# Patient Record
Sex: FEMALE | Race: WHITE | Employment: UNEMPLOYED
[De-identification: names, ages, dates, MRNs, and addresses within clinical notes are randomized per-mention and may not be internally consistent; named-entity substitution may affect disease eponyms.]

---

## 2017-11-11 ENCOUNTER — HEALTH MAINTENANCE LETTER (OUTPATIENT)
Age: 37
End: 2017-11-11

## 2018-02-27 DIAGNOSIS — F41.9 ANXIETY: ICD-10-CM

## 2018-02-27 RX ORDER — BUSPIRONE HYDROCHLORIDE 15 MG/1
15 TABLET ORAL 3 TIMES DAILY
Qty: 90 TABLET | Refills: 0 | Status: SHIPPED | OUTPATIENT
Start: 2018-02-27 | End: 2018-03-19

## 2018-02-27 RX ORDER — BUSPIRONE HYDROCHLORIDE 15 MG/1
15 TABLET ORAL 3 TIMES DAILY
Qty: 90 TABLET | Refills: 0 | Status: SHIPPED | OUTPATIENT
Start: 2018-02-27 | End: 2018-02-27

## 2018-02-27 NOTE — TELEPHONE ENCOUNTER
Reason for Call:  Medication or medication refill:    Do you use a [facility]?  Name of the pharmacy and phone number for the current request:  [facility] - [place], [place] [phone]    Name of the medication requested: Patient calling requesting a short term fill of her buspar. Patient is trying to make an appointment with Dr. Ambrocio but she does not know what her upcoming work schedule will be after March 8th. The day that would work for her to come in during the next couple weeks is a day Dr. Ambrocio is on vacation. Please call her to let her know if this can be filled or if she needs to wait to be seen.    Other request:     Can we leave a detailed message on this number? YES    Phone number patient can be reached at: Home number on file [phone] (home)    Best Time: any    Call taken on 2/27/2018 at 12:46 PM by Monique Silverio

## 2018-02-27 NOTE — TELEPHONE ENCOUNTER
Patient has not taken this medication in months.  Patient has not been seen since 7/2016. Patient states her anxiety is getting worse.  Patient was offered other appointments for evaluation and patient wants a guarantee the medication was prescribed.  Patient was advised the writer can not guarantee a prescription would be granted at the office visit.  Patient was very demanding she gets this medication.  Patient requested this be sent to provider.  Please review and advise.  Writer accidentally approved prescription. Writer called the pharmacy to cancel the prescription.      Thank you  Ekaterina RICE RN

## 2018-03-19 ENCOUNTER — OFFICE VISIT (OUTPATIENT)
Dept: FAMILY MEDICINE | Facility: CLINIC | Age: 38
End: 2018-03-19
Payer: COMMERCIAL

## 2018-03-19 VITALS
HEART RATE: 89 BPM | DIASTOLIC BLOOD PRESSURE: 74 MMHG | RESPIRATION RATE: 16 BRPM | SYSTOLIC BLOOD PRESSURE: 114 MMHG | WEIGHT: 162.2 LBS | HEIGHT: 68 IN | BODY MASS INDEX: 24.58 KG/M2

## 2018-03-19 DIAGNOSIS — F41.9 ANXIETY: ICD-10-CM

## 2018-03-19 DIAGNOSIS — F34.1 DYSTHYMIC DISORDER: ICD-10-CM

## 2018-03-19 DIAGNOSIS — G44.209 MUSCLE CONTRACTION HEADACHE: Primary | ICD-10-CM

## 2018-03-19 PROCEDURE — 99214 OFFICE O/P EST MOD 30 MIN: CPT | Performed by: FAMILY MEDICINE

## 2018-03-19 RX ORDER — NORTRIPTYLINE HYDROCHLORIDE 50 MG/1
50 CAPSULE ORAL AT BEDTIME
Qty: 30 CAPSULE | Refills: 5 | Status: SHIPPED | OUTPATIENT
Start: 2018-03-19 | End: 2018-05-15

## 2018-03-19 RX ORDER — BUSPIRONE HYDROCHLORIDE 15 MG/1
30 TABLET ORAL 2 TIMES DAILY
Qty: 120 TABLET | Refills: 5 | Status: SHIPPED | OUTPATIENT
Start: 2018-03-19 | End: 2018-05-15

## 2018-03-19 ASSESSMENT — ANXIETY QUESTIONNAIRES
GAD7 TOTAL SCORE: 13
IF YOU CHECKED OFF ANY PROBLEMS ON THIS QUESTIONNAIRE, HOW DIFFICULT HAVE THESE PROBLEMS MADE IT FOR YOU TO DO YOUR WORK, TAKE CARE OF THINGS AT HOME, OR GET ALONG WITH OTHER PEOPLE: SOMEWHAT DIFFICULT
3. WORRYING TOO MUCH ABOUT DIFFERENT THINGS: NEARLY EVERY DAY
6. BECOMING EASILY ANNOYED OR IRRITABLE: SEVERAL DAYS
7. FEELING AFRAID AS IF SOMETHING AWFUL MIGHT HAPPEN: NOT AT ALL
1. FEELING NERVOUS, ANXIOUS, OR ON EDGE: NEARLY EVERY DAY
2. NOT BEING ABLE TO STOP OR CONTROL WORRYING: NEARLY EVERY DAY
5. BEING SO RESTLESS THAT IT IS HARD TO SIT STILL: SEVERAL DAYS

## 2018-03-19 ASSESSMENT — PATIENT HEALTH QUESTIONNAIRE - PHQ9: 5. POOR APPETITE OR OVEREATING: MORE THAN HALF THE DAYS

## 2018-03-19 NOTE — MR AVS SNAPSHOT
"              After Visit Summary   3/19/2018    Sommer Gomez    MRN: [id number]           Patient Information     Date Of Birth          1980        Visit Information        Provider Department      3/19/2018 3:00 PM MIGUELINA Ambrocio MD [facility]        Today's Diagnoses     Muscle contraction headache    -  1    Anxiety        Dysthymic disorder           Follow-ups after your visit        Who to contact     If you have questions or need follow up information about today's clinic visit or your schedule please contact [facility] directly at [phone].  Normal or non-critical lab and imaging results will be communicated to you by [employer], letter or phone within 4 business days after the clinic has received the results. If you do not hear from us within 7 days, please contact the clinic through [employer] or phone. If you have a critical or abnormal lab result, we will notify you by phone as soon as possible.  Submit refill requests through [employer] or call your pharmacy and they will forward the refill request to us. Please allow 3 business days for your refill to be completed.          Additional Information About Your Visit        MyChart Information     [employer] gives you secure access to your electronic health record. If you see a primary care provider, you can also send messages to your care team and make appointments. If you have questions, please call your primary care clinic.  If you do not have a primary care provider, please call [phone] and they will assist you.        Care EveryWhere ID     This is your Care EveryWhere ID. This could be used by other organizations to access your [facility] medical records  [id number]        Your Vitals Were     Pulse Respirations Height BMI (Body Mass Index)          89 16 5' 8\" (1.727 m) 24.66 kg/m2         Blood Pressure from Last 3 Encounters:   03/19/18 114/74   07/21/16 100/70   12/04/15 92/60    Weight from Last 3 " Encounters:   03/19/18 162 lb 3.2 oz (73.6 kg)   07/21/16 149 lb (67.6 kg)   12/04/15 144 lb 6.4 oz (65.5 kg)              Today, you had the following     No orders found for display         Today's Medication Changes          These changes are accurate as of 3/19/18  4:30 PM.  If you have any questions, ask your nurse or doctor.               Start taking these medicines.        Dose/Directions    nortriptyline 50 MG capsule   Commonly known as:  PAMELOR   Used for:  Muscle contraction headache   Started by:  MIGUELINA Ambrocio MD        Dose:  50 mg   Take 1 capsule (50 mg) by mouth At Bedtime   Quantity:  30 capsule   Refills:  5         These medicines have changed or have updated prescriptions.        Dose/Directions    busPIRone 15 MG tablet   Commonly known as:  BUSPAR   This may have changed:    - how much to take  - when to take this   Used for:  Anxiety   Changed by:  MIGUELINA Ambrocio MD        Dose:  30 mg   Take 2 tablets (30 mg) by mouth 2 times daily   Quantity:  120 tablet   Refills:  5         Stop taking these medicines if you haven't already. Please contact your care team if you have questions.     traZODone 50 MG tablet   Commonly known as:  DESYREL   Stopped by:  MIGUELINA Ambrocio MD                Where to get your medicines      These medications were sent to [id number] - [place], [place] - [address]  [address] [address]     Phone:  [phone]     busPIRone 15 MG tablet    nortriptyline 50 MG capsule                Primary Care Provider Office Phone # Fax #    MIGUELINA Ambrocio [phone] [address] [address]        Equal Access to Services     [facility]: Hadii aad ku hadasho Soomaali, waaxda luqadaha, qaybta kaalmada adeegyalolis, thania andujar. So [facility] [phone].    ATENCIÓN: Si habla español, tiene a lira disposición servicios gratuitos de asistencia lingüística. Llame al  [phone].    We comply with applicable federal civil rights laws and [place] laws. We do not discriminate on the basis of race, color, national origin, age, disability, sex, sexual orientation, or gender identity.            Thank you!     Thank you for choosing [facility]  for your care. Our goal is always to provide you with excellent care. Hearing back from our patients is one way we can continue to improve our services. Please take a few minutes to complete the written survey that you may receive in the mail after your visit with us. Thank you!             Your Updated Medication List - Protect others around you: Learn how to safely use, store and throw away your medicines at www.disposemymeds.org.          This list is accurate as of 3/19/18  4:30 PM.  Always use your most recent med list.                   Brand Name Dispense Instructions for use Diagnosis    busPIRone 15 MG tablet    BUSPAR    120 tablet    Take 2 tablets (30 mg) by mouth 2 times daily    Anxiety       nortriptyline 50 MG capsule    PAMELOR    30 capsule    Take 1 capsule (50 mg) by mouth At Bedtime    Muscle contraction headache       ranitidine 300 MG tablet    ZANTAC    90 tablet    Take 1 tablet (300 mg) by mouth At Bedtime    Colitis       TYLENOL PO      Take 500-1,500 mg by mouth 2 times daily as needed.

## 2018-03-19 NOTE — NURSING NOTE
"Chief Complaint   Patient presents with     Anxiety     recheck/refill pt. would like to talk about a different medication ? headaches     Health Maintenance     pt. was reminded she is due for pap/pe.       Initial /74  Pulse 89  Resp 16  Ht 5' 8\" (1.727 m)  Wt 162 lb 3.2 oz (73.6 kg)  BMI 24.66 kg/m2 Estimated body mass index is 24.66 kg/(m^2) as calculated from the following:    Height as of this encounter: 5' 8\" (1.727 m).    Weight as of this encounter: 162 lb 3.2 oz (73.6 kg).  Medication Reconciliation: complete     Stefani Martinez, CMA      "

## 2018-03-19 NOTE — PROGRESS NOTES
Subjective:  Sommer Gomez is a 37 year old female   Chief Complaint   Patient presents with     Anxiety     recheck/refill pt. would like to talk about a different medication ? headaches     Health Maintenance     pt. was reminded she is due for pap/pe.     She has noticed that her headaches are often associated with feeling stressed or anxious.  She admits that she will frequently miss the mid day dose of her buspirone and those are often the days that she will get a headache.  The trazodone had been working well to help her sleep at night, but apparently she had quite a few cavities in her dentist looked it up and said this medication was associated with increased dental decay.  I do not find that as a listed side effect in the [employer] medication asaf.  Current symptoms include:  ROSA-7   [employer], 2002; Used with Permission) 3/19/2018   Over the last 2 weeks, how often have you been bothered by feeling nervous, anxious or on edge?    Over the last 2 weeks, how often have you been bothered by not being able to stop or control worrying?    Over the last 2 weeks, how often have you been bothered by worrying too much about different things?    Over the last 2 weeks, how often have you been bothered by trouble relaxing?    Over the last 2 weeks, how often have you been bothered by being so restless that it is hard to sit still?    Over the last 2 weeks, how often have you been bothered by becoming easily annoyed or irritable?    Over the last 2 weeks, how often have you been bothered by feeling afraid as if something awful might happen?    ROSA-7 Total Score =     1. Feeling nervous, anxious, or on edge 3   2. Not being able to stop or control worrying 3   3. Worrying too much about different things 3   4. Trouble relaxing 2   5. Being so restless that it is hard to sit still 1   6. Becoming easily annoyed or irritable 1   7. Feeling afraid, as if something awful might happen 0   ROSA-7 Total Score 13   If you  checked any problems, how difficult have they made it for you to do your work, take care of things at home, or get along with other people? Somewhat difficult     PHQ-9 ([employer]) 3/19/2018   No Interest In Doing Things    Feeling Depressed    Trouble Sleeping    Tired / No Energy    No appetite or Over-Eating    Feeling Bad about Self    Trouble Concentrating    Moving Slow or Restless    Suicidal Thoughts    Total Score    1.  Little interest or pleasure in doing things 1   2.  Feeling down, depressed, or hopeless 0   3.  Trouble falling or staying asleep, or sleeping too much 3   4.  Feeling tired or having little energy 2   5.  Poor appetite or overeating 1   6.  Feeling bad about yourself 0   7.  Trouble concentrating 1   8.  Moving slowly or restless 1   9.  Suicidal or self-harm thoughts 0   PHQ-9 Total Score 9   Difficulty at work, home, or with people Somewhat difficult   1.  Little interest or pleasure in doing things    2.  Feeling down, depressed, or hopeless    3.  Trouble falling or staying asleep, or sleeping too much    4.  Feeling tired or having little energy    5.  Poor appetite or overeating    6.  Feeling bad about yourself    7.  Trouble concentrating    8.  Moving slowly or restless    9.  Suicidal or self-harm thoughts    PHQ-9 via [employer] TOTAL SCORE----->      Encounter Diagnoses   Name Primary?     Muscle contraction headache Yes     Anxiety      Dysthymic disorder        ROS:other than noted above, general, HEENT, respiratory, cardiac, gastrointestinal systems are negative    Medical, surgical, social, and family histories, medications and allergies reviewed and updated.    Objective:  Exam:    GENERAL APPEARANCE ADULT: Alert, no acute distress  EYES: PERRL, EOM normal, conjunctiva and lids normal  RESP: lungs clear to auscultation   CV: normal rate, regular rhythm, no murmur or gallop  MS: Tenderness and tightness of the posterior neck muscles just below the occiput  NEURO: Alert,  oriented, speech and mentation normal, Cranial nerves 2-12 are normal., Strength normal and symmetrical in upper and lower extremities., Reflexes 2+ and symmetrical at biceps, triceps, brachioradialis, knees and ankles, Finger to nose and heel to shin testing is normal, Gait  normal  PSYCH: mentation appears normal., affect somewhat flat which is her baseline      ASSESSMENT:  1. Muscle contraction headache    2. Anxiety    3. Dysthymic disorder        PLAN:  Orders Placed This Encounter     busPIRone (BUSPAR) 15 MG tablet     nortriptyline (PAMELOR) 50 MG capsule       We will switch her buspirone prescription to 30 mg twice a day instead of 50 mg 3 times a day.  She should be able to achieve better compliance with it twice a day dosing.  Hopefully this will decrease the frequency of her muscle contraction headaches.  Although I had never heard of the problem with dental caries using trazodone,  will try switching to nortriptyline and see if this will work as well to help her with sleep

## 2018-03-20 ASSESSMENT — PATIENT HEALTH QUESTIONNAIRE - PHQ9: SUM OF ALL RESPONSES TO PHQ QUESTIONS 1-9: 9

## 2018-03-20 ASSESSMENT — ANXIETY QUESTIONNAIRES: GAD7 TOTAL SCORE: 13

## 2018-04-20 ENCOUNTER — TELEPHONE (OUTPATIENT)
Dept: FAMILY MEDICINE | Facility: CLINIC | Age: 38
End: 2018-04-20

## 2018-04-20 NOTE — TELEPHONE ENCOUNTER
Patient was notified and left a detailed message on the voicemail. Patient stated it was ok to leave a message.    Ekaterina RICE RN

## 2018-04-20 NOTE — TELEPHONE ENCOUNTER
Patient would like something for some future dental work she is having done.  Patient has had to be under sedation in the past.  Patient's insurance is not covering the sedation and the PA for this was refused. Patient has appointment with PCP on 5/15/18.  Patient has dentist appointment on 6/13/18.  Ok to send rx or would you like to discuss this with patient at the clinic visit in 5/15/18?    Thank you  Ekaterina RICE RN

## 2018-04-20 NOTE — TELEPHONE ENCOUNTER
Reason for Call:  Anxiety medications    Detailed comments: patient is calling and stating that she is on an anxiety medication and it increases with dental visits. She is having a tooth pulled soon, and would like something stronger to keep her calm for this.  Please call her work number of [phone] and ask for Caroline in [place], and you can leave a message on her cell phone.    Phone Number Patient can be reached at: Work number on file:  [phone] (work)    Best Time: any    Can we leave a detailed message on this number? YES   Odalys Royal  [facility] [place] [facility]      Call taken on 4/20/2018 at 12:58 PM by Odalys Royal

## 2018-04-20 NOTE — TELEPHONE ENCOUNTER
You could call the patient back and reassure her that I will give her something to help her get through the dental appointments.  We might as well wait until the office visit to do the actual prescription so I can explain this in more detail, and then she will have it for the dental visits in [place]

## 2018-05-15 ENCOUNTER — OFFICE VISIT (OUTPATIENT)
Dept: FAMILY MEDICINE | Facility: CLINIC | Age: 38
End: 2018-05-15
Payer: COMMERCIAL

## 2018-05-15 VITALS
DIASTOLIC BLOOD PRESSURE: 77 MMHG | TEMPERATURE: 97.4 F | BODY MASS INDEX: 24.25 KG/M2 | WEIGHT: 160 LBS | HEART RATE: 112 BPM | HEIGHT: 68 IN | RESPIRATION RATE: 16 BRPM | SYSTOLIC BLOOD PRESSURE: 112 MMHG

## 2018-05-15 DIAGNOSIS — F34.1 DYSTHYMIC DISORDER: Primary | ICD-10-CM

## 2018-05-15 DIAGNOSIS — F41.9 ANXIETY: ICD-10-CM

## 2018-05-15 DIAGNOSIS — F33.0 MILD RECURRENT MAJOR DEPRESSION (H): ICD-10-CM

## 2018-05-15 DIAGNOSIS — G44.209 MUSCLE CONTRACTION HEADACHE: ICD-10-CM

## 2018-05-15 PROCEDURE — 99213 OFFICE O/P EST LOW 20 MIN: CPT | Performed by: FAMILY MEDICINE

## 2018-05-15 RX ORDER — BUSPIRONE HYDROCHLORIDE 15 MG/1
30 TABLET ORAL 2 TIMES DAILY
Qty: 120 TABLET | Refills: 11 | Status: SHIPPED | OUTPATIENT
Start: 2018-05-15 | End: 2020-12-01

## 2018-05-15 RX ORDER — NORTRIPTYLINE HYDROCHLORIDE 75 MG/1
75 CAPSULE ORAL AT BEDTIME
Qty: 90 CAPSULE | Refills: 3 | Status: SHIPPED | OUTPATIENT
Start: 2018-05-15 | End: 2020-09-15

## 2018-05-15 RX ORDER — LORAZEPAM 1 MG/1
1 TABLET ORAL EVERY 8 HOURS PRN
Qty: 12 TABLET | Refills: 1 | Status: SHIPPED | OUTPATIENT
Start: 2018-05-15 | End: 2023-09-21

## 2018-05-15 NOTE — PROGRESS NOTES
Subjective:  Sommer Gomez is a 37 year old female   Chief Complaint   Patient presents with     Anxiety     Depression     Health Maintenance     pt. is due for pap/pe     She is here for follow-up of her depression and needs a refill on her medications.  She is also requesting a short acting potent antianxiety meds to use for some upcoming dental appointments.  The nortriptyline has helped her to fall asleep but she will frequently wake up after 3 or 4 hours and have difficulty going back to sleep she gets extremely anxious about these appointments and her dentist does provide sedation but her insurance will not cover.  Therefore they recommended she get a prescription for an oral medication from her physician.    Current symptoms include:  PHQ-9 ([employer]) 5/15/2018   No Interest In Doing Things    Feeling Depressed    Trouble Sleeping    Tired / No Energy    No appetite or Over-Eating    Feeling Bad about Self    Trouble Concentrating    Moving Slow or Restless    Suicidal Thoughts    Total Score    1.  Little interest or pleasure in doing things 1   2.  Feeling down, depressed, or hopeless 0   3.  Trouble falling or staying asleep, or sleeping too much 2   4.  Feeling tired or having little energy 1   5.  Poor appetite or overeating 1   6.  Feeling bad about yourself 0   7.  Trouble concentrating 1   8.  Moving slowly or restless 0   9.  Suicidal or self-harm thoughts 0   PHQ-9 Total Score 6   Difficulty at work, home, or with people Somewhat difficult   1.  Little interest or pleasure in doing things    2.  Feeling down, depressed, or hopeless    3.  Trouble falling or staying asleep, or sleeping too much    4.  Feeling tired or having little energy    5.  Poor appetite or overeating    6.  Feeling bad about yourself    7.  Trouble concentrating    8.  Moving slowly or restless    9.  Suicidal or self-harm thoughts    PHQ-9 via [employer] TOTAL SCORE----->      ROSA-7   [employer], 2002; Used with Permission)  3/19/2018   Over the last 2 weeks, how often have you been bothered by feeling nervous, anxious or on edge?    Over the last 2 weeks, how often have you been bothered by not being able to stop or control worrying?    Over the last 2 weeks, how often have you been bothered by worrying too much about different things?    Over the last 2 weeks, how often have you been bothered by trouble relaxing?    Over the last 2 weeks, how often have you been bothered by being so restless that it is hard to sit still?    Over the last 2 weeks, how often have you been bothered by becoming easily annoyed or irritable?    Over the last 2 weeks, how often have you been bothered by feeling afraid as if something awful might happen?    ROSA-7 Total Score =     1. Feeling nervous, anxious, or on edge 3   2. Not being able to stop or control worrying 3   3. Worrying too much about different things 3   4. Trouble relaxing 2   5. Being so restless that it is hard to sit still 1   6. Becoming easily annoyed or irritable 1   7. Feeling afraid, as if something awful might happen 0   ROSA-7 Total Score 13   If you checked any problems, how difficult have they made it for you to do your work, take care of things at home, or get along with other people? Somewhat difficult           Encounter Diagnoses   Name Primary?     Dysthymic disorder Yes     Mild recurrent major depression (H)      Muscle contraction headache      Anxiety        ROS:other than noted above, general, HEENT, respiratory, cardiac, gastrointestinal systems are negative    Medical, surgical, social, and family histories, medications and allergies reviewed and updated.    Objective:  Exam:    GENERAL APPEARANCE ADULT: Alert, no acute distress  EYES: PERRL, EOM normal, conjunctiva and lids normal  PSYCH: mentation appears normal., affect and mood normal      ASSESSMENT:  1. Dysthymic disorder    2. Mild recurrent major depression (H)    3. Muscle contraction headache    4. Anxiety         PLAN:  Orders Placed This Encounter     busPIRone (BUSPAR) 15 MG tablet     nortriptyline (PAMELOR) 75 MG capsule     LORazepam (ATIVAN) 1 MG tablet for dental appointments     We will increase the nortriptyline to 75 mg and see if that will not help her sleep longer through the night

## 2018-05-15 NOTE — MR AVS SNAPSHOT
After Visit Summary   5/15/2018    Sommer Gomez    MRN: [id number]           Patient Information     Date Of Birth          1980        Visit Information        Provider Department      5/15/2018 4:40 PM MIGUELINA Ambrocio MD [facility]        Today's Diagnoses     Anxiety        Muscle contraction headache           Follow-ups after your visit        Your next 10 appointments already scheduled     May 15, 2018  4:40 PM CDT   SHORT with MIGUELINA Ambrocio MD   [facility] ([facility])    [address]  [facility] [id number] [phone]              Who to contact     If you have questions or need follow up information about today's clinic visit or your schedule please contact [facility] directly at [phone].  Normal or non-critical lab and imaging results will be communicated to you by MyChart, letter or phone within 4 business days after the clinic has received the results. If you do not hear from us within 7 days, please contact the clinic through MyChart or phone. If you have a critical or abnormal lab result, we will notify you by phone as soon as possible.  Submit refill requests through "[employer] or call your pharmacy and they will forward the refill request to us. Please allow 3 business days for your refill to be completed.          Additional Information About Your Visit        MyChart Information     "[employer] gives you secure access to your electronic health record. If you see a primary care provider, you can also send messages to your care team and make appointments. If you have questions, please call your primary care clinic.  If you do not have a primary care provider, please call [phone] and they will assist you.        Care EveryWhere ID     This is your Care EveryWhere ID. This could be used by other organizations to access your [facility] medical records  [id number]        Your Vitals Were      "Pulse Temperature Respirations Height BMI (Body Mass Index)       112 97.4  F (36.3  C) (Tympanic) 16 5' 8\" (1.727 m) 24.33 kg/m2        Blood Pressure from Last 3 Encounters:   05/15/18 112/77   03/19/18 114/74   07/21/16 100/70    Weight from Last 3 Encounters:   05/15/18 160 lb (72.6 kg)   03/19/18 162 lb 3.2 oz (73.6 kg)   07/21/16 149 lb (67.6 kg)              Today, you had the following     No orders found for display         Today's Medication Changes          These changes are accurate as of 5/15/18  4:29 PM.  If you have any questions, ask your nurse or doctor.               Start taking these medicines.        Dose/Directions    LORazepam 1 MG tablet   Commonly known as:  ATIVAN   Used for:  Anxiety        Dose:  1 mg   Take 1 tablet (1 mg) by mouth every 8 hours as needed for anxiety Take 30 minutes prior to dental appointment.  Do not operate a vehicle after taking this medication   Quantity:  12 tablet   Refills:  1         These medicines have changed or have updated prescriptions.        Dose/Directions    nortriptyline 75 MG capsule   Commonly known as:  PAMELOR   This may have changed:    - medication strength  - how much to take   Used for:  Muscle contraction headache        Dose:  75 mg   Take 1 capsule (75 mg) by mouth At Bedtime   Quantity:  90 capsule   Refills:  3            Where to get your medicines      These medications were sent to [id number] - [place], [place] - [address]  [address] [address]     Phone:  [phone]     busPIRone 15 MG tablet    nortriptyline 75 MG capsule         Some of these will need a paper prescription and others can be bought over the counter.  Ask your nurse if you have questions.     Bring a paper prescription for each of these medications     LORazepam 1 MG tablet                Primary Care Provider Office Phone # Fax #    MIGUELINA Ambrocio [phone] [address] " [place] [address]        Equal Access to Services     [facility] : Hadii giselle jeff ken Calvert, wasmoothda luqadaha, qaybta kaalmada radhacarlynlolis, waxarnav sylvain escamillapreetantoine andujar. So [facility] [phone].    ATENCIÓN: Si habla español, tiene a lira disposición servicios gratuitos de asistencia lingüística. Rakeshame al [phone].    We comply with applicable federal civil rights laws and [place] laws. We do not discriminate on the basis of race, color, national origin, age, disability, sex, sexual orientation, or gender identity.            Thank you!     Thank you for choosing [facility]  for your care. Our goal is always to provide you with excellent care. Hearing back from our patients is one way we can continue to improve our services. Please take a few minutes to complete the written survey that you may receive in the mail after your visit with us. Thank you!             Your Updated Medication List - Protect others around you: Learn how to safely use, store and throw away your medicines at www.disposemymeds.org.          This list is accurate as of 5/15/18  4:29 PM.  Always use your most recent med list.                   Brand Name Dispense Instructions for use Diagnosis    busPIRone 15 MG tablet    BUSPAR    120 tablet    Take 2 tablets (30 mg) by mouth 2 times daily    Anxiety       LORazepam 1 MG tablet    ATIVAN    12 tablet    Take 1 tablet (1 mg) by mouth every 8 hours as needed for anxiety Take 30 minutes prior to dental appointment.  Do not operate a vehicle after taking this medication    Anxiety       nortriptyline 75 MG capsule    PAMELOR    90 capsule    Take 1 capsule (75 mg) by mouth At Bedtime    Muscle contraction headache       ranitidine 300 MG tablet    ZANTAC    90 tablet    Take 1 tablet (300 mg) by mouth At Bedtime    Colitis       TYLENOL PO      Take 500-1,500 mg by mouth 2 times daily as needed.

## 2018-05-16 ASSESSMENT — PATIENT HEALTH QUESTIONNAIRE - PHQ9: SUM OF ALL RESPONSES TO PHQ QUESTIONS 1-9: 6

## 2018-09-02 ENCOUNTER — NURSE TRIAGE (OUTPATIENT)
Dept: NURSING | Facility: CLINIC | Age: 38
End: 2018-09-02

## 2018-09-02 ENCOUNTER — HOSPITAL ENCOUNTER (EMERGENCY)
Facility: CLINIC | Age: 38
Discharge: HOME | End: 2018-09-02
Attending: PHYSICIAN ASSISTANT | Admitting: PHYSICIAN ASSISTANT
Payer: COMMERCIAL

## 2018-09-02 VITALS
DIASTOLIC BLOOD PRESSURE: 90 MMHG | RESPIRATION RATE: 16 BRPM | SYSTOLIC BLOOD PRESSURE: 139 MMHG | BODY MASS INDEX: 24.33 KG/M2 | TEMPERATURE: 98.7 F | OXYGEN SATURATION: 99 % | WEIGHT: 160 LBS | HEART RATE: 108 BPM

## 2018-09-02 PROCEDURE — G0463 HOSPITAL OUTPT CLINIC VISIT: HCPCS | Performed by: PHYSICIAN ASSISTANT

## 2018-09-02 PROCEDURE — 99213 OFFICE O/P EST LOW 20 MIN: CPT | Mod: Z6 | Performed by: PHYSICIAN ASSISTANT

## 2018-09-02 RX ORDER — ALBUTEROL SULFATE 90 UG/1
2 AEROSOL, METERED RESPIRATORY (INHALATION) EVERY 6 HOURS PRN
Qty: 1 INHALER | Refills: 0 | Status: SHIPPED | OUTPATIENT
Start: 2018-09-02 | End: 2019-09-09

## 2018-09-02 NOTE — ED AVS SNAPSHOT
[facility]    [address] [address]    Phone:  [phone]    Fax:  [phone]                                       Sommer Gomez   MRN: [id number]    Department:  [facility]   Date of Visit:  9/2/2018           After Visit Summary Signature Page     I have received my discharge instructions, and my questions have been answered. I have discussed any challenges I see with this plan with the nurse or doctor.    ..........................................................................................................................................  Patient/Patient Representative Signature      ..........................................................................................................................................  Patient Representative Print Name and Relationship to Patient    ..................................................               ................................................  Date                                            Time    ..........................................................................................................................................  Reviewed by Signature/Title    ...................................................              ..............................................  Date                                                            Time          22EPIC Rev 08/18

## 2018-09-02 NOTE — ED AVS SNAPSHOT
[facility]    [address] [address]    Phone:  [phone]    Fax:  [phone]                                       Sommer Gomez   MRN: [id number]    Department:  [facility]   Date of Visit:  9/2/2018           Patient Information     Date Of Birth          1980        Your diagnoses for this visit were:     Exposure to respiratory irritant, initial encounter        You were seen by Amara Grullon PA-C.      Follow-up Information     Call MIGUELINA Ambrocio MD.    Specialty:  Family Practice    Why:  As needed, For persistent symptoms    Contact information:    [address] [address]  [phone]          Follow up with [facility].    Specialty:  EMERGENCY MEDICINE    Why:  As needed, If symptoms worsen    Contact information:    [address] [place] [id number] [phone]    Additional information:    The medical center is located at   [address] ([address] in [place], four miles north   of [place]).      24 Hour Appointment Hotline       To make an appointment at any [facility] clinic, call [phone] ([phone]). If you don't have a family doctor or clinic, we will help you find one. [facility] clinics are conveniently located to serve the needs of you and your family.             Review of your medicines      START taking        Dose / Directions Last dose taken    albuterol 108 (90 Base) MCG/ACT inhaler   Commonly known as:  PROAIR HFA/PROVENTIL HFA/VENTOLIN HFA   Dose:  2 puff   Quantity:  1 Inhaler        Inhale 2 puffs into the lungs every 6 hours as needed for shortness of breath / dyspnea or wheezing   Refills:  0          Our records show that you are taking the medicines listed below. If these are incorrect, please call your family doctor or clinic.        Dose / Directions Last dose taken    busPIRone 15 MG tablet    Commonly known as:  BUSPAR   Dose:  30 mg   Quantity:  120 tablet        Take 2 tablets (30 mg) by mouth 2 times daily   Refills:  11        LORazepam 1 MG tablet   Commonly known as:  ATIVAN   Dose:  1 mg   Quantity:  12 tablet        Take 1 tablet (1 mg) by mouth every 8 hours as needed for anxiety Take 30 minutes prior to dental appointment.  Do not operate a vehicle after taking this medication   Refills:  1        nortriptyline 75 MG capsule   Commonly known as:  PAMELOR   Dose:  75 mg   Quantity:  90 capsule        Take 1 capsule (75 mg) by mouth At Bedtime   Refills:  3        ranitidine 300 MG tablet   Commonly known as:  ZANTAC   Dose:  300 mg   Quantity:  90 tablet        Take 1 tablet (300 mg) by mouth At Bedtime   Refills:  3        TYLENOL PO   Dose:  500-1500 mg        Take 500-1,500 mg by mouth 2 times daily as needed.   Refills:  0                Prescriptions were sent or printed at these locations (1 Prescription)                   [facility] [address]   [address] [address]    Telephone:  [phone]   Fax:  [phone]   Hours:                  E-Prescribed (1 of 1)         albuterol (PROAIR HFA/PROVENTIL HFA/VENTOLIN HFA) 108 (90 Base) MCG/ACT inhaler                Orders Needing Specimen Collection     None      Pending Results     No orders found from 8/31/2018 to 9/3/2018.            Pending Culture Results     No orders found from 8/31/2018 to 9/3/2018.            Pending Results Instructions     If you had any lab results that were not finalized at the time of your Discharge, you can call the ED Lab Result RN at [phone]. You will be contacted by this team for any positive Lab results or changes in treatment. The nurses are available 7 days a week from 10A to 6:30P.  You can leave a message 24 hours per day and they will return your call.        Test Results From Your Hospital Stay               Thank you for choosing [facility]        Thank you for choosing [facility] for your care. Our goal is always to provide you with excellent care. Hearing back from our patients is one way we can continue to improve our services. Please take a few minutes to complete the written survey that you may receive in the mail after you visit with us. Thank you!        [employer] Information     [employer] gives you secure access to your electronic health record. If you see a primary care provider, you can also send messages to your care team and make appointments. If you have questions, please call your primary care clinic.  If you do not have a primary care provider, please call [phone] and they will assist you.        Care EveryWhere ID     This is your Care EveryWhere ID. This could be used by other organizations to access your [facility] medical records  [id number]        Equal Access to Services     KODY RÍOS : Cathy Calvert, mika fernández, steve hernandez, thania andujar. So [facility] [phone].    ATENCIÓN: Si habla español, tiene a lira disposición servicios gratuitos de asistencia lingüística. Llame al [phone].    We comply with applicable federal civil rights laws and [place] laws. We do not discriminate on the basis of race, color, national origin, age, disability, sex, sexual orientation, or gender identity.            After Visit Summary       This is your record. Keep this with you and show to your community pharmacist(s) and doctor(s) at your next visit.

## 2018-09-02 NOTE — ED TRIAGE NOTES
Patient presents today with possible allergic reaction. Patient states that she has a history with being sensitive to certain laundry detergents etc.

## 2018-09-02 NOTE — TELEPHONE ENCOUNTER
Should 911 but will go to ER.  Rocío Cates RN  [facility]      Reason for Disposition    Wheezing, stridor, hoarseness, or difficulty breathing    Protocols used: ANAPHYLAXIS-ADULT-AH

## 2018-09-02 NOTE — ED PROVIDER NOTES
History     Chief Complaint   Patient presents with     Allergic Reaction     reports chemical exposure at work,  felt  hard to breath and c/o chest pain. reports it is now improved after benadryl. triage nurse told pt to come in     HPI  Sommer Gomez is a 37 year old female who presents with complaints of irritant exposure around 2 PM today.  Pt states she was in the [place]le of the laundry detergents at a store when she developed chest tightness from inhaling all of the laundry detergent scents which she states was overwhelming.  Pt denies any difficulties breathing, shortness of breath, or wheezing.  Pt also developed diffuse itching at that time as well.  Pt reports similar symptoms in the past whenever she is exposed to strong perfumes or strong scents.  Pt states she is very sensitive to scents.  Pt took 2 tabs of Benadryl prior to coming in with improvement in her symptoms.  No rash.  Pt denies difficulties swallowing or the sensation of her throat closing/swelling.  Denies fevers, chills, cough, sore throat, sinus pressure, nasal congestion, nausea, vomiting, or abdominal pain.  She states the nurse advice line recommended she be evaluated.      Problem List:    Patient Active Problem List    Diagnosis Date Noted     Impingement syndrome of shoulder region 12/11/2014     Priority: Medium     Fatigue 11/14/2012     Priority: Medium     Anxiety 11/02/2012     Priority: Medium     Health Care Home 10/16/2011     Priority: Medium     EMERGENCY CARE PLAN  Presenting Problem Signs and Symptoms Treatment Plan    Questions or conerns during clinic hours    I will call the clinic directly     Questions or conerns outside clinic hours    I will call the 24 hour nurse line at [phone]    Patient needs to schedule an appointment    I will call the 24 hour scheduling team at [phone] or clinic directly    Same day treatment     I will call the clinic first, nurse line if after hours, urgent care and express  care if needed                            DX V65.8 REPLACED WITH [id number] HEALTH CARE HOME (04/08/2013)       Mild recurrent major depression (H) 08/05/2011     Priority: Medium     CARDIOVASCULAR SCREENING; LDL GOAL LESS THAN 160 10/31/2010     Priority: Medium     Colitis 12/10/2008     Priority: Medium     Dysthymic disorder 08/31/2006     Priority: Medium     8/31/2006  Pt has hx of depression, family hx of depression (all her brothers are on lexapro), and hx of psot-partum depression.  8/31/2006 PHQ9 score is 14.            Past Medical History:    Past Medical History:   Diagnosis Date     Chondromalacia of patella        Past Surgical History:    Past Surgical History:   Procedure Laterality Date     COLONOSCOPY  3/1/2011    flex sig     COLONOSCOPY      colonoscopy     TUBAL LIGATION  12/08    essure occlusion       Family History:    Family History   Problem Relation Age of Onset     Allergies Father      Alzheimer Disease Maternal Grandmother      Blood Disease Maternal Grandmother      Hypertension Maternal Grandfather      Arthritis Paternal Grandmother      Depression Brother      Depression Brother      Depression Mother      Allergies Son      Allergies Son        Social History:  Marital Status:  Legally  [3]  Social History   Substance Use Topics     Smoking status: Never Smoker     Smokeless tobacco: Never Used     Alcohol use No        Medications:      albuterol (PROAIR HFA/PROVENTIL HFA/VENTOLIN HFA) 108 (90 Base) MCG/ACT inhaler   Acetaminophen (TYLENOL PO)   busPIRone (BUSPAR) 15 MG tablet   LORazepam (ATIVAN) 1 MG tablet   nortriptyline (PAMELOR) 75 MG capsule   ranitidine (ZANTAC) 300 MG tablet         Review of Systems   Constitutional: Negative.    HENT: Negative.    Respiratory: Positive for chest tightness. Negative for cough, choking, shortness of breath and wheezing.    Cardiovascular: Negative.  Negative for chest pain.   Gastrointestinal: Negative.    Skin: Negative.    All  other systems reviewed and are negative.      Physical Exam   BP: 139/90  Pulse: 108  Temp: 98.7  F (37.1  C)  Resp: 16  Weight: 72.6 kg (160 lb)  SpO2: 99 %      Physical Exam   Constitutional: She appears well-developed and well-nourished. No distress.   HENT:   Head: Normocephalic and atraumatic.   Nose: Nose normal.   Mouth/Throat: Uvula is midline, oropharynx is clear and moist and mucous membranes are normal.   Eyes: Conjunctivae and EOM are normal. Pupils are equal, round, and reactive to light.   Neck: Normal range of motion. Neck supple.   Cardiovascular: Normal rate, regular rhythm and normal heart sounds.    Pulmonary/Chest: Effort normal and breath sounds normal. No respiratory distress. She has no wheezes. She has no rales.   Musculoskeletal: Normal range of motion.   Neurological: She is alert.   Skin: Skin is warm and dry. No rash noted.       ED Course     ED Course     Procedures    No results found for this or any previous visit (from the past 24 hour(s)).    Medications - No data to display    Assessments & Plan (with Medical Decision Making)     Pt is a 37 year old female who presents with complaints of irritant exposure around 2 PM today.  Pt states she was [place] the [place] the laundry detergents at a store when she developed chest tightness from inhaling all of the laundry detergent scents which she states was overwhelming.  Pt denies any difficulties breathing, shortness of breath, or wheezing.  Pt also developed diffuse itching at that time as well.  Pt reports similar symptoms in the past whenever she is exposed to strong perfumes or strong scents.  Pt states she is very sensitive to scents.  Pt took 2 tabs of Benadryl prior to coming in with improvement in her symptoms.  No rash.  Pt denies difficulties swallowing or the sensation of her throat closing/swelling.  Pt is afebrile on arrival.  Exam as above.  Pt's O2 sats are 99% on room air.  Lungs are clear to ausculation bilaterally.  We  discussed obtaining a CXR but since patient is improving with reassuring exam, will hold-off at this time.  Patient is in agreement with this.  Return precautions were reviewed.  Hand-outs were provided.    Patient was sent with an Albuterol inhaler and was instructed to follow-up with PCP in 3-5 days for continued care and management or sooner if new or worsening symptoms.  She is to return to the ED for persistent and/or worsening symptoms.  Patient expressed understanding of the diagnosis and plan and was discharged home in good condition.    I have reviewed the nursing notes.    I have reviewed the findings, diagnosis, plan and need for follow up with the patient.    Discharge Medication List as of 9/2/2018  5:08 PM      START taking these medications    Details   albuterol (PROAIR HFA/PROVENTIL HFA/VENTOLIN HFA) 108 (90 Base) MCG/ACT inhaler Inhale 2 puffs into the lungs every 6 hours as needed for shortness of breath / dyspnea or wheezing, Disp-1 Inhaler, R-0, E-Prescribe             Final diagnoses:   Exposure to respiratory irritant, initial encounter       9/2/2018   [facility] EMERGENCY DEPARTMENT     Amara Grullon PA-C  09/03/18 1646       Amara Grullon PA-C  09/12/18 1000       Amara Grullon PA-C  09/12/18 1000

## 2018-09-12 ASSESSMENT — ENCOUNTER SYMPTOMS
CONSTITUTIONAL NEGATIVE: 1
CHEST TIGHTNESS: 1
WHEEZING: 0
SHORTNESS OF BREATH: 0
CHOKING: 0
CARDIOVASCULAR NEGATIVE: 1
COUGH: 0
GASTROINTESTINAL NEGATIVE: 1

## 2019-02-06 ENCOUNTER — TELEPHONE (OUTPATIENT)
Dept: FAMILY MEDICINE | Facility: CLINIC | Age: 39
End: 2019-02-06

## 2019-02-06 NOTE — TELEPHONE ENCOUNTER
Panel Management Review      Patient has the following on her problem list:     Depression / Dysthymia review    Measure:  Needs PHQ-9 score of 4 or less during index window.  Administer PHQ-9 and if score is 5 or more, send encounter to provider for next steps.    5 - 7 month window range: Due    PHQ-9 SCORE 7/21/2016 3/19/2018 5/15/2018   PHQ-9 Total Score - - -   PHQ-9 Total Score MyChart - - -   PHQ-9 Total Score 4 9 6       If PHQ-9 recheck is 5 or more, route to provider for next steps.    Patient is due for:  PHQ9      Composite cancer screening  Chart review shows that this patient is due/due soon for the following Pap Smear  Summary:    Patient is due/failing the following:   PAP and PHQ9    Action needed:   Patient needs office visit for pap. and Patient needs to do PHQ9.    Type of outreach:    Sent MyChart message.    Questions for provider review:    None                                                                                                                                    Penny Batista MA       Chart routed to Care Team .

## 2019-08-12 ENCOUNTER — OFFICE VISIT (OUTPATIENT)
Dept: FAMILY MEDICINE | Facility: CLINIC | Age: 39
End: 2019-08-12
Payer: COMMERCIAL

## 2019-08-12 VITALS
OXYGEN SATURATION: 99 % | DIASTOLIC BLOOD PRESSURE: 72 MMHG | HEIGHT: 68 IN | HEART RATE: 72 BPM | WEIGHT: 159.8 LBS | BODY MASS INDEX: 24.22 KG/M2 | SYSTOLIC BLOOD PRESSURE: 108 MMHG | TEMPERATURE: 98 F

## 2019-08-12 DIAGNOSIS — Z23 NEED FOR TDAP VACCINATION: ICD-10-CM

## 2019-08-12 DIAGNOSIS — K21.9 GASTROESOPHAGEAL REFLUX DISEASE WITHOUT ESOPHAGITIS: ICD-10-CM

## 2019-08-12 DIAGNOSIS — F41.9 ANXIETY: Primary | ICD-10-CM

## 2019-08-12 PROCEDURE — 90471 IMMUNIZATION ADMIN: CPT | Performed by: FAMILY MEDICINE

## 2019-08-12 PROCEDURE — 99214 OFFICE O/P EST MOD 30 MIN: CPT | Mod: 25 | Performed by: FAMILY MEDICINE

## 2019-08-12 PROCEDURE — 90715 TDAP VACCINE 7 YRS/> IM: CPT | Performed by: FAMILY MEDICINE

## 2019-08-12 RX ORDER — BUSPIRONE HYDROCHLORIDE 10 MG/1
10 TABLET ORAL 2 TIMES DAILY
Qty: 60 TABLET | Refills: 0 | Status: SHIPPED | OUTPATIENT
Start: 2019-08-12 | End: 2019-09-09

## 2019-08-12 ASSESSMENT — ENCOUNTER SYMPTOMS
FEVER: 0
DIARRHEA: 0
WEAKNESS: 0
BREAST MASS: 0
SHORTNESS OF BREATH: 0
HEARTBURN: 0
PALPITATIONS: 0
ABDOMINAL PAIN: 0
COUGH: 0
DYSURIA: 0
FREQUENCY: 0
PARESTHESIAS: 0
SORE THROAT: 0
MYALGIAS: 0
HEADACHES: 0
DIZZINESS: 0
NAUSEA: 0
NERVOUS/ANXIOUS: 1
HEMATOCHEZIA: 0
CHILLS: 0
ARTHRALGIAS: 0
JOINT SWELLING: 0
EYE PAIN: 0
CONSTIPATION: 0
HEMATURIA: 0

## 2019-08-12 ASSESSMENT — ANXIETY QUESTIONNAIRES
3. WORRYING TOO MUCH ABOUT DIFFERENT THINGS: SEVERAL DAYS
7. FEELING AFRAID AS IF SOMETHING AWFUL MIGHT HAPPEN: NOT AT ALL
GAD7 TOTAL SCORE: 14
6. BECOMING EASILY ANNOYED OR IRRITABLE: SEVERAL DAYS
5. BEING SO RESTLESS THAT IT IS HARD TO SIT STILL: NEARLY EVERY DAY
2. NOT BEING ABLE TO STOP OR CONTROL WORRYING: NEARLY EVERY DAY
1. FEELING NERVOUS, ANXIOUS, OR ON EDGE: NEARLY EVERY DAY

## 2019-08-12 ASSESSMENT — PATIENT HEALTH QUESTIONNAIRE - PHQ9
5. POOR APPETITE OR OVEREATING: NEARLY EVERY DAY
SUM OF ALL RESPONSES TO PHQ QUESTIONS 1-9: 12

## 2019-08-12 ASSESSMENT — MIFFLIN-ST. JEOR: SCORE: 1455.41

## 2019-08-12 NOTE — PATIENT INSTRUCTIONS
Dolly Miguel,     Thank you for allowing [facility] to manage your care.    I sent your prescriptions to your pharmacy.    I made a therapy referral, they will be in 1-2 weeks to set up your appointment.  If you do not hear from them, please call the specialty number on your after visit.     If you have any questions or concerns, please feel free to call us at [phone].    Sincerely,    Dr. Shultz
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
## 2019-08-13 ASSESSMENT — ANXIETY QUESTIONNAIRES: GAD7 TOTAL SCORE: 14

## 2019-09-09 ENCOUNTER — OFFICE VISIT (OUTPATIENT)
Dept: FAMILY MEDICINE | Facility: CLINIC | Age: 39
End: 2019-09-09
Payer: COMMERCIAL

## 2019-09-09 VITALS
OXYGEN SATURATION: 100 % | HEART RATE: 67 BPM | BODY MASS INDEX: 24.48 KG/M2 | DIASTOLIC BLOOD PRESSURE: 68 MMHG | WEIGHT: 156 LBS | SYSTOLIC BLOOD PRESSURE: 112 MMHG | TEMPERATURE: 98 F | HEIGHT: 67 IN

## 2019-09-09 DIAGNOSIS — F41.9 ANXIETY: Primary | ICD-10-CM

## 2019-09-09 DIAGNOSIS — R06.02 SHORTNESS OF BREATH: ICD-10-CM

## 2019-09-09 PROCEDURE — 99213 OFFICE O/P EST LOW 20 MIN: CPT | Performed by: FAMILY MEDICINE

## 2019-09-09 RX ORDER — ALBUTEROL SULFATE 90 UG/1
2 AEROSOL, METERED RESPIRATORY (INHALATION) EVERY 6 HOURS PRN
Qty: 1 INHALER | Refills: 3 | Status: SHIPPED | OUTPATIENT
Start: 2019-09-09 | End: 2020-03-24

## 2019-09-09 RX ORDER — BUSPIRONE HYDROCHLORIDE 10 MG/1
10 TABLET ORAL 2 TIMES DAILY
Qty: 180 TABLET | Refills: 3 | Status: SHIPPED | OUTPATIENT
Start: 2019-09-09 | End: 2020-10-21

## 2019-09-09 ASSESSMENT — ENCOUNTER SYMPTOMS
HEMATURIA: 0
WEAKNESS: 0
MYALGIAS: 0
COUGH: 0
CONSTIPATION: 0
HEADACHES: 0
NAUSEA: 0
NERVOUS/ANXIOUS: 0
CHILLS: 0
ARTHRALGIAS: 0
PALPITATIONS: 0
DYSURIA: 0
DIZZINESS: 0
JOINT SWELLING: 0
SHORTNESS OF BREATH: 1
BREAST MASS: 0
ABDOMINAL PAIN: 0
FREQUENCY: 0
DIARRHEA: 0
HEMATOCHEZIA: 0
HEARTBURN: 0
SORE THROAT: 0
FEVER: 0
EYE PAIN: 0
PARESTHESIAS: 0

## 2019-09-09 ASSESSMENT — MIFFLIN-ST. JEOR: SCORE: 1434.2
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
## 2019-09-09 NOTE — PATIENT INSTRUCTIONS
Dolly Gomez,    Thank you for allowing [facility] to manage your care.    I sent your prescriptions to your pharmacy.    If you have any questions or concerns, please feel free to call us at [phone].    Sincerely,    Dr. Shultz

## 2019-10-31 ENCOUNTER — OFFICE VISIT (OUTPATIENT)
Dept: PSYCHOLOGY | Facility: CLINIC | Age: 39
End: 2019-10-31
Payer: MEDICAID

## 2019-10-31 DIAGNOSIS — F43.23 ADJUSTMENT DISORDER WITH MIXED ANXIETY AND DEPRESSED MOOD: Primary | ICD-10-CM

## 2019-10-31 PROCEDURE — 90834 PSYTX W PT 45 MINUTES: CPT | Performed by: COUNSELOR

## 2019-10-31 ASSESSMENT — ANXIETY QUESTIONNAIRES
6. BECOMING EASILY ANNOYED OR IRRITABLE: MORE THAN HALF THE DAYS
3. WORRYING TOO MUCH ABOUT DIFFERENT THINGS: MORE THAN HALF THE DAYS
2. NOT BEING ABLE TO STOP OR CONTROL WORRYING: MORE THAN HALF THE DAYS
IF YOU CHECKED OFF ANY PROBLEMS ON THIS QUESTIONNAIRE, HOW DIFFICULT HAVE THESE PROBLEMS MADE IT FOR YOU TO DO YOUR WORK, TAKE CARE OF THINGS AT HOME, OR GET ALONG WITH OTHER PEOPLE: VERY DIFFICULT
7. FEELING AFRAID AS IF SOMETHING AWFUL MIGHT HAPPEN: NOT AT ALL
GAD7 TOTAL SCORE: 14
5. BEING SO RESTLESS THAT IT IS HARD TO SIT STILL: MORE THAN HALF THE DAYS
1. FEELING NERVOUS, ANXIOUS, OR ON EDGE: NEARLY EVERY DAY

## 2019-10-31 ASSESSMENT — PATIENT HEALTH QUESTIONNAIRE - PHQ9
5. POOR APPETITE OR OVEREATING: NEARLY EVERY DAY
SUM OF ALL RESPONSES TO PHQ QUESTIONS 1-9: 10
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
## 2019-10-31 NOTE — PATIENT INSTRUCTIONS
"Step 2: Coping strategies - Things I can do to take my mind off of my problems without contacting another person (relaxation technique, physical activity):    Distress Tolerance Strategies:  read a book:   , pick a color and count how many things in the room you can find, grounding (5 things you see, 4 things you feel, 3 things you hear, 2 things you smell, 1 thing you taste OR 1 thing you have control of), square breathing    Physical Activities: go for a walk, yoga and deep breathing, get outside    Focus on helpful thoughts:  \"What can I control right now\"  Step 3: People and social settings that provide distraction:    Spend time with kids and boyfriend  Step 4: Remind myself of people and things that are important to me and worth living for:    Kids, boyfriend        Suicide Prevention Lifeline: 0-737-097-LLUG (6769)    Crisis Text Line Service (available 24 hours a day, 7 days a week): Text [place] to 813421          "

## 2019-11-02 ASSESSMENT — ANXIETY QUESTIONNAIRES: GAD7 TOTAL SCORE: 14

## 2019-11-06 ENCOUNTER — HEALTH MAINTENANCE LETTER (OUTPATIENT)
Age: 39
End: 2019-11-06

## 2019-11-07 ENCOUNTER — FCC EXTENDED DOCUMENTATION (OUTPATIENT)
Dept: PSYCHOLOGY | Facility: CLINIC | Age: 39
End: 2019-11-07

## 2019-11-14 ENCOUNTER — TELEPHONE (OUTPATIENT)
Dept: FAMILY MEDICINE | Facility: CLINIC | Age: 39
End: 2019-11-14

## 2019-11-14 ENCOUNTER — OFFICE VISIT (OUTPATIENT)
Dept: PSYCHOLOGY | Facility: CLINIC | Age: 39
End: 2019-11-14
Payer: MEDICAID

## 2019-11-14 DIAGNOSIS — F41.1 GENERALIZED ANXIETY DISORDER: Primary | ICD-10-CM

## 2019-11-14 DIAGNOSIS — K21.9 GASTROESOPHAGEAL REFLUX DISEASE WITHOUT ESOPHAGITIS: Primary | ICD-10-CM

## 2019-11-14 PROCEDURE — 90791 PSYCH DIAGNOSTIC EVALUATION: CPT | Performed by: COUNSELOR

## 2019-11-14 RX ORDER — FAMOTIDINE 20 MG/1
20 TABLET, FILM COATED ORAL 2 TIMES DAILY
Qty: 180 TABLET | Refills: 1 | Status: SHIPPED | OUTPATIENT
Start: 2019-11-14 | End: 2020-10-21

## 2019-11-14 ASSESSMENT — COLUMBIA-SUICIDE SEVERITY RATING SCALE - C-SSRS
2. HAVE YOU ACTUALLY HAD ANY THOUGHTS OF KILLING YOURSELF LIFETIME?: YES
TOTAL  NUMBER OF INTERRUPTED ATTEMPTS LIFETIME: YES
MOST RECENT DATE: 62823
REASONS FOR IDEATION LIFETIME: COMPLETELY TO END OR STOP THE PAIN (YOU COULDN'T GO ON LIVING WITH THE PAIN OR HOW YOU WERE FEELING)
ATTEMPT LIFETIME: YES
TOTAL  NUMBER OF ACTUAL ATTEMPTS LIFETIME: 2
6. HAVE YOU EVER DONE ANYTHING, STARTED TO DO ANYTHING, OR PREPARED TO DO ANYTHING TO END YOUR LIFE?: YES
4. HAVE YOU HAD THESE THOUGHTS AND HAD SOME INTENTION OF ACTING ON THEM?: YES
1. IN THE PAST MONTH, HAVE YOU WISHED YOU WERE DEAD OR WISHED YOU COULD GO TO SLEEP AND NOT WAKE UP?: YES
5. HAVE YOU STARTED TO WORK OUT OR WORKED OUT THE DETAILS OF HOW TO KILL YOURSELF? DO YOU INTEND TO CARRY OUT THIS PLAN?: YES
TOTAL  NUMBER OF INTERRUPTED ATTEMPTS PAST 3 MONTHS: 1
3. HAVE YOU BEEN THINKING ABOUT HOW YOU MIGHT KILL YOURSELF?: YES
1. IN THE PAST MONTH, HAVE YOU WISHED YOU WERE DEAD OR WISHED YOU COULD GO TO SLEEP AND NOT WAKE UP?: NO
TOTAL  NUMBER OF ABORTED OR SELF INTERRUPTED ATTEMPTS PAST LIFETIME: NO
2. HAVE YOU ACTUALLY HAD ANY THOUGHTS OF KILLING YOURSELF?: NO

## 2019-11-14 ASSESSMENT — PATIENT HEALTH QUESTIONNAIRE - PHQ9
5. POOR APPETITE OR OVEREATING: NEARLY EVERY DAY
SUM OF ALL RESPONSES TO PHQ QUESTIONS 1-9: 10

## 2019-11-14 ASSESSMENT — ANXIETY QUESTIONNAIRES
GAD7 TOTAL SCORE: 14
IF YOU CHECKED OFF ANY PROBLEMS ON THIS QUESTIONNAIRE, HOW DIFFICULT HAVE THESE PROBLEMS MADE IT FOR YOU TO DO YOUR WORK, TAKE CARE OF THINGS AT HOME, OR GET ALONG WITH OTHER PEOPLE: SOMEWHAT DIFFICULT
5. BEING SO RESTLESS THAT IT IS HARD TO SIT STILL: MORE THAN HALF THE DAYS
3. WORRYING TOO MUCH ABOUT DIFFERENT THINGS: NEARLY EVERY DAY
7. FEELING AFRAID AS IF SOMETHING AWFUL MIGHT HAPPEN: NOT AT ALL
2. NOT BEING ABLE TO STOP OR CONTROL WORRYING: NEARLY EVERY DAY
1. FEELING NERVOUS, ANXIOUS, OR ON EDGE: MORE THAN HALF THE DAYS
6. BECOMING EASILY ANNOYED OR IRRITABLE: SEVERAL DAYS
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
## 2019-11-14 NOTE — TELEPHONE ENCOUNTER
Please notify patient of ranitidine recall.  Please discontinue ranitidine.  I am switching her to famotidine (please go over medication instruction with patient).     1. Gastroesophageal reflux disease without esophagitis  - famotidine (PEPCID) 20 MG tablet; Take 1 tablet (20 mg) by mouth 2 times daily  Dispense: 180 tablet; Refill: 1

## 2019-11-14 NOTE — Clinical Note
Gera Shultz,See attached for completed DA. Myriam and I will begin working together to address anxiety and clarification of diagnosis (some limited insight, will explore possible depression and PTSD). When I am on leave, she will likely transition to another [facility] provider for support while I am out. She did report difficulty remembering to take meds consistently, so we will be addressing that in her treatment plan.Please let me know if you have any questions or concerns. Thank you for the referral!Leah

## 2019-11-14 NOTE — TELEPHONE ENCOUNTER
Due to multiple ranitidine recalls, we are recommending switching from Ranitidine to famotidine.  We will be asking pt to return current ranitidine and would like to have an alternative ready for them.  Please send new Rx if appropriate.    Thank you,    Pelon Forrest, Pharm D  [facility]  [phone]

## 2019-11-15 ASSESSMENT — ANXIETY QUESTIONNAIRES: GAD7 TOTAL SCORE: 14

## 2020-01-25 ENCOUNTER — HOSPITAL ENCOUNTER (EMERGENCY)
Facility: CLINIC | Age: 40
Discharge: HOME | End: 2020-01-25
Attending: NURSE PRACTITIONER | Admitting: NURSE PRACTITIONER
Payer: COMMERCIAL

## 2020-01-25 VITALS
HEART RATE: 105 BPM | OXYGEN SATURATION: 97 % | SYSTOLIC BLOOD PRESSURE: 109 MMHG | DIASTOLIC BLOOD PRESSURE: 77 MMHG | RESPIRATION RATE: 20 BRPM | TEMPERATURE: 98.6 F

## 2020-01-25 DIAGNOSIS — J32.9 SINUSITIS: ICD-10-CM

## 2020-01-25 DIAGNOSIS — B34.9 VIRAL SYNDROME: ICD-10-CM

## 2020-01-25 LAB
INTERNAL QC OK POCT: YES
S PYO AG THROAT QL IA.RAPID: NEGATIVE

## 2020-01-25 PROCEDURE — G0463 HOSPITAL OUTPT CLINIC VISIT: HCPCS

## 2020-01-25 PROCEDURE — 87081 CULTURE SCREEN ONLY: CPT | Performed by: PHYSICIAN ASSISTANT

## 2020-01-25 PROCEDURE — 87880 STREP A ASSAY W/OPTIC: CPT | Performed by: NURSE PRACTITIONER

## 2020-01-25 PROCEDURE — 99213 OFFICE O/P EST LOW 20 MIN: CPT | Mod: Z6 | Performed by: NURSE PRACTITIONER

## 2020-01-25 RX ORDER — PREDNISONE 20 MG/1
TABLET ORAL
Qty: 10 TABLET | Refills: 0 | Status: SHIPPED | OUTPATIENT
Start: 2020-01-25 | End: 2020-09-15

## 2020-01-25 ASSESSMENT — ENCOUNTER SYMPTOMS
CHILLS: 0
LIGHT-HEADEDNESS: 0
COUGH: 1
EYE DISCHARGE: 0
FATIGUE: 0
HEADACHES: 0
JOINT SWELLING: 0
DIZZINESS: 0
WHEEZING: 0
STRIDOR: 0
VOMITING: 0
WEAKNESS: 0
SINUS PAIN: 1
RHINORRHEA: 1
FEVER: 1
VOICE CHANGE: 1
ABDOMINAL PAIN: 0
MYALGIAS: 0
NAUSEA: 0
SORE THROAT: 1
EYE REDNESS: 0
ARTHRALGIAS: 0
TROUBLE SWALLOWING: 0
CHEST TIGHTNESS: 0
SHORTNESS OF BREATH: 0
SINUS PRESSURE: 0
NUMBNESS: 0

## 2020-01-25 NOTE — ED PROVIDER NOTES
History     Chief Complaint   Patient presents with     Sinusitis     HPI  Sommer Gomez is a 39 year old female who presents urgent care for evaluation of multiple URI symptoms.  Symptoms include sinus pain, laryngitis, sore throat, cough, congestion, rhinorrhea for 5 days.  Patient states she previously had a tactile fever which has since resolved.  Today patient feels slightly off balance due to her sinuses.  She is a non-smoker with multiple sick contacts.  Has not receive her seasonal influenza.    Allergies:  No Known Allergies    Problem List:    Patient Active Problem List    Diagnosis Date Noted     Generalized anxiety disorder 11/14/2019     Priority: Medium     Impingement syndrome of shoulder region 12/11/2014     Priority: Medium     Fatigue 11/14/2012     Priority: Medium     Anxiety 11/02/2012     Priority: Medium     Health Care Home 10/16/2011     Priority: Medium     EMERGENCY CARE PLAN  Presenting Problem Signs and Symptoms Treatment Plan    Questions or conerns during clinic hours    I will call the clinic directly     Questions or conerns outside clinic hours    I will call the 24 hour nurse line at [phone]    Patient needs to schedule an appointment    I will call the 24 hour scheduling team at [phone] or clinic directly    Same day treatment     I will call the clinic first, nurse line if after hours, urgent care and express care if needed                            DX V65.8 REPLACED WITH 19697 HEALTH CARE HOME (04/08/2013)       Mild recurrent major depression (H) 08/05/2011     Priority: Medium     CARDIOVASCULAR SCREENING; LDL GOAL LESS THAN 160 10/31/2010     Priority: Medium     Colitis 12/10/2008     Priority: Medium     Dysthymic disorder 08/31/2006     Priority: Medium     8/31/2006  Pt has hx of depression, family hx of depression (all her brothers are on lexapro), and hx of psot-partum depression.  8/31/2006 PHQ9 score is 14.            Past Medical History:    Past  Medical History:   Diagnosis Date     Anxiety disorder      Chondromalacia of patella      Depression        Past Surgical History:    Past Surgical History:   Procedure Laterality Date     COLONOSCOPY  3/1/2011    flex sig     COLONOSCOPY      colonoscopy     TUBAL LIGATION  12/08    essure occlusion       Family History:    Family History   Problem Relation Age of Onset     Allergies Father      Alzheimer Disease Maternal Grandmother      Blood Disease Maternal Grandmother      Hypertension Maternal Grandfather      Arthritis Paternal Grandmother      Depression Brother      Depression Brother      Depression Mother      Allergies Son      Allergies Son        Social History:  Marital Status:   [4]  Social History     Tobacco Use     Smoking status: Never Smoker     Smokeless tobacco: Never Used   Substance Use Topics     Alcohol use: No     Drug use: No        Medications:    predniSONE (DELTASONE) 20 MG tablet  Acetaminophen (TYLENOL PO)  albuterol (PROAIR HFA/PROVENTIL HFA/VENTOLIN HFA) 108 (90 Base) MCG/ACT inhaler  busPIRone (BUSPAR) 10 MG tablet  busPIRone (BUSPAR) 15 MG tablet  famotidine (PEPCID) 20 MG tablet  LORazepam (ATIVAN) 1 MG tablet  nortriptyline (PAMELOR) 75 MG capsule  ranitidine (ZANTAC) 300 MG tablet          Review of Systems   Constitutional: Positive for fever. Negative for chills and fatigue.   HENT: Positive for congestion, rhinorrhea, sinus pain, sore throat and voice change. Negative for ear discharge, ear pain, sinus pressure and trouble swallowing.    Eyes: Negative for discharge and redness.   Respiratory: Positive for cough. Negative for chest tightness, shortness of breath, wheezing and stridor.    Cardiovascular: Negative for chest pain.   Gastrointestinal: Negative for abdominal pain, nausea and vomiting.   Musculoskeletal: Negative for arthralgias, joint swelling and myalgias.   Skin: Negative for rash.   Neurological: Negative for dizziness, weakness, light-headedness,  numbness and headaches.       Physical Exam   BP: 109/77  Pulse: 105  Temp: 98.6  F (37  C)  Resp: 20  SpO2: 97 %      Physical Exam  Constitutional:       General: She is not in acute distress.     Appearance: She is well-developed. She is ill-appearing. She is not diaphoretic.   HENT:      Right Ear: Tympanic membrane normal.      Left Ear: Tympanic membrane normal.      Nose: Nose normal.      Right Sinus: No maxillary sinus tenderness or frontal sinus tenderness.      Left Sinus: No maxillary sinus tenderness or frontal sinus tenderness.      Mouth/Throat:      Mouth: Mucous membranes are moist.      Pharynx: Oropharynx is clear. No oropharyngeal exudate or posterior oropharyngeal erythema.   Eyes:      Conjunctiva/sclera: Conjunctivae normal.      Pupils: Pupils are equal, round, and reactive to light.   Neck:      Musculoskeletal: Normal range of motion and neck supple.   Cardiovascular:      Rate and Rhythm: Regular rhythm. Tachycardia present.   Pulmonary:      Effort: Pulmonary effort is normal. No respiratory distress.      Breath sounds: Normal breath sounds and air entry. No decreased air movement. No decreased breath sounds, wheezing or rhonchi.   Abdominal:      General: There is no distension.      Palpations: Abdomen is soft.      Tenderness: There is no abdominal tenderness.   Musculoskeletal: Normal range of motion.   Skin:     General: Skin is warm.      Capillary Refill: Capillary refill takes less than 2 seconds.   Neurological:      Mental Status: She is alert and oriented to person, place, and time.         ED Course        Procedures    Results for orders placed or performed during the hospital encounter of 01/25/20 (from the past 24 hour(s))   Rapid strep group A screen POCT   Result Value Ref Range    Rapid Strep A Screen negative neg    Internal QC OK Yes        Medications - No data to display    Assessments & Plan (with Medical Decision Making)   Patient is a 39-year-old female who  presents urgent care for evaluation of URI-like illness.  Provided patient prescription for prednisone due to reported intense pain throughout the sinuses.  Unable to re-create this pain with percussion or palpitations on exam.  Rapid strep completed which was negative, culture pending.  Discussed likely viral etiology of symptoms and average course of viral illness. May use over the counter medications as needed and appropriate. Increase rest and hydration. Return precautions reviewed, all questions answered. Patient agreeable to plan of care and discharged in stable condition.    I have reviewed the nursing notes.    I have reviewed the findings, diagnosis, plan and need for follow up with the patient.    Discharge Medication List as of 1/25/2020  4:27 PM      START taking these medications    Details   predniSONE (DELTASONE) 20 MG tablet Take two tablets (= 40mg) each day for 5 (five) days, Disp-10 tablet, R-0, E-Prescribe             Final diagnoses:   Sinusitis   Viral syndrome       1/25/2020   [facility] EMERGENCY DEPARTMENT     Ginger Mcghee, APRN CNP  01/25/20 [id number]

## 2020-01-25 NOTE — ED AVS SNAPSHOT
[facility]  [address] [address]  Phone:  [phone]  Fax:  [phone]                                    Sommer Gomez   MRN: [id number]    Department:  [facility]   Date of Visit:  1/25/2020           After Visit Summary Signature Page    I have received my discharge instructions, and my questions have been answered. I have discussed any challenges I see with this plan with the nurse or doctor.    ..........................................................................................................................................  Patient/Patient Representative Signature      ..........................................................................................................................................  Patient Representative Print Name and Relationship to Patient    ..................................................               ................................................  Date                                   Time    ..........................................................................................................................................  Reviewed by Signature/Title    ...................................................              ..............................................  Date                                               Time          22EPIC Rev 08/18

## 2020-01-27 LAB
BACTERIA SPEC CULT: NORMAL
Lab: NORMAL
SPECIMEN SOURCE: NORMAL

## 2020-01-27 NOTE — RESULT ENCOUNTER NOTE
Final Beta strep group A r/o culture is NEGATIVE for Group A streptococcus.    No treatment or change in treatment per [place] Strep protocol.

## 2020-02-16 ENCOUNTER — HEALTH MAINTENANCE LETTER (OUTPATIENT)
Age: 40
End: 2020-02-16

## 2020-03-23 DIAGNOSIS — R06.02 SHORTNESS OF BREATH: ICD-10-CM

## 2020-03-23 NOTE — TELEPHONE ENCOUNTER
"Requested Prescriptions   Pending Prescriptions Disp Refills     albuterol (PROAIR HFA/PROVENTIL HFA/VENTOLIN HFA) 108 (90 Base) MCG/ACT inhaler 1 Inhaler 3     Sig: Inhale 2 puffs into the lungs every 6 hours as needed for shortness of breath / dyspnea or wheezing  Last Written Prescription Date:  09/09/2019 #1 x 3  Last filled - not provided  Last office visit: 9/9/2019 KAMRAN Shultz   Future Office Visit:  None         Asthma Maintenance Inhalers - Anticholinergics Passed - 3/23/2020  2:44 PM        Passed - Patient is age 12 years or older        Passed - Recent (12 mo) or future (30 days) visit within the authorizing provider's specialty     Patient has had an office visit with the authorizing provider or a provider within the authorizing providers department within the previous 12 mos or has a future within next 30 days. See \"Patient Info\" tab in inbasket, or \"Choose Columns\" in Meds & Orders section of the refill encounter.              Passed - Medication is active on med list       Short-Acting Beta Agonist Inhalers Protocol  Passed - 3/23/2020  2:44 PM        Passed - Patient is age 12 or older        Passed - Recent (12 mo) or future (30 days) visit within the authorizing provider's specialty     Patient has had an office visit with the authorizing provider or a provider within the authorizing providers department within the previous 12 mos or has a future within next 30 days. See \"Patient Info\" tab in inbasket, or \"Choose Columns\" in Meds & Orders section of the refill encounter.              Passed - Medication is active on med list             "

## 2020-03-24 RX ORDER — ALBUTEROL SULFATE 90 UG/1
2 AEROSOL, METERED RESPIRATORY (INHALATION) EVERY 6 HOURS PRN
Qty: 1 INHALER | Refills: 0 | Status: SHIPPED | OUTPATIENT
Start: 2020-03-24 | End: 2022-10-28

## 2020-08-11 ENCOUNTER — E-VISIT (OUTPATIENT)
Dept: FAMILY MEDICINE | Facility: CLINIC | Age: 40
End: 2020-08-11
Payer: COMMERCIAL

## 2020-08-11 DIAGNOSIS — Z53.9 ERRONEOUS ENCOUNTER--DISREGARD: Primary | ICD-10-CM

## 2020-08-11 ASSESSMENT — ANXIETY QUESTIONNAIRES
6. BECOMING EASILY ANNOYED OR IRRITABLE: NEARLY EVERY DAY
GAD7 TOTAL SCORE: 17
2. NOT BEING ABLE TO STOP OR CONTROL WORRYING: NEARLY EVERY DAY
4. TROUBLE RELAXING: NEARLY EVERY DAY
1. FEELING NERVOUS, ANXIOUS, OR ON EDGE: NEARLY EVERY DAY
5. BEING SO RESTLESS THAT IT IS HARD TO SIT STILL: MORE THAN HALF THE DAYS
GAD7 TOTAL SCORE: 17
GAD7 TOTAL SCORE: 17
7. FEELING AFRAID AS IF SOMETHING AWFUL MIGHT HAPPEN: SEVERAL DAYS
3. WORRYING TOO MUCH ABOUT DIFFERENT THINGS: MORE THAN HALF THE DAYS
7. FEELING AFRAID AS IF SOMETHING AWFUL MIGHT HAPPEN: SEVERAL DAYS

## 2020-08-11 ASSESSMENT — PATIENT HEALTH QUESTIONNAIRE - PHQ9
SUM OF ALL RESPONSES TO PHQ QUESTIONS 1-9: 17
10. IF YOU CHECKED OFF ANY PROBLEMS, HOW DIFFICULT HAVE THESE PROBLEMS MADE IT FOR YOU TO DO YOUR WORK, TAKE CARE OF THINGS AT HOME, OR GET ALONG WITH OTHER PEOPLE: EXTREMELY DIFFICULT
SUM OF ALL RESPONSES TO PHQ QUESTIONS 1-9: 17

## 2020-08-12 ASSESSMENT — PATIENT HEALTH QUESTIONNAIRE - PHQ9: SUM OF ALL RESPONSES TO PHQ QUESTIONS 1-9: 17

## 2020-08-12 ASSESSMENT — ANXIETY QUESTIONNAIRES: GAD7 TOTAL SCORE: 17

## 2020-09-15 ENCOUNTER — VIRTUAL VISIT (OUTPATIENT)
Dept: FAMILY MEDICINE | Facility: CLINIC | Age: 40
End: 2020-09-15
Payer: COMMERCIAL

## 2020-09-15 DIAGNOSIS — F32.A DEPRESSION, UNSPECIFIED DEPRESSION TYPE: Primary | ICD-10-CM

## 2020-09-15 PROCEDURE — 99213 OFFICE O/P EST LOW 20 MIN: CPT | Mod: 95 | Performed by: FAMILY MEDICINE

## 2020-09-15 ASSESSMENT — ENCOUNTER SYMPTOMS
ABDOMINAL PAIN: 0
HEMATOCHEZIA: 0
DIARRHEA: 0
WEAKNESS: 0
NAUSEA: 0
FEVER: 0
CONSTIPATION: 0
BREAST MASS: 0
ARTHRALGIAS: 0
DYSURIA: 0
FREQUENCY: 0
PARESTHESIAS: 0
PALPITATIONS: 0
DIZZINESS: 0
JOINT SWELLING: 0
EYE PAIN: 0
CHILLS: 0
SHORTNESS OF BREATH: 0
SORE THROAT: 0
COUGH: 0
HEMATURIA: 0
NERVOUS/ANXIOUS: 1
HEARTBURN: 0
MYALGIAS: 0
HEADACHES: 0

## 2020-09-15 ASSESSMENT — ANXIETY QUESTIONNAIRES
2. NOT BEING ABLE TO STOP OR CONTROL WORRYING: NEARLY EVERY DAY
GAD7 TOTAL SCORE: 15
IF YOU CHECKED OFF ANY PROBLEMS ON THIS QUESTIONNAIRE, HOW DIFFICULT HAVE THESE PROBLEMS MADE IT FOR YOU TO DO YOUR WORK, TAKE CARE OF THINGS AT HOME, OR GET ALONG WITH OTHER PEOPLE: EXTREMELY DIFFICULT
3. WORRYING TOO MUCH ABOUT DIFFERENT THINGS: NEARLY EVERY DAY
7. FEELING AFRAID AS IF SOMETHING AWFUL MIGHT HAPPEN: NOT AT ALL
6. BECOMING EASILY ANNOYED OR IRRITABLE: MORE THAN HALF THE DAYS
5. BEING SO RESTLESS THAT IT IS HARD TO SIT STILL: SEVERAL DAYS
1. FEELING NERVOUS, ANXIOUS, OR ON EDGE: NEARLY EVERY DAY

## 2020-09-15 ASSESSMENT — PATIENT HEALTH QUESTIONNAIRE - PHQ9
5. POOR APPETITE OR OVEREATING: NEARLY EVERY DAY
SUM OF ALL RESPONSES TO PHQ QUESTIONS 1-9: 17

## 2020-09-15 NOTE — PROGRESS NOTES
"Sommer Gomez is a 39 year old female who is being evaluated via a billable telephone visit.      The patient has been notified of following:     \"This telephone visit will be conducted via a call between you and your physician/provider. We have found that certain health care needs can be provided without the need for a physical exam.  This service lets us provide the care you need with a short phone conversation.  If a prescription is necessary we can send it directly to your pharmacy.  If lab work is needed we can place an order for that and you can then stop by our lab to have the test done at a later time.    Telephone visits are billed at different rates depending on your insurance coverage. During this emergency period, for some insurers they may be billed the same as an in-person visit.  Please reach out to your insurance provider with any questions.    If during the course of the call the physician/provider feels a telephone visit is not appropriate, you will not be charged for this service.\"    Patient has given verbal consent for Telephone visit?  Yes    What phone number would you like to be contacted at? [phone]    How would you like to obtain your AVS? Liban Eli     Sommer Gomez is a 39 year old female who presents via phone visit today for the following health issues:    HPI    Depression and Anxiety Follow-Up    How are you doing with your depression since your last visit? Worsened     How are you doing with your anxiety since your last visit?  Worsened     Are you having other symptoms that might be associated with depression or anxiety? Yes:  hard to explain feelings in words, not sleeping, feeling overwhelmed, feeling \"flighty\"    Have you had a significant life event? No     Do you have any concerns with your use of alcohol or other drugs? No    Social History     Tobacco Use     Smoking status: Never Smoker     Smokeless tobacco: Never Used   Substance Use Topics     " Alcohol use: No     Drug use: No     PHQ 11/14/2019 8/11/2020 9/15/2020   PHQ-9 Total Score 10 17 17   Q9: Thoughts of better off dead/self-harm past 2 weeks Not at all Not at all Not at all     ROSA-7 SCORE 11/14/2019 8/11/2020 9/15/2020   Total Score - - -   Total Score - 17 (severe anxiety) -   Total Score 14 17 15     Last PHQ-9 9/15/2020   1.  Little interest or pleasure in doing things 3   2.  Feeling down, depressed, or hopeless 1   3.  Trouble falling or staying asleep, or sleeping too much 3   4.  Feeling tired or having little energy 2   5.  Poor appetite or overeating 3   6.  Feeling bad about yourself 1   7.  Trouble concentrating 3   8.  Moving slowly or restless 1   Q9: Thoughts of better off dead/self-harm past 2 weeks 0   PHQ-9 Total Score 17   Difficulty at work, home, or with people Extremely dIfficult     ROSA-7  9/15/2020   1. Feeling nervous, anxious, or on edge 3   2. Not being able to stop or control worrying 3   3. Worrying too much about different things 3   4. Trouble relaxing 3   5. Being so restless that it is hard to sit still 1   6. Becoming easily annoyed or irritable 2   7. Feeling afraid, as if something awful might happen 0   ROSA-7 Total Score 15   If you checked any problems, how difficult have they made it for you to do your work, take care of things at home, or get along with other people? Extremely difficult       Suicide Assessment Five-step Evaluation and Treatment (SAFE-T)      How many servings of fruits and vegetables do you eat daily?  2-3    On average, how many sweetened beverages do you drink each day (Examples: soda, juice, sweet tea, etc.  Do NOT count diet or artificially sweetened beverages)?   1    How many days per week do you exercise enough to make your heart beat faster? 5    How many minutes a day do you exercise enough to make your heart beat faster? 60 or more  How many days per week do you miss taking your medication? 2 times per week will miss second  dose    What makes it hard for you to take your medications?  remembering to take       1. Depression: Patient states that symptoms are not as bad since 8/11/20.  Patient does feel stable.  Feels up and down.  Ongoing for the past 2 months.  Does not feel suicidal.  At times feel no energy and tearful.  Snapping at people at work.  She is a .  Patient had a manager walked out in regards to work stress.  Mask mandate is stressful in regards enforcing mask policy.  Now switched job [employer].  Financial stressors at home.  Was previous on buspar and nortriptyline. Unsure if celexa helped in the past.  Patient went to therapy in past with good relief.     Sleep: Positive  Interest: Positive  Guilt: Positive  Energy: Positive  Concentration: Positive  Appetite: Positive  Psychomotor (Anxious): Positive  Suicide: Negative      Review of Systems   Constitutional: Negative for chills and fever.   HENT: Negative for congestion, ear pain, hearing loss and sore throat.    Eyes: Negative for pain and visual disturbance.   Respiratory: Negative for cough and shortness of breath.    Cardiovascular: Negative for chest pain, palpitations and peripheral edema.   Gastrointestinal: Negative for abdominal pain, constipation, diarrhea, heartburn, hematochezia and nausea.   Breasts:  Negative for tenderness, breast mass and discharge.   Genitourinary: Negative for dysuria, frequency, genital sores, hematuria, pelvic pain, urgency, vaginal bleeding and vaginal discharge.   Musculoskeletal: Negative for arthralgias, joint swelling and myalgias.   Skin: Negative for rash.   Neurological: Negative for dizziness, weakness, headaches and paresthesias.   Psychiatric/Behavioral: Negative for mood changes. The patient is nervous/anxious.         Objective          Vitals:  No vitals were obtained today due to virtual visit.    healthy, alert and no distress  PSYCH: Alert and oriented times 3; coherent speech, normal   rate and volume,  able to articulate logical thoughts, able   to abstract reason, no tangential thoughts, no hallucinations   or delusions  Her affect is normal  RESP: No cough, no audible wheezing, able to talk in full sentences  Remainder of exam unable to be completed due to telephone visits    Assessment/Plan:    Assessment & Plan     Depression, unspecified depression type  Encourage therapy.  Close f/u 1 month.   - FLUoxetine (PROZAC) 20 MG capsule; Take 1 capsule (20 mg) by mouth daily     See Patient Instructions    No follow-ups on file.    Dharmesh Shultz DO  [facility]    Phone call duration:  20 minutes

## 2020-09-15 NOTE — PATIENT INSTRUCTIONS
Dolly Miguel,    Thank you for allowing [facility] to manage your care.    I sent your prescriptions to your pharmacy.    If you have any questions or concerns, please feel free to call us at [phone].    Sincerely,    Dr. Shultz    Did you know?  You can schedule an e-Visit for certain simple non-emergent issue for your convenience.  To learn more about or start an eVisit, simply login to [email or web address], click  Visits  on top banner, click  Start a Virtual Visit  drop down, and click  Symptom-Specific E-Visit

## 2020-09-16 ASSESSMENT — ANXIETY QUESTIONNAIRES: GAD7 TOTAL SCORE: 15

## 2020-09-17 ENCOUNTER — OFFICE VISIT (OUTPATIENT)
Dept: FAMILY MEDICINE | Facility: CLINIC | Age: 40
End: 2020-09-17
Payer: OTHER MISCELLANEOUS

## 2020-09-17 VITALS
DIASTOLIC BLOOD PRESSURE: 68 MMHG | OXYGEN SATURATION: 97 % | SYSTOLIC BLOOD PRESSURE: 114 MMHG | TEMPERATURE: 98.2 F | RESPIRATION RATE: 15 BRPM | WEIGHT: 154 LBS | BODY MASS INDEX: 23.94 KG/M2 | HEART RATE: 82 BPM

## 2020-09-17 DIAGNOSIS — M25.511 ACUTE PAIN OF RIGHT SHOULDER: Primary | ICD-10-CM

## 2020-09-17 PROCEDURE — 99213 OFFICE O/P EST LOW 20 MIN: CPT | Performed by: FAMILY MEDICINE

## 2020-09-17 ASSESSMENT — ENCOUNTER SYMPTOMS
CONSTIPATION: 0
FEVER: 0
MYALGIAS: 0
HEARTBURN: 0
HEMATOCHEZIA: 0
SORE THROAT: 0
HEADACHES: 0
DIZZINESS: 0
SHORTNESS OF BREATH: 0
ABDOMINAL PAIN: 0
DYSURIA: 0
DIARRHEA: 0
FREQUENCY: 0
ARTHRALGIAS: 1
PARESTHESIAS: 0
PALPITATIONS: 0
COUGH: 0
NAUSEA: 0
WEAKNESS: 0
HEMATURIA: 0
CHILLS: 0
EYE PAIN: 0
JOINT SWELLING: 0
BREAST MASS: 0
NERVOUS/ANXIOUS: 0

## 2020-09-17 NOTE — PATIENT INSTRUCTIONS
Dolly Miguel,    Thank you for allowing [facility] to manage your care.    I made a physical therapy referral, they will be calling in approximately 1 week to set up your appointment.  If you do not hear from them, please call the specialty number on your after visit.     May take over the counter tylenol as needed for pain.    If you have any questions or concerns, please feel free to call us at [phone].    Sincerely,    Dr. Shultz    Did you know?  You can schedule an e-Visit for certain simple non-emergent issue for your convenience.  To learn more about or start an eVisit, simply login to [employer], click  Visits  on top banner, click  Start a Virtual Visit  drop down, and click  Symptom-Specific E-Visit

## 2020-09-17 NOTE — PROGRESS NOTES
Subjective     Sommer Gomez is a 39 year old female who presents to clinic today for the following health issues:    HPI       Musculoskeletal problem/pain  Onset/Duration: 09/09; injured while lifting a box over head on 9/9/20  Description  Location: right shoulder  Joint Swelling: no  Redness: no  Pain: YES  Warmth: no  Intensity:  mild  Progression of Symptoms:  same  Accompanying signs and symptoms:   Fevers: no  Numbness/tingling/weakness: YES- right hand dominant  History  Trauma to the area: YES  Recent illness:  no  Previous similar problem: YES- 7/8 years ago  Previous evaluation:  no  Precipitating or alleviating factors:  Aggravating factors include: lifting  Therapies tried and outcome: rest/inactivity, heat and ice    Ally DeShong CMA    1. Right shoulder pain: Patient was lifting box last Wednesday.  Box was considered heavy.  Felt sore afterwards.  Dent popping noise.  Symptoms got worse with activities.  Rest makes it better.  The past two days some improvement because she is not working.  Right hand dominant.  History of trauma in the past in which was treated with cortisone shot.  Right now, 3/10 and 7-8/10 by the end of the day.  Some improvement with tylenol.    Review of Systems   Constitutional: Negative for chills and fever.   HENT: Negative for congestion, ear pain, hearing loss and sore throat.    Eyes: Negative for pain and visual disturbance.   Respiratory: Negative for cough and shortness of breath.    Cardiovascular: Negative for chest pain, palpitations and peripheral edema.   Gastrointestinal: Negative for abdominal pain, constipation, diarrhea, heartburn, hematochezia and nausea.   Breasts:  Negative for tenderness, breast mass and discharge.   Genitourinary: Negative for dysuria, frequency, genital sores, hematuria, pelvic pain, urgency, vaginal bleeding and vaginal discharge.   Musculoskeletal: Positive for arthralgias (right shoulder). Negative for joint swelling and  myalgias.   Skin: Negative for rash.   Neurological: Negative for dizziness, weakness, headaches and paresthesias.   Psychiatric/Behavioral: Negative for mood changes. The patient is not nervous/anxious.          Objective    /68   Pulse 82   Temp 98.2  F (36.8  C) (Tympanic)   Resp 15   Wt 69.9 kg (154 lb)   LMP  (LMP Unknown)   SpO2 97%   Breastfeeding No   BMI 23.94 kg/m    Body mass index is 23.94 kg/m .  Physical Exam  Constitutional:       General: She is not in acute distress.     Appearance: She is well-developed.   HENT:      Head: Normocephalic and atraumatic.      Nose: Nose normal.   Eyes:      Conjunctiva/sclera: Conjunctivae normal.   Neck:      Musculoskeletal: Normal range of motion.      Trachea: No tracheal deviation.   Cardiovascular:      Rate and Rhythm: Normal rate and regular rhythm.      Heart sounds: Normal heart sounds.   Pulmonary:      Effort: Pulmonary effort is normal. No respiratory distress.      Breath sounds: Normal breath sounds.   Musculoskeletal:      Comments: Right shoulder: Normal symmetry, good ROM in regards to flexion, external rotation (arms at side), external rotation (arms abducted at 90 deg), and internal rotation, negative Neer impingement sign, mild Rivers, negative Apprehension sign     Skin:     General: Skin is warm.   Neurological:      Mental Status: She is alert and oriented to person, place, and time.   Psychiatric:         Behavior: Behavior normal.        Assessment & Plan     Acute pain of right shoulder  Most likely a shoulder strain.  Low likelihood of rotator cuff tear.  May continue with tylenol as needed. Light duty for 2 weeks.   - [facility], AND CHIROPRACTIC REFERRAL; Future     See Patient Instructions    Return in about 2 weeks (around 10/1/2020), or if symptoms worsen or fail to improve.    Dharmesh Shultz DO  [facility]

## 2020-09-17 NOTE — LETTER
September 17, 2020      Sommer Gomez  [address]  [place] [facility] [address]        To Whom It May Concern:    Sommer Gomez was seen in our clinic. She may return to work with light duty for the next 2 weeks with following restrictions:    No heavy lifting greater 15 lbs  No pulling or pushing  No strenuous activities involving right shoulder      Sincerely,      Dr. Dharmesh Shultz

## 2020-10-05 ENCOUNTER — THERAPY VISIT (OUTPATIENT)
Dept: PHYSICAL THERAPY | Facility: CLINIC | Age: 40
End: 2020-10-05
Attending: FAMILY MEDICINE
Payer: OTHER MISCELLANEOUS

## 2020-10-05 DIAGNOSIS — M25.511 RIGHT SHOULDER PAIN, UNSPECIFIED CHRONICITY: Primary | ICD-10-CM

## 2020-10-05 DIAGNOSIS — M25.511 ACUTE PAIN OF RIGHT SHOULDER: ICD-10-CM

## 2020-10-05 PROCEDURE — 97112 NEUROMUSCULAR REEDUCATION: CPT | Mod: GP | Performed by: PHYSICAL THERAPIST

## 2020-10-05 PROCEDURE — 97161 PT EVAL LOW COMPLEX 20 MIN: CPT | Mod: GP | Performed by: PHYSICAL THERAPIST

## 2020-10-05 NOTE — PROGRESS NOTES
[facility] Initial Evaluation  Subjective:  The history is provided by the patient. No  was used.   Patient Health History  Sommer Gomez being seen for right shoulder.     Problem began: 9/9/2020.   Problem occurred: lifted a heavy box overhead.  Patient is right hand dominant     General health as reported by patient is good.  Pertinent medical history includes: asthma, depression and implanted device.   Red flags:  None as reported by patient.  Medical allergies: Nortiptyline.   Surgeries: Essure Birth Control device.    Current medications:  Anti-depressants and other (Tylenol).    Current occupation is  for [employer].   Primary job tasks include:  Lifting/carrying, computer work, prolonged standing and repetitive tasks.                  Therapist Generated HPI Evaluation         Type of problem:  Right shoulder.    This is a new condition.  Condition occurred with:  Lifting.    Patient reports pain:  Anterior, in the joint and upper trap.  Pain is described as aching and is constant (rated as 2/10).  Pain radiates to:  Cervical. Pain is worse in the P.M..  Since onset symptoms are gradually improving.  Associated symptoms:  Loss of strength and other (intermittent numbness/tingling in C8-T1 dermatome; occasional popping). 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      Restrictions due to condition include:  Working in normal job with restrictions.  Barriers include:  None as reported by patient.      Patient's Goals:   To strengthen right arm to do job duties without pain             Objective:  System  SHOULDER EVALUATION    POSTURE: poor sitting posture; increased thoracic kyphosis; forward head; rounded shoulders    CERVICAL SCREEN: negative    RANGE OF MOTION: (* denotes pain)   AROM L  AROM R PROM L PROM R   FLEXION WNl WNL&*   WNL   ABDUCTION WNL WNL  WNL   IR/EXT T8 T9     IR    WNL*   ER WNL WNL  WNL   HORIZ ADD WNL WNL  WNL     STRENGTH:  (* denotes pain)   LEFT RIGHT   FLEXION  5/5      5/5   ABDUCTION 5/5 5/5   IR 5/5 5/5   ER 5/5 5/5   SUPRASPINATUS 5/5 5-/5 *       SPECIAL TESTS:   IMPINGEMENT LEFT RIGHT   INSTABILITY LEFT RIGHT    Neer's  Neg   Sulcus  Neg    Hawkin's-Kade  Equivocal   Anterior Load & Shift      Coracoid Impingement  Neg   Posterior Load & Shift      Internal Impingement     Randy Relocation      BURSA     Apprehension      LABRAL          [place]'s  Neg    ROTATOR CUFF TEAR      Crank     Drop Arm      Compression/Rotation     ER Lag Sign      BICEPS     [place] Lift-Off      Speed's     Belly Press       Scapulohumeral Rhythm: WNL   Mobility Testing: NT          Palpation: not assessed   Other:  pec minor tightness bilat; decreased thoracic extension mobility      Physical Exam    General     ROS    Assessment/Plan:    Patient is a 39 year old female with right shoulder complaints.    Patient has the following significant findings with corresponding treatment plan.                Diagnosis 1:  Right Shoulder Pain    Pain -  self management, education and home program  Decreased strength - therapeutic exercise  Decreased function - home program  Impaired posture - neuro re-education    Cumulative Therapy Evaluation is: Low complexity.    Previous and current functional limitations:  (See Goal Flow Sheet for this information)    Short term and Long term goals: (See Goal Flow Sheet for this information)     Communication ability:  Patient appears to be able to clearly communicate and understand verbal and written communication and follow directions correctly.  Treatment Explanation - The following has been discussed with the patient:    RX ordered/plan of care  Anticipated outcomes  Possible risks and side effects  This patient would benefit from PT intervention to resume  normal activities.   Rehab potential is good.    Frequency:  1 X week, once daily  Duration:  for 4 weeks, tapering to 2x/month for 1 month  Discharge Plan:  Achieve all LTG.  Independent in home treatment program.  Reach maximal therapeutic benefit.  Return to work without restrictions    Please refer to the daily flowsheet for treatment today, total treatment time and time spent performing 1:1 timed codes.

## 2020-10-12 ENCOUNTER — THERAPY VISIT (OUTPATIENT)
Dept: PHYSICAL THERAPY | Facility: CLINIC | Age: 40
End: 2020-10-12
Payer: OTHER MISCELLANEOUS

## 2020-10-12 DIAGNOSIS — M25.511 RIGHT SHOULDER PAIN, UNSPECIFIED CHRONICITY: ICD-10-CM

## 2020-10-12 PROCEDURE — 97112 NEUROMUSCULAR REEDUCATION: CPT | Mod: GP | Performed by: PHYSICAL THERAPIST

## 2020-10-12 PROCEDURE — 97110 THERAPEUTIC EXERCISES: CPT | Mod: GP | Performed by: PHYSICAL THERAPIST

## 2020-10-12 NOTE — PROGRESS NOTES
Subjective:  HPI  Physical Exam                    Objective:  System    Physical Exam    General     ROS    Assessment/Plan:    {REHAB NOTES:249539}

## 2020-10-19 ENCOUNTER — THERAPY VISIT (OUTPATIENT)
Dept: PHYSICAL THERAPY | Facility: CLINIC | Age: 40
End: 2020-10-19
Payer: OTHER MISCELLANEOUS

## 2020-10-19 DIAGNOSIS — M25.511 RIGHT SHOULDER PAIN, UNSPECIFIED CHRONICITY: ICD-10-CM

## 2020-10-19 PROCEDURE — 97110 THERAPEUTIC EXERCISES: CPT | Mod: GP | Performed by: PHYSICAL THERAPIST

## 2020-10-19 PROCEDURE — 97112 NEUROMUSCULAR REEDUCATION: CPT | Mod: GP | Performed by: PHYSICAL THERAPIST

## 2020-10-20 DIAGNOSIS — K21.9 GASTROESOPHAGEAL REFLUX DISEASE WITHOUT ESOPHAGITIS: ICD-10-CM

## 2020-10-20 DIAGNOSIS — F41.9 ANXIETY: ICD-10-CM

## 2020-10-21 RX ORDER — FAMOTIDINE 20 MG/1
20 TABLET, FILM COATED ORAL 2 TIMES DAILY
Qty: 180 TABLET | Refills: 1 | Status: SHIPPED | OUTPATIENT
Start: 2020-10-21 | End: 2021-02-23

## 2020-10-21 RX ORDER — BUSPIRONE HYDROCHLORIDE 10 MG/1
10 TABLET ORAL 2 TIMES DAILY
Qty: 180 TABLET | Refills: 1 | Status: SHIPPED | OUTPATIENT
Start: 2020-10-21 | End: 2020-12-01

## 2020-10-22 DIAGNOSIS — F32.A DEPRESSION, UNSPECIFIED DEPRESSION TYPE: ICD-10-CM

## 2020-10-30 ENCOUNTER — THERAPY VISIT (OUTPATIENT)
Dept: PHYSICAL THERAPY | Facility: CLINIC | Age: 40
End: 2020-10-30
Payer: OTHER MISCELLANEOUS

## 2020-10-30 DIAGNOSIS — M25.511 RIGHT SHOULDER PAIN, UNSPECIFIED CHRONICITY: ICD-10-CM

## 2020-10-30 PROCEDURE — 97112 NEUROMUSCULAR REEDUCATION: CPT | Mod: GP | Performed by: PHYSICAL THERAPIST

## 2020-10-30 PROCEDURE — 97110 THERAPEUTIC EXERCISES: CPT | Mod: GP | Performed by: PHYSICAL THERAPIST

## 2020-11-13 ENCOUNTER — THERAPY VISIT (OUTPATIENT)
Dept: PHYSICAL THERAPY | Facility: CLINIC | Age: 40
End: 2020-11-13
Payer: OTHER MISCELLANEOUS

## 2020-11-13 DIAGNOSIS — M25.511 RIGHT SHOULDER PAIN, UNSPECIFIED CHRONICITY: ICD-10-CM

## 2020-11-13 PROCEDURE — 97112 NEUROMUSCULAR REEDUCATION: CPT | Mod: GP | Performed by: PHYSICAL THERAPIST

## 2020-11-13 PROCEDURE — 97110 THERAPEUTIC EXERCISES: CPT | Mod: GP | Performed by: PHYSICAL THERAPIST

## 2020-11-13 PROCEDURE — 97140 MANUAL THERAPY 1/> REGIONS: CPT | Mod: GP | Performed by: PHYSICAL THERAPIST

## 2020-11-27 ENCOUNTER — THERAPY VISIT (OUTPATIENT)
Dept: PHYSICAL THERAPY | Facility: CLINIC | Age: 40
End: 2020-11-27
Payer: OTHER MISCELLANEOUS

## 2020-11-27 DIAGNOSIS — M25.511 RIGHT SHOULDER PAIN, UNSPECIFIED CHRONICITY: ICD-10-CM

## 2020-11-27 PROCEDURE — 97110 THERAPEUTIC EXERCISES: CPT | Mod: GP | Performed by: PHYSICAL THERAPIST

## 2020-11-27 PROCEDURE — 97112 NEUROMUSCULAR REEDUCATION: CPT | Mod: GP | Performed by: PHYSICAL THERAPIST

## 2020-11-27 NOTE — PROGRESS NOTES
Subjective:  HPI  Physical Exam                    Objective:  System    Physical Exam    General     ROS    Assessment/Plan:    DISCHARGE REPORT    Progress reporting period is from 10/05/2020 to 11/27/2020.       SUBJECTIVE  Patient reports significant improvement in her left shoulder since the start of therapy. 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  Prior to this, however, patient noticed that she could tolerate lifting lighter boxes at work without increased pain.         Current pain level is 2/10  .     Previous pain level was  0/10  .   Changes in function:  Yes (See Goal flowsheet attached for changes in current functional level)  Adverse reaction to treatment or activity: None    OBJECTIVE  Left Shoulder AROM: WNL and painfree  Left Shoulder Strength: all motions 5/5 and painfree  Flexibility: increased in pec minor  Thoracic spine mobility improving.        ASSESSMENT/PLAN  STG/LTGs have been met or progress has been made towards goals:  Yes (See Goal flow sheet completed today.)  Assessment of Progress: The patient has met all of her short and long term goals.  Self Management Plans:  Patient has been instructed in a home exercise program and how to safely progress with this independently, emphasizing high reps with low weight.  PT intervention is no longer required to meet STG/LTG.    Recommendations:  This patient is ready to be discharged from therapy and continue their home treatment program. Encouraged patient to call if she has any questions or concerns.      Please refer to the daily flowsheet for treatment today, total treatment time and time spent performing 1:1 timed codes.

## 2020-11-29 ENCOUNTER — HEALTH MAINTENANCE LETTER (OUTPATIENT)
Age: 40
End: 2020-11-29

## 2020-12-01 ENCOUNTER — VIRTUAL VISIT (OUTPATIENT)
Dept: FAMILY MEDICINE | Facility: CLINIC | Age: 40
End: 2020-12-01
Payer: COMMERCIAL

## 2020-12-01 DIAGNOSIS — F32.A DEPRESSION, UNSPECIFIED DEPRESSION TYPE: ICD-10-CM

## 2020-12-01 PROCEDURE — 96127 BRIEF EMOTIONAL/BEHAV ASSMT: CPT | Performed by: FAMILY MEDICINE

## 2020-12-01 PROCEDURE — 99213 OFFICE O/P EST LOW 20 MIN: CPT | Mod: 95 | Performed by: FAMILY MEDICINE

## 2020-12-01 RX ORDER — FLUOXETINE 40 MG/1
40 CAPSULE ORAL DAILY
Qty: 30 CAPSULE | Refills: 0 | Status: SHIPPED | OUTPATIENT
Start: 2020-12-01 | End: 2021-01-18

## 2020-12-01 ASSESSMENT — ANXIETY QUESTIONNAIRES
7. FEELING AFRAID AS IF SOMETHING AWFUL MIGHT HAPPEN: SEVERAL DAYS
5. BEING SO RESTLESS THAT IT IS HARD TO SIT STILL: MORE THAN HALF THE DAYS
2. NOT BEING ABLE TO STOP OR CONTROL WORRYING: NEARLY EVERY DAY
6. BECOMING EASILY ANNOYED OR IRRITABLE: MORE THAN HALF THE DAYS
1. FEELING NERVOUS, ANXIOUS, OR ON EDGE: NEARLY EVERY DAY
3. WORRYING TOO MUCH ABOUT DIFFERENT THINGS: NEARLY EVERY DAY
GAD7 TOTAL SCORE: 16

## 2020-12-01 ASSESSMENT — PATIENT HEALTH QUESTIONNAIRE - PHQ9
5. POOR APPETITE OR OVEREATING: MORE THAN HALF THE DAYS
SUM OF ALL RESPONSES TO PHQ QUESTIONS 1-9: 13

## 2020-12-01 NOTE — PATIENT INSTRUCTIONS
Dolly Miguel,    Thank you for allowing [facility] to manage your care.    I sent your prescriptions to your pharmacy.    I made a therapy referral, they will be calling in approximately 1 week to set up your appointment.  If you do not hear from them, please call the specialty number on your after visit.     If you have any questions or concerns, please feel free to call us at [phone].    Sincerely,    Dr. Shultz    Did you know?      You can schedule a video visit for follow-up appointments as well as future appointments for certain conditions.  Please see the below link.     [email or web address]    If you have not already done so,  I encourage you to sign up for Process [facility]ation[facility] ([email or web address]).  This will allow you to review your results, securely communicate with a provider, and schedule virtual visits as well.

## 2020-12-01 NOTE — PROGRESS NOTES
"oSmmer Gomez is a 39 year old female who is being evaluated via a billable telephone visit.      The patient has been notified of following:     \"This telephone visit will be conducted via a call between you and your physician/provider. We have found that certain health care needs can be provided without the need for a physical exam.  This service lets us provide the care you need with a short phone conversation.  If a prescription is necessary we can send it directly to your pharmacy.  If lab work is needed we can place an order for that and you can then stop by our lab to have the test done at a later time.    Telephone visits are billed at different rates depending on your insurance coverage. During this emergency period, for some insurers they may be billed the same as an in-person visit.  Please reach out to your insurance provider with any questions.    If during the course of the call the physician/provider feels a telephone visit is not appropriate, you will not be charged for this service.\"    Patient has given verbal consent for Telephone visit?  Yes    What phone number would you like to be contacted at? [phone]    How would you like to obtain your AVS? Liban Eli     Sommer Gomez is a 39 year old female who presents via phone visit today for the following health issues:    HPI     Depression and Anxiety Follow-Up    How are you doing with your depression since your last visit? Improved but not great    How are you doing with your anxiety since your last visit?  No change    Are you having other symptoms that might be associated with depression or anxiety? Yes:  sleep problems    Have you had a significant life event? She lost her job     Do you have any concerns with your use of alcohol or other drugs? No    Social History     Tobacco Use     Smoking status: Never Smoker     Smokeless tobacco: Never Used   Substance Use Topics     Alcohol use: No     Drug use: No     PHQ " 8/11/2020 9/15/2020 12/1/2020   PHQ-9 Total Score 17 17 13   Q9: Thoughts of better off dead/self-harm past 2 weeks Not at all Not at all Not at all     ROSA-7 SCORE 8/11/2020 9/15/2020 12/1/2020   Total Score - - -   Total Score 17 (severe anxiety) - -   Total Score 17 15 16         Suicide Assessment Five-step Evaluation and Treatment (SAFE-T)      How many days per week do you miss taking your medication? 0    What makes it hard for you to take your medications?  fatigue and dizziness     1. Depression and anxiety f/u: Patient got fired from her last job at the end of October.  Patient applied for [employer].  Patient is doing a little better now.  It was tough initially when she was fired.  She was called inappropriate names which was not true.  Patient states that prozac was very helpful.  She states that she struggles with sleep and continues to have racing thoughts.     Review of Systems   Constitutional, HEENT, cardiovascular, pulmonary, gi and gu systems are negative, except as otherwise noted.       Objective          Vitals:  No vitals were obtained today due to virtual visit.    healthy, alert and no distress  PSYCH: Alert and oriented times 3; coherent speech, normal   rate and volume, able to articulate logical thoughts, able   to abstract reason, no tangential thoughts, no hallucinations   or delusions  Her affect is normal  RESP: No cough, no audible wheezing, able to talk in full sentences  Remainder of exam unable to be completed due to telephone visits      Assessment/Plan:    Assessment & Plan     Depression, unspecified depression type  With anxiety.  - FLUoxetine (PROZAC) 40 MG capsule; Take 1 capsule (40 mg) by mouth daily  Dispense: 30 capsule; Refill: 0  - MENTAL HEALTH REFERRAL  - Adult; Outpatient Treatment; Individual/Couples/Family/Group Therapy/Health Psychology; [facility] [place] [facility] [phone]; We will contact you to schedule the appointment or please call with any  questions    See Patient Instructions    No follow-ups on file.    Dharmesh Shultz [facility]    Phone call duration:  15 minutes

## 2020-12-02 ASSESSMENT — ANXIETY QUESTIONNAIRES: GAD7 TOTAL SCORE: 16

## 2021-01-20 ENCOUNTER — VIRTUAL VISIT (OUTPATIENT)
Dept: PSYCHOLOGY | Facility: CLINIC | Age: 41
End: 2021-01-20
Attending: FAMILY MEDICINE
Payer: COMMERCIAL

## 2021-01-20 DIAGNOSIS — F33.9 MAJOR DEPRESSIVE DISORDER, RECURRENT EPISODE WITH ANXIOUS DISTRESS (H): Primary | ICD-10-CM

## 2021-01-20 PROCEDURE — 90834 PSYTX W PT 45 MINUTES: CPT | Mod: 95 | Performed by: PSYCHOLOGIST

## 2021-01-20 ASSESSMENT — ANXIETY QUESTIONNAIRES
6. BECOMING EASILY ANNOYED OR IRRITABLE: SEVERAL DAYS
GAD7 TOTAL SCORE: 12
2. NOT BEING ABLE TO STOP OR CONTROL WORRYING: MORE THAN HALF THE DAYS
3. WORRYING TOO MUCH ABOUT DIFFERENT THINGS: NEARLY EVERY DAY
1. FEELING NERVOUS, ANXIOUS, OR ON EDGE: MORE THAN HALF THE DAYS
7. FEELING AFRAID AS IF SOMETHING AWFUL MIGHT HAPPEN: NOT AT ALL
5. BEING SO RESTLESS THAT IT IS HARD TO SIT STILL: SEVERAL DAYS
IF YOU CHECKED OFF ANY PROBLEMS ON THIS QUESTIONNAIRE, HOW DIFFICULT HAVE THESE PROBLEMS MADE IT FOR YOU TO DO YOUR WORK, TAKE CARE OF THINGS AT HOME, OR GET ALONG WITH OTHER PEOPLE: SOMEWHAT DIFFICULT

## 2021-01-20 ASSESSMENT — COLUMBIA-SUICIDE SEVERITY RATING SCALE - C-SSRS
TOTAL  NUMBER OF INTERRUPTED ATTEMPTS LIFETIME: NO
1. IN THE PAST MONTH, HAVE YOU WISHED YOU WERE DEAD OR WISHED YOU COULD GO TO SLEEP AND NOT WAKE UP?: YES
5. HAVE YOU STARTED TO WORK OUT OR WORKED OUT THE DETAILS OF HOW TO KILL YOURSELF? DO YOU INTEND TO CARRY OUT THIS PLAN?: NO
ATTEMPT LIFETIME: YES
ATTEMPT PAST THREE MONTHS: NO
6. HAVE YOU EVER DONE ANYTHING, STARTED TO DO ANYTHING, OR PREPARED TO DO ANYTHING TO END YOUR LIFE?: YES
4. HAVE YOU HAD THESE THOUGHTS AND HAD SOME INTENTION OF ACTING ON THEM?: NO
REASONS FOR IDEATION LIFETIME: MOSTLY TO END OR STOP THE PAIN (YOU COULDN'T GO ON LIVING WITH THE PAIN OR HOW YOU WERE FEELING)
6. HAVE YOU EVER DONE ANYTHING, STARTED TO DO ANYTHING, OR PREPARED TO DO ANYTHING TO END YOUR LIFE?: NO
1. IN THE PAST MONTH, HAVE YOU WISHED YOU WERE DEAD OR WISHED YOU COULD GO TO SLEEP AND NOT WAKE UP?: NO
TOTAL  NUMBER OF ABORTED OR SELF INTERRUPTED ATTEMPTS PAST LIFETIME: NO
TOTAL  NUMBER OF ABORTED OR SELF INTERRUPTED ATTEMPTS PAST 3 MONTHS: NO
TOTAL  NUMBER OF INTERRUPTED ATTEMPTS PAST 3 MONTHS: NO

## 2021-01-20 ASSESSMENT — PATIENT HEALTH QUESTIONNAIRE - PHQ9
SUM OF ALL RESPONSES TO PHQ QUESTIONS 1-9: 10
5. POOR APPETITE OR OVEREATING: NEARLY EVERY DAY

## 2021-01-21 ASSESSMENT — ANXIETY QUESTIONNAIRES: GAD7 TOTAL SCORE: 12

## 2021-01-25 NOTE — PROGRESS NOTES
Progress Note - Initial Visit    Client Name:  Sommer Gomez Date: 1/20/21         Service Type: Individual     Visit Start Time: 11:00 AM  Visit End Time: 11:52 AM    Visit #: 1    Attendees: Client    Service Modality:  Video Visit:      Provider verified identity through the following two step process.  Patient provided:  Patient photo    Telemedicine Visit: The patient's condition can be safely assessed and treated via synchronous audio and visual telemedicine encounter.      Reason for Telemedicine Visit: Services only offered telehealth    Originating Site (Patient Location): Patient's home    Distant Site (Provider Location): Provider Remote Setting    Consent:  The patient/guardian has verbally consented to: the potential risks and benefits of telemedicine (video visit) versus in person care; bill my insurance or make self-payment for services provided; and responsibility for payment of non-covered services.     Patient would like the video invitation sent by:  Text to cell phone: [phone]    Mode of Communication:  Video Conference via Amwell    As the provider I attest to compliance with applicable laws and regulations related to telemedicine.       DATA:   Interactive Complexity: No   Crisis: No     Presenting Concerns/  Current Stressors:   Individual/Relational/Societal  Patient was on time and present to the session. Patient discussed having concerns related to do depression, anxiety and at times trauma flashbacks.  Patient discussed recently losing their job and that is what triggered the realization of wanting to reach out for support.       ASSESSMENT:  Mental Status Assessment:  Appearance:   Appropriate   Eye Contact:   Good   Psychomotor Behavior: Normal   Attitude:   Cooperative   Orientation:   All  Speech   Rate / Production: Normal/ Responsive   Volume:  Normal   Mood:    Normal  Affect:    Appropriate   Thought Content:  Clear   Thought Form:  Logical    Insight:    Good       Safety Issues and Plan for Safety and Risk Management:     [place] Suicide Severity Rating Scale (Lifetime/Recent)  [place] Suicide Severity Rating (Lifetime/Recent) 11/14/2019 1/20/2021   1. Wish to be Dead (Lifetime) Yes Yes   1. Wish to be Dead (Recent) No No   2. Non-Specific Active Suicidal Thoughts (Lifetime) Yes -   2. Non-Specific Active Suicidal Thoughts (Recent) No -   3. Active Suicidal Ideation with any Methods (Not Plan) Without Intent to Act (Lifetime) Yes -   3. Active Sucidal Ideation with any Methods (Not Plan) Without Intent to Act (Recent) - Yes   4. Active Suicidal Ideation with Some Intent to Act, Without Specific Plan (Lifetime) Yes -   4. Active Suicidal Ideation with Some Intent to Act, Without Specific Plan (Recent) - No   5. Active Suicidal Ideation with Specific Plan and Intent (Lifetime) Yes -   5. Active Suicidal Ideation with Specific Plan and Intent (Recent) - No   Most Severe Ideation Rating (Lifetime) 5 5   Frequency (Lifetime) 5 4   Duration (Lifetime) 4 3   Controllability (Lifetime) 4 4   Protective Factors  (Lifetime) 1 2   Reasons for Ideation (Lifetime) 5 4   Most Severe Ideation Rating (Past Month) - NA   Frequency (Past Month) - NA   Duration (Past Month) - NA   Controllability (Past Month) - NA   Protective Factors (Past Month) - NA   Reasons for Ideation (Past Month) - NA   Actual Attempt (Lifetime) Yes Yes   Total Number of Actual Attempts (Lifetime) 2 -   Actual Attempt (Past 3 Months) - No   Has subject engaged in non-suicidal self-injurious behavior? (Lifetime) No Yes   Has subject engaged in non-suicidal self-injurious behavior? (Past 3 Months) - No   Interrupted Attempts (Lifetime) Yes No   Total Number of Interrupted Attempts (Lifetime) 1 -   Interrupted Attempts (Past 3 Months) - No   Aborted or Self-Interrupted Attempt (Lifetime) No No   Aborted or Self-Interrupted Attempt (Past 3 Months) - No   Preparatory Acts or Behavior (Lifetime) Yes  Yes   Preparatory Acts or Behavior (Past 3 Months) - No   Most Recent Attempt Date 25834 -   Most Recent Attempt Actual Lethality Code - NA   Most Lethal Attempt Actual Lethality Code - NA   Initial/First Attempt Actual Lethality Code - NA     Patient denies current fears or concerns for personal safety.  Patient denies current or recent suicidal ideation or behaviors.  Patient denies current or recent homicidal ideation or behaviors.  Patient denies current or recent self injurious behavior or ideation.  Patient denies other safety concerns.  Recommended that patient call 911 or go to the local ED should there be a change in any of these risk factors.  Patient reports there are no firearms in the house.     Diagnostic Criteria:  A) Recurrent episode(s) - symptoms have been present during the same 2-week period and represent a change from previous functioning 5 or more symptoms (required for diagnosis)   - Depressed mood. Note: In children and adolescents, can be irritable mood.     - Diminished interest or pleasure in all, or almost all, activities.    - Increased sleep.    - Psychomotor activity agitation.    - Fatigue or loss of energy.    - Feelings of worthlessness or inappropriate guilt.    - Diminished ability to think or concentrate, or indecisiveness.    - Recurrent thoughts of death (not just fear of dying), recurrent suicidal ideation without a specific plan, or a suicide attempt or a specific plan for committing suicide.   B) The symptoms cause clinically significant distress or impairment in social, occupational, or other important areas of functioning  C) The episode is not attributable to the physiological effects of a substance or to another medical condition  D) The occurence of major depressive episode is not better explained by other thought / psychotic disorders      DSM5 Diagnoses: (Sustained by DSM5 Criteria Listed Above)  Diagnoses: 296.31 (F33.0) Major Depressive Disorder, Recurrent Episode,  Mild _ and With anxious distress  Psychosocial & Contextual Factors: Individual/Family/Relational/Societal  WHODAS 2.0 (12 item):   WHODAS 2.0 Total Score 11/14/2019 1/20/2021   Total Score 31 32     Intervention:   Psychodynamic- Patient processed internal experiences   Collateral Reports Completed:  Routed note to PCP      PLAN: (Homework, other):  1. Provider will continue Diagnostic Assessment.  Patient was given the following to do until next session:  No homework assigned.    2. Provider recommended the following referrals: No referrals recommended at time of assessment.      3.  Safety plan created.  Provider recommended that patient contact support persons or any health care professional if any safety concerns develop and for any immediate safety risk or concerns, please contact 911.       CHRISTIAN Mata  January 20, 2021

## 2021-01-27 ENCOUNTER — VIRTUAL VISIT (OUTPATIENT)
Dept: PSYCHOLOGY | Facility: CLINIC | Age: 41
End: 2021-01-27
Attending: FAMILY MEDICINE
Payer: COMMERCIAL

## 2021-01-27 DIAGNOSIS — F41.1 GENERALIZED ANXIETY DISORDER: Primary | ICD-10-CM

## 2021-01-27 PROCEDURE — 90834 PSYTX W PT 45 MINUTES: CPT | Mod: 95 | Performed by: PSYCHOLOGIST

## 2021-01-27 NOTE — PROGRESS NOTES
Progress Note    Patient Name: Sommer Gomez  Date: 1/27/21         Service Type: Individual      Session Start Time: 5:07 PM  Session End Time: 5:59 PM     Session Length: 38-52 min    Session #: 2    Attendees: Client    Service Modality:  Video Visit:      Provider verified identity through the following two step process.  Patient provided:  Patient photo    Telemedicine Visit: The patient's condition can be safely assessed and treated via synchronous audio and visual telemedicine encounter.      Reason for Telemedicine Visit: Services only offered telehealth    Originating Site (Patient Location): Patient's home    Distant Site (Provider Location): Provider Remote Setting    Consent:  The patient/guardian has verbally consented to: the potential risks and benefits of telemedicine (video visit) versus in person care; bill my insurance or make self-payment for services provided; and responsibility for payment of non-covered services.     Patient would like the video invitation sent by:  Text to cell phone: [phone]    Mode of Communication:  Video Conference via Amwell    As the provider I attest to compliance with applicable laws and regulations related to telemedicine.     Treatment Plan Last Reviewed:   PHQ-9 / ROSA-7 : 10/12    DATA  Interactive Complexity: No  Crisis: No       Progress Since Last Session (Related to Symptoms / Goals / Homework):   Symptoms: No change Patient has expressed and exhibited no change in symptoms or behaviors.    Homework: Completed in session      Episode of Care Goals: Achieved / completed to satisfaction - ACTION (Actively working towards change); Intervened by reinforcing change plan / affirming steps taken     Current / Ongoing Stressors and Concerns:   Individual/Family/Relational/Societal  Patient was on time and present for the session.  Patient discussed having a fairly decent week but tired due to not sleeping.  Patient  and therapist discussed medication and they brought up past unsuccessful attempts.  Discussed reconsidering medication. Patient and therapist completed safety plan, please see below for safety plan.      Treatment Objective(s) Addressed in This Session:   identify specific strategies to more effectively address stressors       Intervention:   CBT: Completion of safety plans using CBT interventions        ASSESSMENT: Current Emotional / Mental Status (status of significant symptoms):   Risk status (Self / Other harm or suicidal ideation)   Patient denies current fears or concerns for personal safety.   Patient denies current or recent suicidal ideation or behaviors.   Patient denies current or recent homicidal ideation or behaviors.   Patient denies current or recent self injurious behavior or ideation.   Patient denies other safety concerns.   Patient reports there has been no change in risk factors since their last session.     Patient reports there has been no change in protective factors since their last session.     A safety and risk management plan has been developed including: Patient consented to co-developed safety plan.  Safety and risk management plan was completed.  Patient agreed to use safety plan should any safety concerns arise.  A copy was given to the patient.     Appearance:   Appropriate    Eye Contact:   Good    Psychomotor Behavior: Normal    Attitude:   Cooperative    Orientation:   All   Speech    Rate / Production: Normal     Volume:  Normal    Mood:    Normal   Affect:    Appropriate    Thought Content:  Clear    Thought Form:  Coherent  Logical    Insight:    Good      Medication Review:   No changes to current psychiatric medication(s)     Medication Compliance:   Yes     Changes in Health Issues:   None reported     Chemical Use Review:   Substance Use: Chemical use reviewed, no active concerns identified      Tobacco Use: No current tobacco use.      Diagnosis:  Generalized Anxiety  "Disorder     Collateral Reports Completed:   Not Applicable    PLAN: (Patient Tasks / Therapist Tasks / Other)  Continue to complete assessment and meet for individual therapy        Misbah Sandoval [facility]                                                                                               Sommer Gomez     SAFETY PLAN:  Step 1: Warning signs / cues (Thoughts, images, mood, situation, behavior) that a crisis may be developing:    Thoughts: \"I don't matter\", \"People would be better off without me\", \"I'm a burden\", \"I can't do this anymore\" and \"I just want this to end\"    Images: flashbacks    Thinking Processes: ruminations (can't stop thinking about my problems): stressors, racing thoughts, intrusive thoughts (bothersome, unwanted thoughts that come out of nowhere): past experiences and highly critical and negative thoughts: negative self esteem    Mood: worsening depression, hopelessness, helplessness and lack of motivation    Behaviors: isolating/withdrawing , not taking care of my responsibilities and not sleeping enough    Situations: financial stress and compounded and prolonged stressors   Step 2: Coping strategies - Things I can do to take my mind off of my problems without contacting another person (relaxation technique, physical activity):    Distress Tolerance Strategies:  play with my pet , change body temperature (ice pack/cold water)  and go outside, spend time with family and garden    Physical Activities: go for a walk, yoga and gardening    Focus on helpful thoughts:  \"This is temporary\", \"I will get through this\", \"It always passes\" and \"Ride the wave\"  Step 3: People and social settings that provide distraction:   Name: Mary    Name: [employer]   Step 4: Remind myself of people and things that are important to me and worth living for:  My family and kids and my pets.       Step 5: When I am in crisis, I can ask these people to help me use my safety plan:   Name: " Shalom  Step 6: Making the environment safe:     be around others  Step 7: Professionals or agencies I can contact during a crisis:    [facility] Daytime Number: [phone]    Suicide Prevention Lifeline: 7-532-775-OWWL (9686)    Crisis Text Line Service (available 24 hours a day, 7 days a week): Text [place] to 146730  Local Crisis Services: 911    Call 911 or go to my nearest emergency department.   I helped develop this safety plan and agree to use it when needed.  I have been given a copy of this plan.      Client signature-Myriam  Today s date:  1/27/2021  Adapted from Safety Plan Template 2008 Merari Colin and Chau Garcia is reprinted with the express permission of the authors.  No portion of the Safety Plan Template may be reproduced without the express, written permission.  You can contact the authors at [email or web address] or [email or web address].

## 2021-02-22 DIAGNOSIS — K21.9 GASTROESOPHAGEAL REFLUX DISEASE WITHOUT ESOPHAGITIS: ICD-10-CM

## 2021-02-22 DIAGNOSIS — F32.A DEPRESSION, UNSPECIFIED DEPRESSION TYPE: ICD-10-CM

## 2021-02-23 RX ORDER — FLUOXETINE 40 MG/1
40 CAPSULE ORAL DAILY
Qty: 30 CAPSULE | Refills: 0 | Status: SHIPPED | OUTPATIENT
Start: 2021-02-23 | End: 2021-02-26

## 2021-02-23 RX ORDER — FAMOTIDINE 20 MG/1
20 TABLET, FILM COATED ORAL 2 TIMES DAILY
Qty: 180 TABLET | Refills: 0 | Status: SHIPPED | OUTPATIENT
Start: 2021-02-23 | End: 2021-06-24

## 2021-02-23 NOTE — TELEPHONE ENCOUNTER
Famotidine:    Prescription approved per [facility] Refill Protocol.      Fluoxetine:    PHQ-9 score:    PHQ 1/20/2021   PHQ-9 Total Score 10   Q9: Thoughts of better off dead/self-harm past 2 weeks Not at all       Virtual visit 12/1/20 with Dr. Shultz.    See plan:    Instructions       Return in about 1 month (around 1/1/2021) for with me, using a phone visit, depression f/u.      I called and spoke to patient, scheduled phone visit Friday 2/26.  She is out of prozac now so sent a one month supply to get her by.        Cyndi Hardwick RN  [facility]

## 2021-02-25 NOTE — PROGRESS NOTES
Myriam is a 40 year old who is being evaluated via a billable telephone visit.      What phone number would you like to be contacted at? [phone]  How would you like to obtain your AVS? MyChart    Assessment & Plan     Depression, unspecified depression type  Stable and improved.   - FLUoxetine (PROZAC) 40 MG capsule; Take 1 capsule (40 mg) by mouth daily    See Patient Instructions    No follow-ups on file.    Dharmesh Shultz DO  [facility] EBONI Miguel is a 40 year old who presents for the following health issues     HPI       Depression and Anxiety Follow-Up    How are you doing with your depression since your last visit? Improved     How are you doing with your anxiety since your last visit?  Improved     Are you having other symptoms that might be associated with depression or anxiety? Yes:  trouble sleeping    Have you had a significant life event? No     Do you have any concerns with your use of alcohol or other drugs? No    Social History     Tobacco Use     Smoking status: Never Smoker     Smokeless tobacco: Never Used   Substance Use Topics     Alcohol use: No     Drug use: No     PHQ 9/15/2020 12/1/2020 1/20/2021   PHQ-9 Total Score 17 13 10   Q9: Thoughts of better off dead/self-harm past 2 weeks Not at all Not at all Not at all     ROSA-7 SCORE 9/15/2020 12/1/2020 1/20/2021   Total Score - - -   Total Score - - -   Total Score 15 16 12     Last PHQ-9 2/26/2021   1.  Little interest or pleasure in doing things 1   2.  Feeling down, depressed, or hopeless 1   3.  Trouble falling or staying asleep, or sleeping too much 3   4.  Feeling tired or having little energy 1   5.  Poor appetite or overeating 0   6.  Feeling bad about yourself 0   7.  Trouble concentrating 2   8.  Moving slowly or restless 0   Q9: Thoughts of better off dead/self-harm past 2 weeks 0   PHQ-9 Total Score 8   Difficulty at work, home, or with people Somewhat difficult       Suicide Assessment  Five-step Evaluation and Treatment (SAFE-T)      How many servings of fruits and vegetables do you eat daily?  2-3    On average, how many sweetened beverages do you drink each day (Examples: soda, juice, sweet tea, etc.  Do NOT count diet or artificially sweetened beverages)?   1    How many days per week do you exercise enough to make your heart beat faster? 5    How many minutes a day do you exercise enough to make your heart beat faster? 60 or more  How many days per week do you miss taking your medication? 1    What makes it hard for you to take your medications?  remembering to take    1. Depression f/u: Patient has new job as a [employer] job.  Tolerating prozac.  States that mood has improved.  Unsure is concentration has improved.  Getting 6 hours per sleep.  Patient continues to go therapy.     Review of Systems   Constitutional: Negative for chills and fever.   HENT: Negative for congestion, ear pain, hearing loss and sore throat.    Eyes: Negative for pain and visual disturbance.   Respiratory: Negative for cough and shortness of breath.    Cardiovascular: Negative for chest pain, palpitations and peripheral edema.   Gastrointestinal: Negative for abdominal pain, constipation, diarrhea, heartburn, hematochezia and nausea.   Breasts:  Negative for tenderness, breast mass and discharge.   Genitourinary: Negative for dysuria, frequency, genital sores, hematuria, pelvic pain, urgency, vaginal bleeding and vaginal discharge.   Musculoskeletal: Negative for arthralgias, joint swelling and myalgias.   Skin: Negative for rash.   Neurological: Negative for dizziness, weakness, headaches and paresthesias.   Psychiatric/Behavioral: Negative for mood changes. The patient is not nervous/anxious.           Objective         Vitals:  No vitals were obtained today due to virtual visit.    Physical Exam   healthy, alert and no distress  PSYCH: Alert and oriented times 3; coherent speech, normal   rate and volume, able to  articulate logical thoughts, able   to abstract reason, no tangential thoughts, no hallucinations   or delusions  Her affect is normal  RESP: No cough, no audible wheezing, able to talk in full sentences  Remainder of exam unable to be completed due to telephone visits    Phone call duration: 15 minutes

## 2021-02-26 ENCOUNTER — VIRTUAL VISIT (OUTPATIENT)
Dept: FAMILY MEDICINE | Facility: CLINIC | Age: 41
End: 2021-02-26
Payer: COMMERCIAL

## 2021-02-26 ENCOUNTER — VIRTUAL VISIT (OUTPATIENT)
Dept: PSYCHOLOGY | Facility: CLINIC | Age: 41
End: 2021-02-26
Payer: COMMERCIAL

## 2021-02-26 DIAGNOSIS — F43.23 ADJUSTMENT DISORDER WITH MIXED ANXIETY AND DEPRESSED MOOD: Primary | ICD-10-CM

## 2021-02-26 DIAGNOSIS — F32.A DEPRESSION, UNSPECIFIED DEPRESSION TYPE: ICD-10-CM

## 2021-02-26 PROCEDURE — 90834 PSYTX W PT 45 MINUTES: CPT | Mod: 95 | Performed by: PSYCHOLOGIST

## 2021-02-26 PROCEDURE — 99213 OFFICE O/P EST LOW 20 MIN: CPT | Mod: 95 | Performed by: FAMILY MEDICINE

## 2021-02-26 PROCEDURE — 96127 BRIEF EMOTIONAL/BEHAV ASSMT: CPT | Mod: 95 | Performed by: FAMILY MEDICINE

## 2021-02-26 RX ORDER — FLUOXETINE 40 MG/1
40 CAPSULE ORAL DAILY
Qty: 90 CAPSULE | Refills: 3 | Status: SHIPPED | OUTPATIENT
Start: 2021-02-26 | End: 2022-01-25

## 2021-02-26 ASSESSMENT — ENCOUNTER SYMPTOMS
SHORTNESS OF BREATH: 0
NAUSEA: 0
EYE PAIN: 0
PARESTHESIAS: 0
NERVOUS/ANXIOUS: 0
HEMATURIA: 0
DIZZINESS: 0
FEVER: 0
HEADACHES: 0
BREAST MASS: 0
CHILLS: 0
FREQUENCY: 0
DIARRHEA: 0
COUGH: 0
DYSURIA: 0
SORE THROAT: 0
ARTHRALGIAS: 0
HEARTBURN: 0
PALPITATIONS: 0
CONSTIPATION: 0
JOINT SWELLING: 0
WEAKNESS: 0
ABDOMINAL PAIN: 0
HEMATOCHEZIA: 0
MYALGIAS: 0

## 2021-02-26 ASSESSMENT — ANXIETY QUESTIONNAIRES
1. FEELING NERVOUS, ANXIOUS, OR ON EDGE: MORE THAN HALF THE DAYS
2. NOT BEING ABLE TO STOP OR CONTROL WORRYING: SEVERAL DAYS
3. WORRYING TOO MUCH ABOUT DIFFERENT THINGS: SEVERAL DAYS
7. FEELING AFRAID AS IF SOMETHING AWFUL MIGHT HAPPEN: NOT AT ALL
GAD7 TOTAL SCORE: 7
6. BECOMING EASILY ANNOYED OR IRRITABLE: NOT AT ALL
5. BEING SO RESTLESS THAT IT IS HARD TO SIT STILL: SEVERAL DAYS
IF YOU CHECKED OFF ANY PROBLEMS ON THIS QUESTIONNAIRE, HOW DIFFICULT HAVE THESE PROBLEMS MADE IT FOR YOU TO DO YOUR WORK, TAKE CARE OF THINGS AT HOME, OR GET ALONG WITH OTHER PEOPLE: SOMEWHAT DIFFICULT

## 2021-02-26 ASSESSMENT — PATIENT HEALTH QUESTIONNAIRE - PHQ9
SUM OF ALL RESPONSES TO PHQ QUESTIONS 1-9: 8
5. POOR APPETITE OR OVEREATING: MORE THAN HALF THE DAYS
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
## 2021-02-26 NOTE — PATIENT INSTRUCTIONS
Dolly Miguel,    Thank you for allowing [facility] to manage your care.    I sent your prescriptions to your pharmacy.    If you have any questions or concerns, please feel free to call us at [phone].    Sincerely,    Dr. Shultz    Did you know?      You can schedule a video visit for follow-up appointments as well as future appointments for certain conditions.  Please see the below link.     [email or web address]    If you have not already done so,  I encourage you to sign up for [employer] ([email or web address]).  This will allow you to review your results, securely communicate with a provider, and schedule virtual visits as well.

## 2021-02-27 ASSESSMENT — ANXIETY QUESTIONNAIRES: GAD7 TOTAL SCORE: 7

## 2021-03-03 ENCOUNTER — VIRTUAL VISIT (OUTPATIENT)
Dept: PSYCHOLOGY | Facility: CLINIC | Age: 41
End: 2021-03-03
Payer: COMMERCIAL

## 2021-03-03 DIAGNOSIS — F33.0 MAJOR DEPRESSIVE DISORDER, RECURRENT, MILD (H): Primary | ICD-10-CM

## 2021-03-03 PROCEDURE — 90791 PSYCH DIAGNOSTIC EVALUATION: CPT | Mod: 95 | Performed by: PSYCHOLOGIST
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
## 2021-03-22 ENCOUNTER — VIRTUAL VISIT (OUTPATIENT)
Dept: PSYCHOLOGY | Facility: CLINIC | Age: 41
End: 2021-03-22
Payer: COMMERCIAL

## 2021-03-22 DIAGNOSIS — F33.0 MILD RECURRENT MAJOR DEPRESSION (H): Primary | ICD-10-CM

## 2021-03-22 PROCEDURE — 90834 PSYTX W PT 45 MINUTES: CPT | Mod: 95 | Performed by: PSYCHOLOGIST

## 2021-03-24 NOTE — PROGRESS NOTES
Progress Note    Patient Name: Sommer Gomez  Date: 3/22/21         Service Type: Individual      Session Start Time: 7:00 AM  Session End Time: 7:52 AM     Session Length: 38-52 min    Session #: 4    Attendees: Client    Service Modality:  Video Visit:      Provider verified identity through the following two step process.  Patient provided:  Patient photo    Telemedicine Visit: The patient's condition can be safely assessed and treated via synchronous audio and visual telemedicine encounter.      Reason for Telemedicine Visit: Services only offered telehealth    Originating Site (Patient Location): Patient's home    Distant Site (Provider Location): Provider Remote Setting    Consent:  The patient/guardian has verbally consented to: the potential risks and benefits of telemedicine (video visit) versus in person care; bill my insurance or make self-payment for services provided; and responsibility for payment of non-covered services.     Patient would like the video invitation sent by:  Text to cell phone: [phone]    Mode of Communication:  Video Conference via [facility]    As the provider I attest to compliance with applicable laws and regulations related to telemedicine.     Treatment Plan Last Reviewed: 3/22/21  PHQ-9 / ROSA-7 : 8/7    DATA  Interactive Complexity: No  Crisis: No       Progress Since Last Session (Related to Symptoms / Goals / Homework):   Symptoms: No change Patient expressed and exhibited little change in symptoms and behaviors.    Homework: Achieved / completed to satisfaction      Episode of Care Goals: Satisfactory progress - PREPARATION (Decided to change - considering how); Intervened by negotiating a change plan and determining options / strategies for behavior change, identifying triggers, exploring social supports, and working towards setting a date to begin behavior change     Current / Ongoing Stressors and  Concerns:   Individual/Employment/Societal/Family  Patient was on time and present for the session.  Patient discussed that they were able to resolve employment issues and she was able to go back to work.  Patient stated that it went better than expected.  Patient discussed having a good past week with little difficulties.  Patient and therapist discussed treatment goals and planning.  Therapist and patient identified trauma like responses and symptoms.  Patient and therapist continued to identify areas of target concerns and behaviors.       Treatment Objective(s) Addressed in This Session:   identify specific stressors which contribute to feelings of depression  identify at least one triggers for anxiety  Decrease frequency and intensity of feeling down, depressed, hopeless       Intervention:   CBT: identification of target concerns and behaviors.        ASSESSMENT: Current Emotional / Mental Status (status of significant symptoms):   Risk status (Self / Other harm or suicidal ideation)   Patient denies current fears or concerns for personal safety.   Patient denies current or recent suicidal ideation or behaviors.   Patient denies current or recent homicidal ideation or behaviors.   Patient denies current or recent self injurious behavior or ideation.   Patient denies other safety concerns.   Patient reports there has been no change in risk factors since their last session.     Patient reports there has been no change in protective factors since their last session.     A safety and risk management plan has been developed including: Patient consented to co-developed safety plan.  Safety and risk management plan was completed.  Patient agreed to use safety plan should any safety concerns arise.  A copy was given to the patient.     Appearance:   Appropriate    Eye Contact:   Good    Psychomotor Behavior: Normal    Attitude:   Cooperative    Orientation:   All   Speech    Rate / Production: Normal  "    Volume:  Normal    Mood:    Normal   Affect:    Appropriate    Thought Content:  Clear    Thought Form:  Coherent  Logical    Insight:    Good      Medication Review:   No changes to current psychiatric medication(s)     Medication Compliance:   Yes     Changes in Health Issues:   None reported     Chemical Use Review:   Substance Use: Chemical use reviewed, no active concerns identified      Tobacco Use: No current tobacco use.      Diagnosis:  Major Depressive Disorder Recurrent Mild     Collateral Reports Completed:   Communicated with: referral provider    PLAN: (Patient Tasks / Therapist Tasks / Other)  Continue to meet for individual therapy and continue to explore areas of target concerns, build relationship        Misbah Sandoval [facility]                                                         ______________________________________________________________________    Treatment Plan    Patient's Name: Sommer Gomez  YOB: 1980    Date: 3/22/21    DSM5 Diagnoses: 296.31 (F33.0) Major Depressive Disorder, Recurrent Episode, Mild _, 300.02 (F41.1) Generalized Anxiety Disorder or 309.81 (F43.10) Posttraumatic Stress Disorder (includes Posttraumatic Stress Disorder for Children 6 Years and Younger)  Without dissociative symptoms  Psychosocial / Contextual Factors: Individual/Societal/Family/Employment  WHODAS:     Referral / Collaboration:  Referral to another professional/service is not indicated at this time..    Anticipated number of session or this episode of care: 90 days      MeasurableTreatment Goal(s) related to diagnosis / functional impairment(s)  Goal 1: Patient will utilize interventions and skills to better regulate negative emotions in difficult circumstances or experiences.  Also, if they are triggered in situations.    I will know I've met my goal when I am able to find positives in difficult circumstances.  Increase in motivation and less irritable.\"     Objective #A (Patient " "Action)    Patient will identify specific strategies to more effectively address stressors  identify three initial signs or symptoms of anxiety.  Status: 90 days     Intervention(s)  Therapist will teach emotional recognition/identification. Utilizing CBT DBT ACT and mindfulness interventions.    Objective #B  Patient will identify at least 5 triggers for anxiety.  Status: 90 days     Intervention(s)  Therapist will teach emotional recognition/identification. utilizing CBT DBT ACT and mindfulness interventions.    Objective #C  Patient will Feel less tired and more energy during the day .  Status: 90 days     Intervention(s)  Therapist will teach emotional regulation skills. Utilizng CBT DBT ACT and mindfulness interventions.      Goal 2: Patient will reduce overall depression and anxiety    I will know I've met my goal when I am able to feel more motivation and have more energy.  I am able to be more mindful and in the present\"     Objective #A (Patient Action)    Status: 90 days     Patient will use cognitive strategies identified in therapy to challenge anxious thoughts  Identify negative self-talk and behaviors: challenge core beliefs, myths, and actions.    Intervention(s)  Therapist will utilize CBT DBT ACT and mindfulness interventions.    Goal 3: Patiient will address traumatic experiences to reduce trauma response.    I will know I've met my goal when I am able to have less trauma response, flashbacks, nightmares, re-experiencing of past.  I can stay more in the present\"     Objective #A (Patient Action)    Status: 90 days     Patient will identify specific strategies to more effectively address stressors  learn to utilize relaxation and cogntive coping skills to reduce and regulate emotions.    Intervention(s)  Therapist will teach emotional regulation skills. Utilizing CBT DBT ACT and mindfulness interventions.    Objective #B  Patient will address specific and targeted traumatic experiences to reduce " negative trauma responses and overall impact of trauma.    Status: 90 days     Intervention(s)  Therapist will utilize CBT DBT ACT and mindfulness interventions.  Narrative therapy and Exposure therapy.    Patient has reviewed and agreed to the above plan.      CHRISTIAN Mata  March 22, 2021

## 2021-04-02 ENCOUNTER — VIRTUAL VISIT (OUTPATIENT)
Dept: PSYCHOLOGY | Facility: CLINIC | Age: 41
End: 2021-04-02
Payer: COMMERCIAL

## 2021-04-02 DIAGNOSIS — F33.0 MAJOR DEPRESSIVE DISORDER, RECURRENT, MILD (H): Primary | ICD-10-CM

## 2021-04-02 PROCEDURE — 90834 PSYTX W PT 45 MINUTES: CPT | Mod: 95 | Performed by: PSYCHOLOGIST

## 2021-04-06 NOTE — PROGRESS NOTES
Progress Note     Patient Name: Sommer Gomez                   Date:   4/2/21                                           Service Type: Individual                            Session Start Time:  2:30 PM                       Session End Time:    3:22 PM                Session Length:        38-52 min     Session #:      5     Attendees:     Client     Service Modality:  Video Visit:      Provider verified identity through the following two step process.  Patient provided:  Patient photo     Telemedicine Visit: The patient's condition can be safely assessed and treated via synchronous audio and visual telemedicine encounter.       Reason for Telemedicine Visit: Services only offered telehealth     Originating Site (Patient Location): Patient's home     Distant Site (Provider Location): Provider Remote Setting     Consent:  The patient/guardian has verbally consented to: the potential risks and benefits of telemedicine (video visit) versus in person care; bill my insurance or make self-payment for services provided; and responsibility for payment of non-covered services.      Patient would like the video invitation sent by:  Text to cell phone: [phone]     Mode of Communication:  Video Conference via [employer]     As the provider I attest to compliance with applicable laws and regulations related to telemedicine.                Treatment Plan Last Reviewed: 3/22/21  PHQ-9 / ROSA-7 : 8/7     DATA  Interactive Complexity: No  Crisis: No                                   Progress Since Last Session (Related to Symptoms / Goals / Homework):              Symptoms: No change Patient expressed and exhibited little change in symptoms and behaviors.     Homework: Achieved / completed to satisfaction                            Episode of Care Goals: Satisfactory progress - PREPARATION (Decided to change - considering how); Intervened by negotiating a change plan and determining  options / strategies for behavior change, identifying triggers, exploring social supports, and working towards setting a date to begin behavior change                 Current / Ongoing Stressors and Concerns:              Individual/Employment/Societal/Family  Patient was on time and present for the session.  Patient reports that they are doing well.  Patient discussed that there has been minimal stress and anxiety since our last visit.  Patient discussed experiencing emotions that they have not felt prior.  Therapist educated patient on stable and safe environment and being able to finally be in a safe, secure environment.  How that can cause people whom have been living in stressful or chaotic environments experience difficult emotions as they transition to a secure environment.  Patient also continued to give more of a narrative about their development and upbringing.  Patient identified more traumatic experiences that they did not disclose prior.  Patient agreed to explore further.  Therapist discussed ACES with patient and psychoeducation of trauma.                   Treatment Objective(s) Addressed in This Session:          Psychoeducation of trauma  Self-care activities                    Intervention:              CBT: identification of target concerns and behaviors.                              ASSESSMENT: Current Emotional / Mental Status (status of significant symptoms):              Risk status (Self / Other harm or suicidal ideation)              Patient denies current fears or concerns for personal safety.              Patient denies current or recent suicidal ideation or behaviors.              Patient denies current or recent homicidal ideation or behaviors.              Patient denies current or recent self injurious behavior or ideation.              Patient denies other safety concerns.              Patient reports there has been no change in risk factors since their last session.                 Patient reports there has been no change in protective factors since their last session.                A safety and risk management plan has been developed including: Patient consented to co-developed safety plan.  Safety and risk management plan was completed.  Patient agreed to use safety plan should any safety concerns arise.  A copy was given to the patient.                 Appearance:                            Appropriate               Eye Contact:                           Good               Psychomotor Behavior:          Normal               Attitude:                                   Cooperative               Orientation:                             All              Speech                          Rate / Production:       Normal                           Volume:                       Normal               Mood:                                      Normal              Affect:                                      Appropriate               Thought Content:                    Clear               Thought Form:                        Coherent  Logical               Insight:                                     Good                  Medication Review:              No changes to current psychiatric medication(s)                 Medication Compliance:              Yes                 Changes in Health Issues:              None reported                 Chemical Use Review:              Substance Use: Chemical use reviewed, no active concerns identified                  Tobacco Use: No current tobacco use.       Diagnosis:  Major Depressive Disorder Recurrent Mild     Collateral Reports Completed:              Communicated with: referral provider     PLAN: (Patient Tasks / Therapist Tasks / Other)  Continue to meet for individual therapy and continue to explore areas of target concerns, build relationship           CHRISTIAN Mata                                                       "     ______________________________________________________________________     Treatment Plan     Patient's Name: Sommer Gomez                YOB: 1980     Date: 3/22/21     DSM5 Diagnoses: 296.31 (F33.0) Major Depressive Disorder, Recurrent Episode, Mild _, 300.02 (F41.1) Generalized Anxiety Disorder or 309.81 (F43.10) Posttraumatic Stress Disorder (includes Posttraumatic Stress Disorder for Children 6 Years and Younger)  Without dissociative symptoms  Psychosocial / Contextual Factors: Individual/Societal/Family/Employment  WHODAS:      Referral / Collaboration:  Referral to another professional/service is not indicated at this time..     Anticipated number of session or this episode of care: 90 days        MeasurableTreatment Goal(s) related to diagnosis / functional impairment(s)  Goal 1: Patient will utilize interventions and skills to better regulate negative emotions in difficult circumstances or experiences.  Also, if they are triggered in situations.    I will know I've met my goal when I am able to find positives in difficult circumstances.  Increase in motivation and less irritable.\"      Objective #A (Patient Action)                          Patient will identify specific strategies to more effectively address stressors  identify three initial signs or symptoms of anxiety.  Status: 90 days      Intervention(s)  Therapist will teach emotional recognition/identification. Utilizing CBT DBT ACT and mindfulness interventions.     Objective #B  Patient will identify at least 5 triggers for anxiety.  Status: 90 days      Intervention(s)  Therapist will teach emotional recognition/identification. utilizing CBT DBT ACT and mindfulness interventions.     Objective #C  Patient will Feel less tired and more energy during the day .  Status: 90 days      Intervention(s)  Therapist will teach emotional regulation skills. Utilizng CBT DBT ACT and mindfulness interventions.        Goal 2: Patient " "will reduce overall depression and anxiety    I will know I've met my goal when I am able to feel more motivation and have more energy.  I am able to be more mindful and in the present\"      Objective #A (Patient Action)                          Status: 90 days      Patient will use cognitive strategies identified in therapy to challenge anxious thoughts  Identify negative self-talk and behaviors: challenge core beliefs, myths, and actions.     Intervention(s)  Therapist will utilize CBT DBT ACT and mindfulness interventions.     Goal 3: Patiient will address traumatic experiences to reduce trauma response.    I will know I've met my goal when I am able to have less trauma response, flashbacks, nightmares, re-experiencing of past.  I can stay more in the present\"      Objective #A (Patient Action)                          Status: 90 days      Patient will identify specific strategies to more effectively address stressors  learn to utilize relaxation and cogntive coping skills to reduce and regulate emotions.     Intervention(s)  Therapist will teach emotional regulation skills. Utilizing CBT DBT ACT and mindfulness interventions.     Objective #B  Patient will address specific and targeted traumatic experiences to reduce negative trauma responses and overall impact of trauma.                 Status: 90 days      Intervention(s)  Therapist will utilize CBT DBT ACT and mindfulness interventions.  Narrative therapy and Exposure therapy.     Patient has reviewed and agreed to the above plan.        Misbah Sandoval [facility]              March 22, 2021  "

## 2021-04-09 ENCOUNTER — VIRTUAL VISIT (OUTPATIENT)
Dept: PSYCHOLOGY | Facility: CLINIC | Age: 41
End: 2021-04-09
Payer: COMMERCIAL

## 2021-04-09 DIAGNOSIS — F33.0 MAJOR DEPRESSIVE DISORDER, RECURRENT EPISODE, MILD WITH ANXIOUS DISTRESS (H): Primary | ICD-10-CM

## 2021-04-09 PROCEDURE — 90834 PSYTX W PT 45 MINUTES: CPT | Mod: 95 | Performed by: PSYCHOLOGIST

## 2021-04-15 NOTE — PROGRESS NOTES
Progress Note     Patient Name: Sommer Gomez                   Date:   4/2/21                                           Service Type: Individual                            Session Start Time:  2:30 PM                       Session End Time:    3:22 PM                Session Length:        38-52 min     Session #:      6     Attendees:     Client     Service Modality:  Video Visit:      Provider verified identity through the following two step process.  Patient provided:  Patient photo     Telemedicine Visit: The patient's condition can be safely assessed and treated via synchronous audio and visual telemedicine encounter.       Reason for Telemedicine Visit: Services only offered telehealth     Originating Site (Patient Location): Patient's home     Distant Site (Provider Location): Provider Remote Setting     Consent:  The patient/guardian has verbally consented to: the potential risks and benefits of telemedicine (video visit) versus in person care; bill my insurance or make self-payment for services provided; and responsibility for payment of non-covered services.      Patient would like the video invitation sent by:  Text to cell phone: [phone]     Mode of Communication:  Video Conference via [facility]     As the provider I attest to compliance with applicable laws and regulations related to telemedicine.                Treatment Plan Last Reviewed: 3/22/21  PHQ-9 / ROSA-7 : 8/7     DATA  Interactive Complexity: No  Crisis: No                                   Progress Since Last Session (Related to Symptoms / Goals / Homework):              Symptoms: Some change Patient expressed and exhibited little change in symptoms and behaviors.     Homework: Achieved / completed to satisfaction                            Episode of Care Goals: Satisfactory progress - PREPARATION (Decided to change - considering how); Intervened by negotiating a change plan and determining  options / strategies for behavior change, identifying triggers, exploring social supports, and working towards setting a date to begin behavior change                 Current / Ongoing Stressors and Concerns:              Individual/Employment/Societal/Family  Patient was on time and present for the session.  Patient reports that they are doing well.  Patient discussed that there has been minimal stress and anxiety since our last visit.  Patient also continued to give more of a narrative about their development and upbringing.  Patient identified more traumatic experiences that they did not disclose prior.  Patient agreed to explore further.  Therapist discussed ACES with patient and psychoeducation of trauma. Patient also discussed family conflict or difficulties with their oldest son identifying as Bi-sexual.  Mother is supportive but states that her oldest has autism and is worried about what is the extent that they understand what it means.                    Treatment Objective(s) Addressed in This Session:          Psychoeducation of trauma  Self-care activities                    Intervention:              CBT: identification of target concerns and behaviors.   Psychodynamic-processing internal experiences.                               ASSESSMENT: Current Emotional / Mental Status (status of significant symptoms):              Risk status (Self / Other harm or suicidal ideation)              Patient denies current fears or concerns for personal safety.              Patient denies current or recent suicidal ideation or behaviors.              Patient denies current or recent homicidal ideation or behaviors.              Patient denies current or recent self injurious behavior or ideation.              Patient denies other safety concerns.              Patient reports there has been no change in risk factors since their last session.                Patient reports there has been no change in protective factors  since their last session.                A safety and risk management plan has been developed including: Patient consented to co-developed safety plan.  Safety and risk management plan was completed.  Patient agreed to use safety plan should any safety concerns arise.  A copy was given to the patient.                 Appearance:                            Appropriate               Eye Contact:                           Good               Psychomotor Behavior:          Normal               Attitude:                                   Cooperative               Orientation:                             All              Speech                          Rate / Production:       Normal                           Volume:                       Normal               Mood:                                      Normal              Affect:                                      Appropriate               Thought Content:                    Clear               Thought Form:                        Coherent  Logical               Insight:                                     Good                  Medication Review:              No changes to current psychiatric medication(s)                 Medication Compliance:              Yes                 Changes in Health Issues:              None reported                 Chemical Use Review:              Substance Use: Chemical use reviewed, no active concerns identified                  Tobacco Use: No current tobacco use.       Diagnosis:  No diagnosis found.     Collateral Reports Completed:              Communicated with: referral provider     PLAN: (Patient Tasks / Therapist Tasks / Other)  Continue to meet for individual therapy and continue to explore areas of target concerns, build relationship           CHRISTIAN Mata                                                           ______________________________________________________________________     Treatment Plan     Patient's  "Name: Sommer Gomez                YOB: 1980     Date: 3/22/21     DSM5 Diagnoses: 296.31 (F33.0) Major Depressive Disorder, Recurrent Episode, Mild _, 300.02 (F41.1) Generalized Anxiety Disorder or 309.81 (F43.10) Posttraumatic Stress Disorder (includes Posttraumatic Stress Disorder for Children 6 Years and Younger)  Without dissociative symptoms  Psychosocial / Contextual Factors: Individual/Societal/Family/Employment  WHODAS:      Referral / Collaboration:  Referral to another professional/service is not indicated at this time..     Anticipated number of session or this episode of care: 90 days        MeasurableTreatment Goal(s) related to diagnosis / functional impairment(s)  Goal 1: Patient will utilize interventions and skills to better regulate negative emotions in difficult circumstances or experiences.  Also, if they are triggered in situations.    I will know I've met my goal when I am able to find positives in difficult circumstances.  Increase in motivation and less irritable.\"      Objective #A (Patient Action)                          Patient will identify specific strategies to more effectively address stressors  identify three initial signs or symptoms of anxiety.  Status: 90 days      Intervention(s)  Therapist will teach emotional recognition/identification. Utilizing CBT DBT ACT and mindfulness interventions.     Objective #B  Patient will identify at least 5 triggers for anxiety.  Status: 90 days      Intervention(s)  Therapist will teach emotional recognition/identification. utilizing CBT DBT ACT and mindfulness interventions.     Objective #C  Patient will Feel less tired and more energy during the day .  Status: 90 days      Intervention(s)  Therapist will teach emotional regulation skills. Utilizng CBT DBT ACT and mindfulness interventions.        Goal 2: Patient will reduce overall depression and anxiety    I will know I've met my goal when I am able to feel more " "motivation and have more energy.  I am able to be more mindful and in the present\"      Objective #A (Patient Action)                          Status: 90 days      Patient will use cognitive strategies identified in therapy to challenge anxious thoughts  Identify negative self-talk and behaviors: challenge core beliefs, myths, and actions.     Intervention(s)  Therapist will utilize CBT DBT ACT and mindfulness interventions.     Goal 3: Patiient will address traumatic experiences to reduce trauma response.    I will know I've met my goal when I am able to have less trauma response, flashbacks, nightmares, re-experiencing of past.  I can stay more in the present\"      Objective #A (Patient Action)                          Status: 90 days      Patient will identify specific strategies to more effectively address stressors  learn to utilize relaxation and cogntive coping skills to reduce and regulate emotions.     Intervention(s)  Therapist will teach emotional regulation skills. Utilizing CBT DBT ACT and mindfulness interventions.     Objective #B  Patient will address specific and targeted traumatic experiences to reduce negative trauma responses and overall impact of trauma.                 Status: 90 days      Intervention(s)  Therapist will utilize CBT DBT ACT and mindfulness interventions.  Narrative therapy and Exposure therapy.     Patient has reviewed and agreed to the above plan.        Misbah Sandoval [facility]              March 22, 2021  " No Vaccines Administered.

## 2021-05-07 ENCOUNTER — VIRTUAL VISIT (OUTPATIENT)
Dept: PSYCHOLOGY | Facility: CLINIC | Age: 41
End: 2021-05-07
Payer: COMMERCIAL

## 2021-05-07 DIAGNOSIS — F33.0 MAJOR DEPRESSIVE DISORDER, RECURRENT EPISODE, MILD WITH ANXIOUS DISTRESS (H): Primary | ICD-10-CM

## 2021-05-07 PROCEDURE — 90834 PSYTX W PT 45 MINUTES: CPT | Mod: 95 | Performed by: PSYCHOLOGIST

## 2021-05-12 NOTE — PROGRESS NOTES
Progress Note     Patient Name: Sommer Gomez                   Date:   5/7/21                                           Service Type: Individual                            Session Start Time:  1:30 PM                       Session End Time:    2:22 PM                Session Length:        38-52 min     Session #:      7     Attendees:     Client     Service Modality:  Video Visit:      Provider verified identity through the following two step process.  Patient provided:  Patient photo     Telemedicine Visit: The patient's condition can be safely assessed and treated via synchronous audio and visual telemedicine encounter.       Reason for Telemedicine Visit: Services only offered telehealth     Originating Site (Patient Location): Patient's home     Distant Site (Provider Location): Provider Remote Setting     Consent:  The patient/guardian has verbally consented to: the potential risks and benefits of telemedicine (video visit) versus in person care; bill my insurance or make self-payment for services provided; and responsibility for payment of non-covered services.      Patient would like the video invitation sent by:  Text to cell phone: [phone]     Mode of Communication:  Video Conference via [facility]     As the provider I attest to compliance with applicable laws and regulations related to telemedicine.                Treatment Plan Last Reviewed: 3/22/21  PHQ-9 / ROSA-7 : 8/7     DATA  Interactive Complexity: No  Crisis: No                                   Progress Since Last Session (Related to Symptoms / Goals / Homework):              Symptoms: Some change Patient expressed and exhibited little change in symptoms and behaviors.     Homework: Achieved / completed to satisfaction                            Episode of Care Goals: Satisfactory progress - PREPARATION (Decided to change - considering how); Intervened by negotiating a change plan and determining  "options / strategies for behavior change, identifying triggers, exploring social supports, and working towards setting a date to begin behavior change                 Current / Ongoing Stressors and Concerns:              Individual/Employment/Societal/Family  Patient was on time and present for the session.  Patient reports that they are doing well.  Patient discussed that there has been minimal stress and anxiety since our last visit.  Patient also continued to give more of a narrative about their development and upbringing.  Patient identified more traumatic experiences that they did not disclose prior.  Patient agreed to explore further.  Therapist discussed ACES with patient and psychoeducation of trauma. Discussed finding \"anchor or better self-care strategies\" to help manage emotions as we discuss difficulty experiences in therapy                 Treatment Objective(s) Addressed in This Session:          Psychoeducation of trauma  Self-care activities                    Intervention:              CBT: identification of target concerns and behaviors.              Psychodynamic-processing internal experiences.                               ASSESSMENT: Current Emotional / Mental Status (status of significant symptoms):              Risk status (Self / Other harm or suicidal ideation)              Patient denies current fears or concerns for personal safety.              Patient denies current or recent suicidal ideation or behaviors.              Patient denies current or recent homicidal ideation or behaviors.              Patient denies current or recent self injurious behavior or ideation.              Patient denies other safety concerns.              Patient reports there has been no change in risk factors since their last session.                Patient reports there has been no change in protective factors since their last session.                A safety and risk management plan has been developed " including: Patient consented to co-developed safety plan.  Safety and risk management plan was completed.  Patient agreed to use safety plan should any safety concerns arise.  A copy was given to the patient.                 Appearance:                            Appropriate               Eye Contact:                           Good               Psychomotor Behavior:          Normal               Attitude:                                   Cooperative               Orientation:                             All              Speech                          Rate / Production:       Normal                           Volume:                       Normal               Mood:                                      Normal              Affect:                                      Appropriate               Thought Content:                    Clear               Thought Form:                        Coherent  Logical               Insight:                                     Good                  Medication Review:              No changes to current psychiatric medication(s)                 Medication Compliance:              Yes                 Changes in Health Issues:              None reported                 Chemical Use Review:              Substance Use: Chemical use reviewed, no active concerns identified                  Tobacco Use: No current tobacco use.       Diagnosis:  Major Depressive Disorder Recurrent Mild with anxious distress.     Collateral Reports Completed:              Communicated with: referral provider     PLAN: (Patient Tasks / Therapist Tasks / Other)  Continue to meet for individual therapy and continue to explore areas of target concerns, build relationship           CHRISTIAN Mata                                                           ______________________________________________________________________     Treatment Plan     Patient's Name: Sommer Gomez                Date Of  "Birth: 1980     Date: 3/22/21     DSM5 Diagnoses: 296.31 (F33.0) Major Depressive Disorder, Recurrent Episode, Mild _, 300.02 (F41.1) Generalized Anxiety Disorder or 309.81 (F43.10) Posttraumatic Stress Disorder (includes Posttraumatic Stress Disorder for Children 6 Years and Younger)  Without dissociative symptoms  Psychosocial / Contextual Factors: Individual/Societal/Family/Employment  WHODAS:      Referral / Collaboration:  Referral to another professional/service is not indicated at this time..     Anticipated number of session or this episode of care: 90 days        MeasurableTreatment Goal(s) related to diagnosis / functional impairment(s)  Goal 1: Patient will utilize interventions and skills to better regulate negative emotions in difficult circumstances or experiences.  Also, if they are triggered in situations.    I will know I've met my goal when I am able to find positives in difficult circumstances.  Increase in motivation and less irritable.\"      Objective #A (Patient Action)                          Patient will identify specific strategies to more effectively address stressors  identify three initial signs or symptoms of anxiety.  Status: 90 days      Intervention(s)  Therapist will teach emotional recognition/identification. Utilizing CBT DBT ACT and mindfulness interventions.     Objective #B  Patient will identify at least 5 triggers for anxiety.  Status: 90 days      Intervention(s)  Therapist will teach emotional recognition/identification. utilizing CBT DBT ACT and mindfulness interventions.     Objective #C  Patient will Feel less tired and more energy during the day .  Status: 90 days      Intervention(s)  Therapist will teach emotional regulation skills. Utilizng CBT DBT ACT and mindfulness interventions.        Goal 2: Patient will reduce overall depression and anxiety    I will know I've met my goal when I am able to feel more motivation and have more energy.  I am able to be more " "mindful and in the present\"      Objective #A (Patient Action)                          Status: 90 days      Patient will use cognitive strategies identified in therapy to challenge anxious thoughts  Identify negative self-talk and behaviors: challenge core beliefs, myths, and actions.     Intervention(s)  Therapist will utilize CBT DBT ACT and mindfulness interventions.     Goal 3: Patiient will address traumatic experiences to reduce trauma response.    I will know I've met my goal when I am able to have less trauma response, flashbacks, nightmares, re-experiencing of past.  I can stay more in the present\"      Objective #A (Patient Action)                          Status: 90 days      Patient will identify specific strategies to more effectively address stressors  learn to utilize relaxation and cogntive coping skills to reduce and regulate emotions.     Intervention(s)  Therapist will teach emotional regulation skills. Utilizing CBT DBT ACT and mindfulness interventions.     Objective #B  Patient will address specific and targeted traumatic experiences to reduce negative trauma responses and overall impact of trauma.                 Status: 90 days      Intervention(s)  Therapist will utilize CBT DBT ACT and mindfulness interventions.  Narrative therapy and Exposure therapy.     Patient has reviewed and agreed to the above plan.        Misb[facility] Sandoval [facility]              March 22, 2021  "

## 2021-05-21 ENCOUNTER — VIRTUAL VISIT (OUTPATIENT)
Dept: PSYCHOLOGY | Facility: CLINIC | Age: 41
End: 2021-05-21
Payer: COMMERCIAL

## 2021-05-21 DIAGNOSIS — F33.0 MAJOR DEPRESSIVE DISORDER, RECURRENT EPISODE, MILD WITH ANXIOUS DISTRESS (H): Primary | ICD-10-CM

## 2021-05-21 PROCEDURE — 90834 PSYTX W PT 45 MINUTES: CPT | Mod: 95 | Performed by: PSYCHOLOGIST

## 2021-05-25 NOTE — PROGRESS NOTES
Progress Note     Patient Name: Sommer Gomez                   Date:   5/21/21                                           Service Type: Individual                            Session Start Time:  2:30 PM                       Session End Time:    3:22 PM                Session Length:        38-52 min     Session #:      9     Attendees:     Client     Service Modality:  Video Visit:      Provider verified identity through the following two step process.  Patient provided:  Patient photo     Telemedicine Visit: The patient's condition can be safely assessed and treated via synchronous audio and visual telemedicine encounter.       Reason for Telemedicine Visit: Services only offered telehealth     Originating Site (Patient Location): Patient's home     Distant Site (Provider Location): Provider Remote Setting     Consent:  The patient/guardian has verbally consented to: the potential risks and benefits of telemedicine (video visit) versus in person care; bill my insurance or make self-payment for services provided; and responsibility for payment of non-covered services.      Patient would like the video invitation sent by:  Text to cell phone: [phone]     Mode of Communication:  Video Conference via [facility]     As the provider I attest to compliance with applicable laws and regulations related to telemedicine.                Treatment Plan Last Reviewed: 3/22/21  PHQ-9 / ROSA-7 : 8/7     DATA  Interactive Complexity: No  Crisis: No                                   Progress Since Last Session (Related to Symptoms / Goals / Homework):              Symptoms: Some change Patient expressed and exhibited little change in symptoms and behaviors.     Homework: Achieved / completed to satisfaction                            Episode of Care Goals: Satisfactory progress - PREPARATION (Decided to change - considering how); Intervened by negotiating a change plan and determining  options / strategies for behavior change, identifying triggers, exploring social supports, and working towards setting a date to begin behavior change                 Current / Ongoing Stressors and Concerns:              Individual/Employment/Societal/Family  Patient was on time and present for the session.  Patient reports that they are doing well.  Patient discussed that there has been minimal stress and anxiety since our last visit.  Patient also continued to give more of a narrative about their development and upbringing.  Patient identified more traumatic experiences that they did not disclose prior.  Patient agreed to explore further. Therapist discussed and provided psychoeducation on anxiety and depression.  Helped identify cognitive distortions.                 Treatment Objective(s) Addressed in This Session:          Psychoeducation of Depression/Anxiety  Self-care activities                    Intervention:              CBT: identification of target concerns and behaviors.              Psychodynamic-processing internal experiences.                               ASSESSMENT: Current Emotional / Mental Status (status of significant symptoms):              Risk status (Self / Other harm or suicidal ideation)              Patient denies current fears or concerns for personal safety.              Patient denies current or recent suicidal ideation or behaviors.              Patient denies current or recent homicidal ideation or behaviors.              Patient denies current or recent self injurious behavior or ideation.              Patient denies other safety concerns.              Patient reports there has been no change in risk factors since their last session.                Patient reports there has been no change in protective factors since their last session.                A safety and risk management plan has been developed including: Patient consented to co-developed safety plan.  Safety and risk  management plan was completed.  Patient agreed to use safety plan should any safety concerns arise.  A copy was given to the patient.                 Appearance:                            Appropriate               Eye Contact:                           Good               Psychomotor Behavior:          Normal               Attitude:                                   Cooperative               Orientation:                             All              Speech                          Rate / Production:       Normal                           Volume:                       Normal               Mood:                                      Normal              Affect:                                      Appropriate               Thought Content:                    Clear               Thought Form:                        Coherent  Logical               Insight:                                     Good                  Medication Review:              No changes to current psychiatric medication(s)                 Medication Compliance:              Yes                 Changes in Health Issues:              None reported                 Chemical Use Review:              Substance Use: Chemical use reviewed, no active concerns identified                  Tobacco Use: No current tobacco use.       Diagnosis:  Major Depressive Disorder Recurrent Mild with anxious distress.     Collateral Reports Completed:              Communicated with: referral provider     PLAN: (Patient Tasks / Therapist Tasks / Other)  Continue to meet for individual therapy and continue to explore areas of target concerns, build relationship           CHRISTIAN Mata                                                           ______________________________________________________________________     Treatment Plan     Patient's Name: Sommer Gomez                YOB: 1980     Date: 3/22/21     DSM5 Diagnoses: 296.31 (F33.0) Major  "Depressive Disorder, Recurrent Episode, Mild _, 300.02 (F41.1) Generalized Anxiety Disorder or 309.81 (F43.10) Posttraumatic Stress Disorder (includes Posttraumatic Stress Disorder for Children 6 Years and Younger)  Without dissociative symptoms  Psychosocial / Contextual Factors: Individual/Societal/Family/Employment  WHODAS:      Referral / Collaboration:  Referral to another professional/service is not indicated at this time..     Anticipated number of session or this episode of care: 90 days        MeasurableTreatment Goal(s) related to diagnosis / functional impairment(s)  Goal 1: Patient will utilize interventions and skills to better regulate negative emotions in difficult circumstances or experiences.  Also, if they are triggered in situations.    I will know I've met my goal when I am able to find positives in difficult circumstances.  Increase in motivation and less irritable.\"      Objective #A (Patient Action)                          Patient will identify specific strategies to more effectively address stressors  identify three initial signs or symptoms of anxiety.  Status: 90 days      Intervention(s)  Therapist will teach emotional recognition/identification. Utilizing CBT DBT ACT and mindfulness interventions.     Objective #B  Patient will identify at least 5 triggers for anxiety.  Status: 90 days      Intervention(s)  Therapist will teach emotional recognition/identification. utilizing CBT DBT ACT and mindfulness interventions.     Objective #C  Patient will Feel less tired and more energy during the day .  Status: 90 days      Intervention(s)  Therapist will teach emotional regulation skills. Utilizng CBT DBT ACT and mindfulness interventions.        Goal 2: Patient will reduce overall depression and anxiety    I will know I've met my goal when I am able to feel more motivation and have more energy.  I am able to be more mindful and in the present\"      Objective #A (Patient " "Action)                          Status: 90 days      Patient will use cognitive strategies identified in therapy to challenge anxious thoughts  Identify negative self-talk and behaviors: challenge core beliefs, myths, and actions.     Intervention(s)  Therapist will utilize CBT DBT ACT and mindfulness interventions.     Goal 3: Patiient will address traumatic experiences to reduce trauma response.    I will know I've met my goal when I am able to have less trauma response, flashbacks, nightmares, re-experiencing of past.  I can stay more in the present\"      Objective #A (Patient Action)                          Status: 90 days      Patient will identify specific strategies to more effectively address stressors  learn to utilize relaxation and cogntive coping skills to reduce and regulate emotions.     Intervention(s)  Therapist will teach emotional regulation skills. Utilizing CBT DBT ACT and mindfulness interventions.     Objective #B  Patient will address specific and targeted traumatic experiences to reduce negative trauma responses and overall impact of trauma.                 Status: 90 days      Intervention(s)  Therapist will utilize CBT DBT ACT and mindfulness interventions.  Narrative therapy and Exposure therapy.     Patient has reviewed and agreed to the above plan.        Misbah Sandoval [facility]              March 22, 2021  "

## 2021-06-04 ENCOUNTER — VIRTUAL VISIT (OUTPATIENT)
Dept: PSYCHOLOGY | Facility: CLINIC | Age: 41
End: 2021-06-04
Payer: COMMERCIAL

## 2021-06-04 DIAGNOSIS — Z03.89 NO DIAGNOSIS ON AXIS I: Primary | ICD-10-CM

## 2021-06-18 ENCOUNTER — VIRTUAL VISIT (OUTPATIENT)
Dept: PSYCHOLOGY | Facility: CLINIC | Age: 41
End: 2021-06-18
Payer: COMMERCIAL

## 2021-06-18 DIAGNOSIS — F33.0 MAJOR DEPRESSIVE DISORDER, RECURRENT EPISODE, MILD WITH ANXIOUS DISTRESS (H): Primary | ICD-10-CM

## 2021-06-18 PROCEDURE — 90834 PSYTX W PT 45 MINUTES: CPT | Mod: 95 | Performed by: PSYCHOLOGIST

## 2021-06-18 ASSESSMENT — PATIENT HEALTH QUESTIONNAIRE - PHQ9
5. POOR APPETITE OR OVEREATING: MORE THAN HALF THE DAYS
SUM OF ALL RESPONSES TO PHQ QUESTIONS 1-9: 10

## 2021-06-18 ASSESSMENT — ANXIETY QUESTIONNAIRES
1. FEELING NERVOUS, ANXIOUS, OR ON EDGE: NEARLY EVERY DAY
5. BEING SO RESTLESS THAT IT IS HARD TO SIT STILL: SEVERAL DAYS
2. NOT BEING ABLE TO STOP OR CONTROL WORRYING: NEARLY EVERY DAY
GAD7 TOTAL SCORE: 12
7. FEELING AFRAID AS IF SOMETHING AWFUL MIGHT HAPPEN: NOT AT ALL
6. BECOMING EASILY ANNOYED OR IRRITABLE: SEVERAL DAYS
IF YOU CHECKED OFF ANY PROBLEMS ON THIS QUESTIONNAIRE, HOW DIFFICULT HAVE THESE PROBLEMS MADE IT FOR YOU TO DO YOUR WORK, TAKE CARE OF THINGS AT HOME, OR GET ALONG WITH OTHER PEOPLE: SOMEWHAT DIFFICULT
3. WORRYING TOO MUCH ABOUT DIFFERENT THINGS: MORE THAN HALF THE DAYS

## 2021-06-19 ASSESSMENT — ANXIETY QUESTIONNAIRES: GAD7 TOTAL SCORE: 12

## 2021-06-23 DIAGNOSIS — K21.9 GASTROESOPHAGEAL REFLUX DISEASE WITHOUT ESOPHAGITIS: ICD-10-CM

## 2021-06-23 NOTE — PROGRESS NOTES
Progress Note     Patient Name: Sommer Gomez                   Date:   6/18/21                                           Service Type: Individual                            Session Start Time:  2:37 PM                       Session End Time:    3:22 PM                Session Length:        38-52 min     Session #:      10     Attendees:     Client     Service Modality:  Video Visit:      Provider verified identity through the following two step process.  Patient provided:  Patient photo     Telemedicine Visit: The patient's condition can be safely assessed and treated via synchronous audio and visual telemedicine encounter.       Reason for Telemedicine Visit: Services only offered telehealth     Originating Site (Patient Location): Patient's home     Distant Site (Provider Location): Provider Remote Setting     Consent:  The patient/guardian has verbally consented to: the potential risks and benefits of telemedicine (video visit) versus in person care; bill my insurance or make self-payment for services provided; and responsibility for payment of non-covered services.      Patient would like the video invitation sent by:  Text to cell phone: [phone]     Mode of Communication:  Video Conference via [facility]     As the provider I attest to compliance with applicable laws and regulations related to telemedicine.                Treatment Plan Last Reviewed: 3/22/21  PHQ-9 / ROSA-7 : 10/12     DATA  Interactive Complexity: No  Crisis: No                                   Progress Since Last Session (Related to Symptoms / Goals / Homework):              Symptoms: Some change Patient expressed and exhibited little change in symptoms and behaviors.     Homework: Achieved / completed to satisfaction                            Episode of Care Goals: Satisfactory progress - PREPARATION (Decided to change - considering how); Intervened by negotiating a change plan and  determining options / strategies for behavior change, identifying triggers, exploring social supports, and working towards setting a date to begin behavior change                 Current / Ongoing Stressors and Concerns:              Individual/Employment/Societal/Family  Patient was on time and present for the session.  Patient reports that they have been noticing an increase in anxiety and depression.  Patient reports environmental stressors such as changing people she 's for.  Patient also discussed family stressors with her mother.  Patient has a conflicting relationship with mother and reports that she has been negative towards her.  Patient and therapist also discussed effective parenting strategies to help with her job and as a parent.                  Treatment Objective(s) Addressed in This Session:          Psychoeducation of Depression/Anxiety  Self-care activities                    Intervention:              CBT: identification of target concerns and behaviors.              Psychodynamic-processing internal experiences.                               ASSESSMENT: Current Emotional / Mental Status (status of significant symptoms):              Risk status (Self / Other harm or suicidal ideation)              Patient denies current fears or concerns for personal safety.              Patient denies current or recent suicidal ideation or behaviors.              Patient denies current or recent homicidal ideation or behaviors.              Patient denies current or recent self injurious behavior or ideation.              Patient denies other safety concerns.              Patient reports there has been no change in risk factors since their last session.                Patient reports there has been no change in protective factors since their last session.                A safety and risk management plan has been developed including: Patient consented to co-developed safety plan.  Safety and risk management  plan was completed.  Patient agreed to use safety plan should any safety concerns arise.  A copy was given to the patient.                 Appearance:                            Appropriate               Eye Contact:                           Good               Psychomotor Behavior:          Normal               Attitude:                                   Cooperative               Orientation:                             All              Speech                          Rate / Production:       Normal                           Volume:                       Normal               Mood:                                      Normal              Affect:                                      Appropriate               Thought Content:                    Clear               Thought Form:                        Coherent  Logical               Insight:                                     Good                  Medication Review:              No changes to current psychiatric medication(s)                 Medication Compliance:              Yes                 Changes in Health Issues:              None reported                 Chemical Use Review:              Substance Use: Chemical use reviewed, no active concerns identified                  Tobacco Use: No current tobacco use.       Diagnosis:  Major Depressive Disorder Recurrent Mild with anxious distress.     Collateral Reports Completed:              Communicated with: referral provider     PLAN: (Patient Tasks / Therapist Tasks / Other)  Continue to meet for individual therapy and continue to explore areas of target concerns, build relationship           CHRISTIAN Mata                                                           ______________________________________________________________________     Treatment Plan     Patient's Name: Sommer Gomez                YOB: 1980     Date: 3/22/21     DSM5 Diagnoses: 296.31 (F33.0) Major Depressive  "Disorder, Recurrent Episode, Mild _, 300.02 (F41.1) Generalized Anxiety Disorder or 309.81 (F43.10) Posttraumatic Stress Disorder (includes Posttraumatic Stress Disorder for Children 6 Years and Younger)  Without dissociative symptoms  Psychosocial / Contextual Factors: Individual/Societal/Family/Employment  WHODAS:      Referral / Collaboration:  Referral to another professional/service is not indicated at this time..     Anticipated number of session or this episode of care: 90 days        MeasurableTreatment Goal(s) related to diagnosis / functional impairment(s)  Goal 1: Patient will utilize interventions and skills to better regulate negative emotions in difficult circumstances or experiences.  Also, if they are triggered in situations.    I will know I've met my goal when I am able to find positives in difficult circumstances.  Increase in motivation and less irritable.\"      Objective #A (Patient Action)                          Patient will identify specific strategies to more effectively address stressors  identify three initial signs or symptoms of anxiety.  Status: 90 days      Intervention(s)  Therapist will teach emotional recognition/identification. Utilizing CBT DBT ACT and mindfulness interventions.     Objective #B  Patient will identify at least 5 triggers for anxiety.  Status: 90 days      Intervention(s)  Therapist will teach emotional recognition/identification. utilizing CBT DBT ACT and mindfulness interventions.     Objective #C  Patient will Feel less tired and more energy during the day .  Status: 90 days      Intervention(s)  Therapist will teach emotional regulation skills. Utilizng CBT DBT ACT and mindfulness interventions.        Goal 2: Patient will reduce overall depression and anxiety    I will know I've met my goal when I am able to feel more motivation and have more energy.  I am able to be more mindful and in the present\"      Objective #A (Patient " "Action)                          Status: 90 days      Patient will use cognitive strategies identified in therapy to challenge anxious thoughts  Identify negative self-talk and behaviors: challenge core beliefs, myths, and actions.     Intervention(s)  Therapist will utilize CBT DBT ACT and mindfulness interventions.     Goal 3: Patiient will address traumatic experiences to reduce trauma response.    I will know I've met my goal when I am able to have less trauma response, flashbacks, nightmares, re-experiencing of past.  I can stay more in the present\"      Objective #A (Patient Action)                          Status: 90 days      Patient will identify specific strategies to more effectively address stressors  learn to utilize relaxation and cogntive coping skills to reduce and regulate emotions.     Intervention(s)  Therapist will teach emotional regulation skills. Utilizing CBT DBT ACT and mindfulness interventions.     Objective #B  Patient will address specific and targeted traumatic experiences to reduce negative trauma responses and overall impact of trauma.                 Status: 90 days      Intervention(s)  Therapist will utilize CBT DBT ACT and mindfulness interventions.  Narrative therapy and Exposure therapy.     Patient has reviewed and agreed to the above plan.        Misbah Sandoval [facility]              March 22, 2021  "

## 2021-06-24 RX ORDER — FAMOTIDINE 20 MG/1
20 TABLET, FILM COATED ORAL 2 TIMES DAILY
Qty: 180 TABLET | Refills: 0 | Status: SHIPPED | OUTPATIENT
Start: 2021-06-24 | End: 2021-09-25

## 2021-06-24 NOTE — TELEPHONE ENCOUNTER
"Prescription approved per [facility] Refill Protocol.  Requested Prescriptions   Pending Prescriptions Disp Refills     famotidine (PEPCID) 20 MG tablet 180 tablet 0     Sig: Take 1 tablet (20 mg) by mouth 2 times daily       H2 Blockers Protocol Passed - 6/23/2021  7:44 AM        Passed - Patient is age 12 or older        Passed - Recent (12 mo) or future (30 days) visit within the authorizing provider's specialty     Patient has had an office visit with the authorizing provider or a provider within the authorizing providers department within the previous 12 mos or has a future within next 30 days. See \"Patient Info\" tab in inbasket, or \"Choose Columns\" in Meds & Orders section of the refill encounter.              Passed - Medication is active on med list             "

## 2021-06-25 ENCOUNTER — VIRTUAL VISIT (OUTPATIENT)
Dept: PSYCHOLOGY | Facility: CLINIC | Age: 41
End: 2021-06-25
Payer: COMMERCIAL

## 2021-06-25 DIAGNOSIS — F33.0 MAJOR DEPRESSIVE DISORDER, RECURRENT EPISODE, MILD WITH ANXIOUS DISTRESS (H): Primary | ICD-10-CM

## 2021-06-25 PROCEDURE — 90834 PSYTX W PT 45 MINUTES: CPT | Mod: 95 | Performed by: PSYCHOLOGIST
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
## 2021-07-05 ENCOUNTER — VIRTUAL VISIT (OUTPATIENT)
Dept: PSYCHOLOGY | Facility: CLINIC | Age: 41
End: 2021-07-05
Payer: COMMERCIAL

## 2021-07-05 DIAGNOSIS — F33.0 MAJOR DEPRESSIVE DISORDER, RECURRENT EPISODE, MILD WITH ANXIOUS DISTRESS (H): Primary | ICD-10-CM

## 2021-07-05 PROCEDURE — 90832 PSYTX W PT 30 MINUTES: CPT | Mod: 95 | Performed by: PSYCHOLOGIST
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
## 2021-07-09 ENCOUNTER — VIRTUAL VISIT (OUTPATIENT)
Dept: PSYCHOLOGY | Facility: CLINIC | Age: 41
End: 2021-07-09
Payer: COMMERCIAL

## 2021-07-09 DIAGNOSIS — F33.0 MAJOR DEPRESSIVE DISORDER, RECURRENT EPISODE, MILD WITH ANXIOUS DISTRESS (H): Primary | ICD-10-CM

## 2021-07-09 PROCEDURE — 90834 PSYTX W PT 45 MINUTES: CPT | Mod: 95 | Performed by: PSYCHOLOGIST

## 2021-07-19 NOTE — PROGRESS NOTES
Progress Note     Patient Name: Sommer Gomez                   Date:   7/9/21                                           Service Type: Individual                            Session Start Time:  1:36 PM                       Session End Time:    2:00 PM                Session Length:        16-37 min     Session #:      13     Attendees:     Client     Service Modality:  Video Visit:      Provider verified identity through the following two step process.  Patient provided:  Patient photo     Telemedicine Visit: The patient's condition can be safely assessed and treated via synchronous audio and visual telemedicine encounter.       Reason for Telemedicine Visit: Services only offered telehealth     Originating Site (Patient Location): Patient's home     Distant Site (Provider Location): Provider Remote Setting     Consent:  The patient/guardian has verbally consented to: the potential risks and benefits of telemedicine (video visit) versus in person care; bill my insurance or make self-payment for services provided; and responsibility for payment of non-covered services.      Patient would like the video invitation sent by:  Text to cell phone: [phone]     Mode of Communication:  Video Conference via [facility]     As the provider I attest to compliance with applicable laws and regulations related to telemedicine.                Treatment Plan Last Reviewed: 7/9/21  PHQ-9 / ROSA-7 : 10/12     DATA  Interactive Complexity: No  Crisis: No                                   Progress Since Last Session (Related to Symptoms / Goals / Homework):              Symptoms: Some change Patient expressed and exhibited little change in symptoms and behaviors.     Homework: Achieved / completed to satisfaction                            Episode of Care Goals: Satisfactory progress - PREPARATION (Decided to change - considering how); Intervened by negotiating a change plan and  determining options / strategies for behavior change, identifying triggers, exploring social supports, and working towards setting a date to begin behavior change                 Current / Ongoing Stressors and Concerns:              Individual/Employment/Societal/Family  Patient was on time and present for the session.  Patient expressed that they are doing well and were going to ask to meet the follow appointment.  However, as session continued patient discussed family issues with her mother that were increasingly stressful.  Patient expressed mother and father  and are now dating. This has created some family stressors. Patient and therapist also discussed treatment plan interventions.                 Treatment Objective(s) Addressed in This Session:          Psychoeducation of Depression/Anxiety                      Intervention:              CBT: identification of target concerns and behaviors.              Psychodynamic-processing internal experiences.                               ASSESSMENT: Current Emotional / Mental Status (status of significant symptoms):              Risk status (Self / Other harm or suicidal ideation)              Patient denies current fears or concerns for personal safety.              Patient denies current or recent suicidal ideation or behaviors.              Patient denies current or recent homicidal ideation or behaviors.              Patient denies current or recent self injurious behavior or ideation.              Patient denies other safety concerns.              Patient reports there has been no change in risk factors since their last session.                Patient reports there has been no change in protective factors since their last session.                A safety and risk management plan has been developed including: Patient consented to co-developed safety plan.  Safety and risk management plan was completed.  Patient agreed to use safety plan should any safety  concerns arise.  A copy was given to the patient.                 Appearance:                            Appropriate               Eye Contact:                           Good               Psychomotor Behavior:          Normal               Attitude:                                   Cooperative               Orientation:                             All              Speech                          Rate / Production:       Normal                           Volume:                       Normal               Mood:                                      Normal              Affect:                                      Appropriate               Thought Content:                    Clear               Thought Form:                        Coherent  Logical               Insight:                                     Good                  Medication Review:              No changes to current psychiatric medication(s)                 Medication Compliance:              Yes                 Changes in Health Issues:              None reported                 Chemical Use Review:              Substance Use: Chemical use reviewed, no active concerns identified                  Tobacco Use: No current tobacco use.       Diagnosis:  Major Depressive Disorder Recurrent Mild with anxious distress.     Collateral Reports Completed:              Communicated with: referral provider     PLAN: (Patient Tasks / Therapist Tasks / Other)  Continue to meet for individual therapy and discussed reflecting on past experiences related to father and mother.          Misbah Sandoval [facility]                                                           ______________________________________________________________________     Treatment Plan     Patient's Name: Sommer Gomez                YOB: 1980     Date: 7/9/21     DSM5 Diagnoses: 296.31 (F33.0) Major Depressive Disorder, Recurrent Episode, Mild _, 300.02 (F41.1) Generalized Anxiety  "Disorder or 309.81 (F43.10) Posttraumatic Stress Disorder (includes Posttraumatic Stress Disorder for Children 6 Years and Younger)  Without dissociative symptoms  Psychosocial / Contextual Factors: Individual/Societal/Family/Employment  WHODAS:      Referral / Collaboration:  Referral to another professional/service is not indicated at this time..     Anticipated number of session or this episode of care: 90 days        MeasurableTreatment Goal(s) related to diagnosis / functional impairment(s)  Goal 1: Patient will utilize interventions and skills to better regulate negative emotions in difficult circumstances or experiences.  Also, if they are triggered in situations.    I will know I've met my goal when I am able to find positives in difficult circumstances.  Increase in motivation and less irritable.\"      Objective #A (Patient Action)                          Patient will identify specific strategies to more effectively address stressors  identify three initial signs or symptoms of anxiety.  Status: 90 days      Intervention(s)  Therapist will teach emotional recognition/identification. Utilizing CBT DBT ACT and mindfulness interventions.     Objective #B  Patient will identify at least 5 triggers for anxiety.  Status: 90 days      Intervention(s)  Therapist will teach emotional recognition/identification. utilizing CBT DBT ACT and mindfulness interventions.     Objective #C  Patient will Feel less tired and more energy during the day .  Status: 90 days      Intervention(s)  Therapist will teach emotional regulation skills. Utilizng CBT DBT ACT and mindfulness interventions.        Goal 2: Patient will reduce overall depression and anxiety    I will know I've met my goal when I am able to feel more motivation and have more energy.  I am able to be more mindful and in the present\"      Objective #A (Patient Action)                          Status: 90 days      Patient will use cognitive strategies identified in " "therapy to challenge anxious thoughts  Identify negative self-talk and behaviors: challenge core beliefs, myths, and actions.     Intervention(s)  Therapist will utilize CBT DBT ACT and mindfulness interventions.     Goal 3: Patiient will address traumatic experiences to reduce trauma response.    I will know I've met my goal when I am able to have less trauma response, flashbacks, nightmares, re-experiencing of past.  I can stay more in the present\"      Objective #A (Patient Action)                          Status: 90 days      Patient will identify specific strategies to more effectively address stressors  learn to utilize relaxation and cogntive coping skills to reduce and regulate emotions.     Intervention(s)  Therapist will teach emotional regulation skills. Utilizing CBT DBT ACT and mindfulness interventions.     Objective #B  Patient will address specific and targeted traumatic experiences to reduce negative trauma responses and overall impact of trauma.                 Status: 90 days      Intervention(s)  Therapist will utilize CBT DBT ACT and mindfulness interventions.  Narrative therapy and Exposure therapy.     Patient has reviewed and agreed to the above plan.        Misbah Sandoval [facility]              July 9, 2021  "

## 2021-07-30 ENCOUNTER — VIRTUAL VISIT (OUTPATIENT)
Dept: PSYCHOLOGY | Facility: CLINIC | Age: 41
End: 2021-07-30
Payer: COMMERCIAL

## 2021-07-30 DIAGNOSIS — F33.0 MAJOR DEPRESSIVE DISORDER, RECURRENT EPISODE, MILD WITH ANXIOUS DISTRESS (H): Primary | ICD-10-CM

## 2021-07-30 PROCEDURE — 90834 PSYTX W PT 45 MINUTES: CPT | Mod: 95 | Performed by: PSYCHOLOGIST

## 2021-08-06 NOTE — PROGRESS NOTES
Progress Note     Patient Name: Sommer Gomez                   Date:   7/30/21                                           Service Type: Individual                            Session Start Time:  11:00 AM                       Session End Time:    11:52 AM                Session Length:        38-52 min     Session #:      14     Attendees:     Client     Service Modality:  Video Visit:      Provider verified identity through the following two step process.  Patient provided:  Patient photo     Telemedicine Visit: The patient's condition can be safely assessed and treated via synchronous audio and visual telemedicine encounter.       Reason for Telemedicine Visit: Services only offered telehealth     Originating Site (Patient Location): Patient's home     Distant Site (Provider Location): Provider Remote Setting     Consent:  The patient/guardian has verbally consented to: the potential risks and benefits of telemedicine (video visit) versus in person care; bill my insurance or make self-payment for services provided; and responsibility for payment of non-covered services.      Patient would like the video invitation sent by:  Text to cell phone: [phone]     Mode of Communication:  Video Conference via [employer]     As the provider I attest to compliance with applicable laws and regulations related to telemedicine.                Treatment Plan Last Reviewed: 7/9/21  PHQ-9 / ROSA-7 : 10/12     DATA  Interactive Complexity: No  Crisis: No                                   Progress Since Last Session (Related to Symptoms / Goals / Homework):              Symptoms: Some change Patient expressed and exhibited little change in symptoms and behaviors.     Homework: Achieved / completed to satisfaction                            Episode of Care Goals: Satisfactory progress - PREPARATION (Decided to change - considering how); Intervened by negotiating a change plan and  determining options / strategies for behavior change, identifying triggers, exploring social supports, and working towards setting a date to begin behavior change                 Current / Ongoing Stressors and Concerns:              Individual/Employment/Societal/Family  Patient was on time and present for the session.  Patient expressed that they are doing well. Patient expressed some changes since our last meeting.  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  Patient expressed that there was some incidents that took place working for the last family that impacted patient negatively.  Patient expressed the children were unruly and too physical towards her.  Patient and therapist explored and processed this experience with patient to gain insight.  Patient also discussed that family dynamics have been doing well with minimal conflict.        Treatment Objective(s) Addressed in This Session:   identify specific fears / thoughts that contribute to feeling anxious         Intervention:              CBT: identification of target concerns and behaviors.              Psychodynamic-processing internal experiences.                               ASSESSMENT: Current Emotional / Mental Status (status of significant symptoms):              Risk status (Self / Other harm or suicidal ideation)              Patient denies current fears or concerns for personal safety.              Patient denies current or recent suicidal ideation or behaviors.              Patient denies current or recent homicidal ideation or behaviors.              Patient denies current or recent self injurious behavior or ideation.              Patient denies other safety concerns.              Patient reports there has been no change in risk factors since their last session.                Patient reports there has been no change in protective factors since their last session.                A safety and risk management plan has  been developed including: Patient consented to co-developed safety plan.  Safety and risk management plan was completed.  Patient agreed to use safety plan should any safety concerns arise.  A copy was given to the patient.                 Appearance:                            Appropriate               Eye Contact:                           Good               Psychomotor Behavior:          Normal               Attitude:                                   Cooperative               Orientation:                             All              Speech                          Rate / Production:       Normal                           Volume:                       Normal               Mood:                                      Normal              Affect:                                      Appropriate               Thought Content:                    Clear               Thought Form:                        Coherent  Logical               Insight:                                     Good                  Medication Review:              No changes to current psychiatric medication(s)                 Medication Compliance:              Yes                 Changes in Health Issues:              None reported                 Chemical Use Review:              Substance Use: Chemical use reviewed, no active concerns identified                  Tobacco Use: No current tobacco use.       Diagnosis:  Major Depressive Disorder Recurrent Mild with anxious distress.     Collateral Reports Completed:              Communicated with: referral provider     PLAN: (Patient Tasks / Therapist Tasks / Other)  Continue to meet for individual therapy and discussed reflecting on past experiences related to father and mother.          Misbah Sandoval [facility]                                                           ______________________________________________________________________     Treatment Plan     Patient's Name: Sommer TULIO  "Jason                YOB: 1980     Date: 7/9/21     DSM5 Diagnoses: 296.31 (F33.0) Major Depressive Disorder, Recurrent Episode, Mild _, 300.02 (F41.1) Generalized Anxiety Disorder or 309.81 (F43.10) Posttraumatic Stress Disorder (includes Posttraumatic Stress Disorder for Children 6 Years and Younger)  Without dissociative symptoms  Psychosocial / Contextual Factors: Individual/Societal/Family/Employment  WHODAS:      Referral / Collaboration:  Referral to another professional/service is not indicated at this time..     Anticipated number of session or this episode of care: 90 days        MeasurableTreatment Goal(s) related to diagnosis / functional impairment(s)  Goal 1: Patient will utilize interventions and skills to better regulate negative emotions in difficult circumstances or experiences.  Also, if they are triggered in situations.    I will know I've met my goal when I am able to find positives in difficult circumstances.  Increase in motivation and less irritable.\"      Objective #A (Patient Action)                          Patient will identify specific strategies to more effectively address stressors  identify three initial signs or symptoms of anxiety.  Status: 90 days      Intervention(s)  Therapist will teach emotional recognition/identification. Utilizing CBT DBT ACT and mindfulness interventions.     Objective #B  Patient will identify at least 5 triggers for anxiety.  Status: 90 days      Intervention(s)  Therapist will teach emotional recognition/identification. utilizing CBT DBT ACT and mindfulness interventions.     Objective #C  Patient will Feel less tired and more energy during the day .  Status: 90 days      Intervention(s)  Therapist will teach emotional regulation skills. Utilizng CBT DBT ACT and mindfulness interventions.        Goal 2: Patient will reduce overall depression and anxiety    I will know I've met my goal when I am able to feel more motivation and have more " "energy.  I am able to be more mindful and in the present\"      Objective #A (Patient Action)                          Status: 90 days      Patient will use cognitive strategies identified in therapy to challenge anxious thoughts  Identify negative self-talk and behaviors: challenge core beliefs, myths, and actions.     Intervention(s)  Therapist will utilize CBT DBT ACT and mindfulness interventions.     Goal 3: Patiient will address traumatic experiences to reduce trauma response.    I will know I've met my goal when I am able to have less trauma response, flashbacks, nightmares, re-experiencing of past.  I can stay more in the present\"      Objective #A (Patient Action)                          Status: 90 days      Patient will identify specific strategies to more effectively address stressors  learn to utilize relaxation and cogntive coping skills to reduce and regulate emotions.     Intervention(s)  Therapist will teach emotional regulation skills. Utilizing CBT DBT ACT and mindfulness interventions.     Objective #B  Patient will address specific and targeted traumatic experiences to reduce negative trauma responses and overall impact of trauma.                 Status: 90 days      Intervention(s)  Therapist will utilize CBT DBT ACT and mindfulness interventions.  Narrative therapy and Exposure therapy.     Patient has reviewed and agreed to the above plan.        Misbah Sandoval [facility]              July 9, 2021  "

## 2021-08-13 ENCOUNTER — VIRTUAL VISIT (OUTPATIENT)
Dept: PSYCHOLOGY | Facility: CLINIC | Age: 41
End: 2021-08-13
Payer: COMMERCIAL

## 2021-08-13 DIAGNOSIS — F33.0 MAJOR DEPRESSIVE DISORDER, RECURRENT EPISODE, MILD WITH ANXIOUS DISTRESS (H): Primary | ICD-10-CM

## 2021-08-13 PROCEDURE — 90834 PSYTX W PT 45 MINUTES: CPT | Mod: 95 | Performed by: PSYCHOLOGIST

## 2021-08-19 NOTE — PROGRESS NOTES
Progress Note     Patient Name: Sommer Gomez                   Date:   8/13/21                                           Service Type: Individual                            Session Start Time:  2:30 PM                       Session End Time:    3:22 PM                Session Length:        38-52 min     Session #:      15     Attendees:     Client     Service Modality:  Video Visit:      Provider verified identity through the following two step process.  Patient provided:  Patient photo     Telemedicine Visit: The patient's condition can be safely assessed and treated via synchronous audio and visual telemedicine encounter.       Reason for Telemedicine Visit: Services only offered telehealth     Originating Site (Patient Location): Patient's home     Distant Site (Provider Location): Provider Remote Setting     Consent:  The patient/guardian has verbally consented to: the potential risks and benefits of telemedicine (video visit) versus in person care; bill my insurance or make self-payment for services provided; and responsibility for payment of non-covered services.      Patient would like the video invitation sent by:  Text to cell phone: [phone]     Mode of Communication:  Video Conference via Amwell     As the provider I attest to compliance with applicable laws and regulations related to telemedicine.                Treatment Plan Last Reviewed: 7/9/21  PHQ-9 / ROSA-7 : 10/12     DATA  Interactive Complexity: No  Crisis: No                                   Progress Since Last Session (Related to Symptoms / Goals / Homework):              Symptoms: Some change Patient expressed and exhibited little change in symptoms and behaviors.     Homework: Achieved / completed to satisfaction                            Episode of Care Goals: Satisfactory progress - PREPARATION (Decided to change - considering how); Intervened by negotiating a change plan and  determining options / strategies for behavior change, identifying triggers, exploring social supports, and working towards setting a date to begin behavior change                 Current / Ongoing Stressors and Concerns:              Individual/Employment/Societal/Family  Patient was on time and present for the session.  Patient expressed that they are doing well.  Patient did express conflict related to work.  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  Patient discussed being offered a better contract to work with this family with better benefits.  Patient's expressed conflict is that they already verified to work for a previous family once the fall started.  However, patient is having difficulty turning the economic increase down.  Patient and therapist utilized motivational interviewing and solution focused interventions to help patient assess and work through these difficulties.      Treatment Objective(s) Addressed in This Session:   identify specific fears / thoughts that contribute to feeling anxious  Employment stressors                     Intervention:              CBT: identification of target concerns and behaviors.              Psychodynamic-processing internal experiences.                               ASSESSMENT: Current Emotional / Mental Status (status of significant symptoms):              Risk status (Self / Other harm or suicidal ideation)              Patient denies current fears or concerns for personal safety.              Patient denies current or recent suicidal ideation or behaviors.              Patient denies current or recent homicidal ideation or behaviors.              Patient denies current or recent self injurious behavior or ideation.              Patient denies other safety concerns.              Patient reports there has been no change in risk factors since their last session.                Patient reports there has been no  change in protective factors since their last session.                A safety and risk management plan has been developed including: Patient consented to co-developed safety plan.  Safety and risk management plan was completed.  Patient agreed to use safety plan should any safety concerns arise.  A copy was given to the patient.                 Appearance:                            Appropriate               Eye Contact:                           Good               Psychomotor Behavior:          Normal               Attitude:                                   Cooperative               Orientation:                             All              Speech                          Rate / Production:       Normal                           Volume:                       Normal               Mood:                                      Normal              Affect:                                      Appropriate               Thought Content:                    Clear               Thought Form:                        Coherent  Logical               Insight:                                     Good                  Medication Review:              No changes to current psychiatric medication(s)                 Medication Compliance:              Yes                 Changes in Health Issues:              None reported                 Chemical Use Review:              Substance Use: Chemical use reviewed, no active concerns identified                  Tobacco Use: No current tobacco use.       Diagnosis:  Major Depressive Disorder Recurrent Mild with anxious distress.     Collateral Reports Completed:              Communicated with: referral provider     PLAN: (Patient Tasks / Therapist Tasks / Other)  Continue to meet for individual therapy and Pros and cons list to employment, follow up with conversation with previous employer         Misbah Sandoval,  "LPCC                                                           ______________________________________________________________________     Treatment Plan     Patient's Name: Sommer Gomez                YOB: 1980     Date: 7/9/21     DSM5 Diagnoses: 296.31 (F33.0) Major Depressive Disorder, Recurrent Episode, Mild _, 300.02 (F41.1) Generalized Anxiety Disorder or 309.81 (F43.10) Posttraumatic Stress Disorder (includes Posttraumatic Stress Disorder for Children 6 Years and Younger)  Without dissociative symptoms  Psychosocial / Contextual Factors: Individual/Societal/Family/Employment  WHODAS:      Referral / Collaboration:  Referral to another professional/service is not indicated at this time..     Anticipated number of session or this episode of care: 90 days        MeasurableTreatment Goal(s) related to diagnosis / functional impairment(s)  Goal 1: Patient will utilize interventions and skills to better regulate negative emotions in difficult circumstances or experiences.  Also, if they are triggered in situations.    I will know I've met my goal when I am able to find positives in difficult circumstances.  Increase in motivation and less irritable.\"      Objective #A (Patient Action)                          Patient will identify specific strategies to more effectively address stressors  identify three initial signs or symptoms of anxiety.  Status: 90 days      Intervention(s)  Therapist will teach emotional recognition/identification. Utilizing CBT DBT ACT and mindfulness interventions.     Objective #B  Patient will identify at least 5 triggers for anxiety.  Status: 90 days      Intervention(s)  Therapist will teach emotional recognition/identification. utilizing CBT DBT ACT and mindfulness interventions.     Objective #C  Patient will Feel less tired and more energy during the day .  Status: 90 days      Intervention(s)  Therapist will teach emotional regulation skills. Utilizng CBT DBT ACT " "and mindfulness interventions.        Goal 2: Patient will reduce overall depression and anxiety    I will know I've met my goal when I am able to feel more motivation and have more energy.  I am able to be more mindful and in the present\"      Objective #A (Patient Action)                          Status: 90 days      Patient will use cognitive strategies identified in therapy to challenge anxious thoughts  Identify negative self-talk and behaviors: challenge core beliefs, myths, and actions.     Intervention(s)  Therapist will utilize CBT DBT ACT and mindfulness interventions.     Goal 3: Patiient will address traumatic experiences to reduce trauma response.    I will know I've met my goal when I am able to have less trauma response, flashbacks, nightmares, re-experiencing of past.  I can stay more in the present\"      Objective #A (Patient Action)                          Status: 90 days      Patient will identify specific strategies to more effectively address stressors  learn to utilize relaxation and cogntive coping skills to reduce and regulate emotions.     Intervention(s)  Therapist will teach emotional regulation skills. Utilizing CBT DBT ACT and mindfulness interventions.     Objective #B  Patient will address specific and targeted traumatic experiences to reduce negative trauma responses and overall impact of trauma.                 Status: 90 days      Intervention(s)  Therapist will utilize CBT DBT ACT and mindfulness interventions.  Narrative therapy and Exposure therapy.     Patient has reviewed and agreed to the above plan.        [facility]isbah Sandoval [facility]              July 9, 2021  "

## 2021-09-17 ENCOUNTER — VIRTUAL VISIT (OUTPATIENT)
Dept: PSYCHOLOGY | Facility: CLINIC | Age: 41
End: 2021-09-17
Payer: COMMERCIAL

## 2021-09-17 DIAGNOSIS — F33.0 MAJOR DEPRESSIVE DISORDER, RECURRENT EPISODE, MILD WITH ANXIOUS DISTRESS (H): Primary | ICD-10-CM

## 2021-09-17 PROCEDURE — 90834 PSYTX W PT 45 MINUTES: CPT | Mod: 95 | Performed by: PSYCHOLOGIST

## 2021-09-19 ENCOUNTER — HEALTH MAINTENANCE LETTER (OUTPATIENT)
Age: 41
End: 2021-09-19

## 2021-09-24 DIAGNOSIS — K21.9 GASTROESOPHAGEAL REFLUX DISEASE WITHOUT ESOPHAGITIS: ICD-10-CM

## 2021-09-25 RX ORDER — FAMOTIDINE 20 MG/1
20 TABLET, FILM COATED ORAL 2 TIMES DAILY
Qty: 180 TABLET | Refills: 0 | Status: SHIPPED | OUTPATIENT
Start: 2021-09-25 | End: 2021-12-28

## 2021-09-30 ENCOUNTER — VIRTUAL VISIT (OUTPATIENT)
Dept: PSYCHOLOGY | Facility: CLINIC | Age: 41
End: 2021-09-30
Payer: COMMERCIAL

## 2021-09-30 DIAGNOSIS — F33.0 MAJOR DEPRESSIVE DISORDER, RECURRENT EPISODE, MILD WITH ANXIOUS DISTRESS (H): Primary | ICD-10-CM

## 2021-09-30 PROCEDURE — 90834 PSYTX W PT 45 MINUTES: CPT | Mod: 95 | Performed by: PSYCHOLOGIST

## 2021-10-04 NOTE — PROGRESS NOTES
Myriam is a 40 year old who is being evaluated via a billable video visit.      How would you like to obtain your AVS? MyChart  If the video visit is dropped, the invitation should be resent by: Text to cell phone: Patient's cell  Will anyone else be joining your video visit? No    Video Start Time: 500 pm    Assessment & Plan     Gastroesophageal reflux disease with esophagitis without hemorrhage  Continue with pepcid    Mild recurrent major depression (H)  Continue with prozac    Abdominal bloating  - Comprehensive metabolic panel (BMP + Alb, Alk Phos, ALT, AST, Total. Bili, TP); Future  - Allergy adult food panel; Future  - TSH with free T4 reflex; Future     See Patient Instructions    No follow-ups on file.    Dharmesh Shultz DO  [facility] EBONI Miguel is a 40 year old who presents for the following health issues         HPI     Follow up on long term illness. Review test results.     Medication Followup of Famotidine     Taking Medication as prescribed: yes    Side Effects:  None    Medication Helping Symptoms:  yes     1. GERD f/u: Patient states of that pepcid is helping her symptoms.     2. GI symptoms: Painful to have bowel movement 95%.  Ongoing for the past 15 years.  Patient changed her diet from red meat and noticed improvement.  History chronic abdominal pain.  Gets worse raw vegetables.  Patient unsure what causes the stomach pain.  Colonoscopy in 2011 which was normal.  States of intermittent diarrhea and constipation.  States of fatigue.  Intermittent nausea.  No recent vomiting.    3. Depression f/u: Currently seeing therapy.  Patient is currently on prozac, feels like it is helping.     Review of Systems   Constitutional: Negative for chills and fever.   HENT: Negative for congestion, ear pain, hearing loss and sore throat.    Eyes: Negative for pain and visual disturbance.   Respiratory: Negative for cough and shortness of breath.    Cardiovascular: Negative for  chest pain, palpitations and peripheral edema.   Gastrointestinal: Positive for abdominal pain (during bowel movements). Negative for constipation, diarrhea, heartburn, hematochezia and nausea.   Breasts:  Negative for tenderness, breast mass and discharge.   Genitourinary: Negative for dysuria, frequency, genital sores, hematuria, pelvic pain, urgency, vaginal bleeding and vaginal discharge.   Musculoskeletal: Negative for arthralgias, joint swelling and myalgias.   Skin: Negative for rash.   Neurological: Negative for dizziness, weakness, headaches and paresthesias.   Psychiatric/Behavioral: Negative for mood changes. The patient is not nervous/anxious.           Objective           Vitals:  No vitals were obtained today due to virtual visit.     GENERAL: Healthy, alert and no distress  EYES: Eyes grossly normal to inspection.  No discharge or erythema, or obvious scleral/conjunctival abnormalities.  RESP: No audible wheeze, cough, or visible cyanosis.  No visible retractions or increased work of breathing.    SKIN: Visible skin clear. No significant rash, abnormal pigmentation or lesions.  NEURO: Cranial nerves grossly intact.  Mentation and speech appropriate for age.  PSYCH: Mentation appears normal, affect normal/bright, judgement and insight intact, normal speech and appearance well-groomed.    Video-Visit Details    Type of service:  Video Visit    Video End Time: 520 pm    Originating Location (pt. Location): Home    Distant Location (provider location):  [facility] [employer]     Platform used for Video Visit: [employer]

## 2021-10-05 ENCOUNTER — VIRTUAL VISIT (OUTPATIENT)
Dept: FAMILY MEDICINE | Facility: CLINIC | Age: 41
End: 2021-10-05
Payer: COMMERCIAL

## 2021-10-05 DIAGNOSIS — R14.0 ABDOMINAL BLOATING: ICD-10-CM

## 2021-10-05 DIAGNOSIS — F33.0 MILD RECURRENT MAJOR DEPRESSION (H): ICD-10-CM

## 2021-10-05 DIAGNOSIS — K21.00 GASTROESOPHAGEAL REFLUX DISEASE WITH ESOPHAGITIS WITHOUT HEMORRHAGE: Primary | ICD-10-CM

## 2021-10-05 PROCEDURE — 99213 OFFICE O/P EST LOW 20 MIN: CPT | Mod: 95 | Performed by: FAMILY MEDICINE

## 2021-10-05 NOTE — PATIENT INSTRUCTIONS
Dolly Miguel,    Thank you for allowing [facility] to manage your care.    I ordered some blood work, please call [phone] to schedule your laboratory appointment.     I ordered some xrays, please go to our radiology department to get your xrays.    For your convenience, test results are released as soon as they are available  Please allow 1-2 business days for me to send you a comment about your results.  If not done so, I encourage you to login into [employer] ([email or web address] to review your results in real time.     If you have any questions or concerns, please feel free to call us at [phone].    Sincerely,    Dr. Shultz    Did you know?      You can schedule a video visit for follow-up appointments as well as future appointments for certain conditions.  Please see the below link.     [email or web address]    If you have not already done so,  I encourage you to sign up for [employer] ([email or web address]).  This will allow you to review your results, securely communicate with a provider, and schedule virtual visits as well.

## 2021-10-06 NOTE — PROGRESS NOTES
Progress Note     Patient Name: Sommer Gomez                   Date:   9/30/21                                           Service Type: Individual                            Session Start Time:  12:30 PM                       Session End Time:    1:22 PM                Session Length:        38-52 min     Session #:      17     Attendees:     Client     Service Modality:  Video Visit:      Provider verified identity through the following two step process.  Patient provided:  Patient photo     Telemedicine Visit: The patient's condition can be safely assessed and treated via synchronous audio and visual telemedicine encounter.       Reason for Telemedicine Visit: Services only offered telehealth     Originating Site (Patient Location): Patient's home     Distant Site (Provider Location): Provider Remote Setting     Consent:  The patient/guardian has verbally consented to: the potential risks and benefits of telemedicine (video visit) versus in person care; bill my insurance or make self-payment for services provided; and responsibility for payment of non-covered services.      Patient would like the video invitation sent by:  Text to cell phone: [phone]     Mode of Communication:  Video Conference via Amwell     As the provider I attest to compliance with applicable laws and regulations related to telemedicine.                Treatment Plan Last Reviewed: 7/9/21  PHQ-9 / ROSA-7 : 10/12     DATA  Interactive Complexity: No  Crisis: No                                   Progress Since Last Session (Related to Symptoms / Goals / Homework):              Symptoms: Some change Patient expressed and exhibited little change in symptoms and behaviors.     Homework: Achieved / completed to satisfaction                            Episode of Care Goals: Satisfactory progress - PREPARATION (Decided to change - considering how); Intervened by negotiating a change plan and  determining options / strategies for behavior change, identifying triggers, exploring social supports, and working towards setting a date to begin behavior change                 Current / Ongoing Stressors and Concerns:              Individual/Employment/Societal/Family  Patient was on time and present for the session.  Patient expressed doing well but experienced some stressors and difficulties.  Patient expressed external stressors related to ex-marital conflict and custody arrangements with their children.  Patient and therapist problems solved scenarios and how to help patient work through these steps.  Patient also discussed interested in being tested for ADHD as they would like to pursue school, will discuss further.  Patient and therapist explored and processed patients internal experiences to gain more insight and self awareness.      Treatment Objective(s) Addressed in This Session:   identify specific fears / thoughts that contribute to feeling anxious                    Intervention:              CBT: identification of target concerns and behaviors.              Psychodynamic-processing internal experiences.                               ASSESSMENT: Current Emotional / Mental Status (status of significant symptoms):              Risk status (Self / Other harm or suicidal ideation)              Patient denies current fears or concerns for personal safety.              Patient denies current or recent suicidal ideation or behaviors.              Patient denies current or recent homicidal ideation or behaviors.              Patient denies current or recent self injurious behavior or ideation.              Patient denies other safety concerns.              Patient reports there has been no change in risk factors since their last session.                Patient reports there has been no change in protective factors since their last session.                A safety and risk management plan has been developed  including: Patient consented to co-developed safety plan.  Safety and risk management plan was completed.  Patient agreed to use safety plan should any safety concerns arise.  A copy was given to the patient.                 Appearance:                            Appropriate               Eye Contact:                           Good               Psychomotor Behavior:          Normal               Attitude:                                   Cooperative               Orientation:                             All              Speech                          Rate / Production:       Normal                           Volume:                       Normal               Mood:                                      Normal              Affect:                                      Appropriate               Thought Content:                    Clear               Thought Form:                        Coherent  Logical               Insight:                                     Good                  Medication Review:              No changes to current psychiatric medication(s)                 Medication Compliance:              Yes                 Changes in Health Issues:              None reported                 Chemical Use Review:              Substance Use: Chemical use reviewed, no active concerns identified                  Tobacco Use: No current tobacco use.       Diagnosis:  Major Depressive Disorder Recurrent Mild with anxious distress.     Collateral Reports Completed:              Communicated with: referral provider     PLAN: (Patient Tasks / Therapist Tasks / Other)  Continue to meet for individual therapy and Pros and cons list to employment, follow up with conversation with previous employer         Misbah Sandoval [facility]                                                           ______________________________________________________________________     Treatment Plan     Patient's Name: Sommer TULIO  "Jason                YOB: 1980     Date: 7/9/21     DSM5 Diagnoses: 296.31 (F33.0) Major Depressive Disorder, Recurrent Episode, Mild _, 300.02 (F41.1) Generalized Anxiety Disorder or 309.81 (F43.10) Posttraumatic Stress Disorder (includes Posttraumatic Stress Disorder for Children 6 Years and Younger)  Without dissociative symptoms  Psychosocial / Contextual Factors: Individual/Societal/Family/Employment  WHODAS:      Referral / Collaboration:  Referral to another professional/service is not indicated at this time..     Anticipated number of session or this episode of care: 90 days        MeasurableTreatment Goal(s) related to diagnosis / functional impairment(s)  Goal 1: Patient will utilize interventions and skills to better regulate negative emotions in difficult circumstances or experiences.  Also, if they are triggered in situations.    I will know I've met my goal when I am able to find positives in difficult circumstances.  Increase in motivation and less irritable.\"      Objective #A (Patient Action)                          Patient will identify specific strategies to more effectively address stressors  identify three initial signs or symptoms of anxiety.  Status: 90 days      Intervention(s)  Therapist will teach emotional recognition/identification. Utilizing CBT DBT ACT and mindfulness interventions.     Objective #B  Patient will identify at least 5 triggers for anxiety.  Status: 90 days      Intervention(s)  Therapist will teach emotional recognition/identification. utilizing CBT DBT ACT and mindfulness interventions.     Objective #C  Patient will Feel less tired and more energy during the day .  Status: 90 days      Intervention(s)  Therapist will teach emotional regulation skills. Utilizng CBT DBT ACT and mindfulness interventions.        Goal 2: Patient will reduce overall depression and anxiety    I will know I've met my goal when I am able to feel more motivation and have more " "energy.  I am able to be more mindful and in the present\"      Objective #A (Patient Action)                          Status: 90 days      Patient will use cognitive strategies identified in therapy to challenge anxious thoughts  Identify negative self-talk and behaviors: challenge core beliefs, myths, and actions.     Intervention(s)  Therapist will utilize CBT DBT ACT and mindfulness interventions.     Goal 3: Patiient will address traumatic experiences to reduce trauma response.    I will know I've met my goal when I am able to have less trauma response, flashbacks, nightmares, re-experiencing of past.  I can stay more in the present\"      Objective #A (Patient Action)                          Status: 90 days      Patient will identify specific strategies to more effectively address stressors  learn to utilize relaxation and cogntive coping skills to reduce and regulate emotions.     Intervention(s)  Therapist will teach emotional regulation skills. Utilizing CBT DBT ACT and mindfulness interventions.     Objective #B  Patient will address specific and targeted traumatic experiences to reduce negative trauma responses and overall impact of trauma.                 Status: 90 days      Intervention(s)  Therapist will utilize CBT DBT ACT and mindfulness interventions.  Narrative therapy and Exposure therapy.     Patient has reviewed and agreed to the above plan.        Misbah Sandoval [facility]              July 9, 2021  "

## 2021-10-07 ENCOUNTER — VIRTUAL VISIT (OUTPATIENT)
Dept: PSYCHOLOGY | Facility: CLINIC | Age: 41
End: 2021-10-07
Payer: COMMERCIAL

## 2021-10-07 DIAGNOSIS — F33.0 MAJOR DEPRESSIVE DISORDER, RECURRENT EPISODE, MILD WITH ANXIOUS DISTRESS (H): Primary | ICD-10-CM

## 2021-10-07 PROCEDURE — 90834 PSYTX W PT 45 MINUTES: CPT | Mod: 95 | Performed by: PSYCHOLOGIST

## 2021-10-07 ASSESSMENT — ENCOUNTER SYMPTOMS
ABDOMINAL PAIN: 1
HEADACHES: 0
NERVOUS/ANXIOUS: 0
JOINT SWELLING: 0
DIZZINESS: 0
COUGH: 0
WEAKNESS: 0
CHILLS: 0
CONSTIPATION: 0
HEARTBURN: 0
BREAST MASS: 0
ARTHRALGIAS: 0
MYALGIAS: 0
DYSURIA: 0
NAUSEA: 0
SHORTNESS OF BREATH: 0
HEMATOCHEZIA: 0
SORE THROAT: 0
DIARRHEA: 0
HEMATURIA: 0
PARESTHESIAS: 0
FREQUENCY: 0
PALPITATIONS: 0
FEVER: 0
EYE PAIN: 0

## 2021-10-11 ENCOUNTER — OFFICE VISIT (OUTPATIENT)
Dept: FAMILY MEDICINE | Facility: CLINIC | Age: 41
End: 2021-10-11
Payer: COMMERCIAL

## 2021-10-11 VITALS
BODY MASS INDEX: 25.96 KG/M2 | TEMPERATURE: 97.1 F | SYSTOLIC BLOOD PRESSURE: 99 MMHG | OXYGEN SATURATION: 98 % | HEART RATE: 87 BPM | DIASTOLIC BLOOD PRESSURE: 70 MMHG | WEIGHT: 167 LBS

## 2021-10-11 DIAGNOSIS — Z11.59 NEED FOR HEPATITIS C SCREENING TEST: ICD-10-CM

## 2021-10-11 DIAGNOSIS — J45.20 MILD INTERMITTENT ASTHMA WITHOUT COMPLICATION: ICD-10-CM

## 2021-10-11 DIAGNOSIS — Z13.6 CARDIOVASCULAR SCREENING; LDL GOAL LESS THAN 160: ICD-10-CM

## 2021-10-11 DIAGNOSIS — Z00.00 ROUTINE GENERAL MEDICAL EXAMINATION AT A HEALTH CARE FACILITY: Primary | ICD-10-CM

## 2021-10-11 DIAGNOSIS — Z12.4 ENCOUNTER FOR PAPANICOLAOU SMEAR FOR CERVICAL CANCER SCREENING: ICD-10-CM

## 2021-10-11 DIAGNOSIS — R14.0 ABDOMINAL BLOATING: ICD-10-CM

## 2021-10-11 LAB
ALBUMIN SERPL-MCNC: 3.7 G/DL (ref 3.4–5)
ALP SERPL-CCNC: 78 U/L (ref 40–150)
ALT SERPL W P-5'-P-CCNC: 25 U/L (ref 0–50)
ANION GAP SERPL CALCULATED.3IONS-SCNC: 9 MMOL/L (ref 3–14)
AST SERPL W P-5'-P-CCNC: 16 U/L (ref 0–45)
BILIRUB SERPL-MCNC: 0.6 MG/DL (ref 0.2–1.3)
BUN SERPL-MCNC: 9 MG/DL (ref 7–30)
CALCIUM SERPL-MCNC: 8.7 MG/DL (ref 8.5–10.1)
CHLORIDE BLD-SCNC: 108 MMOL/L (ref 94–109)
CHOLEST SERPL-MCNC: 155 MG/DL
CO2 SERPL-SCNC: 22 MMOL/L (ref 20–32)
CREAT SERPL-MCNC: 0.67 MG/DL (ref 0.52–1.04)
FASTING STATUS PATIENT QL REPORTED: YES
GFR SERPL CREATININE-BSD FRML MDRD: >90 ML/MIN/1.73M2
GLUCOSE BLD-MCNC: 86 MG/DL (ref 70–99)
HCV AB SERPL QL IA: NONREACTIVE
HDLC SERPL-MCNC: 63 MG/DL
LDLC SERPL CALC-MCNC: 75 MG/DL
NONHDLC SERPL-MCNC: 92 MG/DL
POTASSIUM BLD-SCNC: 4 MMOL/L (ref 3.4–5.3)
PROT SERPL-MCNC: 7.4 G/DL (ref 6.8–8.8)
SODIUM SERPL-SCNC: 139 MMOL/L (ref 133–144)
TRIGL SERPL-MCNC: 84 MG/DL
TSH SERPL DL<=0.005 MIU/L-ACNC: 1.28 MU/L (ref 0.4–4)

## 2021-10-11 PROCEDURE — 90686 IIV4 VACC NO PRSV 0.5 ML IM: CPT | Performed by: PHYSICIAN ASSISTANT

## 2021-10-11 PROCEDURE — 86803 HEPATITIS C AB TEST: CPT | Performed by: PHYSICIAN ASSISTANT

## 2021-10-11 PROCEDURE — 80053 COMPREHEN METABOLIC PANEL: CPT | Performed by: PHYSICIAN ASSISTANT

## 2021-10-11 PROCEDURE — 86003 ALLG SPEC IGE CRUDE XTRC EA: CPT | Performed by: PHYSICIAN ASSISTANT

## 2021-10-11 PROCEDURE — 87624 HPV HI-RISK TYP POOLED RSLT: CPT | Performed by: PHYSICIAN ASSISTANT

## 2021-10-11 PROCEDURE — G0145 SCR C/V CYTO,THINLAYER,RESCR: HCPCS | Performed by: PHYSICIAN ASSISTANT

## 2021-10-11 PROCEDURE — 99395 PREV VISIT EST AGE 18-39: CPT | Mod: 25 | Performed by: PHYSICIAN ASSISTANT

## 2021-10-11 PROCEDURE — 84443 ASSAY THYROID STIM HORMONE: CPT | Performed by: PHYSICIAN ASSISTANT

## 2021-10-11 PROCEDURE — 80061 LIPID PANEL: CPT | Performed by: PHYSICIAN ASSISTANT

## 2021-10-11 PROCEDURE — 36415 COLL VENOUS BLD VENIPUNCTURE: CPT | Performed by: PHYSICIAN ASSISTANT

## 2021-10-11 PROCEDURE — 90471 IMMUNIZATION ADMIN: CPT | Performed by: PHYSICIAN ASSISTANT

## 2021-10-11 NOTE — PROGRESS NOTES
SUBJECTIVE:   CC: Sommer Gomez is an 40 year old woman who presents for preventive health visit.       Patient has been advised of split billing requirements and indicates understanding: Yes  Healthy Habits:    Getting at least 3 servings of Calcium per day:  Yes    Bi-annual eye exam:  Yes    Dental care twice a year:  Yes    Sleep apnea or symptoms of sleep apnea:  None    Diet:  Regular (no restrictions)    Frequency of exercise:  None    Taking medications regularly:  Yes    Barriers to taking medications:  Not applicable    Medication side effects:  None    PHQ-2 Total Score:    Additional concerns today:  Yes    Has Essure in place.              Asthma Follow-Up    Was ACT completed today?  Yes    ACT Total Scores 10/11/2021   ACT TOTAL SCORE (Goal Greater than or Equal to 20) 21   In the past 12 months, how many times did you visit the emergency room for your asthma without being admitted to the hospital? 0   In the past 12 months, how many times were you hospitalized overnight because of your asthma? 0          How many days per week do you miss taking your asthma controller medication?  0    Please describe any recent triggers for your asthma: None    Have you had any Emergency Room Visits, Urgent Care Visits, or Hospital Admissions since your last office visit?  No    She was diagnosed with asthma about 3 years ago after she was working at [employer] and exposed to a lot of fragrances/perfumes in the laundry aisle.  She didn't realize that was what was going on until her symptoms did not improve and she went to the ER.  She was given an inhaler at that time and has not had to use it much since. She did note that as a child, she noticed she would get winded like the other kids with asthma, but she was never diagnosed.      Today's PHQ-2 Score:   PHQ-2 ( 1999 Pfizer) 12/1/2020   Q1: Little interest or pleasure in doing things 2   Q2: Feeling down, depressed or hopeless 2   PHQ-2 Score 4       Abuse:  Current or Past (Physical, Sexual or Emotional) - No  Do you feel safe in your environment? Yes    Have you ever done Advance Care Planning? (For example, a Health Directive, POLST, or a discussion with a medical provider or your loved ones about your wishes): No, advance care planning information given to patient to review.  Patient declined advance care planning discussion at this time.    Social History     Tobacco Use     Smoking status: Never Smoker     Smokeless tobacco: Never Used   Substance Use Topics     Alcohol use: No         Alcohol Use 9/9/2019   Prescreen: >3 drinks/day or >7 drinks/week? No       Reviewed orders with patient.  Reviewed health maintenance and updated orders accordingly - Yes  Lab work is in process  Labs reviewed in EPIC  BP Readings from Last 3 Encounters:   10/11/21 99/70   09/17/20 114/68   01/25/20 109/77    Wt Readings from Last 3 Encounters:   10/11/21 75.8 kg (167 lb)   09/17/20 69.9 kg (154 lb)   09/09/19 70.8 kg (156 lb)                    Breast Cancer Screening:  Any new diagnosis of family breast, ovarian, or bowel cancer? No    FHS-7: No flowsheet data found.    Patient under 40 years of age: Routine Mammogram Screening not recommended.   Pertinent mammograms are reviewed under the imaging tab.    History of abnormal Pap smear: NO - age 30-65 PAP every 5 years with negative HPV co-testing recommended  PAP / HPV 9/8/2014 2/18/2011 12/3/2007   PAP (Historical) NIL NIL NIL     Reviewed and updated as needed this visit by clinical staff  Tobacco  Allergies  Meds              Reviewed and updated as needed this visit by Provider                    Review of Systems  CONSTITUTIONAL: NEGATIVE for fever, chills, change in weight  INTEGUMENTARU/SKIN: NEGATIVE for worrisome rashes, moles or lesions  EYES: NEGATIVE for vision changes or irritation  ENT: NEGATIVE for ear, mouth and throat problems  RESP: NEGATIVE for significant cough or SOB  BREAST: NEGATIVE for masses,  tenderness or discharge  CV: NEGATIVE for chest pain, palpitations or peripheral edema  GI: NEGATIVE for nausea, abdominal pain, heartburn, or change in bowel habits  : NEGATIVE for unusual urinary or vaginal symptoms. Periods are regular.  MUSCULOSKELETAL: NEGATIVE for significant arthralgias or myalgia  NEURO: NEGATIVE for weakness, dizziness or paresthesias  PSYCHIATRIC: NEGATIVE for changes in mood or affect     OBJECTIVE:   BP 99/70 (BP Location: Left arm, Patient Position: Chair, Cuff Size: Adult Regular)   Pulse 87   Temp 97.1  F (36.2  C) (Tympanic)   Wt 75.8 kg (167 lb)   LMP 09/07/2021 (Approximate)   SpO2 98%   Breastfeeding No   BMI 25.96 kg/m    Physical Exam  GENERAL: healthy, alert and no distress  EYES: Eyes grossly normal to inspection, PERRL and conjunctivae and sclerae normal  HENT: ear canals and TM's normal, nose and mouth without ulcers or lesions  NECK: no adenopathy, no asymmetry, masses, or scars and thyroid normal to palpation  RESP: lungs clear to auscultation - no rales, rhonchi or wheezes  BREAST: normal without masses, tenderness or nipple discharge and no palpable axillary masses or adenopathy  CV: regular rate and rhythm, normal S1 S2, no S3 or S4, no murmur, click or rub, no peripheral edema and peripheral pulses strong  ABDOMEN: soft, nontender, no hepatosplenomegaly, no masses and bowel sounds normal   (female): normal female external genitalia, normal urethral meatus, vaginal mucosa pink, moist, well rugated, and normal cervix/adnexa/uterus without masses or discharge  MS: no gross musculoskeletal defects noted, no edema  SKIN: no suspicious lesions or rashes  NEURO: Normal strength and tone, mentation intact and speech normal  PSYCH: mentation appears normal, affect normal/bright    Diagnostic Test Results:  Labs reviewed in Epic    ASSESSMENT/PLAN:   (Z00.00) Routine general medical examination at a health care facility  (primary encounter diagnosis)  Comment:       "HEALTH CARE MAINTENANCE              Reviewed USPTF recommendations and anticipatory guidance.              See orders.   I discussed in detail with Sommer Gomez the importance of cervical cancer screenings through pap smears, will repeat pap with HPV test every 5 year(s).      Due for a flu shot.       (Z11.59) Need for hepatitis C screening test  Comment: Discussed CDC guidelines on preventative screening for this condition.    Patient would like screening done.    Plan: Hepatitis C Screen Reflex to HCV RNA Quant and         Genotype            (Z12.4) Encounter for Papanicolaou smear for cervical cancer screening  Comment: due for pap.   Plan: Pap Screen with HPV - recommended age 30 - 65         years            (Z13.6) CARDIOVASCULAR SCREENING; LDL GOAL LESS THAN 160  Comment: due for screening lipids.   Plan: Lipid panel reflex to direct LDL Fasting            (J45.20) Mild intermittent asthma without complication  Comment: ACT and AAP completed today.  She does not currently need a refill on albuterol but will reach out if needed.   Plan:     Patient has been advised of split billing requirements and indicates understanding: Yes  COUNSELING:  Reviewed preventive health counseling, as reflected in patient instructions       Regular exercise       Healthy diet/nutrition       Consider Hep C screening for all patients one time for ages 18-79 years    Estimated body mass index is 25.96 kg/m  as calculated from the following:    Height as of 9/9/19: 1.708 m (5' 7.25\").    Weight as of this encounter: 75.8 kg (167 lb).        She reports that she has never smoked. She has never used smokeless tobacco.      Counseling Resources:  ATP IV Guidelines  Pooled Cohorts Equation Calculator  Breast Cancer Risk Calculator  BRCA-Related Cancer Risk Assessment: FHS-7 Tool  FRAX Risk Assessment  ICSI Preventive Guidelines  Dietary Guidelines for Americans, 2010  USDA's MyPlate  ASA Prophylaxis  Lung CA Screening    Mary " ELI Asencio [facility] [place]

## 2021-10-11 NOTE — LETTER
My Asthma Action Plan    Name: Sommer Gomez   YOB: 1980  Date: 10/11/2021   My doctor: Mary Cowart PA-C   My clinic: [facility] [place]        My Rescue Medicine:   Albuterol inhaler (Proair/Ventolin/Proventil HFA)  2-4 puffs EVERY 4 HOURS as needed. Use a spacer if recommended by your provider.   My Asthma Severity:   Intermittent / Exercise Induced  Know your asthma triggers: strong odors and fumes, exercise/activity             GREEN ZONE   Good Control    I feel good    No cough or wheeze    Can work, sleep and play without asthma symptoms       Take your asthma control medicine every day.     1. If exercise triggers your asthma, take your rescue medication    15 minutes before exercise or sports, and    During exercise if you have asthma symptoms  2. Spacer to use with inhaler: If you have a spacer, make sure to use it with your inhaler             YELLOW ZONE Getting Worse  I have ANY of these:    I do not feel good    Cough or wheeze    Chest feels tight    Wake up at night   1. Keep taking your Green Zone medications  2. Start taking your rescue medicine:    every 20 minutes for up to 1 hour. Then every 4 hours for 24-48 hours.  3. If you stay in the Yellow Zone for more than 12-24 hours, contact your doctor.  4. If you do not return to the Green Zone in 12-24 hours or you get worse, start taking your oral steroid medicine if prescribed by your provider.           RED ZONE Medical Alert - Get Help  I have ANY of these:    I feel awful    Medicine is not helping    Breathing getting harder    Trouble walking or talking    Nose opens wide to breathe       1. Take your rescue medicine NOW  2. If your provider has prescribed an oral steroid medicine, start taking it NOW  3. Call your doctor NOW  4. If you are still in the Red Zone after 20 minutes and you have not reached your doctor:    Take your rescue medicine again and    Call 911 or go to the emergency room right  away    See your regular doctor within 2 weeks of an Emergency Room or Urgent Care visit for follow-up treatment.          Annual Reminders:  Meet with Asthma Educator,  Flu Shot in the Fall, consider Pneumonia Vaccination for patients with asthma (aged 19 and older).    Pharmacy:    [facility] [place] [address] [facility] [place] - [address]    Electronically signed by Mary Cowart PA-C   Date: 10/11/21                    Asthma Triggers  How To Control Things That Make Your Asthma Worse    Triggers are things that make your asthma worse.  Look at the list below to help you find your triggers and   what you can do about them. You can help prevent asthma flare-ups by staying away from your triggers.      Trigger                                                          What you can do   Cigarette Smoke  Tobacco smoke can make asthma worse. Do not allow smoking in your home, car or around you.  Be sure no one smokes at a child s day care or school.  If you smoke, ask your health care provider for ways to help you quit.  Ask family members to quit too.  Ask your health care provider for a referral to Quit Plan to help you quit smoking, or call 6-493-644-PLAN.     Colds, Flu, Bronchitis  These are common triggers of asthma. Wash your hands often.  Don t touch your eyes, nose or mouth.  Get a flu shot every year.     Dust Mites  These are tiny bugs that live in cloth or carpet. They are too small to see. Wash sheets and blankets in hot water every week.   Encase pillows and mattress in dust mite proof covers.  Avoid having carpet if you can. If you have carpet, vacuum weekly.   Use a dust mask and HEPA vacuum.   Pollen and Outdoor Mold  Some people are allergic to trees, grass, or weed pollen, or molds. Try to keep your windows closed.  Limit time out doors when pollen count is high.   Ask you health care provider about taking medicine during allergy  season.     Animal Dander  Some people are allergic to skin flakes, urine or saliva from pets with fur or feathers. Keep pets with fur or feathers out of your home.    If you can t keep the pet outdoors, then keep the pet out of your bedroom.  Keep the bedroom door closed.  Keep pets off cloth furniture and away from stuffed toys.     Mice, Rats, and Cockroaches  Some people are allergic to the waste from these pests.   Cover food and garbage.  Clean up spills and food crumbs.  Store grease in the refrigerator.   Keep food out of the bedroom.   Indoor Mold  This can be a trigger if your home has high moisture. Fix leaking faucets, pipes, or other sources of water.   Clean moldy surfaces.  Dehumidify basement if it is damp and smelly.   Smoke, Strong Odors, and Sprays  These can reduce air quality. Stay away from strong odors and sprays, such as perfume, powder, hair spray, paints, smoke incense, paint, cleaning products, candles and new carpet.   Exercise or Sports  Some people with asthma have this trigger. Be active!  Ask your doctor about taking medicine before sports or exercise to prevent symptoms.    Warm up for 5-10 minutes before and after sports or exercise.     Other Triggers of Asthma  Cold air:  Cover your nose and mouth with a scarf.  Sometimes laughing or crying can be a trigger.  Some medicines and food can trigger asthma.

## 2021-10-11 NOTE — LETTER
October 13, 2021        Sommer Gomez  [address]  [place] [id number]        Dear Ms. Gomez,    We are writing to inform you of your test results.    Your recent laboratory results show the following:  -Liver and gallbladder tests are normal (ALT,AST, Alk phos, bilirubin), kidney function is normal (Cr, GFR), sodium is normal, potassium is normal, calcium is normal, glucose is normal.  -TSH (thyroid stimulating hormone) level is normal which indicates normal thyroid function.    Resulted Orders   Comprehensive metabolic panel (BMP + Alb, Alk Phos, ALT, AST, Total. Bili, TP)   Result Value Ref Range    Sodium 139 133 - 144 mmol/L    Potassium 4.0 3.4 - 5.3 mmol/L    Chloride 108 94 - 109 mmol/L    Carbon Dioxide (CO2) 22 20 - 32 mmol/L    Anion Gap 9 3 - 14 mmol/L    Urea Nitrogen 9 7 - 30 mg/dL    Creatinine 0.67 0.52 - 1.04 mg/dL    Calcium 8.7 8.5 - 10.1 mg/dL    Glucose 86 70 - 99 mg/dL    Alkaline Phosphatase 78 40 - 150 U/L    AST 16 0 - 45 U/L    ALT 25 0 - 50 U/L    Protein Total 7.4 6.8 - 8.8 g/dL    Albumin 3.7 3.4 - 5.0 g/dL    Bilirubin Total 0.6 0.2 - 1.3 mg/dL    GFR Estimate >90 >60 mL/min/1.73m2      Comment:      As of July 11, 2021, eGFR is calculated by the CKD-EPI creatinine equation, without race adjustment. eGFR can be influenced by muscle mass, exercise, and diet. The reported eGFR is an estimation only and is only applicable if the renal function is stable.   TSH with free T4 reflex   Result Value Ref Range    TSH 1.28 0.40 - 4.00 mU/L       If you have any questions or concerns, please call the clinic at the number listed above.       Sincerely,      Dharmesh Shultz, DO

## 2021-10-12 ASSESSMENT — ASTHMA QUESTIONNAIRES: ACT_TOTALSCORE: 21

## 2021-10-13 LAB
BKR LAB AP GYN ADEQUACY: NORMAL
BKR LAB AP GYN INTERPRETATION: NORMAL
BKR LAB AP HPV REFLEX: NORMAL
BKR LAB AP PREVIOUS ABNORMAL: NORMAL
PATH REPORT.COMMENTS IMP SPEC: NORMAL
PATH REPORT.RELEVANT HX SPEC: NORMAL

## 2021-10-13 NOTE — PROGRESS NOTES
Progress Note     Patient Name: Sommer Gomez                   Date:   10/7/21                                           Service Type: Individual                            Session Start Time:  4:30 PM                       Session End Time:    5:22 PM                Session Length:        38-52 min     Session #:      18     Attendees:     Client     Service Modality:  Video Visit:      Provider verified identity through the following two step process.  Patient provided:  Patient photo     Telemedicine Visit: The patient's condition can be safely assessed and treated via synchronous audio and visual telemedicine encounter.       Reason for Telemedicine Visit: Services only offered telehealth     Originating Site (Patient Location): Patient's home     Distant Site (Provider Location): Provider Remote Setting     Consent:  The patient/guardian has verbally consented to: the potential risks and benefits of telemedicine (video visit) versus in person care; bill my insurance or make self-payment for services provided; and responsibility for payment of non-covered services.      Patient would like the video invitation sent by:  Text to cell phone: [phone]     Mode of Communication:  Video Conference via [facility]     As the provider I attest to compliance with applicable laws and regulations related to telemedicine.                Treatment Plan Last Reviewed: 7/9/21  PHQ-9 / ROSA-7 : 10/12     DATA  Interactive Complexity: No  Crisis: No                                   Progress Since Last Session (Related to Symptoms / Goals / Homework):              Symptoms: Some change Patient expressed and exhibited little change in symptoms and behaviors.     Homework: Achieved / completed to satisfaction                            Episode of Care Goals: Satisfactory progress - PREPARATION (Decided to change - considering how); Intervened by negotiating a change plan and  determining options / strategies for behavior change, identifying triggers, exploring social supports, and working towards setting a date to begin behavior change                 Current / Ongoing Stressors and Concerns:              Individual/Employment/Societal/Family  Patient was on time and present for the session.  Patient expressed doing well but experienced some stressors and difficulties.  Patient expressed external stressors related to ex-marital conflict and custody arrangements with their children.  Patient and therapist problems solved scenarios and how to help patient work through these steps.  Patient also discussed interested in being tested for ADHD as they would like to pursue school, will discuss further.  Patient also discussed relational conflicts due to one of their children as they explore their sexuality. Patient and therapist explored and processed patients internal experiences to gain more insight and self awareness.      Treatment Objective(s) Addressed in This Session:   identify specific fears / thoughts that contribute to feeling anxious  Gaining insight to anxiety and depressive moods  Cognitive Distortions                    Intervention:              CBT: identification of target concerns and behaviors.              Psychodynamic-processing internal experiences.                               ASSESSMENT: Current Emotional / Mental Status (status of significant symptoms):              Risk status (Self / Other harm or suicidal ideation)              Patient denies current fears or concerns for personal safety.              Patient denies current or recent suicidal ideation or behaviors.              Patient denies current or recent homicidal ideation or behaviors.              Patient denies current or recent self injurious behavior or ideation.              Patient denies other safety concerns.              Patient reports there has been no change in risk factors since their last  session.                Patient reports there has been no change in protective factors since their last session.                A safety and risk management plan has been developed including: Patient consented to co-developed safety plan.  Safety and risk management plan was completed.  Patient agreed to use safety plan should any safety concerns arise.  A copy was given to the patient.                 Appearance:                            Appropriate               Eye Contact:                           Good               Psychomotor Behavior:          Normal               Attitude:                                   Cooperative               Orientation:                             All              Speech                          Rate / Production:       Normal                           Volume:                       Normal               Mood:                                      Normal              Affect:                                      Appropriate               Thought Content:                    Clear               Thought Form:                        Coherent  Logical               Insight:                                     Good                  Medication Review:              No changes to current psychiatric medication(s)                 Medication Compliance:              Yes                 Changes in Health Issues:              None reported                 Chemical Use Review:              Substance Use: Chemical use reviewed, no active concerns identified                  Tobacco Use: No current tobacco use.       Diagnosis:  Major Depressive Disorder Recurrent Mild with anxious distress.     Collateral Reports Completed:              Communicated with: referral provider     PLAN: (Patient Tasks / Therapist Tasks / Other)  Continue to meet for individual therapy and Pros and cons list to employment, follow up with conversation with previous employer         Misbah Sandoval,  "LP[facility]                                                           ______________________________________________________________________     Treatment Plan     Patient's Name: Sommer Gomez                YOB: 1980     Date: 7/9/21     DSM5 Diagnoses: 296.31 (F33.0) Major Depressive Disorder, Recurrent Episode, Mild _, 300.02 (F41.1) Generalized Anxiety Disorder or 309.81 (F43.10) Posttraumatic Stress Disorder (includes Posttraumatic Stress Disorder for Children 6 Years and Younger)  Without dissociative symptoms  Psychosocial / Contextual Factors: Individual/Societal/Family/Employment  WHODAS:      Referral / Collaboration:  Referral to another professional/service is not indicated at this time..     Anticipated number of session or this episode of care: 90 days        MeasurableTreatment Goal(s) related to diagnosis / functional impairment(s)  Goal 1: Patient will utilize interventions and skills to better regulate negative emotions in difficult circumstances or experiences.  Also, if they are triggered in situations.    I will know I've met my goal when I am able to find positives in difficult circumstances.  Increase in motivation and less irritable.\"      Objective #A (Patient Action)                          Patient will identify specific strategies to more effectively address stressors  identify three initial signs or symptoms of anxiety.  Status: 90 days      Intervention(s)  Therapist will teach emotional recognition/identification. Utilizing CBT DBT ACT and mindfulness interventions.     Objective #B  Patient will identify at least 5 triggers for anxiety.  Status: 90 days      Intervention(s)  Therapist will teach emotional recognition/identification. utilizing CBT DBT ACT and mindfulness interventions.     Objective #C  Patient will Feel less tired and more energy during the day .  Status: 90 days      Intervention(s)  Therapist will teach emotional regulation skills. Utilizng CBT DBT ACT " "and mindfulness interventions.        Goal 2: Patient will reduce overall depression and anxiety    I will know I've met my goal when I am able to feel more motivation and have more energy.  I am able to be more mindful and in the present\"      Objective #A (Patient Action)                          Status: 90 days      Patient will use cognitive strategies identified in therapy to challenge anxious thoughts  Identify negative self-talk and behaviors: challenge core beliefs, myths, and actions.     Intervention(s)  Therapist will utilize CBT DBT ACT and mindfulness interventions.     Goal 3: Patiient will address traumatic experiences to reduce trauma response.    I will know I've met my goal when I am able to have less trauma response, flashbacks, nightmares, re-experiencing of past.  I can stay more in the present\"      Objective #A (Patient Action)                          Status: 90 days      Patient will identify specific strategies to more effectively address stressors  learn to utilize relaxation and cogntive coping skills to reduce and regulate emotions.     Intervention(s)  Therapist will teach emotional regulation skills. Utilizing CBT DBT ACT and mindfulness interventions.     Objective #B  Patient will address specific and targeted traumatic experiences to reduce negative trauma responses and overall impact of trauma.                 Status: 90 days      Intervention(s)  Therapist will utilize CBT DBT ACT and mindfulness interventions.  Narrative therapy and Exposure therapy.     Patient has reviewed and agreed to the above plan.        [facility]isbah Sandoval [facility]              July 9, 2021  "

## 2021-10-14 LAB
ALMOND IGE QN: <0.1 KU(A)/L
CASHEW NUT IGE QN: <0.1 KU(A)/L
CODFISH IGE QN: <0.1 KU(A)/L
COW MILK IGE QN: <0.1 KU(A)/L
EGG WHITE IGE QN: <0.1 KU(A)/L
HAZELNUT IGE QN: <0.1 KU(A)/L
HUMAN PAPILLOMA VIRUS 16 DNA: NEGATIVE
HUMAN PAPILLOMA VIRUS 18 DNA: NEGATIVE
HUMAN PAPILLOMA VIRUS FINAL DIAGNOSIS: NORMAL
HUMAN PAPILLOMA VIRUS OTHER HR: NEGATIVE
IGE SERPL-ACNC: 36 KU/L (ref 0–114)
PEANUT IGE QN: <0.1 KU(A)/L
SALMON IGE QN: <0.1 KU(A)/L
SCALLOP IGE QN: <0.1 KU(A)/L
SESAME SEED IGE QN: <0.1 KU(A)/L
SHRIMP IGE QN: <0.1 KU(A)/L
SOYBEAN IGE QN: <0.1 KU(A)/L
TUNA IGE QN: <0.1 KU(A)/L
WALNUT IGE QN: <0.1 KU(A)/L
WHEAT IGE QN: <0.1 KU(A)/L

## 2021-11-05 ENCOUNTER — VIRTUAL VISIT (OUTPATIENT)
Dept: PSYCHOLOGY | Facility: CLINIC | Age: 41
End: 2021-11-05
Payer: COMMERCIAL

## 2021-11-05 DIAGNOSIS — F33.0 MAJOR DEPRESSIVE DISORDER, RECURRENT EPISODE, MILD WITH ANXIOUS DISTRESS (H): Primary | ICD-10-CM

## 2021-11-05 PROCEDURE — 90834 PSYTX W PT 45 MINUTES: CPT | Mod: 95 | Performed by: PSYCHOLOGIST

## 2021-11-17 NOTE — PROGRESS NOTES
Progress Note     Patient Name: Sommer Gomez                   Date:   11/5/21                                           Service Type: Individual                            Session Start Time:  12:30 PM                       Session End Time:    1:22 PM                Session Length:        38-52 min     Session #:      19     Attendees:     Client     Service Modality:  Video Visit:      Provider verified identity through the following two step process.  Patient provided:  Patient photo     Telemedicine Visit: The patient's condition can be safely assessed and treated via synchronous audio and visual telemedicine encounter.       Reason for Telemedicine Visit: Services only offered telehealth     Originating Site (Patient Location): Patient's home     Distant Site (Provider Location): Provider Remote Setting     Consent:  The patient/guardian has verbally consented to: the potential risks and benefits of telemedicine (video visit) versus in person care; bill my insurance or make self-payment for services provided; and responsibility for payment of non-covered services.      Patient would like the video invitation sent by:  Text to cell phone: [phone]     Mode of Communication:  Video Conference via [facility]     As the provider I attest to compliance with applicable laws and regulations related to telemedicine.                Treatment Plan Last Reviewed: 11/5/21  PHQ-9 / ROSA-7 :      DATA  Interactive Complexity: No  Crisis: No                                   Progress Since Last Session (Related to Symptoms / Goals / Homework):              Symptoms: Some change Patient expressed and exhibited little change in symptoms and behaviors.     Homework: Completed in session                            Episode of Care Goals: Satisfactory progress - PREPARATION (Decided to change - considering how); Intervened by negotiating a change plan and determining options /  strategies for behavior change, identifying triggers, exploring social supports, and working towards setting a date to begin behavior change                 Current / Ongoing Stressors and Concerns:              Individual/Employment/Societal/Family  Patient was on time and present for the session.  Patient expressed that they are doing better due to some relational conflict with ex- that has been resolved with court related involvement. Patient and therapist explored and processed patients internal experiences to gain more insight and self awareness.      Treatment Objective(s) Addressed in This Session:   identify specific fears / thoughts that contribute to feeling anxious  Gaining insight to anxiety and depressive moods  Cognitive Distortions                    Intervention:              CBT: identification of target concerns and behaviors.              Psychodynamic-processing internal experiences.                               ASSESSMENT: Current Emotional / Mental Status (status of significant symptoms):              Risk status (Self / Other harm or suicidal ideation)              Patient denies current fears or concerns for personal safety.              Patient denies current or recent suicidal ideation or behaviors.              Patient denies current or recent homicidal ideation or behaviors.              Patient denies current or recent self injurious behavior or ideation.              Patient denies other safety concerns.              Patient reports there has been no change in risk factors since their last session.                Patient reports there has been no change in protective factors since their last session.                A safety and risk management plan has been developed including: Patient consented to co-developed safety plan.  Safety and risk management plan was completed.  Patient agreed to use safety plan should any safety concerns arise.  A copy was given to the  patient.                 Appearance:                            Appropriate               Eye Contact:                           Good               Psychomotor Behavior:          Normal               Attitude:                                   Cooperative               Orientation:                             All              Speech                          Rate / Production:       Normal                           Volume:                       Normal               Mood:                                      Normal              Affect:                                      Appropriate               Thought Content:                    Clear               Thought Form:                        Coherent  Logical               Insight:                                     Good                  Medication Review:              No changes to current psychiatric medication(s)                 Medication Compliance:              Yes                 Changes in Health Issues:              None reported                 Chemical Use Review:              Substance Use: Chemical use reviewed, no active concerns identified                  Tobacco Use: No current tobacco use.       Diagnosis:  Major Depressive Disorder Recurrent Mild with anxious distress.     Collateral Reports Completed:              Not Necessary at this time     PLAN: (Patient Tasks / Therapist Tasks / Other)  Continue to meet for individual therapy and practice identifying one cognitive distortions         Misbah Sandoval [facility]                                                           ______________________________________________________________________     Treatment Plan     Patient's Name: Sommer Gomez                YOB: 1980     Date: 11/5/21     DSM5 Diagnoses: 296.31 (F33.0) Major Depressive Disorder, Recurrent Episode, Mild _, 300.02 (F41.1) Generalized Anxiety Disorder or 309.81 (F43.10) Posttraumatic Stress Disorder (includes  "Posttraumatic Stress Disorder for Children 6 Years and Younger)  Without dissociative symptoms  Psychosocial / Contextual Factors: Individual/Societal/Family/Employment  WHODAS:      Referral / Collaboration:  Referral to another professional/service is not indicated at this time..     Anticipated number of session or this episode of care: 90 days        MeasurableTreatment Goal(s) related to diagnosis / functional impairment(s)  Goal 1: Patient will utilize interventions and skills to better regulate negative emotions in difficult circumstances or experiences.  Also, if they are triggered in situations.    I will know I've met my goal when I am able to find positives in difficult circumstances.  Increase in motivation and less irritable.\"      Objective #A (Patient Action)                          Patient will identify specific strategies to more effectively address stressors  identify three initial signs or symptoms of anxiety.  Status: 90 days      Intervention(s)  Therapist will teach emotional recognition/identification. Utilizing CBT DBT ACT and mindfulness interventions.     Objective #B  Patient will identify at least 5 triggers for anxiety.  Status: 90 days      Intervention(s)  Therapist will teach emotional recognition/identification. utilizing CBT DBT ACT and mindfulness interventions.     Objective #C  Patient will Feel less tired and more energy during the day .  Status: 90 days      Intervention(s)  Therapist will teach emotional regulation skills. Utilizng CBT DBT ACT and mindfulness interventions.        Goal 2: Patient will reduce overall depression and anxiety    I will know I've met my goal when I am able to feel more motivation and have more energy.  I am able to be more mindful and in the present\"      Objective #A (Patient Action)                          Status: 90 days      Patient will use cognitive strategies identified in therapy to challenge anxious thoughts  Identify negative self-talk " "and behaviors: challenge core beliefs, myths, and actions.     Intervention(s)  Therapist will utilize CBT DBT ACT and mindfulness interventions.     Goal 3: Patiient will address traumatic experiences to reduce trauma response.    I will know I've met my goal when I am able to have less trauma response, flashbacks, nightmares, re-experiencing of past.  I can stay more in the present\"      Objective #A (Patient Action)                          Status: 90 days      Patient will identify specific strategies to more effectively address stressors  learn to utilize relaxation and cogntive coping skills to reduce and regulate emotions.     Intervention(s)  Therapist will teach emotional regulation skills. Utilizing CBT DBT ACT and mindfulness interventions.     Objective #B  Patient will address specific and targeted traumatic experiences to reduce negative trauma responses and overall impact of trauma.                 Status: 90 days      Intervention(s)  Therapist will utilize CBT DBT ACT and mindfulness interventions.  Narrative therapy and Exposure therapy.     Patient has reviewed and agreed to the above plan.        Misbah Sandoval, [facility]              November 5, 2021  "

## 2021-11-19 ENCOUNTER — VIRTUAL VISIT (OUTPATIENT)
Dept: PSYCHOLOGY | Facility: CLINIC | Age: 41
End: 2021-11-19
Payer: COMMERCIAL

## 2021-11-19 DIAGNOSIS — F33.0 MAJOR DEPRESSIVE DISORDER, RECURRENT EPISODE, MILD WITH ANXIOUS DISTRESS (H): Primary | ICD-10-CM

## 2021-11-19 PROCEDURE — 90834 PSYTX W PT 45 MINUTES: CPT | Mod: 95 | Performed by: PSYCHOLOGIST

## 2021-11-24 NOTE — PROGRESS NOTES
Progress Note     Patient Name: Sommer Gomez                   Date:   11/19/21                                           Service Type: Individual                            Session Start Time:  7:10 AM                       Session End Time:    7:52 AM                Session Length:        38-52 min     Session #:      20     Attendees:     Client     Service Modality:  Video Visit:      Provider verified identity through the following two step process.  Patient provided:  Patient photo     Telemedicine Visit: The patient's condition can be safely assessed and treated via synchronous audio and visual telemedicine encounter.       Reason for Telemedicine Visit: Services only offered telehealth     Originating Site (Patient Location): Patient's home     Distant Site (Provider Location): Provider Remote Setting     Consent:  The patient/guardian has verbally consented to: the potential risks and benefits of telemedicine (video visit) versus in person care; bill my insurance or make self-payment for services provided; and responsibility for payment of non-covered services.      Patient would like the video invitation sent by:  Text to cell phone: [phone]     Mode of Communication:  Video Conference via [facility]     As the provider I attest to compliance with applicable laws and regulations related to telemedicine.                Treatment Plan Last Reviewed: 11/5/21  PHQ-9 / ORSA-7 :      DATA  Interactive Complexity: No  Crisis: No                                   Progress Since Last Session (Related to Symptoms / Goals / Homework):              Symptoms: Some change Patient expressed and exhibited little change in symptoms and behaviors.     Homework: Completed in session                            Episode of Care Goals: Satisfactory progress - PREPARATION (Decided to change - considering how); Intervened by negotiating a change plan and determining options /  strategies for behavior change, identifying triggers, exploring social supports, and working towards setting a date to begin behavior change                 Current / Ongoing Stressors and Concerns:              Individual/Employment/Societal/Family  Patient was on time and present for the session.  Patient expressed that they are sick with covid as well as some of the family, so far they are not experiencing too many difficulties physically speaking and are recovering well.  Patient discussed being in quarantine for thanksgiving.  This led to exploration of family dynamics, past experiences and current self negative thoughts. Patient and therapist explored and processed patients internal experiences to gain more insight and self awareness.      Treatment Objective(s) Addressed in This Session:   identify specific fears / thoughts that contribute to feeling anxious  Gaining insight to anxiety and depressive moods  Cognitive Distortions                    Intervention:              CBT: identification of target concerns and behaviors.              Psychodynamic-processing internal experiences.                               ASSESSMENT: Current Emotional / Mental Status (status of significant symptoms):              Risk status (Self / Other harm or suicidal ideation)              Patient denies current fears or concerns for personal safety.              Patient denies current or recent suicidal ideation or behaviors.              Patient denies current or recent homicidal ideation or behaviors.              Patient denies current or recent self injurious behavior or ideation.              Patient denies other safety concerns.              Patient reports there has been no change in risk factors since their last session.                Patient reports there has been no change in protective factors since their last session.                A safety and risk management plan has been developed including: Patient consented  to co-developed safety plan.  Safety and risk management plan was completed.  Patient agreed to use safety plan should any safety concerns arise.  A copy was given to the patient.                 Appearance:                            Appropriate               Eye Contact:                           Good               Psychomotor Behavior:          Normal               Attitude:                                   Cooperative               Orientation:                             All              Speech                          Rate / Production:       Normal                           Volume:                       Normal               Mood:                                      Normal              Affect:                                      Appropriate               Thought Content:                    Clear               Thought Form:                        Coherent  Logical               Insight:                                     Good                  Medication Review:              No changes to current psychiatric medication(s)                 Medication Compliance:              Yes                 Changes in Health Issues:              None reported                 Chemical Use Review:              Substance Use: Chemical use reviewed, no active concerns identified                  Tobacco Use: No current tobacco use.       Diagnosis:  Major Depressive Disorder Recurrent Mild with anxious distress.     Collateral Reports Completed:              Not Necessary at this time     PLAN: (Patient Tasks / Therapist Tasks / Other)  Continue to meet for individual therapy and practice identifying one cognitive distortions         CHRISTINA Mata                                                           ______________________________________________________________________     Treatment Plan     Patient's Name: Sommer Gomez                YOB: 1980     Date: 11/5/21     DSM5 Diagnoses: 296.31  "(F33.0) Major Depressive Disorder, Recurrent Episode, Mild _, 300.02 (F41.1) Generalized Anxiety Disorder or 309.81 (F43.10) Posttraumatic Stress Disorder (includes Posttraumatic Stress Disorder for Children 6 Years and Younger)  Without dissociative symptoms  Psychosocial / Contextual Factors: Individual/Societal/Family/Employment  WHODAS:      Referral / Collaboration:  Referral to another professional/service is not indicated at this time..     Anticipated number of session or this episode of care: 90 days        MeasurableTreatment Goal(s) related to diagnosis / functional impairment(s)  Goal 1: Patient will utilize interventions and skills to better regulate negative emotions in difficult circumstances or experiences.  Also, if they are triggered in situations.    I will know I've met my goal when I am able to find positives in difficult circumstances.  Increase in motivation and less irritable.\"      Objective #A (Patient Action)                          Patient will identify specific strategies to more effectively address stressors  identify three initial signs or symptoms of anxiety.  Status: 90 days      Intervention(s)  Therapist will teach emotional recognition/identification. Utilizing CBT DBT ACT and mindfulness interventions.     Objective #B  Patient will identify at least 5 triggers for anxiety.  Status: 90 days      Intervention(s)  Therapist will teach emotional recognition/identification. utilizing CBT DBT ACT and mindfulness interventions.     Objective #C  Patient will Feel less tired and more energy during the day .  Status: 90 days      Intervention(s)  Therapist will teach emotional regulation skills. Utilizng CBT DBT ACT and mindfulness interventions.        Goal 2: Patient will reduce overall depression and anxiety    I will know I've met my goal when I am able to feel more motivation and have more energy.  I am able to be more mindful and in the present\"      Objective #A (Patient " "Action)                          Status: 90 days      Patient will use cognitive strategies identified in therapy to challenge anxious thoughts  Identify negative self-talk and behaviors: challenge core beliefs, myths, and actions.     Intervention(s)  Therapist will utilize CBT DBT ACT and mindfulness interventions.     Goal 3: Patiient will address traumatic experiences to reduce trauma response.    I will know I've met my goal when I am able to have less trauma response, flashbacks, nightmares, re-experiencing of past.  I can stay more in the present\"      Objective #A (Patient Action)                          Status: 90 days      Patient will identify specific strategies to more effectively address stressors  learn to utilize relaxation and cogntive coping skills to reduce and regulate emotions.     Intervention(s)  Therapist will teach emotional regulation skills. Utilizing CBT DBT ACT and mindfulness interventions.     Objective #B  Patient will address specific and targeted traumatic experiences to reduce negative trauma responses and overall impact of trauma.                 Status: 90 days      Intervention(s)  Therapist will utilize CBT DBT ACT and mindfulness interventions.  Narrative therapy and Exposure therapy.     Patient has reviewed and agreed to the above plan.        Misbah Sandoval [facility]              November 5, 2021  "

## 2021-12-03 ENCOUNTER — VIRTUAL VISIT (OUTPATIENT)
Dept: PSYCHOLOGY | Facility: CLINIC | Age: 41
End: 2021-12-03
Payer: COMMERCIAL

## 2021-12-03 DIAGNOSIS — F33.0 MAJOR DEPRESSIVE DISORDER, RECURRENT EPISODE, MILD WITH ANXIOUS DISTRESS (H): Primary | ICD-10-CM

## 2021-12-03 PROCEDURE — 90834 PSYTX W PT 45 MINUTES: CPT | Mod: 95 | Performed by: PSYCHOLOGIST

## 2021-12-09 NOTE — PROGRESS NOTES
Progress Note     Patient Name: Sommer Gomez                   Date:   12/3/21                                           Service Type: Individual                            Session Start Time:  2:30 PM                       Session End Time:    3:22 PM                Session Length:        38-52 min     Session #:      21     Attendees:     Client     Service Modality:  Video Visit:      Provider verified identity through the following two step process.  Patient provided:  Patient photo     Telemedicine Visit: The patient's condition can be safely assessed and treated via synchronous audio and visual telemedicine encounter.       Reason for Telemedicine Visit: Services only offered telehealth     Originating Site (Patient Location): Patient's home     Distant Site (Provider Location): Provider Remote Setting     Consent:  The patient/guardian has verbally consented to: the potential risks and benefits of telemedicine (video visit) versus in person care; bill my insurance or make self-payment for services provided; and responsibility for payment of non-covered services.      Patient would like the video invitation sent by:  Text to cell phone: [phone]     Mode of Communication:  Video Conference via [facility]     As the provider I attest to compliance with applicable laws and regulations related to telemedicine.                Treatment Plan Last Reviewed: 11/5/21  PHQ-9 / ROSA-7 :      DATA  Interactive Complexity: No  Crisis: No                                   Progress Since Last Session (Related to Symptoms / Goals / Homework):              Symptoms: Some change Patient expressed and exhibited little change in symptoms and behaviors.     Homework: Completed in session                            Episode of Care Goals: Satisfactory progress - PREPARATION (Decided to change - considering how); Intervened by negotiating a change plan and determining options /  strategies for behavior change, identifying triggers, exploring social supports, and working towards setting a date to begin behavior change                 Current / Ongoing Stressors and Concerns:              Individual/Employment/Societal/Family  Patient was on time and present for the session.  Patient expressed that they are feeling better and have recovered from COVID.  Patient expressed that other family members and significant others are having difficulties.  This led to exploration of family dynamics, past experiences and current self negative thoughts. Patient shared more past traumatic experiences that they have not shared. Patient and therapist discussed and explored trauma narrative.       Treatment Objective(s) Addressed in This Session:   identify specific fears / thoughts that contribute to feeling anxious  Gaining insight to anxiety and depressive moods  Trauma                     Intervention:              CBT: identification of target concerns and behaviors.              Psychodynamic-processing internal experiences.    TFCBT                              ASSESSMENT: Current Emotional / Mental Status (status of significant symptoms):              Risk status (Self / Other harm or suicidal ideation)              Patient denies current fears or concerns for personal safety.              Patient denies current or recent suicidal ideation or behaviors.              Patient denies current or recent homicidal ideation or behaviors.              Patient denies current or recent self injurious behavior or ideation.              Patient denies other safety concerns.              Patient reports there has been no change in risk factors since their last session.                Patient reports there has been no change in protective factors since their last session.                A safety and risk management plan has been developed including: Patient consented to co-developed safety plan.  Safety and risk  management plan was completed.  Patient agreed to use safety plan should any safety concerns arise.  A copy was given to the patient.                 Appearance:                            Appropriate               Eye Contact:                           Good               Psychomotor Behavior:          Normal               Attitude:                                   Cooperative               Orientation:                             All              Speech                          Rate / Production:       Normal                           Volume:                       Normal               Mood:                                      Normal              Affect:                                      Appropriate               Thought Content:                    Clear               Thought Form:                        Coherent  Logical               Insight:                                     Good                  Medication Review:              No changes to current psychiatric medication(s)                 Medication Compliance:              Yes                 Changes in Health Issues:              None reported                 Chemical Use Review:              Substance Use: Chemical use reviewed, no active concerns identified                  Tobacco Use: No current tobacco use.       Diagnosis:  Major Depressive Disorder Recurrent Mild with anxious distress.     Collateral Reports Completed:              Not Necessary at this time     PLAN: (Patient Tasks / Therapist Tasks / Other)  Continue to meet for individual therapy and practice identifying one cognitive distortions         Misbah Sandoval [facility]                                                           ______________________________________________________________________     Treatment Plan     Patient's Name: Sommer Gomez                YOB: 1980     Date: 11/5/21     DSM5 Diagnoses: 296.31 (F33.0) Major Depressive Disorder, Recurrent  "Episode, Mild _, 300.02 (F41.1) Generalized Anxiety Disorder or 309.81 (F43.10) Posttraumatic Stress Disorder (includes Posttraumatic Stress Disorder for Children 6 Years and Younger)  Without dissociative symptoms  Psychosocial / Contextual Factors: Individual/Societal/Family/Employment  WHODAS:      Referral / Collaboration:  Referral to another professional/service is not indicated at this time..     Anticipated number of session or this episode of care: 90 days        MeasurableTreatment Goal(s) related to diagnosis / functional impairment(s)  Goal 1: Patient will utilize interventions and skills to better regulate negative emotions in difficult circumstances or experiences.  Also, if they are triggered in situations.    I will know I've met my goal when I am able to find positives in difficult circumstances.  Increase in motivation and less irritable.\"      Objective #A (Patient Action)                          Patient will identify specific strategies to more effectively address stressors  identify three initial signs or symptoms of anxiety.  Status: 90 days      Intervention(s)  Therapist will teach emotional recognition/identification. Utilizing CBT DBT ACT and mindfulness interventions.     Objective #B  Patient will identify at least 5 triggers for anxiety.  Status: 90 days      Intervention(s)  Therapist will teach emotional recognition/identification. utilizing CBT DBT ACT and mindfulness interventions.     Objective #C  Patient will Feel less tired and more energy during the day .  Status: 90 days      Intervention(s)  Therapist will teach emotional regulation skills. Utilizng CBT DBT ACT and mindfulness interventions.        Goal 2: Patient will reduce overall depression and anxiety    I will know I've met my goal when I am able to feel more motivation and have more energy.  I am able to be more mindful and in the present\"      Objective #A (Patient Action)                          Status: 90 " "days      Patient will use cognitive strategies identified in therapy to challenge anxious thoughts  Identify negative self-talk and behaviors: challenge core beliefs, myths, and actions.     Intervention(s)  Therapist will utilize CBT DBT ACT and mindfulness interventions.     Goal 3: Patiient will address traumatic experiences to reduce trauma response.    I will know I've met my goal when I am able to have less trauma response, flashbacks, nightmares, re-experiencing of past.  I can stay more in the present\"      Objective #A (Patient Action)                          Status: 90 days      Patient will identify specific strategies to more effectively address stressors  learn to utilize relaxation and cogntive coping skills to reduce and regulate emotions.     Intervention(s)  Therapist will teach emotional regulation skills. Utilizing CBT DBT ACT and mindfulness interventions.     Objective #B  Patient will address specific and targeted traumatic experiences to reduce negative trauma responses and overall impact of trauma.                 Status: 90 days      Intervention(s)  Therapist will utilize CBT DBT ACT and mindfulness interventions.  Narrative therapy and Exposure therapy.     Patient has reviewed and agreed to the above plan.        [facility] Sandoval [facility]              November 5, 2021  "

## 2021-12-13 ENCOUNTER — VIRTUAL VISIT (OUTPATIENT)
Dept: PSYCHOLOGY | Facility: CLINIC | Age: 41
End: 2021-12-13
Payer: COMMERCIAL

## 2021-12-13 DIAGNOSIS — F33.0 MAJOR DEPRESSIVE DISORDER, RECURRENT EPISODE, MILD WITH ANXIOUS DISTRESS (H): Primary | ICD-10-CM

## 2021-12-13 PROCEDURE — 90834 PSYTX W PT 45 MINUTES: CPT | Mod: 95 | Performed by: PSYCHOLOGIST

## 2021-12-13 ASSESSMENT — ANXIETY QUESTIONNAIRES
5. BEING SO RESTLESS THAT IT IS HARD TO SIT STILL: SEVERAL DAYS
GAD7 TOTAL SCORE: 6
GAD7 TOTAL SCORE: 6
2. NOT BEING ABLE TO STOP OR CONTROL WORRYING: SEVERAL DAYS
GAD7 TOTAL SCORE: 6
7. FEELING AFRAID AS IF SOMETHING AWFUL MIGHT HAPPEN: NOT AT ALL
4. TROUBLE RELAXING: SEVERAL DAYS
6. BECOMING EASILY ANNOYED OR IRRITABLE: NOT AT ALL
7. FEELING AFRAID AS IF SOMETHING AWFUL MIGHT HAPPEN: NOT AT ALL
1. FEELING NERVOUS, ANXIOUS, OR ON EDGE: MORE THAN HALF THE DAYS
3. WORRYING TOO MUCH ABOUT DIFFERENT THINGS: SEVERAL DAYS

## 2021-12-13 ASSESSMENT — PATIENT HEALTH QUESTIONNAIRE - PHQ9
SUM OF ALL RESPONSES TO PHQ QUESTIONS 1-9: 3
10. IF YOU CHECKED OFF ANY PROBLEMS, HOW DIFFICULT HAVE THESE PROBLEMS MADE IT FOR YOU TO DO YOUR WORK, TAKE CARE OF THINGS AT HOME, OR GET ALONG WITH OTHER PEOPLE: SOMEWHAT DIFFICULT
SUM OF ALL RESPONSES TO PHQ QUESTIONS 1-9: 3

## 2021-12-14 ASSESSMENT — PATIENT HEALTH QUESTIONNAIRE - PHQ9: SUM OF ALL RESPONSES TO PHQ QUESTIONS 1-9: 3

## 2021-12-14 ASSESSMENT — ANXIETY QUESTIONNAIRES: GAD7 TOTAL SCORE: 6

## 2021-12-20 NOTE — PROGRESS NOTES
Progress Note     Patient Name: Sommer Gomez                   Date:   12/13/21                                           Service Type: Individual                            Session Start Time:  7:09 AM                       Session End Time:    7:53 AM                Session Length:        38-52 min     Session #:      22     Attendees:     Client     Service Modality:  Video Visit:      Provider verified identity through the following two step process.  Patient provided:  Patient photo     Telemedicine Visit: The patient's condition can be safely assessed and treated via synchronous audio and visual telemedicine encounter.       Reason for Telemedicine Visit: Services only offered telehealth     Originating Site (Patient Location): Patient's home     Distant Site (Provider Location): Provider Remote Setting     Consent:  The patient/guardian has verbally consented to: the potential risks and benefits of telemedicine (video visit) versus in person care; bill my insurance or make self-payment for services provided; and responsibility for payment of non-covered services.      Patient would like the video invitation sent by:  Text to cell phone: [phone]     Mode of Communication:  Video Conference via [facility]     As the provider I attest to compliance with applicable laws and regulations related to telemedicine.                Treatment Plan Last Reviewed: 11/5/21  PHQ-9 / ROSA-7 :      DATA  Interactive Complexity: No  Crisis: No                                   Progress Since Last Session (Related to Symptoms / Goals / Homework):              Symptoms: Some change Patient expressed and exhibited little change in symptoms and behaviors.     Homework: Completed in session                            Episode of Care Goals: Satisfactory progress - PREPARATION (Decided to change - considering how); Intervened by negotiating a change plan and determining options /  "strategies for behavior change, identifying triggers, exploring social supports, and working towards setting a date to begin behavior change                 Current / Ongoing Stressors and Concerns:              Individual/Employment/Societal/Family  Patient was on time and present for the session.  Patient expressed that they are \"okay\".  Patient discussed relational and family conflict that has been stressful.  This led to exploration of family dynamics, past experiences and current self negative thoughts. Patient and therapist discussed and explored trauma narrative.        Treatment Objective(s) Addressed in This Session:   identify specific fears / thoughts that contribute to feeling anxious  Gaining insight to anxiety and depressive moods  Trauma                     Intervention:              CBT: identification of target concerns and behaviors.              Psychodynamic-processing internal experiences.               TFCBT                              ASSESSMENT: Current Emotional / Mental Status (status of significant symptoms):              Risk status (Self / Other harm or suicidal ideation)              Patient denies current fears or concerns for personal safety.              Patient denies current or recent suicidal ideation or behaviors.              Patient denies current or recent homicidal ideation or behaviors.              Patient denies current or recent self injurious behavior or ideation.              Patient denies other safety concerns.              Patient reports there has been no change in risk factors since their last session.                Patient reports there has been no change in protective factors since their last session.                A safety and risk management plan has been developed including: Patient consented to co-developed safety plan.  Safety and risk management plan was completed.  Patient agreed to use safety plan should any safety concerns arise.  A copy was given to " the patient.                 Appearance:                            Appropriate               Eye Contact:                           Good               Psychomotor Behavior:          Normal               Attitude:                                   Cooperative               Orientation:                             All              Speech                          Rate / Production:       Normal                           Volume:                       Normal               Mood:                                      Normal              Affect:                                      Appropriate               Thought Content:                    Clear               Thought Form:                        Coherent  Logical               Insight:                                     Good                  Medication Review:              No changes to current psychiatric medication(s)                 Medication Compliance:              Yes                 Changes in Health Issues:              None reported                 Chemical Use Review:              Substance Use: Chemical use reviewed, no active concerns identified                  Tobacco Use: No current tobacco use.       Diagnosis:  Major Depressive Disorder Recurrent Mild with anxious distress.     Collateral Reports Completed:              Not Necessary at this time     PLAN: (Patient Tasks / Therapist Tasks / Other)  Continue to meet for individual therapy and practice identifying one cognitive distortions         Misbah Jaime [facility]                                                           ______________________________________________________________________     Treatment Plan     Patient's Name: Sommer Gomez                YOB: 1980     Date: 11/5/21     DSM5 Diagnoses: 296.31 (F33.0) Major Depressive Disorder, Recurrent Episode, Mild _, 300.02 (F41.1) Generalized Anxiety Disorder or 309.81 (F43.10) Posttraumatic Stress Disorder (includes  "Posttraumatic Stress Disorder for Children 6 Years and Younger)  Without dissociative symptoms  Psychosocial / Contextual Factors: Individual/Societal/Family/Employment  WHODAS:      Referral / Collaboration:  Referral to another professional/service is not indicated at this time..     Anticipated number of session or this episode of care: 90 days        MeasurableTreatment Goal(s) related to diagnosis / functional impairment(s)  Goal 1: Patient will utilize interventions and skills to better regulate negative emotions in difficult circumstances or experiences.  Also, if they are triggered in situations.    I will know I've met my goal when I am able to find positives in difficult circumstances.  Increase in motivation and less irritable.\"      Objective #A (Patient Action)                          Patient will identify specific strategies to more effectively address stressors  identify three initial signs or symptoms of anxiety.  Status: 90 days      Intervention(s)  Therapist will teach emotional recognition/identification. Utilizing CBT DBT ACT and mindfulness interventions.     Objective #B  Patient will identify at least 5 triggers for anxiety.  Status: 90 days      Intervention(s)  Therapist will teach emotional recognition/identification. utilizing CBT DBT ACT and mindfulness interventions.     Objective #C  Patient will Feel less tired and more energy during the day .  Status: 90 days      Intervention(s)  Therapist will teach emotional regulation skills. Utilizng CBT DBT ACT and mindfulness interventions.        Goal 2: Patient will reduce overall depression and anxiety    I will know I've met my goal when I am able to feel more motivation and have more energy.  I am able to be more mindful and in the present\"      Objective #A (Patient Action)                          Status: 90 days      Patient will use cognitive strategies identified in therapy to challenge anxious thoughts  Identify negative self-talk " "and behaviors: challenge core beliefs, myths, and actions.     Intervention(s)  Therapist will utilize CBT DBT ACT and mindfulness interventions.     Goal 3: Patiient will address traumatic experiences to reduce trauma response.    I will know I've met my goal when I am able to have less trauma response, flashbacks, nightmares, re-experiencing of past.  I can stay more in the present\"      Objective #A (Patient Action)                          Status: 90 days      Patient will identify specific strategies to more effectively address stressors  learn to utilize relaxation and cogntive coping skills to reduce and regulate emotions.     Intervention(s)  Therapist will teach emotional regulation skills. Utilizing CBT DBT ACT and mindfulness interventions.     Objective #B  Patient will address specific and targeted traumatic experiences to reduce negative trauma responses and overall impact of trauma.                 Status: 90 days      Intervention(s)  Therapist will utilize CBT DBT ACT and mindfulness interventions.  Narrative therapy and Exposure therapy.     Patient has reviewed and agreed to the above plan.        [facility] Sandoval [facility]              November 5, 2021  Answers for HPI/ROS submitted by the patient on 12/13/2021  If you checked off any problems, how difficult have these problems made it for you to do your work, take care of things at home, or get along with other people?: Somewhat difficult  PHQ9 TOTAL SCORE: 3  ROSA 7 TOTAL SCORE: 6      "

## 2021-12-27 DIAGNOSIS — K21.9 GASTROESOPHAGEAL REFLUX DISEASE WITHOUT ESOPHAGITIS: ICD-10-CM

## 2021-12-29 RX ORDER — FAMOTIDINE 20 MG/1
20 TABLET, FILM COATED ORAL 2 TIMES DAILY
Qty: 180 TABLET | Refills: 2 | Status: SHIPPED | OUTPATIENT
Start: 2021-12-29 | End: 2022-01-25

## 2022-01-13 ENCOUNTER — VIRTUAL VISIT (OUTPATIENT)
Dept: PSYCHOLOGY | Facility: CLINIC | Age: 42
End: 2022-01-13
Payer: COMMERCIAL

## 2022-01-13 DIAGNOSIS — F33.0 MAJOR DEPRESSIVE DISORDER, RECURRENT EPISODE, MILD WITH ANXIOUS DISTRESS (H): Primary | ICD-10-CM

## 2022-01-13 PROCEDURE — 90834 PSYTX W PT 45 MINUTES: CPT | Mod: 95 | Performed by: PSYCHOLOGIST

## 2022-01-13 ASSESSMENT — PATIENT HEALTH QUESTIONNAIRE - PHQ9
SUM OF ALL RESPONSES TO PHQ QUESTIONS 1-9: 6
10. IF YOU CHECKED OFF ANY PROBLEMS, HOW DIFFICULT HAVE THESE PROBLEMS MADE IT FOR YOU TO DO YOUR WORK, TAKE CARE OF THINGS AT HOME, OR GET ALONG WITH OTHER PEOPLE: SOMEWHAT DIFFICULT
SUM OF ALL RESPONSES TO PHQ QUESTIONS 1-9: 6

## 2022-01-14 ASSESSMENT — PATIENT HEALTH QUESTIONNAIRE - PHQ9: SUM OF ALL RESPONSES TO PHQ QUESTIONS 1-9: 6

## 2022-01-18 NOTE — PROGRESS NOTES
Progress Note     Patient Name: Sommer Gomez                   Date:   1/13/22                                           Service Type: Individual                            Session Start Time:  4:33 PM                       Session End Time:    5:22 PM                Session Length:        38-52 min     Session #:      23     Attendees:     Client     Service Modality:  Video Visit:      Provider verified identity through the following two step process.  Patient provided:  Patient photo     Telemedicine Visit: The patient's condition can be safely assessed and treated via synchronous audio and visual telemedicine encounter.       Reason for Telemedicine Visit: Services only offered telehealth     Originating Site (Patient Location): Patient's home     Distant Site (Provider Location): Provider Remote Setting     Consent:  The patient/guardian has verbally consented to: the potential risks and benefits of telemedicine (video visit) versus in person care; bill my insurance or make self-payment for services provided; and responsibility for payment of non-covered services.      Patient would like the video invitation sent by:  Text to cell phone: [phone]     Mode of Communication:  Video Conference via Amwell     As the provider I attest to compliance with applicable laws and regulations related to telemedicine.                Treatment Plan Last Reviewed: 11/5/21  PHQ-9 / ROSA-7 :      DATA  Interactive Complexity: No  Crisis: No                                   Progress Since Last Session (Related to Symptoms / Goals / Homework):              Symptoms: No change- Patient expressed and exhibited little to no changes in symptoms and behaviors.     Homework: Completed in session                            Episode of Care Goals: Minimal progress - PREPARATION (Decided to change - considering how); Intervened by negotiating a change plan and determining options /  "strategies for behavior change, identifying triggers, exploring social supports, and working towards setting a date to begin behavior change                 Current / Ongoing Stressors and Concerns:              Individual/Employment/Societal/Family  Patient was on time and present for the session.  Patient expressed that they are \"okay\".  Patient expressed still under the weather from the bout with COVID, constant fatigues. Patient discussed relational and family conflict that has been stressful.  This led to exploration of family dynamics, past experiences and current self negative thoughts. Patient and therapist discussed and explored trauma narrative.        Treatment Objective(s) Addressed in This Session:   identify specific fears / thoughts that contribute to feeling anxious  Gaining insight to anxiety and depressive moods  Trauma                     Intervention:              CBT: identification of target concerns and behaviors.              Psychodynamic-processing internal experiences.               TFCBT                              ASSESSMENT: Current Emotional / Mental Status (status of significant symptoms):              Risk status (Self / Other harm or suicidal ideation)              Patient denies current fears or concerns for personal safety.              Patient denies current or recent suicidal ideation or behaviors.              Patient denies current or recent homicidal ideation or behaviors.              Patient denies current or recent self injurious behavior or ideation.              Patient denies other safety concerns.              Patient reports there has been no change in risk factors since their last session.                Patient reports there has been no change in protective factors since their last session.                A safety and risk management plan has been developed including: Patient consented to co-developed safety plan.  Safety and risk management plan was completed. "  Patient agreed to use safety plan should any safety concerns arise.  A copy was given to the patient.                 Appearance:                            Appropriate               Eye Contact:                           Good               Psychomotor Behavior:          Normal               Attitude:                                   Cooperative               Orientation:                             All              Speech                          Rate / Production:       Normal                           Volume:                       Normal               Mood:                                      Normal              Affect:                                      Appropriate               Thought Content:                    Clear               Thought Form:                        Coherent  Logical               Insight:                                     Good                  Medication Review:              No changes to current psychiatric medication(s)                 Medication Compliance:              Yes                 Changes in Health Issues:              None reported                 Chemical Use Review:              Substance Use: Chemical use reviewed, no active concerns identified                  Tobacco Use: No current tobacco use.       Diagnosis:  Major Depressive Disorder Recurrent Mild with anxious distress.     Collateral Reports Completed:              Not Necessary at this time     PLAN: (Patient Tasks / Therapist Tasks / Other)  Continue to meet for individual therapy and utilize mindfulness interventions three times a week such as [id number]                                                           ______________________________________________________________________     Treatment Plan     Patient's Name: Sommer Gomez                YOB: 1980     Date: 11/5/21     DSM5 Diagnoses: 296.31 (F33.0) Major Depressive Disorder, Recurrent Episode,  "Mild _, 300.02 (F41.1) Generalized Anxiety Disorder or 309.81 (F43.10) Posttraumatic Stress Disorder (includes Posttraumatic Stress Disorder for Children 6 Years and Younger)  Without dissociative symptoms  Psychosocial / Contextual Factors: Individual/Societal/Family/Employment  WHODAS:      Referral / Collaboration:  Referral to another professional/service is not indicated at this time..     Anticipated number of session or this episode of care: 90 days        MeasurableTreatment Goal(s) related to diagnosis / functional impairment(s)  Goal 1: Patient will utilize interventions and skills to better regulate negative emotions in difficult circumstances or experiences.  Also, if they are triggered in situations.    I will know I've met my goal when I am able to find positives in difficult circumstances.  Increase in motivation and less irritable.\"      Objective #A (Patient Action)                          Patient will identify specific strategies to more effectively address stressors  identify three initial signs or symptoms of anxiety.  Status: 90 days      Intervention(s)  Therapist will teach emotional recognition/identification. Utilizing CBT DBT ACT and mindfulness interventions.     Objective #B  Patient will identify at least 5 triggers for anxiety.  Status: 90 days      Intervention(s)  Therapist will teach emotional recognition/identification. utilizing CBT DBT ACT and mindfulness interventions.     Objective #C  Patient will Feel less tired and more energy during the day .  Status: 90 days      Intervention(s)  Therapist will teach emotional regulation skills. Utilizng CBT DBT ACT and mindfulness interventions.        Goal 2: Patient will reduce overall depression and anxiety    I will know I've met my goal when I am able to feel more motivation and have more energy.  I am able to be more mindful and in the present\"      Objective #A (Patient Action)                          Status: 90 days      Patient " "will use cognitive strategies identified in therapy to challenge anxious thoughts  Identify negative self-talk and behaviors: challenge core beliefs, myths, and actions.     Intervention(s)  Therapist will utilize CBT DBT ACT and mindfulness interventions.     Goal 3: Patiient will address traumatic experiences to reduce trauma response.    I will know I've met my goal when I am able to have less trauma response, flashbacks, nightmares, re-experiencing of past.  I can stay more in the present\"      Objective #A (Patient Action)                          Status: 90 days      Patient will identify specific strategies to more effectively address stressors  learn to utilize relaxation and cogntive coping skills to reduce and regulate emotions.     Intervention(s)  Therapist will teach emotional regulation skills. Utilizing CBT DBT ACT and mindfulness interventions.     Objective #B  Patient will address specific and targeted traumatic experiences to reduce negative trauma responses and overall impact of trauma.                 Status: 90 days      Intervention(s)  Therapist will utilize CBT DBT ACT and mindfulness interventions.  Narrative therapy and Exposure therapy.     Patient has reviewed and agreed to the above plan.        CHRISTIAN Mata              November 5, 2021  Answers for HPI/ROS submitted by the patient on 1/13/2022  If you checked off any problems, how difficult have these problems made it for you to do your work, take care of things at home, or get along with other people?: Somewhat difficult  PHQ9 TOTAL SCORE: 6      "

## 2022-01-24 NOTE — PROGRESS NOTES
Assessment & Plan     Mild recurrent major depression (H)  Stable  - FLUoxetine (PROZAC) 40 MG capsule; Take 1 capsule (40 mg) by mouth daily    High priority for 2019-nCoV vaccine  - COVID-19,PF,PFIZER (12+ Yrs GRAY LABEL)    Gastroesophageal reflux disease without esophagitis  Advised to avoid foods or drinks which contain citrus (tomato, pineapple, lime, lemon, etc), caffeine, chocolate, and spicy foods.  Avoid eating late at night.  If no symptomatic relief, consider endoscopy.   - famotidine (PEPCID) 40 MG tablet; Take 1 tablet (40 mg) by mouth 2 times daily      Return in about 1 month (around 2/25/2022), or if symptoms worsen or fail to improve.    DO PRETTY [facility] EBONI Miguel is a 41 year old who presents for the following health issues     HPI     Follow up ongoing stomach pain    1. Depression f/u: Doing well prozac. Doing well with her [employer] job.  Feels like prozac has been helping her symptoms.     2. Stomach pain: Starting acting up.  Bad weekend.  Does not have energy.  Hurts to move.  Can affect appetite.  States that pepcid is helping symptoms.     Review of Systems   Constitutional: Negative for chills and fever.   HENT: Negative for congestion, ear pain, hearing loss and sore throat.    Eyes: Negative for pain and visual disturbance.   Respiratory: Negative for cough and shortness of breath.    Cardiovascular: Negative for chest pain, palpitations and peripheral edema.   Gastrointestinal: Negative for abdominal pain, constipation, diarrhea, heartburn, hematochezia and nausea.   Breasts:  Negative for tenderness, breast mass and discharge.   Genitourinary: Negative for dysuria, frequency, genital sores, hematuria, pelvic pain, urgency, vaginal bleeding and vaginal discharge.   Musculoskeletal: Negative for arthralgias, joint swelling and myalgias.   Skin: Negative for rash.   Neurological: Negative for dizziness, weakness, headaches and paresthesias.  "  Psychiatric/Behavioral: Negative for mood changes. The patient is not nervous/anxious.             Objective    /76 (BP Location: Left arm, Cuff Size: Adult Large)   Pulse 88   Temp 98.5  F (36.9  C) (Tympanic)   Ht 1.734 m (5' 8.25\")   Wt 80.2 kg (176 lb 12.8 oz)   SpO2 98%   BMI 26.69 kg/m    Body mass index is 26.69 kg/m .  Physical Exam  Constitutional:       General: She is not in acute distress.     Appearance: She is well-developed.   HENT:      Head: Normocephalic and atraumatic.      Nose: Nose normal.   Eyes:      Conjunctiva/sclera: Conjunctivae normal.   Neck:      Trachea: No tracheal deviation.   Cardiovascular:      Rate and Rhythm: Normal rate and regular rhythm.      Heart sounds: Normal heart sounds.   Pulmonary:      Effort: Pulmonary effort is normal. No respiratory distress.      Breath sounds: Normal breath sounds.   Abdominal:      General: There is no distension.      Palpations: Abdomen is soft. There is no mass.      Tenderness: There is no abdominal tenderness.   Musculoskeletal:         General: Normal range of motion.      Cervical back: Normal range of motion.   Skin:     General: Skin is warm.   Neurological:      Mental Status: She is alert and oriented to person, place, and time.   Psychiatric:         Behavior: Behavior normal.       "

## 2022-01-24 NOTE — H&P (VIEW-ONLY)
Assessment & Plan     Mild recurrent major depression (H)  Stable  - FLUoxetine (PROZAC) 40 MG capsule; Take 1 capsule (40 mg) by mouth daily    High priority for 2019-nCoV vaccine  - COVID-19,PF,PFIZER (12+ Yrs GRAY LABEL)    Gastroesophageal reflux disease without esophagitis  Advised to avoid foods or drinks which contain citrus (tomato, pineapple, lime, lemon, etc), caffeine, chocolate, and spicy foods.  Avoid eating late at night.  If no symptomatic relief, consider endoscopy.   - famotidine (PEPCID) 40 MG tablet; Take 1 tablet (40 mg) by mouth 2 times daily      Return in about 1 month (around 2/25/2022), or if symptoms worsen or fail to improve.    DO PRETTY [facility] EBONI Miguel is a 41 year old who presents for the following health issues     HPI     Follow up ongoing stomach pain    1. Depression f/u: Doing well prozac. Doing well with her [employer] job.  Feels like prozac has been helping her symptoms.     2. Stomach pain: Starting acting up.  Bad weekend.  Does not have energy.  Hurts to move.  Can affect appetite.  States that pepcid is helping symptoms.     Review of Systems   Constitutional: Negative for chills and fever.   HENT: Negative for congestion, ear pain, hearing loss and sore throat.    Eyes: Negative for pain and visual disturbance.   Respiratory: Negative for cough and shortness of breath.    Cardiovascular: Negative for chest pain, palpitations and peripheral edema.   Gastrointestinal: Negative for abdominal pain, constipation, diarrhea, heartburn, hematochezia and nausea.   Breasts:  Negative for tenderness, breast mass and discharge.   Genitourinary: Negative for dysuria, frequency, genital sores, hematuria, pelvic pain, urgency, vaginal bleeding and vaginal discharge.   Musculoskeletal: Negative for arthralgias, joint swelling and myalgias.   Skin: Negative for rash.   Neurological: Negative for dizziness, weakness, headaches and paresthesias.  "  Psychiatric/Behavioral: Negative for mood changes. The patient is not nervous/anxious.             Objective    /76 (BP Location: Left arm, Cuff Size: Adult Large)   Pulse 88   Temp 98.5  F (36.9  C) (Tympanic)   Ht 1.734 m (5' 8.25\")   Wt 80.2 kg (176 lb 12.8 oz)   SpO2 98%   BMI 26.69 kg/m    Body mass index is 26.69 kg/m .  Physical Exam  Constitutional:       General: She is not in acute distress.     Appearance: She is well-developed.   HENT:      Head: Normocephalic and atraumatic.      Nose: Nose normal.   Eyes:      Conjunctiva/sclera: Conjunctivae normal.   Neck:      Trachea: No tracheal deviation.   Cardiovascular:      Rate and Rhythm: Normal rate and regular rhythm.      Heart sounds: Normal heart sounds.   Pulmonary:      Effort: Pulmonary effort is normal. No respiratory distress.      Breath sounds: Normal breath sounds.   Abdominal:      General: There is no distension.      Palpations: Abdomen is soft. There is no mass.      Tenderness: There is no abdominal tenderness.   Musculoskeletal:         General: Normal range of motion.      Cervical back: Normal range of motion.   Skin:     General: Skin is warm.   Neurological:      Mental Status: She is alert and oriented to person, place, and time.   Psychiatric:         Behavior: Behavior normal.       "

## 2022-01-25 ENCOUNTER — OFFICE VISIT (OUTPATIENT)
Dept: FAMILY MEDICINE | Facility: CLINIC | Age: 42
End: 2022-01-25
Payer: COMMERCIAL

## 2022-01-25 VITALS
BODY MASS INDEX: 26.8 KG/M2 | WEIGHT: 176.8 LBS | HEIGHT: 68 IN | DIASTOLIC BLOOD PRESSURE: 76 MMHG | TEMPERATURE: 98.5 F | OXYGEN SATURATION: 98 % | HEART RATE: 88 BPM | SYSTOLIC BLOOD PRESSURE: 108 MMHG

## 2022-01-25 DIAGNOSIS — Z23 HIGH PRIORITY FOR 2019-NCOV VACCINE: ICD-10-CM

## 2022-01-25 DIAGNOSIS — F33.0 MILD RECURRENT MAJOR DEPRESSION (H): Primary | ICD-10-CM

## 2022-01-25 DIAGNOSIS — K21.9 GASTROESOPHAGEAL REFLUX DISEASE WITHOUT ESOPHAGITIS: ICD-10-CM

## 2022-01-25 PROCEDURE — 0054A COVID-19,PF,PFIZER (12+ YRS): CPT | Performed by: FAMILY MEDICINE

## 2022-01-25 PROCEDURE — 99213 OFFICE O/P EST LOW 20 MIN: CPT | Performed by: FAMILY MEDICINE

## 2022-01-25 PROCEDURE — 91305 COVID-19,PF,PFIZER (12+ YRS): CPT | Performed by: FAMILY MEDICINE

## 2022-01-25 RX ORDER — FAMOTIDINE 40 MG/1
40 TABLET, FILM COATED ORAL 2 TIMES DAILY
Qty: 60 TABLET | Refills: 1 | Status: SHIPPED | OUTPATIENT
Start: 2022-01-25 | End: 2022-03-03

## 2022-01-25 RX ORDER — FLUOXETINE 40 MG/1
40 CAPSULE ORAL DAILY
Qty: 90 CAPSULE | Refills: 3 | Status: SHIPPED | OUTPATIENT
Start: 2022-01-25 | End: 2023-03-28

## 2022-01-25 ASSESSMENT — MIFFLIN-ST. JEOR: SCORE: 1529.43

## 2022-01-25 ASSESSMENT — ENCOUNTER SYMPTOMS
HEMATOCHEZIA: 0
HEARTBURN: 0
ABDOMINAL PAIN: 0
FEVER: 0
HEADACHES: 0
PALPITATIONS: 0
NAUSEA: 0
PARESTHESIAS: 0
HEMATURIA: 0
DIZZINESS: 0
FREQUENCY: 0
CHILLS: 0
EYE PAIN: 0
BREAST MASS: 0
MYALGIAS: 0
DIARRHEA: 0
ARTHRALGIAS: 0
SORE THROAT: 0
SHORTNESS OF BREATH: 0
NERVOUS/ANXIOUS: 0
WEAKNESS: 0
CONSTIPATION: 0
JOINT SWELLING: 0
COUGH: 0
DYSURIA: 0

## 2022-01-25 ASSESSMENT — PAIN SCALES - GENERAL: PAINLEVEL: NO PAIN (0)

## 2022-01-25 NOTE — PATIENT INSTRUCTIONS
Gera Miguel,    Thank you for allowing [facility] to manage your care.    I sent your prescriptions to your pharmacy.    Please avoid foods or drinks which contain citrus (tomato, pineapple, lime, lemon, etc), caffeine, chocolate, and spicy foods.  Avoid eating late at night.     If you have any questions or concerns, please feel free to call us at [phone].    Sincerely,    Dr. Shultz    Did you know?      You can schedule a video visit for follow-up appointments as well as future appointments for certain conditions.  Please see the below link.     [email or web address]    If you have not already done so,  I encourage you to sign up for [email or web address] ([email or web address]).  This will allow you to review your results, securely communicate with a provider, and schedule virtual visits as well.

## 2022-01-27 ENCOUNTER — VIRTUAL VISIT (OUTPATIENT)
Dept: PSYCHOLOGY | Facility: CLINIC | Age: 42
End: 2022-01-27
Payer: COMMERCIAL

## 2022-01-27 DIAGNOSIS — F33.0 MAJOR DEPRESSIVE DISORDER, RECURRENT EPISODE, MILD WITH ANXIOUS DISTRESS (H): Primary | ICD-10-CM

## 2022-01-27 PROCEDURE — 90834 PSYTX W PT 45 MINUTES: CPT | Mod: 95 | Performed by: PSYCHOLOGIST

## 2022-01-27 ASSESSMENT — PATIENT HEALTH QUESTIONNAIRE - PHQ9
SUM OF ALL RESPONSES TO PHQ QUESTIONS 1-9: 4
10. IF YOU CHECKED OFF ANY PROBLEMS, HOW DIFFICULT HAVE THESE PROBLEMS MADE IT FOR YOU TO DO YOUR WORK, TAKE CARE OF THINGS AT HOME, OR GET ALONG WITH OTHER PEOPLE: SOMEWHAT DIFFICULT
SUM OF ALL RESPONSES TO PHQ QUESTIONS 1-9: 4

## 2022-01-28 ASSESSMENT — PATIENT HEALTH QUESTIONNAIRE - PHQ9: SUM OF ALL RESPONSES TO PHQ QUESTIONS 1-9: 4

## 2022-01-31 NOTE — PROGRESS NOTES
Progress Note     Patient Name: Sommer Gomez                   Date:   1/27/22                                           Service Type: Individual                            Session Start Time:  1:33 PM                       Session End Time:    2:22 PM                Session Length:        38-52 min     Session #:      24     Attendees:     Client     Service Modality:  Video Visit:      Provider verified identity through the following two step process.  Patient provided:  Patient photo     Telemedicine Visit: The patient's condition can be safely assessed and treated via synchronous audio and visual telemedicine encounter.       Reason for Telemedicine Visit: Services only offered telehealth     Originating Site (Patient Location): Patient's home     Distant Site (Provider Location): Provider Remote Setting     Consent:  The patient/guardian has verbally consented to: the potential risks and benefits of telemedicine (video visit) versus in person care; bill my insurance or make self-payment for services provided; and responsibility for payment of non-covered services.      Patient would like the video invitation sent by:  Text to cell phone: [phone]     Mode of Communication:  Video Conference via [facility]     As the provider I attest to compliance with applicable laws and regulations related to telemedicine.                Treatment Plan Last Reviewed: 11/5/21  PHQ-9 / ROSA-7 :      DATA  Interactive Complexity: No  Crisis: No                                   Progress Since Last Session (Related to Symptoms / Goals / Homework):              Symptoms: Some change- Patient expressed and exhibited little changes in symptoms and behaviors.     Homework: Completed in session                            Episode of Care Goals: Minimal progress - PREPARATION (Decided to change - considering how); Intervened by negotiating a change plan and determining options / strategies  "for behavior change, identifying triggers, exploring social supports, and working towards setting a date to begin behavior change                 Current / Ongoing Stressors and Concerns:              Individual/Employment/Societal/Family  Patient was on time and present for the session.  Patient expressed that they are \"okay\".   This led to exploration of family dynamics, past experiences and current self negative thoughts. Patient and therapist discussed and explored trauma narrative.  Patient and therapist discussed doing a trauma timeline      Treatment Objective(s) Addressed in This Session:   identify specific fears / thoughts that contribute to feeling anxious  Gaining insight to anxiety and depressive moods  Trauma therapy                    Intervention:              CBT: identification of target concerns and behaviors.              Psychodynamic-processing internal experiences.               TFCBT                              ASSESSMENT: Current Emotional / Mental Status (status of significant symptoms):              Risk status (Self / Other harm or suicidal ideation)              Patient denies current fears or concerns for personal safety.              Patient denies current or recent suicidal ideation or behaviors.              Patient denies current or recent homicidal ideation or behaviors.              Patient denies current or recent self injurious behavior or ideation.              Patient denies other safety concerns.              Patient reports there has been no change in risk factors since their last session.                Patient reports there has been no change in protective factors since their last session.                A safety and risk management plan has been developed including: Patient consented to co-developed safety plan.  Safety and risk management plan was completed.  Patient agreed to use safety plan should any safety concerns arise.  A copy was given to the " patient.                 Appearance:                            Appropriate               Eye Contact:                           Good               Psychomotor Behavior:          Normal               Attitude:                                   Cooperative               Orientation:                             All              Speech                          Rate / Production:       Normal                           Volume:                       Normal               Mood:                                      Normal              Affect:                                      Appropriate               Thought Content:                    Clear               Thought Form:                        Coherent  Logical               Insight:                                     Good                  Medication Review:              No changes to current psychiatric medication(s)                 Medication Compliance:              Yes                 Changes in Health Issues:              None reported                 Chemical Use Review:              Substance Use: Chemical use reviewed, no active concerns identified                  Tobacco Use: No current tobacco use.       Diagnosis:  Major Depressive Disorder Recurrent Mild with anxious distress.     Collateral Reports Completed:              Not Necessary at this time     PLAN: (Patient Tasks / Therapist Tasks / Other)  Continue to meet for individual therapy and utilize resources to help with cleaning house         [facility]                                                           ______________________________________________________________________     Treatment Plan     Patient's Name: Sommer Gomez                YOB: 1980     Date: 11/5/21     DSM5 Diagnoses: 296.31 (F33.0) Major Depressive Disorder, Recurrent Episode, Mild _, 300.02 (F41.1) Generalized Anxiety Disorder or 309.81 (F43.10) Posttraumatic Stress Disorder (includes Posttraumatic  "Stress Disorder for Children 6 Years and Younger)  Without dissociative symptoms  Psychosocial / Contextual Factors: Individual/Societal/Family/Employment  WHODAS:      Referral / Collaboration:  Referral to another professional/service is not indicated at this time..     Anticipated number of session or this episode of care: 90 days        MeasurableTreatment Goal(s) related to diagnosis / functional impairment(s)  Goal 1: Patient will utilize interventions and skills to better regulate negative emotions in difficult circumstances or experiences.  Also, if they are triggered in situations.    I will know I've met my goal when I am able to find positives in difficult circumstances.  Increase in motivation and less irritable.\"      Objective #A (Patient Action)                          Patient will identify specific strategies to more effectively address stressors  identify three initial signs or symptoms of anxiety.  Status: 90 days      Intervention(s)  Therapist will teach emotional recognition/identification. Utilizing CBT DBT ACT and mindfulness interventions.     Objective #B  Patient will identify at least 5 triggers for anxiety.  Status: 90 days      Intervention(s)  Therapist will teach emotional recognition/identification. utilizing CBT DBT ACT and mindfulness interventions.     Objective #C  Patient will Feel less tired and more energy during the day .  Status: 90 days      Intervention(s)  Therapist will teach emotional regulation skills. Utilizng CBT DBT ACT and mindfulness interventions.        Goal 2: Patient will reduce overall depression and anxiety    I will know I've met my goal when I am able to feel more motivation and have more energy.  I am able to be more mindful and in the present\"      Objective #A (Patient Action)                          Status: 90 days      Patient will use cognitive strategies identified in therapy to challenge anxious thoughts  Identify negative self-talk and behaviors: " "challenge core beliefs, myths, and actions.     Intervention(s)  Therapist will utilize CBT DBT ACT and mindfulness interventions.     Goal 3: Patiient will address traumatic experiences to reduce trauma response.    I will know I've met my goal when I am able to have less trauma response, flashbacks, nightmares, re-experiencing of past.  I can stay more in the present\"      Objective #A (Patient Action)                          Status: 90 days      Patient will identify specific strategies to more effectively address stressors  learn to utilize relaxation and cogntive coping skills to reduce and regulate emotions.     Intervention(s)  Therapist will teach emotional regulation skills. Utilizing CBT DBT ACT and mindfulness interventions.     Objective #B  Patient will address specific and targeted traumatic experiences to reduce negative trauma responses and overall impact of trauma.                 Status: 90 days      Intervention(s)  Therapist will utilize CBT DBT ACT and mindfulness interventions.  Narrative therapy and Exposure therapy.     Patient has reviewed and agreed to the above plan.        CHRISTIAN Mata              November 5, 2021  Answers for HPI/ROS submitted by the patient on 1/27/2022  If you checked off any problems, how difficult have these problems made it for you to do your work, take care of things at home, or get along with other people?: Somewhat difficult  PHQ9 TOTAL SCORE: 4      "

## 2022-02-04 ENCOUNTER — VIRTUAL VISIT (OUTPATIENT)
Dept: PSYCHOLOGY | Facility: CLINIC | Age: 42
End: 2022-02-04
Payer: COMMERCIAL

## 2022-02-04 DIAGNOSIS — F33.0 MAJOR DEPRESSIVE DISORDER, RECURRENT EPISODE, MILD WITH ANXIOUS DISTRESS (H): Primary | ICD-10-CM

## 2022-02-04 PROCEDURE — 90834 PSYTX W PT 45 MINUTES: CPT | Mod: 95 | Performed by: PSYCHOLOGIST

## 2022-02-04 ASSESSMENT — PATIENT HEALTH QUESTIONNAIRE - PHQ9
SUM OF ALL RESPONSES TO PHQ QUESTIONS 1-9: 3
SUM OF ALL RESPONSES TO PHQ QUESTIONS 1-9: 3
10. IF YOU CHECKED OFF ANY PROBLEMS, HOW DIFFICULT HAVE THESE PROBLEMS MADE IT FOR YOU TO DO YOUR WORK, TAKE CARE OF THINGS AT HOME, OR GET ALONG WITH OTHER PEOPLE: SOMEWHAT DIFFICULT

## 2022-02-05 ASSESSMENT — PATIENT HEALTH QUESTIONNAIRE - PHQ9: SUM OF ALL RESPONSES TO PHQ QUESTIONS 1-9: 3

## 2022-02-07 DIAGNOSIS — K21.9 GASTROESOPHAGEAL REFLUX DISEASE WITHOUT ESOPHAGITIS: Primary | ICD-10-CM

## 2022-02-10 ENCOUNTER — TELEPHONE (OUTPATIENT)
Dept: GASTROENTEROLOGY | Facility: CLINIC | Age: 42
End: 2022-02-10
Payer: COMMERCIAL

## 2022-02-10 DIAGNOSIS — Z11.59 ENCOUNTER FOR SCREENING FOR OTHER VIRAL DISEASES: Primary | ICD-10-CM

## 2022-02-10 NOTE — TELEPHONE ENCOUNTER
Screening Questions  Blue=prep questions Red=location Green=sedation   1. Are you active on mychart? yes    What insurance is in the chart? [employer] [place]      3.  Ordering/Referring Provider: Dharmesh Shultz DO    4. BMI 26.8 , If greater than 40 review exclusion criteria also will need EXTENDED PREP    5.  Respiratory Screening (If yes to any of the following HOSPITAL setting only):     Do you use daily home oxygen? no  Do you have mod to severe Obstructive Sleep Apnea? no (can be seen at [facility] or hospital setting)    Do you have Pulmonary Hypertension? no   Do you have UNCONTROLLED asthma? no    6. Have you had a heart or lung transplant? no  (If yes, please review exclusion criteria)    7. Are you currently on dialysis? no  (If yes, schedule in HOSPITAL setting only)(If yes, please send Gol[facility] prep)    8. Do you have chronic kidney disease? no (If yes, please send Golytely prep)    9. Have you had a stroke or Transient ischemic attack (TIA) within 6 months? no (If yes, do not schedule at [facility])    10. In the past 6 months, have you had any heart related issues including cardiomyopathy or heart attack? no (If yes, please review exclusion criteria)           If yes, did it require cardiac stenting or other implantable device?no  (If yes, please review exclusion criteria)      11. Do you have any implantable devices in your body (pacemaker, defib, LVAD)? no (If yes, schedule at [facility])    12. Do you take nitroglycerin? If yes, how often? no (if yes, schedule at HOSPITAL setting)    13. Are you currently taking any blood thinners?no (If yes- inform patient to follow up with PCP or provider for follow up instructions)     14. Are you a diabetic? no (If yes, please send Golytely prep)    15. (Females) Are you currently pregnant? no  If yes, how many weeks?      16. Are you taking any prescription pain medications on a routine schedule? no If yes, MAC sedation and patient will need EXTENDED PREP.    17. Do you  have any chemical dependencies such as alcohol, street drugs, or methadone? no If yes, MAC sedation     18. Do you have any history of post-traumatic stress syndrome, severe anxiety or history of psychosis? no  If yes, MAC sedation.     19. Do you transfer independently? yes    20.  Do you have any issues with constipation? yes   If yes, pt will need EXTENDED PREP     21. Preferred Pharmacy for Pre Prescription n/a    Scheduling Details    Which Colonoscopy Prep was Sent?: none  Type of Procedure Scheduled: EGD  Surgeon: Jorge  Date of Procedure: 2/22  Location:  OR  Caller (Please ask for phone number if not scheduled by patient): self      Sedation Type: conscious  Conscious Sedation- Needs  for 6 hours after the procedure  MAC/General-Needs  for 24 hours after procedure    Pre-op Required at [facility] [place], [place] and OR for MAC sedation: no  (if yes advise patient they will need a pre-op prior to procedure)      Informed patient they will need an adult  yes  Cannot take any type of public or medical transportation alone    Pre-Procedure [facility]     Confirmed Nurse will call to complete assessment yes    Additional comments: no ([facility] PATIENTS NEED EXTENDED PREP)

## 2022-02-11 NOTE — PROGRESS NOTES
Progress Note     Patient Name: Sommer Gomez                   Date:   2/4/22                                           Service Type: Individual                            Session Start Time:  7:10 AM                       Session End Time:    7:52 AM                Session Length:        38-52 min     Session #:      25     Attendees:     Client     Service Modality:  Video Visit:      Provider verified identity through the following two step process.  Patient provided:  Patient photo     Telemedicine Visit: The patient's condition can be safely assessed and treated via synchronous audio and visual telemedicine encounter.       Reason for Telemedicine Visit: Services only offered telehealth     Originating Site (Patient Location): Patient's home     Distant Site (Provider Location): Provider Remote Setting     Consent:  The patient/guardian has verbally consented to: the potential risks and benefits of telemedicine (video visit) versus in person care; bill my insurance or make self-payment for services provided; and responsibility for payment of non-covered services.      Patient would like the video invitation sent by:  Text to cell phone: [phone]     Mode of Communication:  Video Conference via Amwell     As the provider I attest to compliance with applicable laws and regulations related to telemedicine.                Treatment Plan Last Reviewed: 11/5/21  PHQ-9 / ROSA-7 :      DATA  Interactive Complexity: No  Crisis: No                                   Progress Since Last Session (Related to Symptoms / Goals / Homework):              Symptoms: Some change- Patient expressed and exhibited little changes in symptoms and behaviors.     Homework: Completed in session                            Episode of Care Goals: Minimal progress - PREPARATION (Decided to change - considering how); Intervened by negotiating a change plan and determining options / strategies  for behavior change, identifying triggers, exploring social supports, and working towards setting a date to begin behavior change                 Current / Ongoing Stressors and Concerns:              Individual/Employment/Societal/Family  Patient was on time and present for the session.  Patient expressed that they are doing well. Patient discussed brothers bday was not long ago and they did not send a message.   This led to exploration of family dynamics, past experiences and current self negative thoughts. Patient and therapist discussed and explored trauma narrative. However, patient expressed being in the best place mentally and emotionally in a long time      Treatment Objective(s) Addressed in This Session:   identify specific fears / thoughts that contribute to feeling anxious  Gaining insight to anxiety and depressive moods  Trauma therapy                    Intervention:              CBT: identification of target concerns and behaviors.              Psychodynamic-processing internal experiences.               TFCBT                              ASSESSMENT: Current Emotional / Mental Status (status of significant symptoms):              Risk status (Self / Other harm or suicidal ideation)              Patient denies current fears or concerns for personal safety.              Patient denies current or recent suicidal ideation or behaviors.              Patient denies current or recent homicidal ideation or behaviors.              Patient denies current or recent self injurious behavior or ideation.              Patient denies other safety concerns.              Patient reports there has been no change in risk factors since their last session.                Patient reports there has been no change in protective factors since their last session.                A safety and risk management plan has been developed including: Patient consented to co-developed safety plan.  Safety and risk management plan was  completed.  Patient agreed to use safety plan should any safety concerns arise.  A copy was given to the patient.                 Appearance:                            Appropriate               Eye Contact:                           Good               Psychomotor Behavior:          Normal               Attitude:                                   Cooperative               Orientation:                             All              Speech                          Rate / Production:       Normal                           Volume:                       Normal               Mood:                                      Normal              Affect:                                      Appropriate               Thought Content:                    Clear               Thought Form:                        Coherent  Logical               Insight:                                     Good                  Medication Review:              No changes to current psychiatric medication(s)                 Medication Compliance:              Yes                 Changes in Health Issues:              None reported                 Chemical Use Review:              Substance Use: Chemical use reviewed, no active concerns identified                  Tobacco Use: No current tobacco use.       Diagnosis:  Major Depressive Disorder Recurrent Mild with anxious distress.     Collateral Reports Completed:              Not Necessary at this time     PLAN: (Patient Tasks / Therapist Tasks / Other)  Continue to meet for individual therapy and utilize resources to help with cleaning house         Misbah Sandoval [facility]                                                           ______________________________________________________________________     Treatment Plan     Patient's Name: Sommer Gomez                YOB: 1980     Date: 11/5/21     DSM5 Diagnoses: 296.31 (F33.0) Major Depressive Disorder, Recurrent Episode, Mild _, 300.02  "(F41.1) Generalized Anxiety Disorder or 309.81 (F43.10) Posttraumatic Stress Disorder (includes Posttraumatic Stress Disorder for Children 6 Years and Younger)  Without dissociative symptoms  Psychosocial / Contextual Factors: Individual/Societal/Family/Employment  WHODAS:      Referral / Collaboration:  Referral to another professional/service is not indicated at this time..     Anticipated number of session or this episode of care: 90 days        MeasurableTreatment Goal(s) related to diagnosis / functional impairment(s)  Goal 1: Patient will utilize interventions and skills to better regulate negative emotions in difficult circumstances or experiences.  Also, if they are triggered in situations.    I will know I've met my goal when I am able to find positives in difficult circumstances.  Increase in motivation and less irritable.\"      Objective #A (Patient Action)                          Patient will identify specific strategies to more effectively address stressors  identify three initial signs or symptoms of anxiety.  Status: 90 days      Intervention(s)  Therapist will teach emotional recognition/identification. Utilizing CBT DBT ACT and mindfulness interventions.     Objective #B  Patient will identify at least 5 triggers for anxiety.  Status: 90 days      Intervention(s)  Therapist will teach emotional recognition/identification. utilizing CBT DBT ACT and mindfulness interventions.     Objective #C  Patient will Feel less tired and more energy during the day .  Status: 90 days      Intervention(s)  Therapist will teach emotional regulation skills. Utilizng CBT DBT ACT and mindfulness interventions.        Goal 2: Patient will reduce overall depression and anxiety    I will know I've met my goal when I am able to feel more motivation and have more energy.  I am able to be more mindful and in the present\"      Objective #A (Patient Action)                          Status: 90 days      Patient will use " "cognitive strategies identified in therapy to challenge anxious thoughts  Identify negative self-talk and behaviors: challenge core beliefs, myths, and actions.     Intervention(s)  Therapist will utilize CBT DBT ACT and mindfulness interventions.     Goal 3: Patiient will address traumatic experiences to reduce trauma response.    I will know I've met my goal when I am able to have less trauma response, flashbacks, nightmares, re-experiencing of past.  I can stay more in the present\"      Objective #A (Patient Action)                          Status: 90 days      Patient will identify specific strategies to more effectively address stressors  learn to utilize relaxation and cogntive coping skills to reduce and regulate emotions.     Intervention(s)  Therapist will teach emotional regulation skills. Utilizing CBT DBT ACT and mindfulness interventions.     Objective #B  Patient will address specific and targeted traumatic experiences to reduce negative trauma responses and overall impact of trauma.                 Status: 90 days      Intervention(s)  Therapist will utilize CBT DBT ACT and mindfulness interventions.  Narrative therapy and Exposure therapy.     Patient has reviewed and agreed to the above plan.        [facility] Sandoval [facility]              November 5, 2021  Answers for HPI/ROS submitted by the patient on 2/4/2022  If you checked off any problems, how difficult have these problems made it for you to do your work, take care of things at home, or get along with other people?: Somewhat difficult  PHQ9 TOTAL SCORE: 3      "

## 2022-02-15 ENCOUNTER — VIRTUAL VISIT (OUTPATIENT)
Dept: PSYCHOLOGY | Facility: CLINIC | Age: 42
End: 2022-02-15
Payer: COMMERCIAL

## 2022-02-15 ENCOUNTER — PATIENT OUTREACH (OUTPATIENT)
Dept: CARE COORDINATION | Facility: CLINIC | Age: 42
End: 2022-02-15
Payer: COMMERCIAL

## 2022-02-15 DIAGNOSIS — F33.0 MAJOR DEPRESSIVE DISORDER, RECURRENT EPISODE, MILD WITH ANXIOUS DISTRESS (H): Primary | ICD-10-CM

## 2022-02-15 PROCEDURE — 90834 PSYTX W PT 45 MINUTES: CPT | Mod: 95 | Performed by: PSYCHOLOGIST

## 2022-02-15 NOTE — PROGRESS NOTES
Clinic Care Coordination Contact  Community Health Worker Initial Outreach    Reason for Referral: Financial Support  Patient/Caregiver Support       Patient/Caregiver Suport: Navigation of Long Term Care/County Services     Patient accepts CC: Yes. Patient scheduled for assessment with SW on 2/22/2022 at 3:00pm. Patient noted desire to discuss health insurance and county benefits .    CHW Initial Information Gathering:  Referral Source: PCP  Preferred Hospital: [place], [place]  [phone]  Preferred Urgent Care: [facility], [phone]  Current living arrangement:: I live in a private home with family  Type of residence:: Private home - stairs  Community Resources: Other (see comment) ([place])  Supplies used at home:: None  Equipment Currently Used at Home: none  Informal Support system:: Family  No PCP office visit in Past Year: No  Transportation means:: Regular car  CHW Additional Questions  MyChart active?: Yes  Patient sent Social Determinants of Health questionnaire?: Yes     Financial Resource Worker Screening    County Benefits  Is patient requesting help applying for county benefits?: Yes  Have you recently applied for any county benefits?: No  How many people in your household?: 5  Do you buy/eat food together?: Yes  What is the monthly gross income for the household (wages, social security, workers comp, and pension)? :  (Yearly- $60,000)    Insurance:  Is this an insurance renewal?: Yes  Is this a new insurance application request?: Yes  Have you recently applied for insurance?: No  How many people in your household? : 5  What is the monthly gross income for the household (wages, social security, workers comp, and pension)? :  (Yearly- $60,000)      Ginger Reyes  Community Health Worker  [facility]  Clinic Care Coordination  [email or web address]  Office: [phone]

## 2022-02-16 ENCOUNTER — PATIENT OUTREACH (OUTPATIENT)
Dept: CARE COORDINATION | Facility: CLINIC | Age: 42
End: 2022-02-16
Payer: COMMERCIAL

## 2022-02-16 ASSESSMENT — PATIENT HEALTH QUESTIONNAIRE - PHQ9: SUM OF ALL RESPONSES TO PHQ QUESTIONS 1-9: 6

## 2022-02-16 NOTE — PROGRESS NOTES
Clinic Care Coordination Contact  Program: [facility]   County: [facility] Case #:  [facility] Worker:   Nba #:   Subscriber #: [id number]  Renewal:  Date Applied:     FRW Outreach:  2/16/2022: FR[place] of 10[place]1/19. FRW  called patient and she states her and her kids have MA. Her significant other doesn't have health insurance but they do not have mutual kids together and he's over income [place] and [place]. Patient states she's over income for SNAP as well as they tired. FRW will resolve the FRW and remove patient from panel. Please refer to FRW for future needs.     Health Insurance:    Major Programs  This subscriber has eligibility for [place]: Medical Assistance.  Elig Type AA: Parent of a dependent child  Eligibility Begin Date: 10/01/2019  Eligibility End Date: --/--/----  This subscriber is eligible for the following service types: Medical Care ,  Chiropractic ,  Dental Care ,  Hospital ,  Hospital - Inpatient ,  Hospital - Outpatient ,  Emergency Services ,  Pharmacy ,  Professional (Physician) Visit - Office ,  Vision (Optometry) ,  Mental Health ,  Urgent Care  Prepaid Health Plan  This subscriber receives MA12 - Prepaid Medical Assistance Program (PMAP) delivered through "[employer]. The phone numbers is [phone] ().    Referral/Screening:    [facility] Benefits  Is patient requesting help applying for [facility] benefits?: Yes  Have you recently applied for any [facility] benefits?: No  How many people in your household?: 5  Do you buy/eat food together?: Yes  What is the monthly gross income for the household (wages, social security, workers comp, and pension)? :  (Yearly- $60,000)     Insurance:  Is this an insurance renewal?: Yes  Is this a new insurance application request?: Yes  Have you recently applied for insurance?: No  How many people in your household? : 5  What is the monthly gross income for the household (wages, social security, workers comp, and pension)? :   (Yearly- $60,000)        Ginger Reyes  Community Health Worker  [facility] Care Coordination  [email or web address]  Office: [phone]    Financial Resource Worker Follow Up    Goals:       Intervention and Education during outreach: n/a    FRW Plan: n/a

## 2022-02-18 ENCOUNTER — LAB (OUTPATIENT)
Dept: LAB | Facility: CLINIC | Age: 42
End: 2022-02-18
Payer: COMMERCIAL

## 2022-02-18 DIAGNOSIS — Z11.59 ENCOUNTER FOR SCREENING FOR OTHER VIRAL DISEASES: ICD-10-CM

## 2022-02-18 PROCEDURE — U0003 INFECTIOUS AGENT DETECTION BY NUCLEIC ACID (DNA OR RNA); SEVERE ACUTE RESPIRATORY SYNDROME CORONAVIRUS 2 (SARS-COV-2) (CORONAVIRUS DISEASE [COVID-19]), AMPLIFIED PROBE TECHNIQUE, MAKING USE OF HIGH THROUGHPUT TECHNOLOGIES AS DESCRIBED BY CMS-2020-01-R: HCPCS

## 2022-02-18 PROCEDURE — U0005 INFEC AGEN DETEC AMPLI PROBE: HCPCS

## 2022-02-19 LAB — SARS-COV-2 RNA RESP QL NAA+PROBE: NEGATIVE

## 2022-02-21 NOTE — PROGRESS NOTES
Progress Note     Patient Name: Sommer Gomez                   Date:   2/15/22                                           Service Type: Individual                            Session Start Time:  12:30 PM                       Session End Time:    1:22 PM                Session Length:        38-52 min     Session #:      26     Attendees:     Client     Service Modality:  Video Visit:      Provider verified identity through the following two step process.  Patient provided:  Patient photo     Telemedicine Visit: The patient's condition can be safely assessed and treated via synchronous audio and visual telemedicine encounter.       Reason for Telemedicine Visit: Services only offered telehealth     Originating Site (Patient Location): Patient's home     Distant Site (Provider Location): Provider Remote Setting     Consent:  The patient/guardian has verbally consented to: the potential risks and benefits of telemedicine (video visit) versus in person care; bill my insurance or make self-payment for services provided; and responsibility for payment of non-covered services.      Patient would like the video invitation sent by:  Text to cell phone: [phone]     Mode of Communication:  Video Conference via Amwell     As the provider I attest to compliance with applicable laws and regulations related to telemedicine.                Treatment Plan Last Reviewed: 2/15/22  PHQ-9 / ROSA-7 :      DATA  Interactive Complexity: No  Crisis: No                                   Progress Since Last Session (Related to Symptoms / Goals / Homework):              Symptoms: Some change- Patient expressed and exhibited little changes in symptoms and behaviors.     Homework: Completed in session                            Episode of Care Goals: Minimal progress - PREPARATION (Decided to change - considering how); Intervened by negotiating a change plan and determining options /  strategies for behavior change, identifying triggers, exploring social supports, and working towards setting a date to begin behavior change                 Current / Ongoing Stressors and Concerns:              Individual/Employment/Societal/Family  Patient was on time and present for the session.  Patient expressed that they are doing well. Patient discussed being a bit nervous to have an endoscopy done to assess gastrointestinal symptoms.  Patient and therapist discussed treatment planning and other resources      Treatment Objective(s) Addressed in This Session:   Treatment update/Care coordination                    Intervention:              CBT: identification of target concerns and behaviors.              Psychodynamic-processing internal experiences.                               ASSESSMENT: Current Emotional / Mental Status (status of significant symptoms):              Risk status (Self / Other harm or suicidal ideation)              Patient denies current fears or concerns for personal safety.              Patient denies current or recent suicidal ideation or behaviors.              Patient denies current or recent homicidal ideation or behaviors.              Patient denies current or recent self injurious behavior or ideation.              Patient denies other safety concerns.              Patient reports there has been no change in risk factors since their last session.                Patient reports there has been no change in protective factors since their last session.                A safety and risk management plan has been developed including: Patient consented to co-developed safety plan.  Safety and risk management plan was completed.  Patient agreed to use safety plan should any safety concerns arise.  A copy was given to the patient.                 Appearance:                            Appropriate               Eye Contact:                           Good               Psychomotor  Behavior:          Normal               Attitude:                                   Cooperative               Orientation:                             All              Speech                          Rate / Production:       Normal                           Volume:                       Normal               Mood:                                      Normal              Affect:                                      Appropriate               Thought Content:                    Clear               Thought Form:                        Coherent  Logical               Insight:                                     Good                  Medication Review:              No changes to current psychiatric medication(s)                 Medication Compliance:              Yes                 Changes in Health Issues:              None reported                 Chemical Use Review:              Substance Use: Chemical use reviewed, no active concerns identified                  Tobacco Use: No current tobacco use.       Diagnosis:  Major Depressive Disorder Recurrent Mild with anxious distress.     Collateral Reports Completed:              Not Necessary at this time     PLAN: (Patient Tasks / Therapist Tasks / Other)  Continue to meet for individual therapy and practice grounding to help stay grounded for medical procedure         Misbah Sandoval [facility]                                                           ______________________________________________________________________     Treatment Plan     Patient's Name: Sommer Gomez                YOB: 1980     Date: 2/15/22     DSM5 Diagnoses: 296.31 (F33.0) Major Depressive Disorder, Recurrent Episode, Mild _, 300.02 (F41.1) Generalized Anxiety Disorder or 309.81 (F43.10) Posttraumatic Stress Disorder (includes Posttraumatic Stress Disorder for Children 6 Years and Younger)  Without dissociative symptoms  Psychosocial / Contextual  "Factors: Individual/Societal/Family/Employment  WHODAS:      Referral / Collaboration:  Referral to another professional/service is not indicated at this time..     Anticipated number of session or this episode of care: 90 days        MeasurableTreatment Goal(s) related to diagnosis / functional impairment(s)  Goal 1: Patient will utilize interventions and skills to better regulate negative emotions in difficult circumstances or experiences.  Also, if they are triggered in situations.    I will know I've met my goal when I am able to find positives in difficult circumstances.  Increase in motivation and less irritable.\"      Objective #A (Patient Action)                          Patient will identify specific strategies to more effectively address stressors  identify three initial signs or symptoms of anxiety.  Status: 90 days      Intervention(s)  Therapist will teach emotional recognition/identification. Utilizing CBT DBT ACT and mindfulness interventions.     Objective #B  Patient will identify at least 5 triggers for anxiety.  Status: 90 days      Intervention(s)  Therapist will teach emotional recognition/identification. utilizing CBT DBT ACT and mindfulness interventions.     Objective #C  Patient will Feel less tired and more energy during the day .  Status: 90 days      Intervention(s)  Therapist will teach emotional regulation skills. Utilizng CBT DBT ACT and mindfulness interventions.        Goal 2: Patient will reduce overall depression and anxiety    I will know I've met my goal when I am able to feel more motivation and have more energy.  I am able to be more mindful and in the present\"      Objective #A (Patient Action)                          Status: 90 days      Patient will use cognitive strategies identified in therapy to challenge anxious thoughts  Identify negative self-talk and behaviors: challenge core beliefs, myths, and actions.     Intervention(s)  Therapist will utilize CBT DBT ACT and " "mindfulness interventions.     Goal 3: Patiient will address traumatic experiences to reduce trauma response.    I will know I've met my goal when I am able to have less trauma response, flashbacks, nightmares, re-experiencing of past.  I can stay more in the present\"      Objective #A (Patient Action)                          Status: 90 days      Patient will identify specific strategies to more effectively address stressors  learn to utilize relaxation and cogntive coping skills to reduce and regulate emotions.     Intervention(s)  Therapist will teach emotional regulation skills. Utilizing CBT DBT ACT and mindfulness interventions.     Objective #B  Patient will address specific and targeted traumatic experiences to reduce negative trauma responses and overall impact of trauma.                 Status: 90 days      Intervention(s)  Therapist will utilize CBT DBT ACT and mindfulness interventions.  Narrative therapy and Exposure therapy.     Patient has reviewed and agreed to the above plan.        [facility]oval [facility]              February 15, 2022  Answers for HPI/ROS submitted by the patient on 2/15/2022  If you checked off any problems, how difficult have these problems made it for you to do your work, take care of things at home, or get along with other people?: Somewhat difficult  PHQ9 TOTAL SCORE: 6      "

## 2022-02-22 ENCOUNTER — PATIENT OUTREACH (OUTPATIENT)
Dept: NURSING | Facility: CLINIC | Age: 42
End: 2022-02-22

## 2022-02-22 ENCOUNTER — HOSPITAL ENCOUNTER (OUTPATIENT)
Facility: AMBULATORY SURGERY CENTER | Age: 42
Discharge: HOME | End: 2022-02-22
Attending: SURGERY | Admitting: SURGERY
Payer: COMMERCIAL

## 2022-02-22 VITALS
RESPIRATION RATE: 16 BRPM | DIASTOLIC BLOOD PRESSURE: 77 MMHG | OXYGEN SATURATION: 96 % | SYSTOLIC BLOOD PRESSURE: 106 MMHG | TEMPERATURE: 97.2 F

## 2022-02-22 DIAGNOSIS — F33.0 MAJOR DEPRESSIVE DISORDER, RECURRENT EPISODE, MILD WITH ANXIOUS DISTRESS (H): ICD-10-CM

## 2022-02-22 LAB — UPPER GI ENDOSCOPY: NORMAL

## 2022-02-22 PROCEDURE — G8918 PT W/O PREOP ORDER IV AB PRO: HCPCS

## 2022-02-22 PROCEDURE — 43235 EGD DIAGNOSTIC BRUSH WASH: CPT | Performed by: SURGERY

## 2022-02-22 PROCEDURE — 43235 EGD DIAGNOSTIC BRUSH WASH: CPT

## 2022-02-22 PROCEDURE — 99152 MOD SED SAME PHYS/QHP 5/>YRS: CPT | Mod: 59 | Performed by: SURGERY

## 2022-02-22 PROCEDURE — G8907 PT DOC NO EVENTS ON DISCHARG: HCPCS

## 2022-02-22 RX ORDER — NALOXONE HYDROCHLORIDE 0.4 MG/ML
0.2 INJECTION, SOLUTION INTRAMUSCULAR; INTRAVENOUS; SUBCUTANEOUS
Status: DISCONTINUED | OUTPATIENT
Start: 2022-02-22 | End: 2022-02-23 | Stop reason: HOSPADM

## 2022-02-22 RX ORDER — PROCHLORPERAZINE MALEATE 10 MG
10 TABLET ORAL EVERY 6 HOURS PRN
Status: DISCONTINUED | OUTPATIENT
Start: 2022-02-22 | End: 2022-02-23 | Stop reason: HOSPADM

## 2022-02-22 RX ORDER — ONDANSETRON 4 MG/1
4 TABLET, ORALLY DISINTEGRATING ORAL EVERY 6 HOURS PRN
Status: DISCONTINUED | OUTPATIENT
Start: 2022-02-22 | End: 2022-02-23 | Stop reason: HOSPADM

## 2022-02-22 RX ORDER — FLUMAZENIL 0.1 MG/ML
0.2 INJECTION, SOLUTION INTRAVENOUS
Status: ACTIVE | OUTPATIENT
Start: 2022-02-22 | End: 2022-02-22

## 2022-02-22 RX ORDER — FENTANYL CITRATE 50 UG/ML
INJECTION, SOLUTION INTRAMUSCULAR; INTRAVENOUS PRN
Status: DISCONTINUED | OUTPATIENT
Start: 2022-02-22 | End: 2022-02-22 | Stop reason: HOSPADM

## 2022-02-22 RX ORDER — NALOXONE HYDROCHLORIDE 0.4 MG/ML
0.4 INJECTION, SOLUTION INTRAMUSCULAR; INTRAVENOUS; SUBCUTANEOUS
Status: DISCONTINUED | OUTPATIENT
Start: 2022-02-22 | End: 2022-02-23 | Stop reason: HOSPADM

## 2022-02-22 RX ORDER — ONDANSETRON 2 MG/ML
4 INJECTION INTRAMUSCULAR; INTRAVENOUS
Status: DISCONTINUED | OUTPATIENT
Start: 2022-02-22 | End: 2022-02-23 | Stop reason: HOSPADM

## 2022-02-22 RX ORDER — LIDOCAINE 40 MG/G
CREAM TOPICAL
Status: DISCONTINUED | OUTPATIENT
Start: 2022-02-22 | End: 2022-02-23 | Stop reason: HOSPADM

## 2022-02-22 RX ORDER — ONDANSETRON 2 MG/ML
4 INJECTION INTRAMUSCULAR; INTRAVENOUS EVERY 6 HOURS PRN
Status: DISCONTINUED | OUTPATIENT
Start: 2022-02-22 | End: 2022-02-23 | Stop reason: HOSPADM

## 2022-02-22 SDOH — ECONOMIC STABILITY: FOOD INSECURITY: WITHIN THE PAST 12 MONTHS, THE FOOD YOU BOUGHT JUST DIDN'T LAST AND YOU DIDN'T HAVE MONEY TO GET MORE.: NEVER TRUE

## 2022-02-22 SDOH — ECONOMIC STABILITY: TRANSPORTATION INSECURITY
IN THE PAST 12 MONTHS, HAS LACK OF TRANSPORTATION KEPT YOU FROM MEETINGS, WORK, OR FROM GETTING THINGS NEEDED FOR DAILY LIVING?: NO

## 2022-02-22 SDOH — ECONOMIC STABILITY: FOOD INSECURITY: WITHIN THE PAST 12 MONTHS, YOU WORRIED THAT YOUR FOOD WOULD RUN OUT BEFORE YOU GOT MONEY TO BUY MORE.: NEVER TRUE

## 2022-02-22 SDOH — ECONOMIC STABILITY: INCOME INSECURITY: IN THE LAST 12 MONTHS, WAS THERE A TIME WHEN YOU WERE NOT ABLE TO PAY THE MORTGAGE OR RENT ON TIME?: NO

## 2022-02-22 SDOH — ECONOMIC STABILITY: TRANSPORTATION INSECURITY
IN THE PAST 12 MONTHS, HAS THE LACK OF TRANSPORTATION KEPT YOU FROM MEDICAL APPOINTMENTS OR FROM GETTING MEDICATIONS?: NO

## 2022-02-22 ASSESSMENT — ACTIVITIES OF DAILY LIVING (ADL): DEPENDENT_IADLS:: INDEPENDENT

## 2022-02-22 NOTE — PROGRESS NOTES
Clinic Care Coordination Contact    Clinic Care Coordination Contact  OUTREACH    Referral Information:  Referral Source: PCP         Chief Complaint   Patient presents with     Clinic Care Coordination - Initial        Universal Utilization:   Clinic Utilization  No PCP office visit in Past Year: No  Utilization    Hospital Admissions  0             ED Visits  0             No Show Count (past year)  8                Current as of: 2/22/2022 11:57 AM              Clinical Concerns:  Current Medical Concerns:  Colitis    Current Behavioral Concerns: Hx anxiety and depression      Education Provided to patient:      [email or web address]  *Access insurance brokers to help selecting health plans  *Tax credits on health plans based on income    [employer]  [email or web address]   [phone]     Health Maintenance Reviewed: Up to date    Medication Management:  Medication review status: Not discussed due to nature of call    Functional Status:  Dependent ADLs:: Independent  Dependent IADLs:: Independent  Bed or wheelchair confined:: No  Mobility Status: Independent  Fallen 2 or more times in the past year?: No  Any fall with injury in the past year?: No    Living Situation:  Current living arrangement:: I live in a private home with family  Type of residence:: Private home - [facility]    Lifestyle & Psychosocial Needs:    Social Determinants of Health     Tobacco Use: Low Risk      Smoking Tobacco Use: Never Smoker     Smokeless Tobacco Use: Never Used   Alcohol Use: Not on file   Financial Resource Strain: Not on file   Food Insecurity: No Food Insecurity     Worried About Running Out of Food in the Last Year: Never true     Ran Out of Food in the Last Year: Never true   Transportation Needs: No Transportation Needs     Lack of Transportation (Medical): No     Lack of Transportation (Non-Medical): No   Physical Activity: Not on file   Stress: Not on file   Social Connections: Not on file   Intimate Partner Violence:  Not on file   Depression: Not at risk     PHQ-2 Score: 1   Housing Stability: Unknown     Unable to Pay for Housing in the Last Year: No     Number of Places Lived in the Last Year: Not on file     Unstable Housing in the Last Year: No        Transportation means:: Regular car     Christian or spiritual beliefs that impact treatment:: No  Mental health DX:: No  Mental health management concern (GOAL):: No  Informal Support system:: Family      Resources and Interventions:  Current Resources:      Community Resources: Financial/Insurance ([place])  Supplies used at home:: None  Equipment Currently Used at Home: none     Advance Care Plan/Directive  Advanced Care Plans/Directives on file:: No  Type Advanced Care Plans/Directives: Other  Advanced Care Plan/Directive Status: Declined Further Information    Referrals Placed: Financial Services       Patient/Caregiver understanding: Pt aware she can reach out to Care Coordination or clinic directly for resource information if needed       Future Appointments              In 3 days Misbah Sandoval [facility], [facility]    In 2 weeks Misbah Sandoval [facility], [facility]    In 3 weeks Misbah Sandoval [facility], [facility]          Plan: Pt not enrolling in Care Coordination at this time.  Resources sent via [employer].

## 2022-03-03 ENCOUNTER — VIRTUAL VISIT (OUTPATIENT)
Dept: FAMILY MEDICINE | Facility: CLINIC | Age: 42
End: 2022-03-03
Payer: COMMERCIAL

## 2022-03-03 DIAGNOSIS — K21.9 GASTROESOPHAGEAL REFLUX DISEASE WITHOUT ESOPHAGITIS: Primary | ICD-10-CM

## 2022-03-03 DIAGNOSIS — K59.00 CONSTIPATION, UNSPECIFIED CONSTIPATION TYPE: ICD-10-CM

## 2022-03-03 DIAGNOSIS — E66.3 OVERWEIGHT: ICD-10-CM

## 2022-03-03 PROCEDURE — 99214 OFFICE O/P EST MOD 30 MIN: CPT | Mod: 95 | Performed by: FAMILY MEDICINE

## 2022-03-03 RX ORDER — FAMOTIDINE 40 MG/1
40 TABLET, FILM COATED ORAL 2 TIMES DAILY
Qty: 180 TABLET | Refills: 1 | Status: SHIPPED | OUTPATIENT
Start: 2022-03-03 | End: 2022-09-29

## 2022-03-03 ASSESSMENT — ENCOUNTER SYMPTOMS
ARTHRALGIAS: 0
HEMATOCHEZIA: 0
JOINT SWELLING: 0
SORE THROAT: 0
FREQUENCY: 0
SHORTNESS OF BREATH: 0
HEMATURIA: 0
MYALGIAS: 0
BREAST MASS: 0
ABDOMINAL PAIN: 0
DYSURIA: 0
NAUSEA: 0
DIZZINESS: 0
HEARTBURN: 0
COUGH: 0
EYE PAIN: 0
PALPITATIONS: 0
HEADACHES: 0
PARESTHESIAS: 0
CONSTIPATION: 0
NERVOUS/ANXIOUS: 0
WEAKNESS: 0
CHILLS: 0
DIARRHEA: 0
FEVER: 0

## 2022-03-03 NOTE — PATIENT INSTRUCTIONS
Gera Miguel,    Thank you for allowing [facility] to manage your care.    I sent your prescriptions to your pharmacy.    I made a nutritionist referral, they will be calling in approximately 1 week to set up your appointment.  If you do not hear from them, please call the specialty number on your after visit.     If you have any questions or concerns, please feel free to call us at [phone].    Sincerely,    Dr. Shultz    Did you know?      You can schedule a video visit for follow-up appointments as well as future appointments for certain conditions.  Please see the below link.     [email or web address]    If you have not already done so,  I encourage you to sign up for TrialPay ([email or web address]).  This will allow you to review your results, securely communicate with a provider, and schedule virtual visits as well.        Patient Education     Treating Constipation  Constipation is a common and often uncomfortable problem. Constipation means you have bowel movements fewer than 3 times per week. Or that you strain to pass hard, dry stool. It can last a short time. Or it can be a problem that never seems to go away. The good news is that it can often be treated and controlled.   Eat more fiber  One of the best ways to help treat constipation is to increase your fiber intake. You can do this either through diet or by using fiber supplements. Fiber (in whole grains, fruits, and vegetables) adds bulk and absorbs water to soften the stool. This helps the stool pass through the colon more easily. When you increase your fiber intake, do it slowly to prevent side effects such as bloating. Also increase the amount of water that you drink. Eating more of these foods can add fiber to your diet:     High-fiber cereals    Whole grains, bran, and brown rice    Vegetables such as carrots, broccoli, and greens    Fresh fruits (especially apples, pears, and dried fruits  such as raisins and apricots)    Nuts and legumes (especially beans such as lentils, kidney beans, and lima beans)  Get physically active  Exercise helps improve the working of your colon which helps ease constipation. Try to get some physical activity every day. If you haven t been active for a while, talk with your healthcare provider before starting again.     Laxatives  Your healthcare provider may suggest an over-the-counter product to help ease your constipation. He or she may suggest the use of bulk-forming agents or laxatives. Laxatives, if used as directed, are common and safe. Follow directions carefully when using them. See your provider for new-onset constipation, or long-term constipation, to rule out other causes such as medicines or thyroid disease.   [employer] last reviewed this educational content on 6/1/2019 2000-2021 [employer]. All rights reserved. This information is not intended as a substitute for professional medical care. Always follow your healthcare professional's instructions.

## 2022-03-03 NOTE — PROGRESS NOTES
Myriam is a 41 year old who is being evaluated via a billable video visit.      How would you like to obtain your AVS? MyChart  If the video visit is dropped, the invitation should be resent by: Text to cell phone: [phone]  Will anyone else be joining your video visit? No      Video Start Time: 8:35 AM    1. Gastroesophageal reflux disease without esophagitis  Most recent endoscopy reviewed.  Evidence of small hiatal hernia.  Encourage weight loss.  Consider general surgery if symptoms do not improve.  - famotidine (PEPCID) 40 MG tablet; Take 1 tablet (40 mg) by mouth 2 times daily  Dispense: 180 tablet; Refill: 1    2. Overweight  Encourage diet and exercise.   - Nutrition Referral; Future    3. Constipation, unspecified constipation type  High fiber diet.  Consider colace daily if symptoms do not improve.       Subjective   Myriam is a 41 year old who presents for the following health issues     HPI     Endoscopy follow up     1. GERD: Had endoscopy 2/22/22 which it showed small hiatal hernia.  States of improvement with omeprazole.  She is overweight.      2. Overweight: Patient would like to lose weight.  States of constipation and interested in a nutritionist. She does admit to not drinking enough fluids which may be attributing to her constipation.     Review of Systems   Constitutional: Negative for chills and fever.   HENT: Negative for congestion, ear pain, hearing loss and sore throat.    Eyes: Negative for pain and visual disturbance.   Respiratory: Negative for cough and shortness of breath.    Cardiovascular: Negative for chest pain, palpitations and peripheral edema.   Gastrointestinal: Negative for abdominal pain, constipation, diarrhea, heartburn, hematochezia and nausea.   Breasts:  Negative for tenderness, breast mass and discharge.   Genitourinary: Negative for dysuria, frequency, genital sores, hematuria, pelvic pain, urgency, vaginal bleeding and vaginal discharge.   Musculoskeletal:  Negative for arthralgias, joint swelling and myalgias.   Skin: Negative for rash.   Neurological: Negative for dizziness, weakness, headaches and paresthesias.   Psychiatric/Behavioral: Negative for mood changes. The patient is not nervous/anxious.             Objective           Vitals:  No vitals were obtained today due to virtual visit.    Physical Exam   GENERAL: Healthy, alert and no distress  EYES: Eyes grossly normal to inspection.  No discharge or erythema, or obvious scleral/conjunctival abnormalities.  RESP: No audible wheeze, cough, or visible cyanosis.  No visible retractions or increased work of breathing.    SKIN: Visible skin clear. No significant rash, abnormal pigmentation or lesions.  NEURO: Cranial nerves grossly intact.  Mentation and speech appropriate for age.  PSYCH: Mentation appears normal, affect normal/bright, judgement and insight intact, normal speech and appearance well-groomed.    2/22/22  Impression:               - 2 cm hiatal hernia.                             - Normal stomach.                             - Normal examined duodenum.                             - No specimens collected.   Recommendation:           - Discharge patient to home.                             - High fiber diet and low fat diet.                             - Continue present medications.     Video-Visit Details    Type of service:  Video Visit    Video End Time:8:55 AM    Originating Location (pt. Location): Home    Distant Location (provider location):  [facility] [employer]     Platform used for Video Visit: [employer]

## 2022-03-18 ENCOUNTER — VIRTUAL VISIT (OUTPATIENT)
Dept: PSYCHOLOGY | Facility: CLINIC | Age: 42
End: 2022-03-18
Payer: COMMERCIAL

## 2022-03-18 DIAGNOSIS — F33.0 MAJOR DEPRESSIVE DISORDER, RECURRENT EPISODE, MILD WITH ANXIOUS DISTRESS (H): Primary | ICD-10-CM

## 2022-03-18 DIAGNOSIS — F43.10 POST TRAUMATIC STRESS DISORDER (PTSD): ICD-10-CM

## 2022-03-18 DIAGNOSIS — F41.1 GENERALIZED ANXIETY DISORDER: ICD-10-CM

## 2022-03-18 PROCEDURE — 90834 PSYTX W PT 45 MINUTES: CPT | Mod: 95 | Performed by: PSYCHOLOGIST

## 2022-03-18 ASSESSMENT — PATIENT HEALTH QUESTIONNAIRE - PHQ9
SUM OF ALL RESPONSES TO PHQ QUESTIONS 1-9: 4
SUM OF ALL RESPONSES TO PHQ QUESTIONS 1-9: 4
10. IF YOU CHECKED OFF ANY PROBLEMS, HOW DIFFICULT HAVE THESE PROBLEMS MADE IT FOR YOU TO DO YOUR WORK, TAKE CARE OF THINGS AT HOME, OR GET ALONG WITH OTHER PEOPLE: SOMEWHAT DIFFICULT

## 2022-03-19 ASSESSMENT — PATIENT HEALTH QUESTIONNAIRE - PHQ9: SUM OF ALL RESPONSES TO PHQ QUESTIONS 1-9: 4

## 2022-03-25 NOTE — PROGRESS NOTES
[facility]                                     Progress Note    Patient Name: Sommer Gomez  Date: 3/18/22         Service Type: Individual      Session Start Time: 7:10 AM  Session End Time: 7:52 AM     Session Length: 38-52 min    Session #: 27    Attendees: Client    Service Modality:  Video Visit:      Provider verified identity through the following two step process.  Patient provided:  Patient photo    Telemedicine Visit: The patient's condition can be safely assessed and treated via synchronous audio and visual telemedicine encounter.      Reason for Telemedicine Visit: Services only offered telehealth    Originating Site (Patient Location): Patient's home    Distant Site (Provider Location): Provider Remote Setting- Home Office    Consent:  The patient/guardian has verbally consented to: the potential risks and benefits of telemedicine (video visit) versus in person care; bill my insurance or make self-payment for services provided; and responsibility for payment of non-covered services.     Patient would like the video invitation sent by:  Text to cell phone: [phone]    Mode of Communication:  Video Conference via [employer]    As the provider I attest to compliance with applicable laws and regulations related to telemedicine.    DATA  Interactive Complexity: No  Crisis: No        Progress Since Last Session (Related to Symptoms / Goals / Homework):   Symptoms: No change Patient expressed minimal to no changes in symptoms and behaviors    Homework: Completed in session      Episode of Care Goals: Minimal progress - ACTION (Actively working towards change); Intervened by reinforcing change plan / affirming steps taken     Current / Ongoing Stressors and Concerns:   Individual/Family/societal/Relational  Patient was on time and present for the session.  Patient expressed that they are doing well and recently gained a new cat.  They expressed having some medical difficulties but are  managing.  The session was shortly interrupted due to daughter not feeling well.  Discussed reconvening at next session.  They expressed doing well overall     Treatment Objective(s) Addressed in This Session:   identify some stressors which contribute to feelings of anxiety       Intervention:   CBT: identification of root stressors    Assessments completed prior to visit:  PHQ9:   PHQ-9 SCORE 6/18/2021 12/13/2021 1/13/2022 1/27/2022 2/4/2022 2/15/2022 3/18/2022   PHQ-9 Total Score - - - - - - -   PHQ-9 Total Score MyChart - 3 (Minimal depression) 6 (Mild depression) 4 (Minimal depression) 3 (Minimal depression) 6 (Mild depression) 4 (Minimal depression)   PHQ-9 Total Score 10 3 6 4 3 6 4     GAD7:   ROSA-7 SCORE 8/11/2020 9/15/2020 12/1/2020 1/20/2021 2/26/2021 6/18/2021 12/13/2021   Total Score - - - - - - -   Total Score 17 (severe anxiety) - - - - - 6 (mild anxiety)   Total Score 17 15 16 12 7 12 6     PROMIS 10-Global Health (only subscores and total score):   PROMIS-10 Scores Only 12/13/2021 3/18/2022   Global Mental Health Score 11 15   Global Physical Health Score 15 14   PROMIS TOTAL - SUBSCORES 26 29         ASSESSMENT: Current Emotional / Mental Status (status of significant symptoms):   Risk status (Self / Other harm or suicidal ideation)   Patient denies current fears or concerns for personal safety.   Patient denies current or recent suicidal ideation or behaviors.   Patient denies current or recent homicidal ideation or behaviors.   Patient denies current or recent self injurious behavior or ideation.   Patient denies other safety concerns.   Patient reports there has been no change in risk factors since their last session.     Patient reports there has been no change in protective factors since their last session.     Recommended that patient call 911 or go to the local ED should there be a change in any of these risk factors.     Appearance:   Appropriate    Eye Contact:   Good    Psychomotor  Behavior: Normal    Attitude:   Cooperative    Orientation:   All   Speech    Rate / Production: Normal     Volume:  Normal    Mood:    Normal   Affect:    Appropriate    Thought Content:  Clear    Thought Form:  Coherent  Logical    Insight:    Good      Medication Review:   Changes to psychiatric medications, see updated Medication List in EPIC.      Medication Compliance:   Yes     Changes in Health Issues:   Yes: gastroentrolgy, Associated Psychological Distress     Chemical Use Review:   Substance Use: Chemical use reviewed, no active concerns identified      Tobacco Use: No current tobacco use.      Diagnosis:  Major Depressive Disorder Recurrent Mild  Generalized Anxiety Disorder  Post traumatic Stress Disorder    Collateral Reports Completed:   Not Applicable    PLAN: (Patient Tasks / Therapist Tasks / Other)  Continue to meet for individual therapy and watch recommended videos on gut health and brain health        Misbah Sandoval [facility]                                                         ______________________________________________________________________    Individual Treatment Plan    Patient's Name: Sommer Gomez  YOB: 1980    Date of Creation: 3/18/22  Date Treatment Plan Last Reviewed/Revised:     DSM5 Diagnoses: 296.31 (F33.0) Major Depressive Disorder, Recurrent Episode, Mild _, 300.02 (F41.1) Generalized Anxiety Disorder or 309.81 (F43.10) Posttraumatic Stress Disorder (includes Posttraumatic Stress Disorder for Children 6 Years and Younger)  Without dissociative symptoms  Psychosocial / Contextual Factors: Individual/Family/Societal/Relational  PROMIS (reviewed every 90 days):     Referral / Collaboration:  Referral to another professional/service is not indicated at this time..    Anticipated number of session for this episode of care: 90 days  Anticipation frequency of session: Biweekly  Anticipated Duration of each session: 38-52 minutes  Treatment plan will be reviewed in  "90 days or when goals have been changed.        MeasurableTreatment Goal(s) related to diagnosis / functional impairment(s)  Goal 1: Patient will utilize interventions and skills to better regulate negative emotions in difficult circumstances or experiences.  Also, if they are triggered in situations.    I will know I've met my goal when I am able to find positives in difficult circumstances.  Increase in motivation and less irritable.\"      Objective #A (Patient Action)                          Patient will identify specific strategies to more effectively address stressors  identify three initial signs or symptoms of anxiety.  Status: 90 days      Intervention(s)  Therapist will teach emotional recognition/identification. Utilizing CBT DBT ACT and mindfulness interventions.     Objective #B  Patient will identify at least 5 triggers for anxiety.  Status: 90 days      Intervention(s)  Therapist will teach emotional recognition/identification. utilizing CBT DBT ACT and mindfulness interventions.     Objective #C  Patient will Feel less tired and more energy during the day .  Status: 90 days      Intervention(s)  Therapist will teach emotional regulation skills. Utilizng CBT DBT ACT and mindfulness interventions.        Goal 2: Patient will reduce overall depression and anxiety    I will know I've met my goal when I am able to feel more motivation and have more energy.  I am able to be more mindful and in the present\"      Objective #A (Patient Action)                          Status: 90 days      Patient will use cognitive strategies identified in therapy to challenge anxious thoughts  Identify negative self-talk and behaviors: challenge core beliefs, myths, and actions.     Intervention(s)  Therapist will utilize CBT DBT ACT and mindfulness interventions.     Goal 3: Patiient will address traumatic experiences to reduce trauma response.    I will know I've met my goal when I am able to have less trauma response, " "flashbacks, nightmares, re-experiencing of past.  I can stay more in the present\"      Objective #A (Patient Action)                          Status: 90 days      Patient will identify specific strategies to more effectively address stressors  learn to utilize relaxation and cogntive coping skills to reduce and regulate emotions.     Intervention(s)  Therapist will teach emotional regulation skills. Utilizing CBT DBT ACT and mindfulness interventions.     Objective #B  Patient will address specific and targeted traumatic experiences to reduce negative trauma responses and overall impact of trauma.                 Status: 90 days      Intervention(s)  Therapist will utilize CBT DBT ACT and mindfulness interventions.  Narrative therapy and Exposure therapy.    Patient has reviewed and agreed to the above plan.      Misbah Sandoval [facility]  March 18, 2022  Answers for HPI/ROS submitted by the patient on 3/18/2022  If you checked off any problems, how difficult have these problems made it for you to do your work, take care of things at home, or get along with other people?: Somewhat difficult  PHQ9 TOTAL SCORE: 4      "

## 2022-03-28 ENCOUNTER — MYC MEDICAL ADVICE (OUTPATIENT)
Dept: FAMILY MEDICINE | Facility: CLINIC | Age: 42
End: 2022-03-28
Payer: COMMERCIAL

## 2022-03-28 NOTE — TELEPHONE ENCOUNTER
My Chart message placed in son's chart and routed to PCP for review.     Madonna Dahl RN BSN  [facility]

## 2022-03-28 NOTE — TELEPHONE ENCOUNTER
Patient asked that I send her the [place] Web site via My Chart.     Madonna Dahl RN BSN  [facility]

## 2022-03-30 ENCOUNTER — VIRTUAL VISIT (OUTPATIENT)
Dept: PSYCHOLOGY | Facility: CLINIC | Age: 42
End: 2022-03-30
Payer: COMMERCIAL

## 2022-03-30 DIAGNOSIS — F41.1 GENERALIZED ANXIETY DISORDER: ICD-10-CM

## 2022-03-30 DIAGNOSIS — F33.0 MAJOR DEPRESSIVE DISORDER, RECURRENT EPISODE, MILD WITH ANXIOUS DISTRESS (H): Primary | ICD-10-CM

## 2022-03-30 DIAGNOSIS — F43.10 POST TRAUMATIC STRESS DISORDER (PTSD): ICD-10-CM

## 2022-03-30 PROCEDURE — 90834 PSYTX W PT 45 MINUTES: CPT | Mod: 95 | Performed by: PSYCHOLOGIST

## 2022-03-31 ASSESSMENT — PATIENT HEALTH QUESTIONNAIRE - PHQ9: SUM OF ALL RESPONSES TO PHQ QUESTIONS 1-9: 4

## 2022-04-06 NOTE — PROGRESS NOTES
[facility]                                     Progress Note    Patient Name: Sommer Gomez  Date: 3/30/22         Service Type: Individual      Session Start Time: 7:08 AM  Session End Time: 7:52 AM     Session Length: 38-52 min    Session #: 28    Attendees: Client    Service Modality:  Video Visit:      Provider verified identity through the following two step process.  Patient provided:  Patient photo    Telemedicine Visit: The patient's condition can be safely assessed and treated via synchronous audio and visual telemedicine encounter.      Reason for Telemedicine Visit: Services only offered telehealth    Originating Site (Patient Location): Patient's home    Distant Site (Provider Location): Provider Remote Setting- Home Office    Consent:  The patient/guardian has verbally consented to: the potential risks and benefits of telemedicine (video visit) versus in person care; bill my insurance or make self-payment for services provided; and responsibility for payment of non-covered services.     Patient would like the video invitation sent by:  Text to cell phone: [phone]    Mode of Communication:  Video Conference via Amwell    As the provider I attest to compliance with applicable laws and regulations related to telemedicine.    DATA  Interactive Complexity: No  Crisis: No        Progress Since Last Session (Related to Symptoms / Goals / Homework):   Symptoms: No change Patient expressed minimal to no changes in symptoms and behaviors    Homework: Completed in session      Episode of Care Goals: Minimal progress - ACTION (Actively working towards change); Intervened by reinforcing change plan / affirming steps taken     Current / Ongoing Stressors and Concerns:   Individual/Family/societal/Relational  Patient was on time and present for the session.  Patient expressed being under a bit of duress due to internal family conflict with her son.  Patient expressed her son has been  really struggling with his mental health and would like immediate services.  Due to the long waiting list and acuity of her son's mental health, patient expressed high levels of anxiety about her son.  Discussed other resources and safety resources for her son to access.  Expressed and explored her experience and emotions of the situation     Treatment Objective(s) Addressed in This Session:   identify specific fears / thoughts that contribute to feeling anxious       Intervention:   CBT: identification of root stressors    Assessments completed prior to visit:  PHQ9:   PHQ-9 SCORE 12/13/2021 1/13/2022 1/27/2022 2/4/2022 2/15/2022 3/18/2022 3/30/2022   PHQ-9 Total Score - - - - - - -   PHQ-9 Total Score MyChart 3 (Minimal depression) 6 (Mild depression) 4 (Minimal depression) 3 (Minimal depression) 6 (Mild depression) 4 (Minimal depression) 4 (Minimal depression)   PHQ-9 Total Score 3 6 4 3 6 4 4     GAD7:   ROSA-7 SCORE 8/11/2020 9/15/2020 12/1/2020 1/20/2021 2/26/2021 6/18/2021 12/13/2021   Total Score - - - - - - -   Total Score 17 (severe anxiety) - - - - - 6 (mild anxiety)   Total Score 17 15 16 12 7 12 6     PROMIS 10-Global Health (only subscores and total score):   PROMIS-10 Scores Only 12/13/2021 3/18/2022 3/30/2022   Global Mental Health Score 11 15 13   Global Physical Health Score 15 14 14   PROMIS TOTAL - SUBSCORES 26 29 27         ASSESSMENT: Current Emotional / Mental Status (status of significant symptoms):   Risk status (Self / Other harm or suicidal ideation)   Patient denies current fears or concerns for personal safety.   Patient denies current or recent suicidal ideation or behaviors.   Patient denies current or recent homicidal ideation or behaviors.   Patient denies current or recent self injurious behavior or ideation.   Patient denies other safety concerns.   Patient reports there has been no change in risk factors since their last session.     Patient reports there has been no change in  protective factors since their last session.     Recommended that patient call 911 or go to the local ED should there be a change in any of these risk factors.     Appearance:   Appropriate    Eye Contact:   Good    Psychomotor Behavior: Normal    Attitude:   Cooperative    Orientation:   All   Speech    Rate / Production: Normal     Volume:  Normal    Mood:    Normal   Affect:    Appropriate    Thought Content:  Clear    Thought Form:  Coherent  Logical    Insight:    Good      Medication Review:   No changes to current psychiatric medication(s)     Medication Compliance:   Yes     Changes in Health Issues:   Yes: gastroentrolgy, Associated Psychological Distress     Chemical Use Review:   Substance Use: Chemical use reviewed, no active concerns identified      Tobacco Use: No current tobacco use.      Diagnosis:  Major Depressive Disorder Recurrent Mild  Generalized Anxiety Disorder  Post traumatic Stress Disorder    Collateral Reports Completed:   Not Applicable    PLAN: (Patient Tasks / Therapist Tasks / Other)  Continue to meet for individual therapy and watch recommended videos on gut health and brain health/Follow up with safety resources for Dm Sandoval, [facility]                                                         ______________________________________________________________________    Individual Treatment Plan    Patient's Name: Sommer Gomez  YOB: 1980    Date of Creation: 3/18/22  Date Treatment Plan Last Reviewed/Revised:     DSM5 Diagnoses: 296.31 (F33.0) Major Depressive Disorder, Recurrent Episode, Mild _, 300.02 (F41.1) Generalized Anxiety Disorder or 309.81 (F43.10) Posttraumatic Stress Disorder (includes Posttraumatic Stress Disorder for Children 6 Years and Younger)  Without dissociative symptoms  Psychosocial / Contextual Factors: Individual/Family/Societal/Relational  PROMIS (reviewed every 90 days):     Referral / Collaboration:  Referral to another  "professional/service is not indicated at this time..    Anticipated number of session for this episode of care: 90 days  Anticipation frequency of session: Biweekly  Anticipated Duration of each session: 38-52 minutes  Treatment plan will be reviewed in 90 days or when goals have been changed.        MeasurableTreatment Goal(s) related to diagnosis / functional impairment(s)  Goal 1: Patient will utilize interventions and skills to better regulate negative emotions in difficult circumstances or experiences.  Also, if they are triggered in situations.    I will know I've met my goal when I am able to find positives in difficult circumstances.  Increase in motivation and less irritable.\"      Objective #A (Patient Action)                          Patient will identify specific strategies to more effectively address stressors  identify three initial signs or symptoms of anxiety.  Status: 90 days      Intervention(s)  Therapist will teach emotional recognition/identification. Utilizing CBT DBT ACT and mindfulness interventions.     Objective #B  Patient will identify at least 5 triggers for anxiety.  Status: 90 days      Intervention(s)  Therapist will teach emotional recognition/identification. utilizing CBT DBT ACT and mindfulness interventions.     Objective #C  Patient will Feel less tired and more energy during the day .  Status: 90 days      Intervention(s)  Therapist will teach emotional regulation skills. Utilizng CBT DBT ACT and mindfulness interventions.        Goal 2: Patient will reduce overall depression and anxiety    I will know I've met my goal when I am able to feel more motivation and have more energy.  I am able to be more mindful and in the present\"      Objective #A (Patient Action)                          Status: 90 days      Patient will use cognitive strategies identified in therapy to challenge anxious thoughts  Identify negative self-talk and behaviors: challenge core beliefs, myths, and " "actions.     Intervention(s)  Therapist will utilize CBT DBT ACT and mindfulness interventions.     Goal 3: Patiient will address traumatic experiences to reduce trauma response.    I will know I've met my goal when I am able to have less trauma response, flashbacks, nightmares, re-experiencing of past.  I can stay more in the present\"      Objective #A (Patient Action)                          Status: 90 days      Patient will identify specific strategies to more effectively address stressors  learn to utilize relaxation and cogntive coping skills to reduce and regulate emotions.     Intervention(s)  Therapist will teach emotional regulation skills. Utilizing CBT DBT ACT and mindfulness interventions.     Objective #B  Patient will address specific and targeted traumatic experiences to reduce negative trauma responses and overall impact of trauma.                 Status: 90 days      Intervention(s)  Therapist will utilize CBT DBT ACT and mindfulness interventions.  Narrative therapy and Exposure therapy.    Patient has reviewed and agreed to the above plan.      Misbah Sandoval [facility]IS[facility]  March 18, 2022      Answers for HPI/ROS submitted by the patient on 3/30/2022  If you checked off any problems, how difficult have these problems made it for you to do your work, take care of things at home, or get along with other people?: Somewhat difficult  PHQ9 TOTAL SCORE: 4      "

## 2022-04-13 ENCOUNTER — VIRTUAL VISIT (OUTPATIENT)
Dept: PSYCHOLOGY | Facility: CLINIC | Age: 42
End: 2022-04-13
Payer: COMMERCIAL

## 2022-04-13 DIAGNOSIS — F33.0 MAJOR DEPRESSIVE DISORDER, RECURRENT EPISODE, MILD WITH ANXIOUS DISTRESS (H): Primary | ICD-10-CM

## 2022-04-13 DIAGNOSIS — F41.1 GENERALIZED ANXIETY DISORDER: ICD-10-CM

## 2022-04-13 DIAGNOSIS — F43.10 POST TRAUMATIC STRESS DISORDER (PTSD): ICD-10-CM

## 2022-04-13 PROCEDURE — 90834 PSYTX W PT 45 MINUTES: CPT | Mod: 95 | Performed by: PSYCHOLOGIST

## 2022-04-18 NOTE — PROGRESS NOTES
[facility]                                     Progress Note    Patient Name: Sommer Gomez  Date: 4/13/22         Service Type: Individual      Session Start Time: 7:05 AM  Session End Time: 7:52 AM     Session Length: 38-52 min    Session #: 29    Attendees: Client    Service Modality:  Video Visit:      Provider verified identity through the following two step process.  Patient provided:  Patient photo    Telemedicine Visit: The patient's condition can be safely assessed and treated via synchronous audio and visual telemedicine encounter.      Reason for Telemedicine Visit: Services only offered telehealth    Originating Site (Patient Location): Patient's home    Distant Site (Provider Location): Provider Remote Setting- Home Office    Consent:  The patient/guardian has verbally consented to: the potential risks and benefits of telemedicine (video visit) versus in person care; bill my insurance or make self-payment for services provided; and responsibility for payment of non-covered services.     Patient would like the video invitation sent by:  Text to cell phone: [phone]    Mode of Communication:  Video Conference via Amwell    As the provider I attest to compliance with applicable laws and regulations related to telemedicine.    DATA  Interactive Complexity: No  Crisis: No        Progress Since Last Session (Related to Symptoms / Goals / Homework):   Symptoms: No change Patient expressed minimal to no changes in symptoms and behaviors    Homework: Completed in session      Episode of Care Goals: Minimal progress - ACTION (Actively working towards change); Intervened by reinforcing change plan / affirming steps taken     Current / Ongoing Stressors and Concerns:   Individual/Family/societal/Relational  Patient was on time and present for the session.  Patient expressed that they are doing well, they expressed that their partner has not had luck finding a job, however due to their  situation.  They are thinking of finding part time job to help with maintenance and cleanliness around the house.  This is a contributor to their difficulties, inability to keep up house affairs.  Since, partner has been not working, it has increased tiana and decreased stress due to being with others more and able to keep up around the house.       Treatment Objective(s) Addressed in This Session:   identify effective strategies to more effectively address stressors       Intervention:   Motivational Interviewing: Expressed Empathy/Understanding, Supported Autonomy, Collaboration, Evocation, Permission to raise concern or advise, Open-ended questions, Reflections: simple and complex, Change talk (evoked) and Reframe     Assessments completed prior to visit:  PHQ9:   PHQ-9 SCORE 12/13/2021 1/13/2022 1/27/2022 2/4/2022 2/15/2022 3/18/2022 3/30/2022   PHQ-9 Total Score - - - - - - -   PHQ-9 Total Score MyChart 3 (Minimal depression) 6 (Mild depression) 4 (Minimal depression) 3 (Minimal depression) 6 (Mild depression) 4 (Minimal depression) 4 (Minimal depression)   PHQ-9 Total Score 3 6 4 3 6 4 4     GAD7:   ROSA-7 SCORE 8/11/2020 9/15/2020 12/1/2020 1/20/2021 2/26/2021 6/18/2021 12/13/2021   Total Score - - - - - - -   Total Score 17 (severe anxiety) - - - - - 6 (mild anxiety)   Total Score 17 15 16 12 7 12 6     PROMIS 10-Global Health (only subscores and total score):   PROMIS-10 Scores Only 12/13/2021 3/18/2022 3/30/2022   Global Mental Health Score 11 15 13   Global Physical Health Score 15 14 14   PROMIS TOTAL - SUBSCORES 26 29 27         ASSESSMENT: Current Emotional / Mental Status (status of significant symptoms):   Risk status (Self / Other harm or suicidal ideation)   Patient denies current fears or concerns for personal safety.   Patient denies current or recent suicidal ideation or behaviors.   Patient denies current or recent homicidal ideation or behaviors.   Patient denies current or recent self injurious  behavior or ideation.   Patient denies other safety concerns.   Patient reports there has been no change in risk factors since their last session.     Patient reports there has been no change in protective factors since their last session.     Recommended that patient call 911 or go to the local ED should there be a change in any of these risk factors.     Appearance:   Appropriate    Eye Contact:   Good    Psychomotor Behavior: Normal    Attitude:   Cooperative    Orientation:   All   Speech    Rate / Production: Normal     Volume:  Normal    Mood:    Normal   Affect:    Appropriate    Thought Content:  Clear    Thought Form:  Coherent  Logical    Insight:    Good      Medication Review:   No changes to current psychiatric medication(s)     Medication Compliance:   Yes     Changes in Health Issues:   Yes: gastroentrolgy, Associated Psychological Distress     Chemical Use Review:   Substance Use: Chemical use reviewed, no active concerns identified      Tobacco Use: No current tobacco use.      Diagnosis:  Major Depressive Disorder Recurrent Mild  Generalized Anxiety Disorder  Post traumatic Stress Disorder    Collateral Reports Completed:   Not Applicable    PLAN: (Patient Tasks / Therapist Tasks / Other)  Continue to meet for individual therapy and help create routine and structure for house hold chores and responsiblities      [facility]AR[facility]                                                         ______________________________________________________________________    Individual Treatment Plan    Patient's Name: Sommer Gomez  YOB: 1980    Date of Creation: 3/18/22  Date Treatment Plan Last Reviewed/Revised:     DSM5 Diagnoses: 296.31 (F33.0) Major Depressive Disorder, Recurrent Episode, Mild _, 300.02 (F41.1) Generalized Anxiety Disorder or 309.81 (F43.10) Posttraumatic Stress Disorder (includes Posttraumatic Stress Disorder for Children 6 Years and Younger)  Without dissociative  "symptoms  Psychosocial / Contextual Factors: Individual/Family/Societal/Relational  PROMIS (reviewed every 90 days):     Referral / Collaboration:  Referral to another professional/service is not indicated at this time..    Anticipated number of session for this episode of care: 90 days  Anticipation frequency of session: Biweekly  Anticipated Duration of each session: 38-52 minutes  Treatment plan will be reviewed in 90 days or when goals have been changed.        MeasurableTreatment Goal(s) related to diagnosis / functional impairment(s)  Goal 1: Patient will utilize interventions and skills to better regulate negative emotions in difficult circumstances or experiences.  Also, if they are triggered in situations.    I will know I've met my goal when I am able to find positives in difficult circumstances.  Increase in motivation and less irritable.\"      Objective #A (Patient Action)                          Patient will identify specific strategies to more effectively address stressors  identify three initial signs or symptoms of anxiety.  Status: 90 days      Intervention(s)  Therapist will teach emotional recognition/identification. Utilizing CBT DBT ACT and mindfulness interventions.     Objective #B  Patient will identify at least 5 triggers for anxiety.  Status: 90 days      Intervention(s)  Therapist will teach emotional recognition/identification. utilizing CBT DBT ACT and mindfulness interventions.     Objective #C  Patient will Feel less tired and more energy during the day .  Status: 90 days      Intervention(s)  Therapist will teach emotional regulation skills. Utilizng CBT DBT ACT and mindfulness interventions.        Goal 2: Patient will reduce overall depression and anxiety    I will know I've met my goal when I am able to feel more motivation and have more energy.  I am able to be more mindful and in the present\"      Objective #A (Patient Action)                          Status: 90 " "days      Patient will use cognitive strategies identified in therapy to challenge anxious thoughts  Identify negative self-talk and behaviors: challenge core beliefs, myths, and actions.     Intervention(s)  Therapist will utilize CBT DBT ACT and mindfulness interventions.     Goal 3: Patiient will address traumatic experiences to reduce trauma response.    I will know I've met my goal when I am able to have less trauma response, flashbacks, nightmares, re-experiencing of past.  I can stay more in the present\"      Objective #A (Patient Action)                          Status: 90 days      Patient will identify specific strategies to more effectively address stressors  learn to utilize relaxation and cogntive coping skills to reduce and regulate emotions.     Intervention(s)  Therapist will teach emotional regulation skills. Utilizing CBT DBT ACT and mindfulness interventions.     Objective #B  Patient will address specific and targeted traumatic experiences to reduce negative trauma responses and overall impact of trauma.                 Status: 90 days      Intervention(s)  Therapist will utilize CBT DBT ACT and mindfulness interventions.  Narrative therapy and Exposure therapy.    Patient has reviewed and agreed to the above plan.      CHRISTIAN Mata  March 18, 2022          "

## 2022-04-22 ENCOUNTER — VIRTUAL VISIT (OUTPATIENT)
Dept: NUTRITION | Facility: CLINIC | Age: 42
End: 2022-04-22
Payer: COMMERCIAL

## 2022-04-22 DIAGNOSIS — E66.3 OVERWEIGHT: ICD-10-CM

## 2022-04-22 DIAGNOSIS — K52.9 COLITIS: Primary | ICD-10-CM

## 2022-04-22 PROCEDURE — 97802 MEDICAL NUTRITION INDIV IN: CPT | Mod: 95

## 2022-04-22 NOTE — PROGRESS NOTES
Type of service:  Video Visit    If the video visit is dropped, the video visit invitation should be resent by: Send to e-mail at: [email or web address]    Originating Location (pt. Location): Home  Distant Location (provider location): Home  Mode of Communication:  Video Conference via [employer] ([employer]    Video Start Time: 1:04pm  Video End Time (time video stopped): 1:59pm    How would patient like to obtain AVS? [facility]    Medical Nutrition Therapy  Visit Type:Initial assessment and intervention    Sommer Gomez presents today for MNT and education related to weight management.   She is accompanied by self.     ASSESSMENT:   Patient comments/concerns relating to nutrition: Says she has had stomach issues for years and had endoscopy and colonoscopy and both were normal.  Says she has added more fiber and water to the mix and is feeling better.     Says actively trying to drink more water. Would drink Propel in the past but would cause heartburn. Says she is looking for products with added fiber. Says tried to avoid whole grain since would cause issues for her.      Says she was working with her doctor for the past 6-9 months.  Also checked thyroid, allergies and gallbladder. Has been hospitalized for dehydration in the past when had stomach flu. Says now trying to drink 1 glass of water at lunch (16 oz)     Works as a [employer]. One family lives with her and peanut, treenut and sunflower seed allergy.  Says tried water in the morning and before bed but caused issues with heartburn with her hiatal hernia.    NUTRITION HISTORY:    Breakfast: Breakfast bar -banana bar/apple cinnamon OR Fiber One Granola bar  Lunch: Carbohydrate smart tortilla with cheese, chicken nuggets and ranch with 16 oz water  PM: 2 Propel bottles a day  Dinner: spaghetti with meat sauce, garlic bread, sometimes can of veggies or steamed veggies (corn, green beans) OR orange chicken with white rice  Snacks: cheese sticks (Rigo Lasha) OR granola bar  "OR Gold Fish OR oatmeal ball OR crackers/genna crackers  Beverages: Sports Drink (Gatorade, Propel, etc.)  2 bottles (16 oz each)/day and Water 0-1 bottle/day    Misses meals? none  Eats out:  seldom     Previous diet education:  No     Food allergies/intolerances: whole grain and beef caused stomach pain (trip to ER)    Dislikes: most fish, not much pork, broccoli     Diet is high in: carbs  Diet is low in: fiber, fruits and vegetables    EXERCISE: not assessed    SOCIO/ECONOMIC:   Lives with: son and family she Violetta for    MEDICATIONS:  Current Outpatient Medications   Medication     Acetaminophen (TYLENOL PO)     albuterol (PROAIR HFA/PROVENTIL HFA/VENTOLIN HFA) 108 (90 Base) MCG/ACT inhaler     famotidine (PEPCID) 40 MG tablet     FLUoxetine (PROZAC) 40 MG capsule     LORazepam (ATIVAN) 1 MG tablet     No current facility-administered medications for this visit.       LABS:  Last Basic Metabolic Panel:  Lab Results   Component Value Date     10/11/2021     10/24/2012      Lab Results   Component Value Date    POTASSIUM 4.0 10/11/2021    POTASSIUM 4.0 10/24/2012     Lab Results   Component Value Date    CHLORIDE 108 10/11/2021    CHLORIDE 105 10/24/2012     Lab Results   Component Value Date    NICKO 8.7 10/11/2021    NICKO 9.5 10/24/2012     Lab Results   Component Value Date    CO2 22 10/11/2021    CO2 24 10/24/2012     Lab Results   Component Value Date    BUN 9 10/11/2021    BUN 8 10/24/2012     Lab Results   Component Value Date    CR 0.67 10/11/2021    CR 0.71 10/24/2012     Lab Results   Component Value Date    GLC 86 10/11/2021    GLC 92 10/24/2012       ANTHROPOMETRICS:  Vitals: There were no vitals taken for this visit.  Estimated body mass index is 26.69 kg/m  as calculated from the following:    Height as of 1/25/22: 1.734 m (5' 8.25\").    Weight as of 1/25/22: 80.2 kg (176 lb 12.8 oz).       Wt Readings from Last 5 Encounters:   01/25/22 80.2 kg (176 lb 12.8 oz)   10/11/21 75.8 kg (167 " lb)   09/17/20 69.9 kg (154 lb)   09/09/19 70.8 kg (156 lb)   08/12/19 72.5 kg (159 lb 12.8 oz)       Weight Change: gained 9.8 lbs in 3 months (from Oct-January). No new weight available.    ESTIMATED KCAL REQUIREMENTS:  1836 kcal per day (based on last clinic weight from 1/25/22)    NUTRITION DIAGNOSIS: Altered GI function related to constipation as evidenced by patient stating prior to adding more fiber and fluid, would only have 1 bowel movement a week and had stomach pain sometimes after eating.    NUTRITION INTERVENTION:  Nutrition Prescription: Fluid Intake: work up to 8 cups/day  Fiber: gradually increase -ideal is 12 gm/1000 kcals (22 gm/day)  Education given to support: general nutrition guidelines, consistent meals, fiber, behavior modification, heart healthy diet and adequate hydration  Education Materials Provided: High Fiber Diet  Motivational Interviewing    Discussion: Myriam's main concern today is regarding trying to get more fluid and fiber.  Her doctor thinks that both of these issues- not enough hydration and not much fiber, are causing her stomach pain.  Says she has made changes to drink a big glass (16 oz) of water at lunch and drink 2 bottles of Propel a day and at last grocery visit, was looking for higher fiber breakfast bars and found some Fiber One bars as well. She is looking for help to increase both fluid and fiber.    Commended her on fluids and informed Myriam a good starting point is to aim for about 8 cups of fluid a day and just like with lunch, sometimes easier to assign a meal or snack to add another beverage.  Reviewed other hydrating fluids such as herbal and decaf tea or decaf coffee, glass of milk, soup and popsicles in moderation. 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    Next discussed higher fiber foods such as whole grains, beans and legumes, nuts  and seeds and fruits and vegetables. She tries to keep nuts/tree nuts out of the house due to allergies (of child she watches) and she had more stomach pain in the past with whole grains, but is open to trying to add more fruits and vegetables and possibly beans.  Will send her a handout on more higher fiber foods for variety but encouraged her to gradually increase and did caution may have some gas/bloating if increases too much too soon.  Since she is already eating a higher fiber bar in the morning and sometimes includes some fruit with dinner, suggested eating fruit more often with dinner (or eat at breakfast) and try to add a veggie during the day. Also discussed possible benefit of yogurt to add good probiotics and to try Activia if continues to have issues with constipation. Pt verbalized understanding of concepts discussed and recommendations provided.         PATIENT'S BEHAVIOR CHANGE GOALS:   See Patient Instructions for patient stated behavior change goals. AVS was printed and given to patient at today's appointment.    MONITOR / EVALUATE:  RD will monitor/evaluate:  Food / Beverage / Nutrient intake   Pertinent Labs  Progress toward meeting stated nutrition-related goals  Weight change    FOLLOW-UP:  Call RD with questions/concerns.   Follow-up appointment scheduled on 6/3/22.     Silke Garcia RDN, ISAAC, [facility]   Time spent in minutes: 55 minutes  Encounter: Individual

## 2022-04-22 NOTE — PATIENT INSTRUCTIONS
Keep drinking water with your lunch meal- looks like with this and 2 bottles of Propel, are up to 6 cups a day.  -Goal to try to drink 8 cups a day and see how you feel  -Other fluid ideas: soup or low-sodium broth, milk, decaf or herbal tea (try the raspberry herbal tea either hot or brew and then chill- has pretty good flavor).  Juice and fruit popsicles also hydrate but tend to be higher calorie due to the natural sugar.    2. Try to eat yogurt and fruit for breakfast (Activia is helpful with regularity and constipation but all yogurts have probiotics, which help add some good bacteria to help with digestion), cottage cheese and fruit and/or Kashi/Breakfast Bar/Fiber One or oatmeal all good sources of fiber.  -Good fiber sources is 3 gm of fiber or more per serving    3. Try to plan in a fruit and veggie into each day, either on side or with the meal  -Add the extra diced or crushed tomatoes and garlic with spaghetti sauce.   -Prunes are helpful with regularity but can also see if plums help  -Continue to offer cut fruit with dinner  -Add another veggies with dinner that is cold/easy to eat (carrots, celery, tomatoes) or with another meal or snack.     4. Want to gradually add fiber to the diet since too much at once may cause an upset stomach since your body is not used to it and needs to build some good gut bacteria to help break fiber down.    FOLLOW UP APPOINTMENT: Video visit follow up on Friday, June 3rd at 8am    Silke Garcia RDN, ISAAC, [facility]   [phone]

## 2022-04-27 ENCOUNTER — VIRTUAL VISIT (OUTPATIENT)
Dept: PSYCHOLOGY | Facility: CLINIC | Age: 42
End: 2022-04-27
Payer: COMMERCIAL

## 2022-04-27 DIAGNOSIS — F41.1 GENERALIZED ANXIETY DISORDER: ICD-10-CM

## 2022-04-27 DIAGNOSIS — F43.10 POST TRAUMATIC STRESS DISORDER (PTSD): ICD-10-CM

## 2022-04-27 DIAGNOSIS — F33.0 MAJOR DEPRESSIVE DISORDER, RECURRENT EPISODE, MILD WITH ANXIOUS DISTRESS (H): Primary | ICD-10-CM

## 2022-04-27 PROCEDURE — 90834 PSYTX W PT 45 MINUTES: CPT | Mod: 95 | Performed by: PSYCHOLOGIST

## 2022-05-02 NOTE — PROGRESS NOTES
[facility]                                     Progress Note    Patient Name: Sommer Gomez  Date: 4/27/22         Service Type: Individual      Session Start Time: 7:05 AM  Session End Time: 7:52 AM     Session Length: 38-52 min    Session #: 30    Attendees: Client    Service Modality:  Video Visit:      Provider verified identity through the following two step process.  Patient provided:  Patient photo    Telemedicine Visit: The patient's condition can be safely assessed and treated via synchronous audio and visual telemedicine encounter.      Reason for Telemedicine Visit: Services only offered telehealth    Originating Site (Patient Location): Patient's home    Distant Site (Provider Location): Provider Remote Setting- Home Office    Consent:  The patient/guardian has verbally consented to: the potential risks and benefits of telemedicine (video visit) versus in person care; bill my insurance or make self-payment for services provided; and responsibility for payment of non-covered services.     Patient would like the video invitation sent by:  Text to cell phone: [phone]    Mode of Communication:  Video Conference via [employer]    As the provider I attest to compliance with applicable laws and regulations related to telemedicine.    DATA  Interactive Complexity: No  Crisis: No        Progress Since Last Session (Related to Symptoms / Goals / Homework):   Symptoms: No change Patient expressed minimal to no changes in symptoms and behaviors    Homework: Completed in session      Episode of Care Goals: Minimal progress - ACTION (Actively working towards change); Intervened by reinforcing change plan / affirming steps taken     Current / Ongoing Stressors and Concerns:   Individual/Family/societal/Relational  Patient was on time and present for the session.  Patient expressed that they are doing well.  They expressed the most stabilized their life has been for some time.  Due to feeling  stable, expressed progressing in trauma work and narrative.         Treatment Objective(s) Addressed in This Session:   identify effective strategies to more effectively address stressors       Intervention:   Motivational Interviewing: Expressed Empathy/Understanding, Supported Autonomy, Collaboration, Evocation, Permission to raise concern or advise, Open-ended questions, Reflections: simple and complex, Change talk (evoked) and Reframe     Assessments completed prior to visit:  PHQ9:   PHQ-9 SCORE 12/13/2021 1/13/2022 1/27/2022 2/4/2022 2/15/2022 3/18/2022 3/30/2022   PHQ-9 Total Score - - - - - - -   PHQ-9 Total Score MyChart 3 (Minimal depression) 6 (Mild depression) 4 (Minimal depression) 3 (Minimal depression) 6 (Mild depression) 4 (Minimal depression) 4 (Minimal depression)   PHQ-9 Total Score 3 6 4 3 6 4 4     GAD7:   ROSA-7 SCORE 8/11/2020 9/15/2020 12/1/2020 1/20/2021 2/26/2021 6/18/2021 12/13/2021   Total Score - - - - - - -   Total Score 17 (severe anxiety) - - - - - 6 (mild anxiety)   Total Score 17 15 16 12 7 12 6     PROMIS 10-Global Health (only subscores and total score):   PROMIS-10 Scores Only 12/13/2021 3/18/2022 3/30/2022   Global Mental Health Score 11 15 13   Global Physical Health Score 15 14 14   PROMIS TOTAL - SUBSCORES 26 29 27         ASSESSMENT: Current Emotional / Mental Status (status of significant symptoms):   Risk status (Self / Other harm or suicidal ideation)   Patient denies current fears or concerns for personal safety.   Patient denies current or recent suicidal ideation or behaviors.   Patient denies current or recent homicidal ideation or behaviors.   Patient denies current or recent self injurious behavior or ideation.   Patient denies other safety concerns.   Patient reports there has been no change in risk factors since their last session.     Patient reports there has been no change in protective factors since their last session.     Recommended that patient call 911 or  go to the local ED should there be a change in any of these risk factors.     Appearance:   Appropriate    Eye Contact:   Good    Psychomotor Behavior: Normal    Attitude:   Cooperative    Orientation:   All   Speech    Rate / Production: Normal     Volume:  Normal    Mood:    Normal   Affect:    Appropriate    Thought Content:  Clear    Thought Form:  Coherent  Logical    Insight:    Good      Medication Review:   No changes to current psychiatric medication(s)     Medication Compliance:   Yes     Changes in Health Issues:   Yes: gastroentrolgy, Associated Psychological Distress     Chemical Use Review:   Substance Use: Chemical use reviewed, no active concerns identified      Tobacco Use: No current tobacco use.      Diagnosis:  Major Depressive Disorder Recurrent Mild  Generalized Anxiety Disorder  Post traumatic Stress Disorder    Collateral Reports Completed:   Not Applicable    PLAN: (Patient Tasks / Therapist Tasks / Other)  Continue to meet for individual therapy and watch trauma podcast episode 1      Misbah Sandoval [facility]                                                         ______________________________________________________________________    Individual Treatment Plan    Patient's Name: Sommer Gomez  YOB: 1980    Date of Creation: 3/18/22  Date Treatment Plan Last Reviewed/Revised:     DSM5 Diagnoses: 296.31 (F33.0) Major Depressive Disorder, Recurrent Episode, Mild _, 300.02 (F41.1) Generalized Anxiety Disorder or 309.81 (F43.10) Posttraumatic Stress Disorder (includes Posttraumatic Stress Disorder for Children 6 Years and Younger)  Without dissociative symptoms  Psychosocial / Contextual Factors: Individual/Family/Societal/Relational  PROMIS (reviewed every 90 days):     Referral / Collaboration:  Referral to another professional/service is not indicated at this time..    Anticipated number of session for this episode of care: 90 days  Anticipation frequency of session:  "Biweekly  Anticipated Duration of each session: 38-52 minutes  Treatment plan will be reviewed in 90 days or when goals have been changed.        MeasurableTreatment Goal(s) related to diagnosis / functional impairment(s)  Goal 1: Patient will utilize interventions and skills to better regulate negative emotions in difficult circumstances or experiences.  Also, if they are triggered in situations.    I will know I've met my goal when I am able to find positives in difficult circumstances.  Increase in motivation and less irritable.\"      Objective #A (Patient Action)                          Patient will identify specific strategies to more effectively address stressors  identify three initial signs or symptoms of anxiety.  Status: 90 days      Intervention(s)  Therapist will teach emotional recognition/identification. Utilizing CBT DBT ACT and mindfulness interventions.     Objective #B  Patient will identify at least 5 triggers for anxiety.  Status: 90 days      Intervention(s)  Therapist will teach emotional recognition/identification. utilizing CBT DBT ACT and mindfulness interventions.     Objective #C  Patient will Feel less tired and more energy during the day .  Status: 90 days      Intervention(s)  Therapist will teach emotional regulation skills. Utilizng CBT DBT ACT and mindfulness interventions.        Goal 2: Patient will reduce overall depression and anxiety    I will know I've met my goal when I am able to feel more motivation and have more energy.  I am able to be more mindful and in the present\"      Objective #A (Patient Action)                          Status: 90 days      Patient will use cognitive strategies identified in therapy to challenge anxious thoughts  Identify negative self-talk and behaviors: challenge core beliefs, myths, and actions.     Intervention(s)  Therapist will utilize CBT DBT ACT and mindfulness interventions.     Goal 3: Patiient will address traumatic experiences to reduce " "trauma response.    I will know I've met my goal when I am able to have less trauma response, flashbacks, nightmares, re-experiencing of past.  I can stay more in the present\"      Objective #A (Patient Action)                          Status: 90 days      Patient will identify specific strategies to more effectively address stressors  learn to utilize relaxation and cogntive coping skills to reduce and regulate emotions.     Intervention(s)  Therapist will teach emotional regulation skills. Utilizing CBT DBT ACT and mindfulness interventions.     Objective #B  Patient will address specific and targeted traumatic experiences to reduce negative trauma responses and overall impact of trauma.                 Status: 90 days      Intervention(s)  Therapist will utilize CBT DBT ACT and mindfulness interventions.  Narrative therapy and Exposure therapy.    Patient has reviewed and agreed to the above plan.      Misbah Sandoval [facility]  March 18, 2022        "

## 2022-05-11 ENCOUNTER — VIRTUAL VISIT (OUTPATIENT)
Dept: PSYCHOLOGY | Facility: CLINIC | Age: 42
End: 2022-05-11
Payer: COMMERCIAL

## 2022-05-11 DIAGNOSIS — F43.10 POST TRAUMATIC STRESS DISORDER (PTSD): ICD-10-CM

## 2022-05-11 DIAGNOSIS — F33.0 MAJOR DEPRESSIVE DISORDER, RECURRENT EPISODE, MILD WITH ANXIOUS DISTRESS (H): Primary | ICD-10-CM

## 2022-05-11 DIAGNOSIS — F41.1 GENERALIZED ANXIETY DISORDER: ICD-10-CM

## 2022-05-11 PROCEDURE — 90834 PSYTX W PT 45 MINUTES: CPT | Mod: 95 | Performed by: PSYCHOLOGIST

## 2022-05-12 ASSESSMENT — PATIENT HEALTH QUESTIONNAIRE - PHQ9: SUM OF ALL RESPONSES TO PHQ QUESTIONS 1-9: 3

## 2022-05-17 NOTE — PROGRESS NOTES
[facility]                                     Progress Note    Patient Name: Sommer Gomez  Date: 5/11/22         Service Type: Individual      Session Start Time: 7:05 AM  Session End Time: 7:52 AM     Session Length: 38-52 min    Session #: 31    Attendees: Client    Service Modality:  Video Visit:      Provider verified identity through the following two step process.  Patient provided:  Patient photo    Telemedicine Visit: The patient's condition can be safely assessed and treated via synchronous audio and visual telemedicine encounter.      Reason for Telemedicine Visit: Services only offered telehealth    Originating Site (Patient Location): Patient's home    Distant Site (Provider Location): Provider Remote Setting- Home Office    Consent:  The patient/guardian has verbally consented to: the potential risks and benefits of telemedicine (video visit) versus in person care; bill my insurance or make self-payment for services provided; and responsibility for payment of non-covered services.     Patient would like the video invitation sent by:  Text to cell phone: [phone]    Mode of Communication:  Video Conference via Amwell    As the provider I attest to compliance with applicable laws and regulations related to telemedicine.    DATA  Interactive Complexity: No  Crisis: No        Progress Since Last Session (Related to Symptoms / Goals / Homework):   Symptoms: Improving Patient expressed and exhibited improvement in symptoms and behaviors    Homework: Completed in session      Episode of Care Goals: Minimal progress - ACTION (Actively working towards change); Intervened by reinforcing change plan / affirming steps taken     Current / Ongoing Stressors and Concerns:   Individual/Family/societal/Relational  Patient was on time and present for the session.  Patient expressed that they are doing well.  They discussed some relational conflict due to what they think is borderline  hoarding.  They expressed it is not dirty or smelly but they have a lot of stuff.  They would like to work on going through and getting rid of items and belongings they no longer need.  Discussed this is hard with being sentimentally attached at times. Processed and explored their attachments to belongings.         Treatment Objective(s) Addressed in This Session:   Identify negative self-talk and behaviors: challenge core beliefs, myths, and actions       Intervention:   CBT: Negative distortions  Motivational Interviewing: Expressed Empathy/Understanding, Supported Autonomy, Collaboration, Evocation, Permission to raise concern or advise, Open-ended questions, Reflections: simple and complex, Change talk (evoked) and Reframe     Assessments completed prior to visit:  PHQ9:   PHQ-9 SCORE 1/13/2022 1/27/2022 2/4/2022 2/15/2022 3/18/2022 3/30/2022 5/11/2022   PHQ-9 Total Score - - - - - - -   PHQ-9 Total Score MyChart 6 (Mild depression) 4 (Minimal depression) 3 (Minimal depression) 6 (Mild depression) 4 (Minimal depression) 4 (Minimal depression) 3 (Minimal depression)   PHQ-9 Total Score 6 4 3 6 4 4 3     GAD7:   ROSA-7 SCORE 8/11/2020 9/15/2020 12/1/2020 1/20/2021 2/26/2021 6/18/2021 12/13/2021   Total Score - - - - - - -   Total Score 17 (severe anxiety) - - - - - 6 (mild anxiety)   Total Score 17 15 16 12 7 12 6     PROMIS 10-Global Health (only subscores and total score):   PROMIS-10 Scores Only 12/13/2021 3/18/2022 3/30/2022   Global Mental Health Score 11 15 13   Global Physical Health Score 15 14 14   PROMIS TOTAL - SUBSCORES 26 29 27         ASSESSMENT: Current Emotional / Mental Status (status of significant symptoms):   Risk status (Self / Other harm or suicidal ideation)   Patient denies current fears or concerns for personal safety.   Patient denies current or recent suicidal ideation or behaviors.   Patient denies current or recent homicidal ideation or behaviors.   Patient denies current or recent self  injurious behavior or ideation.   Patient denies other safety concerns.   Patient reports there has been no change in risk factors since their last session.     Patient reports there has been no change in protective factors since their last session.     Recommended that patient call 911 or go to the local ED should there be a change in any of these risk factors.     Appearance:   Appropriate    Eye Contact:   Good    Psychomotor Behavior: Normal    Attitude:   Cooperative    Orientation:   All   Speech    Rate / Production: Normal     Volume:  Normal    Mood:    Normal   Affect:    Appropriate    Thought Content:  Clear    Thought Form:  Coherent  Logical    Insight:    Good      Medication Review:   No changes to current psychiatric medication(s)     Medication Compliance:   Yes     Changes in Health Issues:   Yes: gastroentrolgy, Associated Psychological Distress     Chemical Use Review:   Substance Use: Chemical use reviewed, no active concerns identified      Tobacco Use: No current tobacco use.      Diagnosis:  Major Depressive Disorder Recurrent Mild  Generalized Anxiety Disorder  Post traumatic Stress Disorder    Collateral Reports Completed:   Not Applicable    PLAN: (Patient Tasks / Therapist Tasks / Other)  Continue to meet for individual therapy and watch trauma podcast episode 1      [facility]isbah Sandoval [facility]                                                         ______________________________________________________________________    Individual Treatment Plan    Patient's Name: Sommer Gomez  YOB: 1980    Date of Creation: 3/18/22  Date Treatment Plan Last Reviewed/Revised:     DSM5 Diagnoses: 296.31 (F33.0) Major Depressive Disorder, Recurrent Episode, Mild _, 300.02 (F41.1) Generalized Anxiety Disorder or 309.81 (F43.10) Posttraumatic Stress Disorder (includes Posttraumatic Stress Disorder for Children 6 Years and Younger)  Without dissociative symptoms  Psychosocial / Contextual  "Factors: Individual/Family/Societal/Relational  PROMIS (reviewed every 90 days):     Referral / Collaboration:  Referral to another professional/service is not indicated at this time..    Anticipated number of session for this episode of care: 90 days  Anticipation frequency of session: Biweekly  Anticipated Duration of each session: 38-52 minutes  Treatment plan will be reviewed in 90 days or when goals have been changed.        MeasurableTreatment Goal(s) related to diagnosis / functional impairment(s)  Goal 1: Patient will utilize interventions and skills to better regulate negative emotions in difficult circumstances or experiences.  Also, if they are triggered in situations.    I will know I've met my goal when I am able to find positives in difficult circumstances.  Increase in motivation and less irritable.\"      Objective #A (Patient Action)                          Patient will identify specific strategies to more effectively address stressors  identify three initial signs or symptoms of anxiety.  Status: 90 days      Intervention(s)  Therapist will teach emotional recognition/identification. Utilizing CBT DBT ACT and mindfulness interventions.     Objective #B  Patient will identify at least 5 triggers for anxiety.  Status: 90 days      Intervention(s)  Therapist will teach emotional recognition/identification. utilizing CBT DBT ACT and mindfulness interventions.     Objective #C  Patient will Feel less tired and more energy during the day .  Status: 90 days      Intervention(s)  Therapist will teach emotional regulation skills. Utilizng CBT DBT ACT and mindfulness interventions.        Goal 2: Patient will reduce overall depression and anxiety    I will know I've met my goal when I am able to feel more motivation and have more energy.  I am able to be more mindful and in the present\"      Objective #A (Patient Action)                          Status: 90 days      Patient will use cognitive strategies " "identified in therapy to challenge anxious thoughts  Identify negative self-talk and behaviors: challenge core beliefs, myths, and actions.     Intervention(s)  Therapist will utilize CBT DBT ACT and mindfulness interventions.     Goal 3: Patiient will address traumatic experiences to reduce trauma response.    I will know I've met my goal when I am able to have less trauma response, flashbacks, nightmares, re-experiencing of past.  I can stay more in the present\"      Objective #A (Patient Action)                          Status: 90 days      Patient will identify specific strategies to more effectively address stressors  learn to utilize relaxation and cogntive coping skills to reduce and regulate emotions.     Intervention(s)  Therapist will teach emotional regulation skills. Utilizing CBT DBT ACT and mindfulness interventions.     Objective #B  Patient will address specific and targeted traumatic experiences to reduce negative trauma responses and overall impact of trauma.                 Status: 90 days      Intervention(s)  Therapist will utilize CBT DBT ACT and mindfulness interventions.  Narrative therapy and Exposure therapy.    Patient has reviewed and agreed to the above plan.      [facility] Sandoval [facility]  March 18, 2022      Answers for HPI/ROS submitted by the patient on 5/11/2022  If you checked off any problems, how difficult have these problems made it for you to do your work, take care of things at home, or get along with other people?: Somewhat difficult  PHQ9 TOTAL SCORE: 3      "

## 2022-06-02 ENCOUNTER — VIRTUAL VISIT (OUTPATIENT)
Dept: PSYCHOLOGY | Facility: CLINIC | Age: 42
End: 2022-06-02
Payer: COMMERCIAL

## 2022-06-02 DIAGNOSIS — F43.10 POST TRAUMATIC STRESS DISORDER (PTSD): ICD-10-CM

## 2022-06-02 DIAGNOSIS — F41.1 GENERALIZED ANXIETY DISORDER: ICD-10-CM

## 2022-06-02 DIAGNOSIS — F33.0 MAJOR DEPRESSIVE DISORDER, RECURRENT EPISODE, MILD WITH ANXIOUS DISTRESS (H): Primary | ICD-10-CM

## 2022-06-02 PROCEDURE — 90834 PSYTX W PT 45 MINUTES: CPT | Mod: 95 | Performed by: PSYCHOLOGIST

## 2022-06-02 ASSESSMENT — PATIENT HEALTH QUESTIONNAIRE - PHQ9
SUM OF ALL RESPONSES TO PHQ QUESTIONS 1-9: 5
SUM OF ALL RESPONSES TO PHQ QUESTIONS 1-9: 5
10. IF YOU CHECKED OFF ANY PROBLEMS, HOW DIFFICULT HAVE THESE PROBLEMS MADE IT FOR YOU TO DO YOUR WORK, TAKE CARE OF THINGS AT HOME, OR GET ALONG WITH OTHER PEOPLE: SOMEWHAT DIFFICULT

## 2022-06-02 ASSESSMENT — ANXIETY QUESTIONNAIRES
3. WORRYING TOO MUCH ABOUT DIFFERENT THINGS: SEVERAL DAYS
GAD7 TOTAL SCORE: 6
8. IF YOU CHECKED OFF ANY PROBLEMS, HOW DIFFICULT HAVE THESE MADE IT FOR YOU TO DO YOUR WORK, TAKE CARE OF THINGS AT HOME, OR GET ALONG WITH OTHER PEOPLE?: SOMEWHAT DIFFICULT
7. FEELING AFRAID AS IF SOMETHING AWFUL MIGHT HAPPEN: NOT AT ALL
7. FEELING AFRAID AS IF SOMETHING AWFUL MIGHT HAPPEN: NOT AT ALL
4. TROUBLE RELAXING: SEVERAL DAYS
1. FEELING NERVOUS, ANXIOUS, OR ON EDGE: MORE THAN HALF THE DAYS
GAD7 TOTAL SCORE: 6
6. BECOMING EASILY ANNOYED OR IRRITABLE: SEVERAL DAYS
5. BEING SO RESTLESS THAT IT IS HARD TO SIT STILL: SEVERAL DAYS
2. NOT BEING ABLE TO STOP OR CONTROL WORRYING: NOT AT ALL
GAD7 TOTAL SCORE: 6

## 2022-06-03 ENCOUNTER — VIRTUAL VISIT (OUTPATIENT)
Dept: NUTRITION | Facility: CLINIC | Age: 42
End: 2022-06-03
Payer: COMMERCIAL

## 2022-06-03 DIAGNOSIS — K52.9 COLITIS: Primary | ICD-10-CM

## 2022-06-03 DIAGNOSIS — E66.3 OVERWEIGHT: ICD-10-CM

## 2022-06-03 PROCEDURE — 97803 MED NUTRITION INDIV SUBSEQ: CPT | Mod: 95

## 2022-06-03 PROCEDURE — 99207 PR DROP WITH A PROCEDURE: CPT

## 2022-06-03 NOTE — PATIENT INSTRUCTIONS
Continue to drink at least 8 cups of fluid a day  -Great solution to buy more water bottles!    2. Continue to include high fiber foods daily - ideally spread between meals and snacks (beans, fruits and vegetables and oats)  -Continue to try to plan in at least 1 serving fruits and vegetables daily  -Aiming for working up to at least 22 gm fiber daily.    FOLLOW UP: Please reach out by MyChart with questions or call if a follow up is desired in the future ([phone]).    Thanks,  Silke Garcia,  LISAN, LD, [facility]

## 2022-06-03 NOTE — PROGRESS NOTES
Patient not join video chat by 8:05am so sent both text and email invites.    Patient joined at 8:07am.    Type of service:  Video Visit    If the video visit is dropped, the video visit invitation should be resent by: Send to e-mail at: [email or web address]    Originating Location (pt. Location): Home  Distant Location (provider location): Home  Mode of Communication:  Video Conference via Vortal    Video Start Time: 8:07am  Video End Time (time video stopped): 8:21am    How would patient like to obtain AVS? [facility]    Medical Nutrition Therapy  Visit Type:Reassessment and intervention    Sommer Gomez presents today for MNT and education related to weight management.   She is accompanied by self.     ASSESSMENT:   Patient comments/concerns relating to nutrition: Says it has been really nice and felt better than she has in years. Says has to focus on on liquids- if the water bottle is not around, will forget to drink. Says bought another 2 water bottles.     Says she is eating more things than before.  Prior to meeting, was told to keep taking out food.  Says for example, would drink a lot of lemonade and feel bed but it was what her body needs.  Says not perfect- not feel great all the time but not like before- no longer in pain or feeling like certain foods are causing.    NUTRITION HISTORY:    Breakfast: Life cereal with milk and water OR granola bar OR oatmeal balls (higher Fiber)  Lunch: chicken andrés  Dinner: chicken brat on bun with apple and cheese and fruit (strawberries or raspberries) OR chili   Snacks: not consistently - if hungry will eat apple, banana OR Fiber One  Beverages: Juice 1 cup/day, Milk 1x/day, Sports Drink (Gatorade, Propel, etc.)  1-2 bottles a day and Water 18-36 oz/day    Misses meals? none  Eats out:  seldom     Previous diet education:  Yes     Food allergies/intolerances: whole grain and beef caused stomach pain (trip to ER)    Diet is high in: carbs at some  "sittings  Diet is low in: vegetables    EXERCISE: Trampoline or walking/goes to the park almost every day.    SOCIO/ECONOMIC:   Lives with: son and family she Nanaishaes for    MEDICATIONS:  Current Outpatient Medications   Medication     Acetaminophen (TYLENOL PO)     albuterol (PROAIR HFA/PROVENTIL HFA/VENTOLIN HFA) 108 (90 Base) MCG/ACT inhaler     famotidine (PEPCID) 40 MG tablet     FLUoxetine (PROZAC) 40 MG capsule     LORazepam (ATIVAN) 1 MG tablet     No current facility-administered medications for this visit.       LABS:  Last Basic Metabolic Panel:  Lab Results   Component Value Date     10/11/2021     10/24/2012      Lab Results   Component Value Date    POTASSIUM 4.0 10/11/2021    POTASSIUM 4.0 10/24/2012     Lab Results   Component Value Date    CHLORIDE 108 10/11/2021    CHLORIDE 105 10/24/2012     Lab Results   Component Value Date    NICKO 8.7 10/11/2021    NICKO 9.5 10/24/2012     Lab Results   Component Value Date    CO2 22 10/11/2021    CO2 24 10/24/2012     Lab Results   Component Value Date    BUN 9 10/11/2021    BUN 8 10/24/2012     Lab Results   Component Value Date    CR 0.67 10/11/2021    CR 0.71 10/24/2012     Lab Results   Component Value Date    GLC 86 10/11/2021    GLC 92 10/24/2012       ANTHROPOMETRICS:  Vitals: There were no vitals taken for this visit.  Estimated body mass index is 26.69 kg/m  as calculated from the following:    Height as of 1/25/22: 1.734 m (5' 8.25\").    Weight as of 1/25/22: 80.2 kg (176 lb 12.8 oz).       Wt Readings from Last 5 Encounters:   01/25/22 80.2 kg (176 lb 12.8 oz)   10/11/21 75.8 kg (167 lb)   09/17/20 69.9 kg (154 lb)   09/09/19 70.8 kg (156 lb)   08/12/19 72.5 kg (159 lb 12.8 oz)       Weight Change: unable to assess- no new weight    ESTIMATED KCAL REQUIREMENTS:  1836 kcal per day (based on last clinic weight from 1/25/22)    NUTRITION DIAGNOSIS: Altered GI function related to inadequate fluid and fiber as evidenced by patient feeling much " better with adequate hydration, spreading out fiber and eating larger variety of food.    NUTRITION INTERVENTION:  Nutrition Prescription: Fluid Intake: work up to 8 cups/day  Fiber: gradually increase -ideal is 12 gm/1000 kcals (22 gm/day)  Education given to support: general nutrition guidelines, consistent meals, exercise, fiber, behavior modification, portion control and heart healthy diet  Motivational Interviewing    Discussion: Myriam feels things are going well today- she is feeling a lot better than she has in years. Denies any questions or concerns today.  She feels drinking more fluids has been a big help and her variety of foods has improved.  Has some days she feels off but unable to pinpoint any problem foods.  Feels the increased fiber has been going well so far.  She did some problem solving already and found if she forgets her water bottle, does not do as well with hydration so bought a few more. Commended her on problem solving and finding a solution to try to make it easier to sustain her changes.      Commended her on mostly meeting goal to increase fruits and vegetables but eating fruit daily, usually for snack, and often with dinner. No additional changes recommended today and encouraged Myriam to sustain drinking more fluids, aiming for 8 cups a day, and including more fiber, ideally with meals and snacks.  Pt verbalized understanding of concepts discussed and recommendations provided.         PATIENT'S BEHAVIOR CHANGE GOALS:   See Patient Instructions for patient stated behavior change goals. AVS sent by [employer].    MONITOR / EVALUATE:  RD will monitor/evaluate:  Food / Beverage / Nutrient intake   Pertinent Labs  Progress toward meeting stated nutrition-related goals  Weight change    FOLLOW-UP:  Follow up with RD as needed.  Call RD with questions/concerns. Patient feeling good today and feels on right track and has the information she needs. Declines follow up at this time but will reach  out if follow up desired.    Silke Garcia RDN, LD, [facility]   Time spent in minutes: 14 minutes  Encounter: Individual

## 2022-06-09 NOTE — PROGRESS NOTES
"[facility]                                     Progress Note    Patient Name: Sommer Gomez  Date: 6/2/22         Service Type: Individual      Session Start Time: 1:30 PM  Session End Time: 2:20 PM     Session Length: 38-52 min    Session #: 32    Attendees: Client    Service Modality:  Video Visit:      Provider verified identity through the following two step process.  Patient provided:  Patient photo    Telemedicine Visit: The patient's condition can be safely assessed and treated via synchronous audio and visual telemedicine encounter.      Reason for Telemedicine Visit: Services only offered telehealth    Originating Site (Patient Location): Patient's home    Distant Site (Provider Location): Provider Remote Setting- Home Office    Consent:  The patient/guardian has verbally consented to: the potential risks and benefits of telemedicine (video visit) versus in person care; bill my insurance or make self-payment for services provided; and responsibility for payment of non-covered services.     Patient would like the video invitation sent by:  Text to cell phone: [phone]    Mode of Communication:  Video Conference via Amwell    As the provider I attest to compliance with applicable laws and regulations related to telemedicine.    DATA  Interactive Complexity: No  Crisis: No        Progress Since Last Session (Related to Symptoms / Goals / Homework):   Symptoms: No change Patient and expressed no change in symptoms and behaviors    Homework: Completed in session      Episode of Care Goals: Minimal progress - ACTION (Actively working towards change); Intervened by reinforcing change plan / affirming steps taken     Current / Ongoing Stressors and Concerns:   Individual/Family/societal/Relational  Patient was on time and present for the session.  Patient expressed that they are doing \"okay\"  They have been under duress with relational conflict with partner.  Largely due to lack of " communication and financial stressors, it has exacerbated conflict between the two, this has been triggering due to patients past relational experiences.  Helped process, explore and reframe experiences.        Treatment Objective(s) Addressed in This Session:   Identify negative self-talk and behaviors: challenge core beliefs, myths, and actions  relational conflict       Intervention:   CBT: Negative distortions  Motivational Interviewing: Expressed Empathy/Understanding, Supported Autonomy, Collaboration, Evocation, Permission to raise concern or advise, Open-ended questions, Reflections: simple and complex, Change talk (evoked) and Reframe     Assessments completed prior to visit:  PHQ9:   PHQ-9 SCORE 1/27/2022 2/4/2022 2/15/2022 3/18/2022 3/30/2022 5/11/2022 6/2/2022   PHQ-9 Total Score - - - - - - -   PHQ-9 Total Score MyChart 4 (Minimal depression) 3 (Minimal depression) 6 (Mild depression) 4 (Minimal depression) 4 (Minimal depression) 3 (Minimal depression) 5 (Mild depression)   PHQ-9 Total Score 4 3 6 4 4 3 5     GAD7:   ROSA-7 SCORE 9/15/2020 12/1/2020 1/20/2021 2/26/2021 6/18/2021 12/13/2021 6/2/2022   Total Score - - - - - - -   Total Score - - - - - 6 (mild anxiety) 6 (mild anxiety)   Total Score 15 16 12 7 12 6 6     PROMIS 10-Global Health (only subscores and total score):   PROMIS-10 Scores Only 12/13/2021 3/18/2022 3/30/2022   Global Mental Health Score 11 15 13   Global Physical Health Score 15 14 14   PROMIS TOTAL - SUBSCORES 26 29 27         ASSESSMENT: Current Emotional / Mental Status (status of significant symptoms):   Risk status (Self / Other harm or suicidal ideation)   Patient denies current fears or concerns for personal safety.   Patient denies current or recent suicidal ideation or behaviors.   Patient denies current or recent homicidal ideation or behaviors.   Patient denies current or recent self injurious behavior or ideation.   Patient denies other safety concerns.   Patient reports  there has been no change in risk factors since their last session.     Patient reports there has been no change in protective factors since their last session.     Recommended that patient call 911 or go to the local ED should there be a change in any of these risk factors.     Appearance:   Appropriate    Eye Contact:   Good    Psychomotor Behavior: Normal    Attitude:   Cooperative    Orientation:   All   Speech    Rate / Production: Normal     Volume:  Normal    Mood:    Normal   Affect:    Appropriate    Thought Content:  Clear    Thought Form:  Coherent  Logical    Insight:    Good      Medication Review:   No changes to current psychiatric medication(s)     Medication Compliance:   Yes     Changes in Health Issues:   Yes: gastroentrolgy, Associated Psychological Distress     Chemical Use Review:   Substance Use: Chemical use reviewed, no active concerns identified      Tobacco Use: No current tobacco use.      Diagnosis:  Major Depressive Disorder Recurrent Mild  Generalized Anxiety Disorder  Post traumatic Stress Disorder    Collateral Reports Completed:   Not Applicable    PLAN: (Patient Tasks / Therapist Tasks / Other)  Continue to meet for individual therapy and watch a new podcast episode on trauma      Misbah Sandoval [facility]                                                         ______________________________________________________________________    Individual Treatment Plan    Patient's Name: Sommer Gomez  YOB: 1980    Date of Creation: 3/18/22  Date Treatment Plan Last Reviewed/Revised:     DSM5 Diagnoses: 296.31 (F33.0) Major Depressive Disorder, Recurrent Episode, Mild _, 300.02 (F41.1) Generalized Anxiety Disorder or 309.81 (F43.10) Posttraumatic Stress Disorder (includes Posttraumatic Stress Disorder for Children 6 Years and Younger)  Without dissociative symptoms  Psychosocial / Contextual Factors: Individual/Family/Societal/Relational  PROMIS (reviewed every 90 days):  "    Referral / Collaboration:  Referral to another professional/service is not indicated at this time..    Anticipated number of session for this episode of care: 90 days  Anticipation frequency of session: Biweekly  Anticipated Duration of each session: 38-52 minutes  Treatment plan will be reviewed in 90 days or when goals have been changed.        MeasurableTreatment Goal(s) related to diagnosis / functional impairment(s)  Goal 1: Patient will utilize interventions and skills to better regulate negative emotions in difficult circumstances or experiences.  Also, if they are triggered in situations.    I will know I've met my goal when I am able to find positives in difficult circumstances.  Increase in motivation and less irritable.\"      Objective #A (Patient Action)                          Patient will identify specific strategies to more effectively address stressors  identify three initial signs or symptoms of anxiety.  Status: 90 days      Intervention(s)  Therapist will teach emotional recognition/identification. Utilizing CBT DBT ACT and mindfulness interventions.     Objective #B  Patient will identify at least 5 triggers for anxiety.  Status: 90 days      Intervention(s)  Therapist will teach emotional recognition/identification. utilizing CBT DBT ACT and mindfulness interventions.     Objective #C  Patient will Feel less tired and more energy during the day .  Status: 90 days      Intervention(s)  Therapist will teach emotional regulation skills. Utilizng CBT DBT ACT and mindfulness interventions.        Goal 2: Patient will reduce overall depression and anxiety    I will know I've met my goal when I am able to feel more motivation and have more energy.  I am able to be more mindful and in the present\"      Objective #A (Patient Action)                          Status: 90 days      Patient will use cognitive strategies identified in therapy to challenge anxious thoughts  Identify negative self-talk and " "behaviors: challenge core beliefs, myths, and actions.     Intervention(s)  Therapist will utilize CBT DBT ACT and mindfulness interventions.     Goal 3: Patiient will address traumatic experiences to reduce trauma response.    I will know I've met my goal when I am able to have less trauma response, flashbacks, nightmares, re-experiencing of past.  I can stay more in the present\"      Objective #A (Patient Action)                          Status: 90 days      Patient will identify specific strategies to more effectively address stressors  learn to utilize relaxation and cogntive coping skills to reduce and regulate emotions.     Intervention(s)  Therapist will teach emotional regulation skills. Utilizing CBT DBT ACT and mindfulness interventions.     Objective #B  Patient will address specific and targeted traumatic experiences to reduce negative trauma responses and overall impact of trauma.                 Status: 90 days      Intervention(s)  Therapist will utilize CBT DBT ACT and mindfulness interventions.  Narrative therapy and Exposure therapy.    Patient has reviewed and agreed to the above plan.      Misbah Sandoval [facility]  March 18, 2022          Answers for HPI/ROS submitted by the patient on 6/2/2022  If you checked off any problems, how difficult have these problems made it for you to do your work, take care of things at home, or get along with other people?: Somewhat difficult  PHQ9 TOTAL SCORE: 5  ROSA 7 TOTAL SCORE: 6      "

## 2022-06-15 ENCOUNTER — VIRTUAL VISIT (OUTPATIENT)
Dept: PSYCHOLOGY | Facility: CLINIC | Age: 42
End: 2022-06-15
Payer: COMMERCIAL

## 2022-06-15 DIAGNOSIS — F43.10 POST TRAUMATIC STRESS DISORDER (PTSD): ICD-10-CM

## 2022-06-15 DIAGNOSIS — F41.1 GENERALIZED ANXIETY DISORDER: ICD-10-CM

## 2022-06-15 DIAGNOSIS — F33.0 MAJOR DEPRESSIVE DISORDER, RECURRENT EPISODE, MILD WITH ANXIOUS DISTRESS (H): Primary | ICD-10-CM

## 2022-06-15 PROCEDURE — 90834 PSYTX W PT 45 MINUTES: CPT | Mod: 95 | Performed by: PSYCHOLOGIST

## 2022-06-15 ASSESSMENT — PATIENT HEALTH QUESTIONNAIRE - PHQ9
10. IF YOU CHECKED OFF ANY PROBLEMS, HOW DIFFICULT HAVE THESE PROBLEMS MADE IT FOR YOU TO DO YOUR WORK, TAKE CARE OF THINGS AT HOME, OR GET ALONG WITH OTHER PEOPLE: SOMEWHAT DIFFICULT
SUM OF ALL RESPONSES TO PHQ QUESTIONS 1-9: 6
SUM OF ALL RESPONSES TO PHQ QUESTIONS 1-9: 6

## 2022-06-21 NOTE — PROGRESS NOTES
"[facility]                                     Progress Note    Patient Name: Sommer Gomez  Date: 6/15/22         Service Type: Individual      Session Start Time: 7:05 AM  Session End Time: 7:52 AM     Session Length: 38-52 min    Session #: 33    Attendees: Client    Service Modality:  Video Visit:      Provider verified identity through the following two step process.  Patient provided:  Patient photo    Telemedicine Visit: The patient's condition can be safely assessed and treated via synchronous audio and visual telemedicine encounter.      Reason for Telemedicine Visit: Services only offered telehealth    Originating Site (Patient Location): Patient's home    Distant Site (Provider Location): Provider Remote Setting- Home Office    Consent:  The patient/guardian has verbally consented to: the potential risks and benefits of telemedicine (video visit) versus in person care; bill my insurance or make self-payment for services provided; and responsibility for payment of non-covered services.     Patient would like the video invitation sent by:  Text to cell phone: [phone]    Mode of Communication:  Video Conference via Amwell    As the provider I attest to compliance with applicable laws and regulations related to telemedicine.    DATA  Interactive Complexity: No  Crisis: No        Progress Since Last Session (Related to Symptoms / Goals / Homework):   Symptoms: No change Patient and expressed no change in symptoms and behaviors    Homework: Completed in session      Episode of Care Goals: Minimal progress - ACTION (Actively working towards change); Intervened by reinforcing change plan / affirming steps taken     Current / Ongoing Stressors and Concerns:   Individual/Family/societal/Relational  Patient was on time and present for the session.  Patient expressed that they are doing \"okay\" They expressed that they are doing better and that their partner got a new job, this will help " with finances.  Discusses stress with all the kids home and the mess that builds.  Discuss solution focused strategies to maintain cleanliness in house.         Treatment Objective(s) Addressed in This Session:   Routine/Structure/Organization       Intervention:   Motivational Interviewing: Expressed Empathy/Understanding, Supported Autonomy, Collaboration, Evocation, Permission to raise concern or advise, Open-ended questions, Reflections: simple and complex, Change talk (evoked) and Reframe     Assessments completed prior to visit:  PHQ9:   PHQ-9 SCORE 2/4/2022 2/15/2022 3/18/2022 3/30/2022 5/11/2022 6/2/2022 6/15/2022   PHQ-9 Total Score - - - - - - -   PHQ-9 Total Score MyChart 3 (Minimal depression) 6 (Mild depression) 4 (Minimal depression) 4 (Minimal depression) 3 (Minimal depression) 5 (Mild depression) 6 (Mild depression)   PHQ-9 Total Score 3 6 4 4 3 5 6     GAD7:   ROSA-7 SCORE 9/15/2020 12/1/2020 1/20/2021 2/26/2021 6/18/2021 12/13/2021 6/2/2022   Total Score - - - - - - -   Total Score - - - - - 6 (mild anxiety) 6 (mild anxiety)   Total Score 15 16 12 7 12 6 6     PROMIS 10-Global Health (only subscores and total score):   PROMIS-10 Scores Only 12/13/2021 3/18/2022 3/30/2022   Global Mental Health Score 11 15 13   Global Physical Health Score 15 14 14   PROMIS TOTAL - SUBSCORES 26 29 27         ASSESSMENT: Current Emotional / Mental Status (status of significant symptoms):   Risk status (Self / Other harm or suicidal ideation)   Patient denies current fears or concerns for personal safety.   Patient denies current or recent suicidal ideation or behaviors.   Patient denies current or recent homicidal ideation or behaviors.   Patient denies current or recent self injurious behavior or ideation.   Patient denies other safety concerns.   Patient reports there has been no change in risk factors since their last session.     Patient reports there has been no change in protective factors since their last  session.     Recommended that patient call 911 or go to the local ED should there be a change in any of these risk factors.     Appearance:   Appropriate    Eye Contact:   Good    Psychomotor Behavior: Normal    Attitude:   Cooperative    Orientation:   All   Speech    Rate / Production: Normal     Volume:  Normal    Mood:    Normal   Affect:    Appropriate    Thought Content:  Clear    Thought Form:  Coherent  Logical    Insight:    Good      Medication Review:   No changes to current psychiatric medication(s)     Medication Compliance:   Yes     Changes in Health Issues:   Yes: gastroentrolgy, Associated Psychological Distress     Chemical Use Review:   Substance Use: Chemical use reviewed, no active concerns identified      Tobacco Use: No current tobacco use.      Diagnosis:  Major Depressive Disorder Recurrent Mild  Generalized Anxiety Disorder  Post traumatic Stress Disorder    Collateral Reports Completed:   Not Applicable    PLAN: (Patient Tasks / Therapist Tasks / Other)  Continue to meet for individual therapy and set up tasks for kids to do to help with house cleaning      Misbah Sandoval [facility]                                                         ______________________________________________________________________    Individual Treatment Plan    Patient's Name: Sommer Gomez  YOB: 1980    Date of Creation: 3/18/22  Date Treatment Plan Last Reviewed/Revised:     DSM5 Diagnoses: 296.31 (F33.0) Major Depressive Disorder, Recurrent Episode, Mild _, 300.02 (F41.1) Generalized Anxiety Disorder or 309.81 (F43.10) Posttraumatic Stress Disorder (includes Posttraumatic Stress Disorder for Children 6 Years and Younger)  Without dissociative symptoms  Psychosocial / Contextual Factors: Individual/Family/Societal/Relational  PROMIS (reviewed every 90 days):     Referral / Collaboration:  Referral to another professional/service is not indicated at this time..    Anticipated number of session for  "this episode of care: 90 days  Anticipation frequency of session: Biweekly  Anticipated Duration of each session: 38-52 minutes  Treatment plan will be reviewed in 90 days or when goals have been changed.        MeasurableTreatment Goal(s) related to diagnosis / functional impairment(s)  Goal 1: Patient will utilize interventions and skills to better regulate negative emotions in difficult circumstances or experiences.  Also, if they are triggered in situations.    I will know I've met my goal when I am able to find positives in difficult circumstances.  Increase in motivation and less irritable.\"      Objective #A (Patient Action)                          Patient will identify specific strategies to more effectively address stressors  identify three initial signs or symptoms of anxiety.  Status: 90 days      Intervention(s)  Therapist will teach emotional recognition/identification. Utilizing CBT DBT ACT and mindfulness interventions.     Objective #B  Patient will identify at least 5 triggers for anxiety.  Status: 90 days      Intervention(s)  Therapist will teach emotional recognition/identification. utilizing CBT DBT ACT and mindfulness interventions.     Objective #C  Patient will Feel less tired and more energy during the day .  Status: 90 days      Intervention(s)  Therapist will teach emotional regulation skills. Utilizng CBT DBT ACT and mindfulness interventions.        Goal 2: Patient will reduce overall depression and anxiety    I will know I've met my goal when I am able to feel more motivation and have more energy.  I am able to be more mindful and in the present\"      Objective #A (Patient Action)                          Status: 90 days      Patient will use cognitive strategies identified in therapy to challenge anxious thoughts  Identify negative self-talk and behaviors: challenge core beliefs, myths, and actions.     Intervention(s)  Therapist will utilize CBT DBT ACT and mindfulness " "interventions.     Goal 3: Patiient will address traumatic experiences to reduce trauma response.    I will know I've met my goal when I am able to have less trauma response, flashbacks, nightmares, re-experiencing of past.  I can stay more in the present\"      Objective #A (Patient Action)                          Status: 90 days      Patient will identify specific strategies to more effectively address stressors  learn to utilize relaxation and cogntive coping skills to reduce and regulate emotions.     Intervention(s)  Therapist will teach emotional regulation skills. Utilizing CBT DBT ACT and mindfulness interventions.     Objective #B  Patient will address specific and targeted traumatic experiences to reduce negative trauma responses and overall impact of trauma.                 Status: 90 days      Intervention(s)  Therapist will utilize CBT DBT ACT and mindfulness interventions.  Narrative therapy and Exposure therapy.    Patient has reviewed and agreed to the above plan.      [facility]  March 18, 2022              Answers for HPI/ROS submitted by the patient on 6/15/2022  If you checked off any problems, how difficult have these problems made it for you to do your work, take care of things at home, or get along with other people?: Somewhat difficult  PHQ9 TOTAL SCORE: 6      "

## 2022-06-27 ENCOUNTER — VIRTUAL VISIT (OUTPATIENT)
Dept: PSYCHOLOGY | Facility: CLINIC | Age: 42
End: 2022-06-27
Payer: COMMERCIAL

## 2022-06-27 DIAGNOSIS — F43.10 POST TRAUMATIC STRESS DISORDER (PTSD): ICD-10-CM

## 2022-06-27 DIAGNOSIS — F41.1 GENERALIZED ANXIETY DISORDER: Primary | ICD-10-CM

## 2022-06-27 DIAGNOSIS — F33.0 MAJOR DEPRESSIVE DISORDER, RECURRENT EPISODE, MILD WITH ANXIOUS DISTRESS (H): ICD-10-CM

## 2022-06-27 PROCEDURE — 90834 PSYTX W PT 45 MINUTES: CPT | Mod: 95 | Performed by: PSYCHOLOGIST

## 2022-06-30 NOTE — PROGRESS NOTES
"[facility]                                     Progress Note    Patient Name: Sommer Gomez  Date: 6/27/22         Service Type: Individual      Session Start Time: 3:30 PM  Session End Time: 4:20 PM     Session Length: 38-52 min    Session #: 34    Attendees: Client    Service Modality:  Video Visit:      Provider verified identity through the following two step process.  Patient provided:  Patient photo    Telemedicine Visit: The patient's condition can be safely assessed and treated via synchronous audio and visual telemedicine encounter.      Reason for Telemedicine Visit: Services only offered telehealth    Originating Site (Patient Location): Patient's home    Distant Site (Provider Location): Provider Remote Setting- Home Office    Consent:  The patient/guardian has verbally consented to: the potential risks and benefits of telemedicine (video visit) versus in person care; bill my insurance or make self-payment for services provided; and responsibility for payment of non-covered services.     Patient would like the video invitation sent by:  Text to cell phone: [phone]    Mode of Communication:  Video Conference via Amwell    As the provider I attest to compliance with applicable laws and regulations related to telemedicine.    DATA  Interactive Complexity: No  Crisis: No        Progress Since Last Session (Related to Symptoms / Goals / Homework):   Symptoms: No change Patient and expressed no change in symptoms and behaviors    Homework: Completed in session      Episode of Care Goals: Minimal progress - ACTION (Actively working towards change); Intervened by reinforcing change plan / affirming steps taken     Current / Ongoing Stressors and Concerns:   Individual/Family/societal/Relational  Patient was on time and present for the session.  Patient expressed that they are doing \"okay\"  Started to discuss and feel progress in regards of cleanliness around the house and children " following through on tasks.  This led to family dynamics and relationships.  Discussed past trauma and conflict with mother and maternal grandparents.  Expressed resentment and uncertainty of circumstances that has been hard to let go.          Treatment Objective(s) Addressed in This Session:   Family conflict/Trauma history/narrative  Routine/Structure/Organization       Intervention:   Motivational Interviewing: Expressed Empathy/Understanding, Supported Autonomy, Collaboration, Evocation, Permission to raise concern or advise, Open-ended questions, Reflections: simple and complex, Change talk (evoked) and Reframe     Assessments completed prior to visit:  PHQ9:   PHQ-9 SCORE 2/4/2022 2/15/2022 3/18/2022 3/30/2022 5/11/2022 6/2/2022 6/15/2022   PHQ-9 Total Score - - - - - - -   PHQ-9 Total Score MyChart 3 (Minimal depression) 6 (Mild depression) 4 (Minimal depression) 4 (Minimal depression) 3 (Minimal depression) 5 (Mild depression) 6 (Mild depression)   PHQ-9 Total Score 3 6 4 4 3 5 6     GAD7:   ROSA-7 SCORE 9/15/2020 12/1/2020 1/20/2021 2/26/2021 6/18/2021 12/13/2021 6/2/2022   Total Score - - - - - - -   Total Score - - - - - 6 (mild anxiety) 6 (mild anxiety)   Total Score 15 16 12 7 12 6 6     PROMIS 10-Global Health (only subscores and total score):   PROMIS-10 Scores Only 12/13/2021 3/18/2022 3/30/2022   Global Mental Health Score 11 15 13   Global Physical Health Score 15 14 14   PROMIS TOTAL - SUBSCORES 26 29 27         ASSESSMENT: Current Emotional / Mental Status (status of significant symptoms):   Risk status (Self / Other harm or suicidal ideation)   Patient denies current fears or concerns for personal safety.   Patient denies current or recent suicidal ideation or behaviors.   Patient denies current or recent homicidal ideation or behaviors.   Patient denies current or recent self injurious behavior or ideation.   Patient denies other safety concerns.   Patient reports there has been no change in  risk factors since their last session.     Patient reports there has been no change in protective factors since their last session.     Recommended that patient call 911 or go to the local ED should there be a change in any of these risk factors.     Appearance:   Appropriate    Eye Contact:   Good    Psychomotor Behavior: Normal    Attitude:   Cooperative    Orientation:   All   Speech    Rate / Production: Normal     Volume:  Normal    Mood:    Normal   Affect:    Appropriate    Thought Content:  Clear    Thought Form:  Coherent  Logical    Insight:    Good      Medication Review:   No changes to current psychiatric medication(s)     Medication Compliance:   Yes     Changes in Health Issues:   Yes: gastroentrolgy, Associated Psychological Distress     Chemical Use Review:   Substance Use: Chemical use reviewed, no active concerns identified      Tobacco Use: No current tobacco use.      Diagnosis:  Major Depressive Disorder Recurrent Mild  Generalized Anxiety Disorder  Post traumatic Stress Disorder    Collateral Reports Completed:   Not Applicable    PLAN: (Patient Tasks / Therapist Tasks / Other)  Continue to meet for individual therapy and set up tasks for kids to do to help with house cleaning      Misbah Sandoval [facility]                                                         ______________________________________________________________________    Individual Treatment Plan    Patient's Name: Sommer Gomez  YOB: 1980    Date of Creation: 6/27/22  Date Treatment Plan Last Reviewed/Revised:     DSM5 Diagnoses: 296.31 (F33.0) Major Depressive Disorder, Recurrent Episode, Mild _, 300.02 (F41.1) Generalized Anxiety Disorder or 309.81 (F43.10) Posttraumatic Stress Disorder (includes Posttraumatic Stress Disorder for Children 6 Years and Younger)  Without dissociative symptoms  Psychosocial / Contextual Factors: Individual/Family/Societal/Relational  PROMIS (reviewed every 90 days):     Referral /  "Collaboration:  Referral to another professional/service is not indicated at this time..    Anticipated number of session for this episode of care: 90 days  Anticipation frequency of session: Biweekly  Anticipated Duration of each session: 38-52 minutes  Treatment plan will be reviewed in 90 days or when goals have been changed.        MeasurableTreatment Goal(s) related to diagnosis / functional impairment(s)  Goal 1: Patient will utilize interventions and skills to better regulate negative emotions in difficult circumstances or experiences.  Also, if they are triggered in situations.    I will know I've met my goal when I am able to find positives in difficult circumstances.  Increase in motivation and less irritable.\"      Objective #A (Patient Action)                          Patient will identify specific strategies to more effectively address stressors  identify three initial signs or symptoms of anxiety.  Status: Continued - Date:      Intervention(s)  Therapist will teach emotional recognition/identification. Utilizing CBT DBT ACT and mindfulness interventions.     Objective #B  Patient will identify at least 5 triggers for anxiety.  Status: Continued - Date:      Intervention(s)  Therapist will teach emotional recognition/identification. utilizing CBT DBT ACT and mindfulness interventions.     Objective #C  Patient will Feel less tired and more energy during the day .  Status: Continued - Date:      Intervention(s)  Therapist will teach emotional regulation skills. Utilizng CBT DBT ACT and mindfulness interventions.        Goal 2: Patient will reduce overall depression and anxiety    I will know I've met my goal when I am able to feel more motivation and have more energy.  I am able to be more mindful and in the present\"      Objective #A (Patient Action)                          Status: Continued - Date:     Patient will use cognitive strategies identified in therapy to challenge anxious thoughts  Identify " "negative self-talk and behaviors: challenge core beliefs, myths, and actions.     Intervention(s)  Therapist will utilize CBT DBT ACT and mindfulness interventions.     Goal 3: Patiient will address traumatic experiences to reduce trauma response.    I will know I've met my goal when I am able to have less trauma response, flashbacks, nightmares, re-experiencing of past.  I can stay more in the present\"      Objective #A (Patient Action)                          Status: Continued - Date:      Patient will identify specific strategies to more effectively address stressors  learn to utilize relaxation and cogntive coping skills to reduce and regulate emotions.     Intervention(s)  Therapist will teach emotional regulation skills. Utilizing CBT DBT ACT and mindfulness interventions.     Objective #B  Patient will address specific and targeted traumatic experiences to reduce negative trauma responses and overall impact of trauma.                 Status: Continued - Date:     Intervention(s)  Therapist will utilize CBT DBT ACT and mindfulness interventions.  Narrative therapy and Exposure therapy.    Patient has reviewed and agreed to the above plan.      Misbah Sandoval [facility]  June 27, 2022            "

## 2022-07-12 ENCOUNTER — E-VISIT (OUTPATIENT)
Dept: URGENT CARE | Facility: CLINIC | Age: 42
End: 2022-07-12
Payer: COMMERCIAL

## 2022-07-12 DIAGNOSIS — Z20.822 SUSPECTED COVID-19 VIRUS INFECTION: Primary | ICD-10-CM

## 2022-07-12 PROCEDURE — 99421 OL DIG E/M SVC 5-10 MIN: CPT | Performed by: EMERGENCY MEDICINE

## 2022-07-12 NOTE — PATIENT INSTRUCTIONS
Dear Myriam,      Based on your responses, you may have coronavirus (COVID-19).     Will I be tested for COVID-19?  We would like to test you for COVID. I have placed orders for this test.     To schedule: go to your [employer] home page and scroll down to the section that says  You have an appointment that needs to be scheduled  and click the large green button that says  Schedule Now  and follow the steps to find the next available openings.    If you are unable to complete these [employer] scheduling steps, please call [phone] to schedule your testing.     These guidelines are for isolating before returning to work, school or .     For employers, schools and day cares: This is an official notice for this person s medical guidelines for returning in-person.     For health care sites: The CDC gives different isolation and quarantine guidelines for healthcare sites, please check with these sites before arriving.     How do I self-isolate?  You isolate when you have symptoms of COVID or a test shows you have COVID, even if you don t have symptoms.     If you DO have symptoms:  o Stay home and away from others  - For at least 5 days after your symptoms started, AND   - You are fever free for 24 hours (with no medicine that reduces fever), AND  - Your other symptoms are better.  o Wear a mask for 10 full days any time you are around others.    If you DON T have symptoms:  o Stay at home and away from others for at least 5 days after your positive test.  o Wear a mask for 10 full days any time you are around others.    How can I take care of myself?  Over the counter medications may help with your symptoms such as runny or stuffy nose, cough, chills, or fever.  Talk to your care team about your options.     Some people are at high risk of severe illness (for example, you have a weak immune system, you re 65 years or older, or you have certain medical problems). If your risk is high and your symptoms started in  the last 5 to 7 days, we strongly recommend for you to get COVID treatment as soon as possible. Paxlovid, Molnupiravir and the monoclonal antibody treatments are proven safe and effective, make you feel better faster, and prevent hospitalization and death.       To schedule an appointment to discuss COVID treatment, request an appointment on MyChart (select  COVID-19 Treatment ) or call [phone] ([phone]), press 7.      Get lots of rest. Drink extra fluids (unless a doctor has told you not to)    Take Tylenol (acetaminophen) or ibuprofen for fever or pain. If you have liver or kidney problems, ask your family doctor if it's okay to take Tylenol or ibuprofen    Take over the counter medications for your symptoms, as directed by your doctor. You may also talk to your pharmacist.      If you have other health problems (like cancer, heart failure, an organ transplant or severe kidney disease): Call your specialty clinic if you don't feel better in the next 2 days.    Know when to call 911. Emergency warning signs include:  o Trouble breathing or shortness of breath  o Pain or pressure in the chest that doesn't go away  o Feeling confused like you haven't felt before, or not being able to wake up  o Bluish-colored lips or face    Where can I get more information?    [facility] - About COVID-19: [email or web address]     CDC - What to Do If You're Sick: https://www.cdc.gov/coronavirus/2019-ncov/if-you-are-sick/index.html     CDC - Quarantine & Isolation: https://www.cdc.gov/coronavirus/2019-ncov/your-health/quarantine-isolation.html     [facility] clinical trials (COVID-19 research studies): [email or web address]    Below are the COVID-19 hotlines at the [facility] ([facility]). Interpreters are available.  o For health questions: Call 349-511-6292 or [phone] (7 a.m. to 7 p.m.)  o For questions about schools and childcare: Call  [phone] or [phone] (7 a.m. to 7 p.m.)

## 2022-07-15 ENCOUNTER — VIRTUAL VISIT (OUTPATIENT)
Dept: PSYCHOLOGY | Facility: CLINIC | Age: 42
End: 2022-07-15
Payer: COMMERCIAL

## 2022-07-15 DIAGNOSIS — F41.1 GENERALIZED ANXIETY DISORDER: Primary | ICD-10-CM

## 2022-07-15 DIAGNOSIS — F33.0 MAJOR DEPRESSIVE DISORDER, RECURRENT EPISODE, MILD WITH ANXIOUS DISTRESS (H): ICD-10-CM

## 2022-07-15 DIAGNOSIS — F43.10 POST TRAUMATIC STRESS DISORDER (PTSD): ICD-10-CM

## 2022-07-15 PROCEDURE — 90834 PSYTX W PT 45 MINUTES: CPT | Mod: 95 | Performed by: PSYCHOLOGIST

## 2022-07-22 ENCOUNTER — VIRTUAL VISIT (OUTPATIENT)
Dept: PSYCHOLOGY | Facility: CLINIC | Age: 42
End: 2022-07-22
Payer: COMMERCIAL

## 2022-07-22 DIAGNOSIS — F41.1 GENERALIZED ANXIETY DISORDER: Primary | ICD-10-CM

## 2022-07-22 DIAGNOSIS — F43.10 POST TRAUMATIC STRESS DISORDER (PTSD): ICD-10-CM

## 2022-07-22 DIAGNOSIS — F33.0 MAJOR DEPRESSIVE DISORDER, RECURRENT EPISODE, MILD WITH ANXIOUS DISTRESS (H): ICD-10-CM

## 2022-07-22 PROCEDURE — 90834 PSYTX W PT 45 MINUTES: CPT | Mod: 95 | Performed by: PSYCHOLOGIST

## 2022-07-22 ASSESSMENT — ANXIETY QUESTIONNAIRES
5. BEING SO RESTLESS THAT IT IS HARD TO SIT STILL: SEVERAL DAYS
7. FEELING AFRAID AS IF SOMETHING AWFUL MIGHT HAPPEN: SEVERAL DAYS
IF YOU CHECKED OFF ANY PROBLEMS ON THIS QUESTIONNAIRE, HOW DIFFICULT HAVE THESE PROBLEMS MADE IT FOR YOU TO DO YOUR WORK, TAKE CARE OF THINGS AT HOME, OR GET ALONG WITH OTHER PEOPLE: SOMEWHAT DIFFICULT
3. WORRYING TOO MUCH ABOUT DIFFERENT THINGS: MORE THAN HALF THE DAYS
GAD7 TOTAL SCORE: 11
2. NOT BEING ABLE TO STOP OR CONTROL WORRYING: MORE THAN HALF THE DAYS
GAD7 TOTAL SCORE: 11
4. TROUBLE RELAXING: MORE THAN HALF THE DAYS
GAD7 TOTAL SCORE: 11
1. FEELING NERVOUS, ANXIOUS, OR ON EDGE: MORE THAN HALF THE DAYS
7. FEELING AFRAID AS IF SOMETHING AWFUL MIGHT HAPPEN: SEVERAL DAYS
8. IF YOU CHECKED OFF ANY PROBLEMS, HOW DIFFICULT HAVE THESE MADE IT FOR YOU TO DO YOUR WORK, TAKE CARE OF THINGS AT HOME, OR GET ALONG WITH OTHER PEOPLE?: SOMEWHAT DIFFICULT
4. TROUBLE RELAXING: MORE THAN HALF THE DAYS
3. WORRYING TOO MUCH ABOUT DIFFERENT THINGS: MORE THAN HALF THE DAYS
2. NOT BEING ABLE TO STOP OR CONTROL WORRYING: MORE THAN HALF THE DAYS
6. BECOMING EASILY ANNOYED OR IRRITABLE: SEVERAL DAYS
7. FEELING AFRAID AS IF SOMETHING AWFUL MIGHT HAPPEN: SEVERAL DAYS
1. FEELING NERVOUS, ANXIOUS, OR ON EDGE: MORE THAN HALF THE DAYS
7. FEELING AFRAID AS IF SOMETHING AWFUL MIGHT HAPPEN: SEVERAL DAYS
GAD7 TOTAL SCORE: 11
GAD7 TOTAL SCORE: 11
5. BEING SO RESTLESS THAT IT IS HARD TO SIT STILL: SEVERAL DAYS
8. IF YOU CHECKED OFF ANY PROBLEMS, HOW DIFFICULT HAVE THESE MADE IT FOR YOU TO DO YOUR WORK, TAKE CARE OF THINGS AT HOME, OR GET ALONG WITH OTHER PEOPLE?: SOMEWHAT DIFFICULT
6. BECOMING EASILY ANNOYED OR IRRITABLE: SEVERAL DAYS
IF YOU CHECKED OFF ANY PROBLEMS ON THIS QUESTIONNAIRE, HOW DIFFICULT HAVE THESE PROBLEMS MADE IT FOR YOU TO DO YOUR WORK, TAKE CARE OF THINGS AT HOME, OR GET ALONG WITH OTHER PEOPLE: SOMEWHAT DIFFICULT
GAD7 TOTAL SCORE: 11

## 2022-07-22 ASSESSMENT — PATIENT HEALTH QUESTIONNAIRE - PHQ9
SUM OF ALL RESPONSES TO PHQ QUESTIONS 1-9: 7
SUM OF ALL RESPONSES TO PHQ QUESTIONS 1-9: 7
10. IF YOU CHECKED OFF ANY PROBLEMS, HOW DIFFICULT HAVE THESE PROBLEMS MADE IT FOR YOU TO DO YOUR WORK, TAKE CARE OF THINGS AT HOME, OR GET ALONG WITH OTHER PEOPLE: SOMEWHAT DIFFICULT

## 2022-07-22 NOTE — PROGRESS NOTES
[facility]                                     Progress Note    Patient Name: Sommer Gomez  Date: 7/15/22         Service Type: Individual      Session Start Time: 7:00 AM  Session End Time: 7:50 AM     Session Length: 38-52 min    Session #: 35    Attendees: Client    Service Modality:  Video Visit:      Provider verified identity through the following two step process.  Patient provided:  Patient is known previously to provider    Telemedicine Visit: The patient's condition can be safely assessed and treated via synchronous audio and visual telemedicine encounter.      Reason for Telemedicine Visit: Services only offered telehealth    Originating Site (Patient Location): Patient's home    Distant Site (Provider Location): Provider Remote Setting- Home Office    Consent:  The patient/guardian has verbally consented to: the potential risks and benefits of telemedicine (video visit) versus in person care; bill my insurance or make self-payment for services provided; and responsibility for payment of non-covered services.     Patient would like the video invitation sent by:  Text to cell phone: [phone]    Mode of Communication:  Video Conference via Amwell    As the provider I attest to compliance with applicable laws and regulations related to telemedicine.    DATA  Interactive Complexity: No  Crisis: No        Progress Since Last Session (Related to Symptoms / Goals / Homework):   Symptoms: No change Patient and expressed no change in symptoms and behaviors    Homework: Completed in session      Episode of Care Goals: Minimal progress - ACTION (Actively working towards change); Intervened by reinforcing change plan / affirming steps taken     Current / Ongoing Stressors and Concerns:   Individual/Family/societal/Relational  Patient was on time and present for the session.  Patient expressed that they are doing well but quite anxious about upcoming events. They are friends with their  ex's wife.  Whom are going through divorce and separation.  The patient offered to volunteer as a witness for court hearing regarding OFB.  They are anxious based on past trauma with their ex.            Treatment Objective(s) Addressed in This Session:   Family conflict/Trauma history/narrative  gradual exposure  Routine/Structure/Organization       Intervention:   Motivational Interviewing: Expressed Empathy/Understanding, Supported Autonomy, Collaboration, Evocation, Permission to raise concern or advise, Open-ended questions, Reflections: simple and complex, Change talk (evoked) and Reframe   Trauma Focused Interventions    Assessments completed prior to visit:  PHQ9:   PHQ-9 SCORE 2/15/2022 3/18/2022 3/30/2022 5/11/2022 6/2/2022 6/15/2022 7/22/2022   PHQ-9 Total Score - - - - - - -   PHQ-9 Total Score MyChart 6 (Mild depression) 4 (Minimal depression) 4 (Minimal depression) 3 (Minimal depression) 5 (Mild depression) 6 (Mild depression) 7 (Mild depression)   PHQ-9 Total Score 6 4 4 3 5 6 7     GAD7:   ROSA-7 SCORE 1/20/2021 2/26/2021 6/18/2021 12/13/2021 6/2/2022 7/22/2022 7/22/2022   Total Score - - - - - - -   Total Score - - - 6 (mild anxiety) 6 (mild anxiety) - 11 (moderate anxiety)   Total Score 12 7 12 6 6 11 11     PROMIS 10-Global Health (only subscores and total score):   PROMIS-10 Scores Only 12/13/2021 3/18/2022 3/30/2022 7/14/2022   Global Mental Health Score 11 15 13 13   Global Physical Health Score 15 14 14 15   PROMIS TOTAL - SUBSCORES 26 29 27 28         ASSESSMENT: Current Emotional / Mental Status (status of significant symptoms):   Risk status (Self / Other harm or suicidal ideation)   Patient denies current fears or concerns for personal safety.   Patient denies current or recent suicidal ideation or behaviors.   Patient denies current or recent homicidal ideation or behaviors.   Patient denies current or recent self injurious behavior or ideation.   Patient denies other safety  concerns.   Patient reports there has been no change in risk factors since their last session.     Patient reports there has been no change in protective factors since their last session.     Recommended that patient call 911 or go to the local ED should there be a change in any of these risk factors.     Appearance:   Appropriate    Eye Contact:   Good    Psychomotor Behavior: Normal    Attitude:   Cooperative    Orientation:   All   Speech    Rate / Production: Normal     Volume:  Normal    Mood:    Normal   Affect:    Appropriate    Thought Content:  Clear    Thought Form:  Coherent  Logical    Insight:    Good      Medication Review:   No changes to current psychiatric medication(s)     Medication Compliance:   Yes     Changes in Health Issues:   Yes: gastroentrolgy, Associated Psychological Distress     Chemical Use Review:   Substance Use: Chemical use reviewed, no active concerns identified      Tobacco Use: No current tobacco use.      Diagnosis:  Major Depressive Disorder Recurrent Mild  Generalized Anxiety Disorder  Post traumatic Stress Disorder    Collateral Reports Completed:   Not Applicable    PLAN: (Patient Tasks / Therapist Tasks / Other)  Continue to meet for individual therapy and Utilize grounding skills daily to prepare for upcoming week      Misbah Sandoval [facility]                                                         ______________________________________________________________________    Individual Treatment Plan    Patient's Name: Sommer Gomez  YOB: 1980    Date of Creation: 6/27/22  Date Treatment Plan Last Reviewed/Revised:     DSM5 Diagnoses: 296.31 (F33.0) Major Depressive Disorder, Recurrent Episode, Mild _, 300.02 (F41.1) Generalized Anxiety Disorder or 309.81 (F43.10) Posttraumatic Stress Disorder (includes Posttraumatic Stress Disorder for Children 6 Years and Younger)  Without dissociative symptoms  Psychosocial / Contextual Factors:  "Individual/Family/Societal/Relational  PROMIS (reviewed every 90 days):     Referral / Collaboration:  Referral to another professional/service is not indicated at this time..    Anticipated number of session for this episode of care: 90 days  Anticipation frequency of session: Biweekly  Anticipated Duration of each session: 38-52 minutes  Treatment plan will be reviewed in 90 days or when goals have been changed.        MeasurableTreatment Goal(s) related to diagnosis / functional impairment(s)  Goal 1: Patient will utilize interventions and skills to better regulate negative emotions in difficult circumstances or experiences.  Also, if they are triggered in situations.    I will know I've met my goal when I am able to find positives in difficult circumstances.  Increase in motivation and less irritable.\"      Objective #A (Patient Action)                          Patient will identify specific strategies to more effectively address stressors  identify three initial signs or symptoms of anxiety.  Status: Continued - Date:      Intervention(s)  Therapist will teach emotional recognition/identification. Utilizing CBT DBT ACT and mindfulness interventions.     Objective #B  Patient will identify at least 5 triggers for anxiety.  Status: Continued - Date:      Intervention(s)  Therapist will teach emotional recognition/identification. utilizing CBT DBT ACT and mindfulness interventions.     Objective #C  Patient will Feel less tired and more energy during the day .  Status: Continued - Date:      Intervention(s)  Therapist will teach emotional regulation skills. Utilizng CBT DBT ACT and mindfulness interventions.        Goal 2: Patient will reduce overall depression and anxiety    I will know I've met my goal when I am able to feel more motivation and have more energy.  I am able to be more mindful and in the present\"      Objective #A (Patient Action)                          Status: Continued - Date:     Patient " "will use cognitive strategies identified in therapy to challenge anxious thoughts  Identify negative self-talk and behaviors: challenge core beliefs, myths, and actions.     Intervention(s)  Therapist will utilize CBT DBT ACT and mindfulness interventions.     Goal 3: Patiient will address traumatic experiences to reduce trauma response.    I will know I've met my goal when I am able to have less trauma response, flashbacks, nightmares, re-experiencing of past.  I can stay more in the present\"      Objective #A (Patient Action)                          Status: Continued - Date:      Patient will identify specific strategies to more effectively address stressors  learn to utilize relaxation and cogntive coping skills to reduce and regulate emotions.     Intervention(s)  Therapist will teach emotional regulation skills. Utilizing CBT DBT ACT and mindfulness interventions.     Objective #B  Patient will address specific and targeted traumatic experiences to reduce negative trauma responses and overall impact of trauma.                 Status: Continued - Date:     Intervention(s)  Therapist will utilize CBT DBT ACT and mindfulness interventions.  Narrative therapy and Exposure therapy.    Patient has reviewed and agreed to the above plan.      [facility]isb[facility] Sandoval [facility]  June 27, 2022          "

## 2022-07-28 ENCOUNTER — VIRTUAL VISIT (OUTPATIENT)
Dept: PSYCHOLOGY | Facility: CLINIC | Age: 42
End: 2022-07-28
Payer: COMMERCIAL

## 2022-07-28 DIAGNOSIS — F33.0 MAJOR DEPRESSIVE DISORDER, RECURRENT EPISODE, MILD WITH ANXIOUS DISTRESS (H): ICD-10-CM

## 2022-07-28 DIAGNOSIS — F43.10 POST TRAUMATIC STRESS DISORDER (PTSD): ICD-10-CM

## 2022-07-28 DIAGNOSIS — F41.1 GENERALIZED ANXIETY DISORDER: Primary | ICD-10-CM

## 2022-07-28 PROCEDURE — 90834 PSYTX W PT 45 MINUTES: CPT | Mod: 95 | Performed by: PSYCHOLOGIST

## 2022-07-28 ASSESSMENT — PATIENT HEALTH QUESTIONNAIRE - PHQ9
10. IF YOU CHECKED OFF ANY PROBLEMS, HOW DIFFICULT HAVE THESE PROBLEMS MADE IT FOR YOU TO DO YOUR WORK, TAKE CARE OF THINGS AT HOME, OR GET ALONG WITH OTHER PEOPLE: SOMEWHAT DIFFICULT
SUM OF ALL RESPONSES TO PHQ QUESTIONS 1-9: 5
SUM OF ALL RESPONSES TO PHQ QUESTIONS 1-9: 5

## 2022-07-29 NOTE — PROGRESS NOTES
[facility]                                     Progress Note    Patient Name: Sommer Gomez  Date: 7/22/22         Service Type: Individual      Session Start Time: 7:00 AM  Session End Time: 7:50 AM     Session Length: 38-52 min    Session #: 36    Attendees: Client    Service Modality:  Video Visit:      Provider verified identity through the following two step process.  Patient provided:  Patient is known previously to provider    Telemedicine Visit: The patient's condition can be safely assessed and treated via synchronous audio and visual telemedicine encounter.      Reason for Telemedicine Visit: Services only offered telehealth    Originating Site (Patient Location): Patient's home    Distant Site (Provider Location): Provider Remote Setting- Home Office    Consent:  The patient/guardian has verbally consented to: the potential risks and benefits of telemedicine (video visit) versus in person care; bill my insurance or make self-payment for services provided; and responsibility for payment of non-covered services.     Patient would like the video invitation sent by:  Text to cell phone: [phone]    Mode of Communication:  Video Conference via Amwell    As the provider I attest to compliance with applicable laws and regulations related to telemedicine.    DATA  Interactive Complexity: No  Crisis: No        Progress Since Last Session (Related to Symptoms / Goals / Homework):   Symptoms: No change Patient and expressed no change in symptoms and behaviors    Homework: Completed in session      Episode of Care Goals: Minimal progress - ACTION (Actively working towards change); Intervened by reinforcing change plan / affirming steps taken     Current / Ongoing Stressors and Concerns:   Individual/Family/societal/Relational  Patient was on time and present for the session.  Patient discussed that she had a difficulty week.  Patient expressed that they were face to face with her  ex- and this was overwhelming.  Expressed court was difficult with friend. However, they expressed feeling more regulated and balanced.  Discussed feeling bad about having a difficult week.  Challenged negative distortions.            Treatment Objective(s) Addressed in This Session:   Family conflict/Trauma history/narrative  gradual exposure       Intervention:   Motivational Interviewing: Expressed Empathy/Understanding, Supported Autonomy, Collaboration, Evocation, Permission to raise concern or advise, Open-ended questions, Reflections: simple and complex, Change talk (evoked) and Reframe   Trauma Focused Interventions    Assessments completed prior to visit:  PHQ9:   PHQ-9 SCORE 3/18/2022 3/30/2022 5/11/2022 6/2/2022 6/15/2022 7/22/2022 7/28/2022   PHQ-9 Total Score - - - - - - -   PHQ-9 Total Score MyChart 4 (Minimal depression) 4 (Minimal depression) 3 (Minimal depression) 5 (Mild depression) 6 (Mild depression) 7 (Mild depression) 5 (Mild depression)   PHQ-9 Total Score 4 4 3 5 6 7 5     GAD7:   ROSA-7 SCORE 1/20/2021 2/26/2021 6/18/2021 12/13/2021 6/2/2022 7/22/2022 7/22/2022   Total Score - - - - - - -   Total Score - - - 6 (mild anxiety) 6 (mild anxiety) - 11 (moderate anxiety)   Total Score 12 7 12 6 6 11 11     PROMIS 10-Global Health (only subscores and total score):   PROMIS-10 Scores Only 12/13/2021 3/18/2022 3/30/2022 7/14/2022 7/28/2022   Global Mental Health Score 11 15 13 13 11   Global Physical Health Score 15 14 14 15 16   PROMIS TOTAL - SUBSCORES 26 29 27 28 27         ASSESSMENT: Current Emotional / Mental Status (status of significant symptoms):   Risk status (Self / Other harm or suicidal ideation)   Patient denies current fears or concerns for personal safety.   Patient denies current or recent suicidal ideation or behaviors.   Patient denies current or recent homicidal ideation or behaviors.   Patient denies current or recent self injurious behavior or ideation.   Patient denies  other safety concerns.   Patient reports there has been no change in risk factors since their last session.     Patient reports there has been no change in protective factors since their last session.     Recommended that patient call 911 or go to the local ED should there be a change in any of these risk factors.     Appearance:   Appropriate    Eye Contact:   Good    Psychomotor Behavior: Normal    Attitude:   Cooperative    Orientation:   All   Speech    Rate / Production: Normal     Volume:  Normal    Mood:    Normal   Affect:    Appropriate    Thought Content:  Clear    Thought Form:  Coherent  Logical    Insight:    Good      Medication Review:   No changes to current psychiatric medication(s)     Medication Compliance:   Yes     Changes in Health Issues:   Yes: gastroentrolgy, Associated Psychological Distress     Chemical Use Review:   Substance Use: Chemical use reviewed, no active concerns identified      Tobacco Use: No current tobacco use.      Diagnosis:  Major Depressive Disorder Recurrent Mild  Generalized Anxiety Disorder  Post traumatic Stress Disorder    Collateral Reports Completed:   Not Applicable    PLAN: (Patient Tasks / Therapist Tasks / Other)  Continue to meet for individual therapy and Utilize grounding skills daily to prepare for upcoming week      Misbah Sandoval [facility]                                                         ______________________________________________________________________    Individual Treatment Plan    Patient's Name: Sommer Gomez  YOB: 1980    Date of Creation: 6/27/22  Date Treatment Plan Last Reviewed/Revised:     DSM5 Diagnoses: 296.31 (F33.0) Major Depressive Disorder, Recurrent Episode, Mild _, 300.02 (F41.1) Generalized Anxiety Disorder or 309.81 (F43.10) Posttraumatic Stress Disorder (includes Posttraumatic Stress Disorder for Children 6 Years and Younger)  Without dissociative symptoms  Psychosocial / Contextual Factors:  "Individual/Family/Societal/Relational  PROMIS (reviewed every 90 days):     Referral / Collaboration:  Referral to another professional/service is not indicated at this time..    Anticipated number of session for this episode of care: 90 days  Anticipation frequency of session: Biweekly  Anticipated Duration of each session: 38-52 minutes  Treatment plan will be reviewed in 90 days or when goals have been changed.        MeasurableTreatment Goal(s) related to diagnosis / functional impairment(s)  Goal 1: Patient will utilize interventions and skills to better regulate negative emotions in difficult circumstances or experiences.  Also, if they are triggered in situations.    I will know I've met my goal when I am able to find positives in difficult circumstances.  Increase in motivation and less irritable.\"      Objective #A (Patient Action)                          Patient will identify specific strategies to more effectively address stressors  identify three initial signs or symptoms of anxiety.  Status: Continued - Date:      Intervention(s)  Therapist will teach emotional recognition/identification. Utilizing CBT DBT ACT and mindfulness interventions.     Objective #B  Patient will identify at least 5 triggers for anxiety.  Status: Continued - Date:      Intervention(s)  Therapist will teach emotional recognition/identification. utilizing CBT DBT ACT and mindfulness interventions.     Objective #C  Patient will Feel less tired and more energy during the day .  Status: Continued - Date:      Intervention(s)  Therapist will teach emotional regulation skills. Utilizng CBT DBT ACT and mindfulness interventions.        Goal 2: Patient will reduce overall depression and anxiety    I will know I've met my goal when I am able to feel more motivation and have more energy.  I am able to be more mindful and in the present\"      Objective #A (Patient Action)                          Status: Continued - Date:     Patient " "will use cognitive strategies identified in therapy to challenge anxious thoughts  Identify negative self-talk and behaviors: challenge core beliefs, myths, and actions.     Intervention(s)  Therapist will utilize CBT DBT ACT and mindfulness interventions.     Goal 3: Patiient will address traumatic experiences to reduce trauma response.    I will know I've met my goal when I am able to have less trauma response, flashbacks, nightmares, re-experiencing of past.  I can stay more in the present\"      Objective #A (Patient Action)                          Status: Continued - Date:      Patient will identify specific strategies to more effectively address stressors  learn to utilize relaxation and cogntive coping skills to reduce and regulate emotions.     Intervention(s)  Therapist will teach emotional regulation skills. Utilizing CBT DBT ACT and mindfulness interventions.     Objective #B  Patient will address specific and targeted traumatic experiences to reduce negative trauma responses and overall impact of trauma.                 Status: Continued - Date:     Intervention(s)  Therapist will utilize CBT DBT ACT and mindfulness interventions.  Narrative therapy and Exposure therapy.    Patient has reviewed and agreed to the above plan.      Misbah Sandoval [facility]  June 27, 2022        Answers for HPI/ROS submitted by the patient on 7/22/2022  If you checked off any problems, how difficult have these problems made it for you to do your work, take care of things at home, or get along with other people?: Somewhat difficult  PHQ9 TOTAL SCORE: 7  ROSA 7 TOTAL SCORE: 11      "

## 2022-07-29 NOTE — PROGRESS NOTES
[facility]                                     Progress Note    Patient Name: Sommer Gomez  Date: 7/28/22         Service Type: Individual      Session Start Time: 3:30 PM  Session End Time: 4:20 PM     Session Length: 38-52 min    Session #: 37    Attendees: Client    Service Modality:  Video Visit:      Provider verified identity through the following two step process.  Patient provided:  Patient is known previously to provider    Telemedicine Visit: The patient's condition can be safely assessed and treated via synchronous audio and visual telemedicine encounter.      Reason for Telemedicine Visit: Services only offered telehealth    Originating Site (Patient Location): Patient's home    Distant Site (Provider Location): Provider Remote Setting- Home Office    Consent:  The patient/guardian has verbally consented to: the potential risks and benefits of telemedicine (video visit) versus in person care; bill my insurance or make self-payment for services provided; and responsibility for payment of non-covered services.     Patient would like the video invitation sent by:  Text to cell phone: [phone]    Mode of Communication:  Video Conference via Amwell    As the provider I attest to compliance with applicable laws and regulations related to telemedicine.    DATA  Interactive Complexity: No  Crisis: No        Progress Since Last Session (Related to Symptoms / Goals / Homework):   Symptoms: No change Patient and expressed no change in symptoms and behaviors    Homework: Completed in session      Episode of Care Goals: Minimal progress - ACTION (Actively working towards change); Intervened by reinforcing change plan / affirming steps taken     Current / Ongoing Stressors and Concerns:   Individual/Family/societal/Relational  Patient was on time and present for the session.  Patient expressed having an overall positive weekend. Patient expressed that they have had the past week off and  able to clean around the house.  They got a lot done and feeling very accomplished.  This was a big celebration especially due to recent week.  They also processed and explored relational conflict with partner.             Treatment Objective(s) Addressed in This Session:   conflict resolution skills  identify two areas of life that you would like to have improved functioning       Intervention:   Motivational Interviewing: Expressed Empathy/Understanding, Supported Autonomy, Collaboration, Evocation, Permission to raise concern or advise, Open-ended questions, Reflections: simple and complex, Change talk (evoked) and Reframe   Trauma Focused Interventions    Assessments completed prior to visit:  PHQ9:   PHQ-9 SCORE 3/18/2022 3/30/2022 5/11/2022 6/2/2022 6/15/2022 7/22/2022 7/28/2022   PHQ-9 Total Score - - - - - - -   PHQ-9 Total Score MyChart 4 (Minimal depression) 4 (Minimal depression) 3 (Minimal depression) 5 (Mild depression) 6 (Mild depression) 7 (Mild depression) 5 (Mild depression)   PHQ-9 Total Score 4 4 3 5 6 7 5     GAD7:   ROSA-7 SCORE 1/20/2021 2/26/2021 6/18/2021 12/13/2021 6/2/2022 7/22/2022 7/22/2022   Total Score - - - - - - -   Total Score - - - 6 (mild anxiety) 6 (mild anxiety) - 11 (moderate anxiety)   Total Score 12 7 12 6 6 11 11     PROMIS 10-Global Health (only subscores and total score):   PROMIS-10 Scores Only 12/13/2021 3/18/2022 3/30/2022 7/14/2022 7/28/2022   Global Mental Health Score 11 15 13 13 11   Global Physical Health Score 15 14 14 15 16   PROMIS TOTAL - SUBSCORES 26 29 27 28 27         ASSESSMENT: Current Emotional / Mental Status (status of significant symptoms):   Risk status (Self / Other harm or suicidal ideation)   Patient denies current fears or concerns for personal safety.   Patient denies current or recent suicidal ideation or behaviors.   Patient denies current or recent homicidal ideation or behaviors.   Patient denies current or recent self injurious behavior or  ideation.   Patient denies other safety concerns.   Patient reports there has been no change in risk factors since their last session.     Patient reports there has been no change in protective factors since their last session.     Recommended that patient call 911 or go to the local ED should there be a change in any of these risk factors.     Appearance:   Appropriate    Eye Contact:   Good    Psychomotor Behavior: Normal    Attitude:   Cooperative    Orientation:   All   Speech    Rate / Production: Normal     Volume:  Normal    Mood:    Normal   Affect:    Appropriate    Thought Content:  Clear    Thought Form:  Coherent  Logical    Insight:    Good      Medication Review:   No changes to current psychiatric medication(s)     Medication Compliance:   Yes     Changes in Health Issues:   Yes: gastroentrolgy, Associated Psychological Distress     Chemical Use Review:   Substance Use: Chemical use reviewed, no active concerns identified      Tobacco Use: No current tobacco use.      Diagnosis:  Major Depressive Disorder Recurrent Mild  Generalized Anxiety Disorder  Post traumatic Stress Disorder    Collateral Reports Completed:   Not Applicable    PLAN: (Patient Tasks / Therapist Tasks / Other)  Continue to meet for individual therapy and continue to execute task around the house they set to do      CHRISTIAN Mata                                                         ______________________________________________________________________    Individual Treatment Plan    Patient's Name: Sommer Gomez  YOB: 1980    Date of Creation: 6/27/22  Date Treatment Plan Last Reviewed/Revised:     DSM5 Diagnoses: 296.31 (F33.0) Major Depressive Disorder, Recurrent Episode, Mild _, 300.02 (F41.1) Generalized Anxiety Disorder or 309.81 (F43.10) Posttraumatic Stress Disorder (includes Posttraumatic Stress Disorder for Children 6 Years and Younger)  Without dissociative symptoms  Psychosocial / Contextual  "Factors: Individual/Family/Societal/Relational  PROMIS (reviewed every 90 days):     Referral / Collaboration:  Referral to another professional/service is not indicated at this time..    Anticipated number of session for this episode of care: 90 days  Anticipation frequency of session: Biweekly  Anticipated Duration of each session: 38-52 minutes  Treatment plan will be reviewed in 90 days or when goals have been changed.        MeasurableTreatment Goal(s) related to diagnosis / functional impairment(s)  Goal 1: Patient will utilize interventions and skills to better regulate negative emotions in difficult circumstances or experiences.  Also, if they are triggered in situations.    I will know I've met my goal when I am able to find positives in difficult circumstances.  Increase in motivation and less irritable.\"      Objective #A (Patient Action)                          Patient will identify specific strategies to more effectively address stressors  identify three initial signs or symptoms of anxiety.  Status: Continued - Date:      Intervention(s)  Therapist will teach emotional recognition/identification. Utilizing CBT DBT ACT and mindfulness interventions.     Objective #B  Patient will identify at least 5 triggers for anxiety.  Status: Continued - Date:      Intervention(s)  Therapist will teach emotional recognition/identification. utilizing CBT DBT ACT and mindfulness interventions.     Objective #C  Patient will Feel less tired and more energy during the day .  Status: Continued - Date:      Intervention(s)  Therapist will teach emotional regulation skills. Utilizng CBT DBT ACT and mindfulness interventions.        Goal 2: Patient will reduce overall depression and anxiety    I will know I've met my goal when I am able to feel more motivation and have more energy.  I am able to be more mindful and in the present\"      Objective #A (Patient Action)                          Status: Continued - " "Date:     Patient will use cognitive strategies identified in therapy to challenge anxious thoughts  Identify negative self-talk and behaviors: challenge core beliefs, myths, and actions.     Intervention(s)  Therapist will utilize CBT DBT ACT and mindfulness interventions.     Goal 3: Patiient will address traumatic experiences to reduce trauma response.    I will know I've met my goal when I am able to have less trauma response, flashbacks, nightmares, re-experiencing of past.  I can stay more in the present\"      Objective #A (Patient Action)                          Status: Continued - Date:      Patient will identify specific strategies to more effectively address stressors  learn to utilize relaxation and cogntive coping skills to reduce and regulate emotions.     Intervention(s)  Therapist will teach emotional regulation skills. Utilizing CBT DBT ACT and mindfulness interventions.     Objective #B  Patient will address specific and targeted traumatic experiences to reduce negative trauma responses and overall impact of trauma.                 Status: Continued - Date:     Intervention(s)  Therapist will utilize CBT DBT ACT and mindfulness interventions.  Narrative therapy and Exposure therapy.    Patient has reviewed and agreed to the above plan.      CHRISTIAN Mata  June 27, 2022      Answers for HPI/ROS submitted by the patient on 7/28/2022  If you checked off any problems, how difficult have these problems made it for you to do your work, take care of things at home, or get along with other people?: Somewhat difficult  PHQ9 TOTAL SCORE: 5  ROSA 7 TOTAL SCORE: 11      "

## 2022-08-05 ENCOUNTER — VIRTUAL VISIT (OUTPATIENT)
Dept: PSYCHOLOGY | Facility: CLINIC | Age: 42
End: 2022-08-05
Payer: COMMERCIAL

## 2022-08-05 DIAGNOSIS — F33.0 MAJOR DEPRESSIVE DISORDER, RECURRENT EPISODE, MILD WITH ANXIOUS DISTRESS (H): ICD-10-CM

## 2022-08-05 DIAGNOSIS — F41.1 GENERALIZED ANXIETY DISORDER: Primary | ICD-10-CM

## 2022-08-05 DIAGNOSIS — F43.10 POST TRAUMATIC STRESS DISORDER (PTSD): ICD-10-CM

## 2022-08-05 PROCEDURE — 90834 PSYTX W PT 45 MINUTES: CPT | Mod: 95 | Performed by: PSYCHOLOGIST

## 2022-08-11 NOTE — PROGRESS NOTES
[facility]                                     Progress Note    Patient Name: Sommer Gomez  Date: 8/5/22         Service Type: Individual      Session Start Time: 2:36 PM  Session End Time: 3:20 PM     Session Length: 38-52 min    Session #: 38    Attendees: Client    Service Modality:  Video Visit:      Provider verified identity through the following two step process.  Patient provided:  Patient is known previously to provider    Telemedicine Visit: The patient's condition can be safely assessed and treated via synchronous audio and visual telemedicine encounter.      Reason for Telemedicine Visit: Services only offered telehealth    Originating Site (Patient Location): Patient's home    Distant Site (Provider Location): Provider Remote Setting- Home Office    Consent:  The patient/guardian has verbally consented to: the potential risks and benefits of telemedicine (video visit) versus in person care; bill my insurance or make self-payment for services provided; and responsibility for payment of non-covered services.     Patient would like the video invitation sent by:  Text to cell phone: [phone]    Mode of Communication:  Video Conference via Amwell    As the provider I attest to compliance with applicable laws and regulations related to telemedicine.    DATA  Interactive Complexity: No  Crisis: No        Progress Since Last Session (Related to Symptoms / Goals / Homework):   Symptoms: No change Patient and expressed no change in symptoms and behaviors    Homework: Completed in session      Episode of Care Goals: Minimal progress - ACTION (Actively working towards change); Intervened by reinforcing change plan / affirming steps taken     Current / Ongoing Stressors and Concerns:   Individual/Family/societal/Relational  Patient was on time and present for the session.  Patient expressed having an overall positive weekend. Patient indicated a new awareness of possibility of having  ADHD.  All of her children except for one are diagnosed with ADHD.  They discussed past experiences and symptoms that they struggled with.  They are interested in going back to school to obtain a college degree.  They feel like they would like to be assessed for ADHD for any assistance with medication and accommodations before they start school.              Treatment Objective(s) Addressed in This Session:   identify two areas of life that you would like to have improved functioning       Intervention:   Motivational Interviewing: Expressed Empathy/Understanding, Supported Autonomy, Collaboration, Evocation, Permission to raise concern or advise, Open-ended questions, Reflections: simple and complex, Change talk (evoked) and Reframe     Assessments completed prior to visit:  PHQ9:   PHQ-9 SCORE 3/30/2022 5/11/2022 6/2/2022 6/15/2022 7/22/2022 7/28/2022 8/5/2022   PHQ-9 Total Score - - - - - - -   PHQ-9 Total Score MyChart 4 (Minimal depression) 3 (Minimal depression) 5 (Mild depression) 6 (Mild depression) 7 (Mild depression) 5 (Mild depression) 6 (Mild depression)   PHQ-9 Total Score 4 3 5 6 7 5 6     GAD7:   ROSA-7 SCORE 1/20/2021 2/26/2021 6/18/2021 12/13/2021 6/2/2022 7/22/2022 7/22/2022   Total Score - - - - - - -   Total Score - - - 6 (mild anxiety) 6 (mild anxiety) - 11 (moderate anxiety)   Total Score 12 7 12 6 6 11 11     PROMIS 10-Global Health (only subscores and total score):   PROMIS-10 Scores Only 12/13/2021 3/18/2022 3/30/2022 7/14/2022 7/28/2022   Global Mental Health Score 11 15 13 13 11   Global Physical Health Score 15 14 14 15 16   PROMIS TOTAL - SUBSCORES 26 29 27 28 27         ASSESSMENT: Current Emotional / Mental Status (status of significant symptoms):   Risk status (Self / Other harm or suicidal ideation)   Patient denies current fears or concerns for personal safety.   Patient denies current or recent suicidal ideation or behaviors.   Patient denies current or recent homicidal ideation or  behaviors.   Patient denies current or recent self injurious behavior or ideation.   Patient denies other safety concerns.   Patient reports there has been no change in risk factors since their last session.     Patient reports there has been no change in protective factors since their last session.     Recommended that patient call 911 or go to the local ED should there be a change in any of these risk factors.     Appearance:   Appropriate    Eye Contact:   Good    Psychomotor Behavior: Normal    Attitude:   Cooperative    Orientation:   All   Speech    Rate / Production: Normal     Volume:  Normal    Mood:    Normal   Affect:    Appropriate    Thought Content:  Clear    Thought Form:  Coherent  Logical    Insight:    Good      Medication Review:   No changes to current psychiatric medication(s)     Medication Compliance:   Yes     Changes in Health Issues:   Yes: gastroentrolgy, Associated Psychological Distress     Chemical Use Review:   Substance Use: Chemical use reviewed, no active concerns identified      Tobacco Use: No current tobacco use.      Diagnosis:  Major Depressive Disorder Recurrent Mild  Generalized Anxiety Disorder  Post traumatic Stress Disorder    Collateral Reports Completed:   Not Applicable    PLAN: (Patient Tasks / Therapist Tasks / Other)  Continue to meet for individual therapy and look into areas of education and academia they would like to pursue, watch time management podcast by Kale Sandoval [facility]                                                         ______________________________________________________________________    Individual Treatment Plan    Patient's Name: Sommer Gomez  YOB: 1980    Date of Creation: 6/27/22  Date Treatment Plan Last Reviewed/Revised:     DSM5 Diagnoses: 296.31 (F33.0) Major Depressive Disorder, Recurrent Episode, Mild _, 300.02 (F41.1) Generalized Anxiety Disorder or 309.81 (F43.10) Posttraumatic Stress Disorder  "(includes Posttraumatic Stress Disorder for Children 6 Years and Younger)  Without dissociative symptoms  Psychosocial / Contextual Factors: Individual/Family/Societal/Relational  PROMIS (reviewed every 90 days):     Referral / Collaboration:  Referral to another professional/service is not indicated at this time..    Anticipated number of session for this episode of care: 90 days  Anticipation frequency of session: Biweekly  Anticipated Duration of each session: 38-52 minutes  Treatment plan will be reviewed in 90 days or when goals have been changed.        MeasurableTreatment Goal(s) related to diagnosis / functional impairment(s)  Goal 1: Patient will utilize interventions and skills to better regulate negative emotions in difficult circumstances or experiences.  Also, if they are triggered in situations.    I will know I've met my goal when I am able to find positives in difficult circumstances.  Increase in motivation and less irritable.\"      Objective #A (Patient Action)                          Patient will identify specific strategies to more effectively address stressors  identify three initial signs or symptoms of anxiety.  Status: Continued - Date:      Intervention(s)  Therapist will teach emotional recognition/identification. Utilizing CBT DBT ACT and mindfulness interventions.     Objective #B  Patient will identify at least 5 triggers for anxiety.  Status: Continued - Date:      Intervention(s)  Therapist will teach emotional recognition/identification. utilizing CBT DBT ACT and mindfulness interventions.     Objective #C  Patient will Feel less tired and more energy during the day .  Status: Continued - Date:      Intervention(s)  Therapist will teach emotional regulation skills. Utilizng CBT DBT ACT and mindfulness interventions.        Goal 2: Patient will reduce overall depression and anxiety    I will know I've met my goal when I am able to feel more motivation and have more energy.  I am able " "to be more mindful and in the present\"      Objective #A (Patient Action)                          Status: Continued - Date:     Patient will use cognitive strategies identified in therapy to challenge anxious thoughts  Identify negative self-talk and behaviors: challenge core beliefs, myths, and actions.     Intervention(s)  Therapist will utilize CBT DBT ACT and mindfulness interventions.     Goal 3: Patiient will address traumatic experiences to reduce trauma response.    I will know I've met my goal when I am able to have less trauma response, flashbacks, nightmares, re-experiencing of past.  I can stay more in the present\"      Objective #A (Patient Action)                          Status: Continued - Date:      Patient will identify specific strategies to more effectively address stressors  learn to utilize relaxation and cogntive coping skills to reduce and regulate emotions.     Intervention(s)  Therapist will teach emotional regulation skills. Utilizing CBT DBT ACT and mindfulness interventions.     Objective #B  Patient will address specific and targeted traumatic experiences to reduce negative trauma responses and overall impact of trauma.                 Status: Continued - Date:     Intervention(s)  Therapist will utilize CBT DBT ACT and mindfulness interventions.  Narrative therapy and Exposure therapy.    Patient has reviewed and agreed to the above plan.      Misbah Sandoval [facility]  June 27, 2022        Answers for HPI/ROS submitted by the patient on 8/5/2022  If you checked off any problems, how difficult have these problems made it for you to do your work, take care of things at home, or get along with other people?: Somewhat difficult  PHQ9 TOTAL SCORE: 6      "

## 2022-08-15 ENCOUNTER — VIRTUAL VISIT (OUTPATIENT)
Dept: PSYCHOLOGY | Facility: CLINIC | Age: 42
End: 2022-08-15
Payer: COMMERCIAL

## 2022-08-15 DIAGNOSIS — F41.1 GENERALIZED ANXIETY DISORDER: Primary | ICD-10-CM

## 2022-08-15 DIAGNOSIS — F43.10 POST TRAUMATIC STRESS DISORDER (PTSD): ICD-10-CM

## 2022-08-15 DIAGNOSIS — F33.0 MAJOR DEPRESSIVE DISORDER, RECURRENT EPISODE, MILD WITH ANXIOUS DISTRESS (H): ICD-10-CM

## 2022-08-15 PROCEDURE — 90834 PSYTX W PT 45 MINUTES: CPT | Mod: 95 | Performed by: PSYCHOLOGIST

## 2022-08-16 NOTE — PROGRESS NOTES
[facility]                                     Progress Note    Patient Name: Sommer Gomez  Date: 8/15/22         Service Type: Individual      Session Start Time: 4:32 PM  Session End Time: 5:20 PM     Session Length: 38-52 min    Session #: 39    Attendees: Client    Service Modality:  Video Visit:      Provider verified identity through the following two step process.  Patient provided:  Patient is known previously to provider    Telemedicine Visit: The patient's condition can be safely assessed and treated via synchronous audio and visual telemedicine encounter.      Reason for Telemedicine Visit: Services only offered telehealth    Originating Site (Patient Location): Patient's home    Distant Site (Provider Location): Provider Remote Setting- Home Office    Consent:  The patient/guardian has verbally consented to: the potential risks and benefits of telemedicine (video visit) versus in person care; bill my insurance or make self-payment for services provided; and responsibility for payment of non-covered services.     Patient would like the video invitation sent by:  Text to cell phone: [phone]    Mode of Communication:  Video Conference via Amwell    As the provider I attest to compliance with applicable laws and regulations related to telemedicine.    DATA  Interactive Complexity: No  Crisis: No        Progress Since Last Session (Related to Symptoms / Goals / Homework):   Symptoms: No change Patient and expressed no change in symptoms and behaviors    Homework: Completed in session      Episode of Care Goals: Minimal progress - ACTION (Actively working towards change); Intervened by reinforcing change plan / affirming steps taken     Current / Ongoing Stressors and Concerns:   Individual/Family/societal/Relational  Patient was on time and present for the session.  Patient expressed that they are doing well.  They discussed that they are taking one of their son's back to  [facility], which [facility].  This led to discussion of worry for her other children.  Discussed her other children are of special needs.  They have minimal services in place and are not receiving any [facility] or state services.  Discussed following up with [facility] or Adult Re[facility] Case Management.               Treatment Objective(s) Addressed in This Session:   identify two areas of life that you would like to have improved functioning  Family support and dynamics       Intervention:   Motivational Interviewing: Expressed Empathy/Understanding, Supported Autonomy, Collaboration, Evocation, Permission to raise concern or advise, Open-ended questions, Reflections: simple and complex, Change talk (evoked) and Reframe     Assessments completed prior to visit:  PHQ9:   PHQ-9 SCORE 3/30/2022 5/11/2022 6/2/2022 6/15/2022 7/22/2022 7/28/2022 8/5/2022   PHQ-9 Total Score - - - - - - -   PHQ-9 Total Score MyChart 4 (Minimal depression) 3 (Minimal depression) 5 (Mild depression) 6 (Mild depression) 7 (Mild depression) 5 (Mild depression) 6 (Mild depression)   PHQ-9 Total Score 4 3 5 6 7 5 6     GAD7:   ROSA-7 SCORE 1/20/2021 2/26/2021 6/18/2021 12/13/2021 6/2/2022 7/22/2022 7/22/2022   Total Score - - - - - - -   Total Score - - - 6 (mild anxiety) 6 (mild anxiety) - 11 (moderate anxiety)   Total Score 12 7 12 6 6 11 11     PROMIS 10-Global Health (only subscores and total score):   PROMIS-10 Scores Only 12/13/2021 3/18/2022 3/30/2022 7/14/2022 7/28/2022   Global Mental Health Score 11 15 13 13 11   Global Physical Health Score 15 14 14 15 16   PROMIS TOTAL - SUBSCORES 26 29 27 28 27         ASSESSMENT: Current Emotional / Mental Status (status of significant symptoms):   Risk status (Self / Other harm or suicidal ideation)   Patient denies current fears or concerns for personal safety.   Patient denies current or recent suicidal ideation or behaviors.   Patient denies  current or recent homicidal ideation or behaviors.   Patient denies current or recent self injurious behavior or ideation.   Patient denies other safety concerns.   Patient reports there has been no change in risk factors since their last session.     Patient reports there has been no change in protective factors since their last session.     Recommended that patient call 911 or go to the local ED should there be a change in any of these risk factors.     Appearance:   Appropriate    Eye Contact:   Good    Psychomotor Behavior: Normal    Attitude:   Cooperative    Orientation:   All   Speech    Rate / Production: Normal     Volume:  Normal    Mood:    Normal   Affect:    Appropriate    Thought Content:  Clear    Thought Form:  Coherent  Logical    Insight:    Good      Medication Review:   No changes to current psychiatric medication(s)     Medication Compliance:   Yes     Changes in Health Issues:   Yes: gastroentrolgy, Associated Psychological Distress     Chemical Use Review:   Substance Use: Chemical use reviewed, no active concerns identified      Tobacco Use: No current tobacco use.      Diagnosis:  Major Depressive Disorder Recurrent Mild  Generalized Anxiety Disorder  Post traumatic Stress Disorder    Collateral Reports Completed:   Not Applicable    PLAN: (Patient Tasks / Therapist Tasks / Other)  Continue to meet for individual therapy and Follow up with recommended services for their children    CHRISTIAN Mata                                                         ______________________________________________________________________    Individual Treatment Plan    Patient's Name: Sommer Gomez  YOB: 1980    Date of Creation: 6/27/22  Date Treatment Plan Last Reviewed/Revised:     DSM5 Diagnoses: 296.31 (F33.0) Major Depressive Disorder, Recurrent Episode, Mild _, 300.02 (F41.1) Generalized Anxiety Disorder or 309.81 (F43.10) Posttraumatic Stress Disorder (includes Posttraumatic  "Stress Disorder for Children 6 Years and Younger)  Without dissociative symptoms  Psychosocial / Contextual Factors: Individual/Family/Societal/Relational  PROMIS (reviewed every 90 days):     Referral / Collaboration:  Referral to another professional/service is not indicated at this time..    Anticipated number of session for this episode of care: 90 days  Anticipation frequency of session: Biweekly  Anticipated Duration of each session: 38-52 minutes  Treatment plan will be reviewed in 90 days or when goals have been changed.        MeasurableTreatment Goal(s) related to diagnosis / functional impairment(s)  Goal 1: Patient will utilize interventions and skills to better regulate negative emotions in difficult circumstances or experiences.  Also, if they are triggered in situations.    I will know I've met my goal when I am able to find positives in difficult circumstances.  Increase in motivation and less irritable.\"      Objective #A (Patient Action)                          Patient will identify specific strategies to more effectively address stressors  identify three initial signs or symptoms of anxiety.  Status: Continued - Date:      Intervention(s)  Therapist will teach emotional recognition/identification. Utilizing CBT DBT ACT and mindfulness interventions.     Objective #B  Patient will identify at least 5 triggers for anxiety.  Status: Continued - Date:      Intervention(s)  Therapist will teach emotional recognition/identification. utilizing CBT DBT ACT and mindfulness interventions.     Objective #C  Patient will Feel less tired and more energy during the day .  Status: Continued - Date:      Intervention(s)  Therapist will teach emotional regulation skills. Utilizng CBT DBT ACT and mindfulness interventions.        Goal 2: Patient will reduce overall depression and anxiety    I will know I've met my goal when I am able to feel more motivation and have more energy.  I am able to be more mindful and " "in the present\"      Objective #A (Patient Action)                          Status: Continued - Date:     Patient will use cognitive strategies identified in therapy to challenge anxious thoughts  Identify negative self-talk and behaviors: challenge core beliefs, myths, and actions.     Intervention(s)  Therapist will utilize CBT DBT ACT and mindfulness interventions.     Goal 3: Patiient will address traumatic experiences to reduce trauma response.    I will know I've met my goal when I am able to have less trauma response, flashbacks, nightmares, re-experiencing of past.  I can stay more in the present\"      Objective #A (Patient Action)                          Status: Continued - Date:      Patient will identify specific strategies to more effectively address stressors  learn to utilize relaxation and cogntive coping skills to reduce and regulate emotions.     Intervention(s)  Therapist will teach emotional regulation skills. Utilizing CBT DBT ACT and mindfulness interventions.     Objective #B  Patient will address specific and targeted traumatic experiences to reduce negative trauma responses and overall impact of trauma.                 Status: Continued - Date:     Intervention(s)  Therapist will utilize CBT DBT ACT and mindfulness interventions.  Narrative therapy and Exposure therapy.    Patient has reviewed and agreed to the above plan.      Misbah Sandoval [facility]  June 27, 2022        "

## 2022-08-25 ENCOUNTER — VIRTUAL VISIT (OUTPATIENT)
Dept: PSYCHOLOGY | Facility: CLINIC | Age: 42
End: 2022-08-25
Payer: COMMERCIAL

## 2022-08-25 DIAGNOSIS — F43.10 POST TRAUMATIC STRESS DISORDER (PTSD): ICD-10-CM

## 2022-08-25 DIAGNOSIS — F33.0 MAJOR DEPRESSIVE DISORDER, RECURRENT EPISODE, MILD WITH ANXIOUS DISTRESS (H): ICD-10-CM

## 2022-08-25 DIAGNOSIS — F41.1 GENERALIZED ANXIETY DISORDER: Primary | ICD-10-CM

## 2022-08-25 PROCEDURE — 90834 PSYTX W PT 45 MINUTES: CPT | Mod: 95 | Performed by: PSYCHOLOGIST

## 2022-08-29 NOTE — PROGRESS NOTES
[facility]                                     Progress Note    Patient Name: Sommer Gomez  Date: 8/25/22         Service Type: Individual      Session Start Time: 1:32 PM  Session End Time: 2:20 PM     Session Length: 38-52 min    Session #: 40    Attendees: Client    Service Modality:  Video Visit:      Provider verified identity through the following two step process.  Patient provided:  Patient is known previously to provider    Telemedicine Visit: The patient's condition can be safely assessed and treated via synchronous audio and visual telemedicine encounter.      Reason for Telemedicine Visit: Services only offered telehealth    Originating Site (Patient Location): Patient's home    Distant Site (Provider Location): Provider Remote Setting- Home Office    Consent:  The patient/guardian has verbally consented to: the potential risks and benefits of telemedicine (video visit) versus in person care; bill my insurance or make self-payment for services provided; and responsibility for payment of non-covered services.     Patient would like the video invitation sent by:  Text to cell phone: [phone]    Mode of Communication:  Video Conference via Amwell    As the provider I attest to compliance with applicable laws and regulations related to telemedicine.    DATA  Interactive Complexity: No  Crisis: No        Progress Since Last Session (Related to Symptoms / Goals / Homework):   Symptoms: No change Patient and expressed no change in symptoms and behaviors    Homework: Completed in session      Episode of Care Goals: Minimal progress - ACTION (Actively working towards change); Intervened by reinforcing change plan / affirming steps taken     Current / Ongoing Stressors and Concerns:   Individual/Family/societal/Relational  Patient was on time and present for the session.  Patient expressed that they are doing well.  Patient expressed that they are vacation.  Processed and explored  relational dynamics with family and partner.  Discussed following up with [facility] or Adult [facility]habil[facility] Case Management for their adult child with special needs.                Treatment Objective(s) Addressed in This Session:   identify two areas of life that you would like to have improved functioning  Family support and dynamics       Intervention:   Motivational Interviewing: Expressed Empathy/Understanding, Supported Autonomy, Collaboration, Evocation, Permission to raise concern or advise, Open-ended questions, Reflections: simple and complex, Change talk (evoked) and Reframe     Assessments completed prior to visit:  PHQ9:   PHQ-9 SCORE 3/30/2022 5/11/2022 6/2/2022 6/15/2022 7/22/2022 7/28/2022 8/5/2022   PHQ-9 Total Score - - - - - - -   PHQ-9 Total Score MyChart 4 (Minimal depression) 3 (Minimal depression) 5 (Mild depression) 6 (Mild depression) 7 (Mild depression) 5 (Mild depression) 6 (Mild depression)   PHQ-9 Total Score 4 3 5 6 7 5 6     GAD7:   ROSA-7 SCORE 1/20/2021 2/26/2021 6/18/2021 12/13/2021 6/2/2022 7/22/2022 7/22/2022   Total Score - - - - - - -   Total Score - - - 6 (mild anxiety) 6 (mild anxiety) - 11 (moderate anxiety)   Total Score 12 7 12 6 6 11 11     PROMIS 10-Global Health (only subscores and total score):   PROMIS-10 Scores Only 12/13/2021 3/18/2022 3/30/2022 7/14/2022 7/28/2022   Global Mental Health Score 11 15 13 13 11   Global Physical Health Score 15 14 14 15 16   PROMIS TOTAL - SUBSCORES 26 29 27 28 27         ASSESSMENT: Current Emotional / Mental Status (status of significant symptoms):   Risk status (Self / Other harm or suicidal ideation)   Patient denies current fears or concerns for personal safety.   Patient denies current or recent suicidal ideation or behaviors.   Patient denies current or recent homicidal ideation or behaviors.   Patient denies current or recent self injurious behavior or ideation.   Patient denies other  safety concerns.   Patient reports there has been no change in risk factors since their last session.     Patient reports there has been no change in protective factors since their last session.     Recommended that patient call 911 or go to the local ED should there be a change in any of these risk factors.     Appearance:   Appropriate    Eye Contact:   Good    Psychomotor Behavior: Normal    Attitude:   Cooperative    Orientation:   All   Speech    Rate / Production: Normal     Volume:  Normal    Mood:    Normal   Affect:    Appropriate    Thought Content:  Clear    Thought Form:  Coherent  Logical    Insight:    Good      Medication Review:   No changes to current psychiatric medication(s)     Medication Compliance:   Yes     Changes in Health Issues:   Yes: gastroentrolgy, Associated Psychological Distress     Chemical Use Review:   Substance Use: Chemical use reviewed, no active concerns identified      Tobacco Use: No current tobacco use.      Diagnosis:  Major Depressive Disorder Recurrent Mild  Generalized Anxiety Disorder  Post traumatic Stress Disorder    Collateral Reports Completed:   Not Applicable    PLAN: (Patient Tasks / Therapist Tasks / Other)  Continue to meet for individual therapy and Follow up with recommended services for their children    Misbah Sandoval [facility]                                                         ______________________________________________________________________    Individual Treatment Plan    Patient's Name: Sommer Gomez  YOB: 1980    Date of Creation: 6/27/22  Date Treatment Plan Last Reviewed/Revised:     DSM5 Diagnoses: 296.31 (F33.0) Major Depressive Disorder, Recurrent Episode, Mild _, 300.02 (F41.1) Generalized Anxiety Disorder or 309.81 (F43.10) Posttraumatic Stress Disorder (includes Posttraumatic Stress Disorder for Children 6 Years and Younger)  Without dissociative symptoms  Psychosocial / Contextual Factors:  "Individual/Family/Societal/Relational  PROMIS (reviewed every 90 days):     Referral / Collaboration:  Referral to another professional/service is not indicated at this time..    Anticipated number of session for this episode of care: 90 days  Anticipation frequency of session: Biweekly  Anticipated Duration of each session: 38-52 minutes  Treatment plan will be reviewed in 90 days or when goals have been changed.        MeasurableTreatment Goal(s) related to diagnosis / functional impairment(s)  Goal 1: Patient will utilize interventions and skills to better regulate negative emotions in difficult circumstances or experiences.  Also, if they are triggered in situations.    I will know I've met my goal when I am able to find positives in difficult circumstances.  Increase in motivation and less irritable.\"      Objective #A (Patient Action)                          Patient will identify specific strategies to more effectively address stressors  identify three initial signs or symptoms of anxiety.  Status: Continued - Date:      Intervention(s)  Therapist will teach emotional recognition/identification. Utilizing CBT DBT ACT and mindfulness interventions.     Objective #B  Patient will identify at least 5 triggers for anxiety.  Status: Continued - Date:      Intervention(s)  Therapist will teach emotional recognition/identification. utilizing CBT DBT ACT and mindfulness interventions.     Objective #C  Patient will Feel less tired and more energy during the day .  Status: Continued - Date:      Intervention(s)  Therapist will teach emotional regulation skills. Utilizng CBT DBT ACT and mindfulness interventions.        Goal 2: Patient will reduce overall depression and anxiety    I will know I've met my goal when I am able to feel more motivation and have more energy.  I am able to be more mindful and in the present\"      Objective #A (Patient Action)                          Status: Continued - Date:     Patient " "will use cognitive strategies identified in therapy to challenge anxious thoughts  Identify negative self-talk and behaviors: challenge core beliefs, myths, and actions.     Intervention(s)  Therapist will utilize CBT DBT ACT and mindfulness interventions.     Goal 3: Patiient will address traumatic experiences to reduce trauma response.    I will know I've met my goal when I am able to have less trauma response, flashbacks, nightmares, re-experiencing of past.  I can stay more in the present\"      Objective #A (Patient Action)                          Status: Continued - Date:      Patient will identify specific strategies to more effectively address stressors  learn to utilize relaxation and cogntive coping skills to reduce and regulate emotions.     Intervention(s)  Therapist will teach emotional regulation skills. Utilizing CBT DBT ACT and mindfulness interventions.     Objective #B  Patient will address specific and targeted traumatic experiences to reduce negative trauma responses and overall impact of trauma.                 Status: Continued - Date:     Intervention(s)  Therapist will utilize CBT DBT ACT and mindfulness interventions.  Narrative therapy and Exposure therapy.    Patient has reviewed and agreed to the above plan.      [facility] Sandoval [facility]  June 27, 2022      "

## 2022-09-02 ENCOUNTER — ALLIED HEALTH/NURSE VISIT (OUTPATIENT)
Dept: FAMILY MEDICINE | Facility: CLINIC | Age: 42
End: 2022-09-02
Payer: COMMERCIAL

## 2022-09-02 DIAGNOSIS — Z23 ENCOUNTER FOR IMMUNIZATION: Primary | ICD-10-CM

## 2022-09-02 PROCEDURE — 99207 PR NO CHARGE NURSE ONLY: CPT

## 2022-09-02 PROCEDURE — 91305 COVID-19,PF,PFIZER (12+ YRS): CPT

## 2022-09-02 PROCEDURE — 0054A COVID-19,PF,PFIZER (12+ YRS): CPT

## 2022-09-02 PROCEDURE — 90471 IMMUNIZATION ADMIN: CPT

## 2022-09-02 PROCEDURE — 90686 IIV4 VACC NO PRSV 0.5 ML IM: CPT

## 2022-09-07 ENCOUNTER — VIRTUAL VISIT (OUTPATIENT)
Dept: PSYCHOLOGY | Facility: CLINIC | Age: 42
End: 2022-09-07
Payer: COMMERCIAL

## 2022-09-07 DIAGNOSIS — F41.1 GENERALIZED ANXIETY DISORDER: Primary | ICD-10-CM

## 2022-09-07 DIAGNOSIS — F33.0 MAJOR DEPRESSIVE DISORDER, RECURRENT EPISODE, MILD WITH ANXIOUS DISTRESS (H): ICD-10-CM

## 2022-09-07 DIAGNOSIS — F43.10 POST TRAUMATIC STRESS DISORDER (PTSD): ICD-10-CM

## 2022-09-07 PROCEDURE — 90834 PSYTX W PT 45 MINUTES: CPT | Mod: 95 | Performed by: PSYCHOLOGIST

## 2022-09-16 NOTE — PROGRESS NOTES
[facility]                                     Progress Note    Patient Name: Sommer Gomez  Date: 9/7/22         Service Type: Individual      Session Start Time: 9:00 AM  Session End Time: 9:52 AM     Session Length: 38-52 min    Session #: 41    Attendees: Client    Service Modality:  Video Visit:      Provider verified identity through the following two step process.  Patient provided:  Patient is known previously to provider    Telemedicine Visit: The patient's condition can be safely assessed and treated via synchronous audio and visual telemedicine encounter.      Reason for Telemedicine Visit: Services only offered telehealth    Originating Site (Patient Location): Patient's home    Distant Site (Provider Location): Provider Remote Setting- Home Office    Consent:  The patient/guardian has verbally consented to: the potential risks and benefits of telemedicine (video visit) versus in person care; bill my insurance or make self-payment for services provided; and responsibility for payment of non-covered services.     Patient would like the video invitation sent by:  Text to cell phone: [phone]    Mode of Communication:  Video Conference via Amwell    As the provider I attest to compliance with applicable laws and regulations related to telemedicine.    DATA  Interactive Complexity: No  Crisis: No        Progress Since Last Session (Related to Symptoms / Goals / Homework):   Symptoms: No change Patient and expressed no change in symptoms and behaviors    Homework: Completed in session      Episode of Care Goals: Minimal progress - ACTION (Actively working towards change); Intervened by reinforcing change plan / affirming steps taken     Current / Ongoing Stressors and Concerns:   Individual/Family/societal/Relational  Patient was on time and present for the session.  Patient expressed that they are doing okay.  They expressed a conflicting situations where they were put between  ex- and his soon to be ex-wife.  The relationship is complicated and increasing in conflict.  Patient and therapist processed and explored relational dynamics.                Treatment Objective(s) Addressed in This Session:   Family support and dynamics       Intervention:   Motivational Interviewing: Expressed Empathy/Understanding, Supported Autonomy, Collaboration, Evocation, Permission to raise concern or advise, Open-ended questions, Reflections: simple and complex, Change talk (evoked) and Reframe     Assessments completed prior to visit:  PHQ9:   PHQ-9 SCORE 5/11/2022 6/2/2022 6/15/2022 7/22/2022 7/28/2022 8/5/2022 9/7/2022   PHQ-9 Total Score - - - - - - -   PHQ-9 Total Score MyChart 3 (Minimal depression) 5 (Mild depression) 6 (Mild depression) 7 (Mild depression) 5 (Mild depression) 6 (Mild depression) 6 (Mild depression)   PHQ-9 Total Score 3 5 6 7 5 6 6     GAD7:   ROSA-7 SCORE 1/20/2021 2/26/2021 6/18/2021 12/13/2021 6/2/2022 7/22/2022 7/22/2022   Total Score - - - - - - -   Total Score - - - 6 (mild anxiety) 6 (mild anxiety) - 11 (moderate anxiety)   Total Score 12 7 12 6 6 11 11     PROMIS 10-Global Health (only subscores and total score):   PROMIS-10 Scores Only 12/13/2021 3/18/2022 3/30/2022 7/14/2022 7/28/2022   Global Mental Health Score 11 15 13 13 11   Global Physical Health Score 15 14 14 15 16   PROMIS TOTAL - SUBSCORES 26 29 27 28 27         ASSESSMENT: Current Emotional / Mental Status (status of significant symptoms):   Risk status (Self / Other harm or suicidal ideation)   Patient denies current fears or concerns for personal safety.   Patient denies current or recent suicidal ideation or behaviors.   Patient denies current or recent homicidal ideation or behaviors.   Patient denies current or recent self injurious behavior or ideation.   Patient denies other safety concerns.   Patient reports there has been no change in risk factors since their last session.     Patient reports  there has been no change in protective factors since their last session.     Recommended that patient call 911 or go to the local ED should there be a change in any of these risk factors.     Appearance:   Appropriate    Eye Contact:   Good    Psychomotor Behavior: Normal    Attitude:   Cooperative    Orientation:   All   Speech    Rate / Production: Normal     Volume:  Normal    Mood:    Normal   Affect:    Appropriate    Thought Content:  Clear    Thought Form:  Coherent  Logical    Insight:    Good      Medication Review:   No changes to current psychiatric medication(s)     Medication Compliance:   Yes     Changes in Health Issues:   Yes: gastroentrolgy, Associated Psychological Distress     Chemical Use Review:   Substance Use: Chemical use reviewed, no active concerns identified      Tobacco Use: No current tobacco use.      Diagnosis:  Major Depressive Disorder Recurrent Mild  Generalized Anxiety Disorder  Post traumatic Stress Disorder    Collateral Reports Completed:   Not Applicable    PLAN: (Patient Tasks / Therapist Tasks / Other)  Continue to meet for individual therapy and watch recommended video on trauma    Misbah Sandoval [facility]                                                         ______________________________________________________________________    Individual Treatment Plan    Patient's Name: Sommer Gomez  YOB: 1980    Date of Creation: 6/27/22  Date Treatment Plan Last Reviewed/Revised:     DSM5 Diagnoses: 296.31 (F33.0) Major Depressive Disorder, Recurrent Episode, Mild _, 300.02 (F41.1) Generalized Anxiety Disorder or 309.81 (F43.10) Posttraumatic Stress Disorder (includes Posttraumatic Stress Disorder for Children 6 Years and Younger)  Without dissociative symptoms  Psychosocial / Contextual Factors: Individual/Family/Societal/Relational  PROMIS (reviewed every 90 days):     Referral / Collaboration:  Referral to another professional/service is not indicated at this  "time..    Anticipated number of session for this episode of care: 90 days  Anticipation frequency of session: Biweekly  Anticipated Duration of each session: 38-52 minutes  Treatment plan will be reviewed in 90 days or when goals have been changed.        MeasurableTreatment Goal(s) related to diagnosis / functional impairment(s)  Goal 1: Patient will utilize interventions and skills to better regulate negative emotions in difficult circumstances or experiences.  Also, if they are triggered in situations.    I will know I've met my goal when I am able to find positives in difficult circumstances.  Increase in motivation and less irritable.\"      Objective #A (Patient Action)                          Patient will identify specific strategies to more effectively address stressors  identify three initial signs or symptoms of anxiety.  Status: Continued - Date:      Intervention(s)  Therapist will teach emotional recognition/identification. Utilizing CBT DBT ACT and mindfulness interventions.     Objective #B  Patient will identify at least 5 triggers for anxiety.  Status: Continued - Date:      Intervention(s)  Therapist will teach emotional recognition/identification. utilizing CBT DBT ACT and mindfulness interventions.     Objective #C  Patient will Feel less tired and more energy during the day .  Status: Continued - Date:      Intervention(s)  Therapist will teach emotional regulation skills. Utilizng CBT DBT ACT and mindfulness interventions.        Goal 2: Patient will reduce overall depression and anxiety    I will know I've met my goal when I am able to feel more motivation and have more energy.  I am able to be more mindful and in the present\"      Objective #A (Patient Action)                          Status: Continued - Date:     Patient will use cognitive strategies identified in therapy to challenge anxious thoughts  Identify negative self-talk and behaviors: challenge core beliefs, myths, and " "actions.     Intervention(s)  Therapist will utilize CBT DBT ACT and mindfulness interventions.     Goal 3: Patiient will address traumatic experiences to reduce trauma response.    I will know I've met my goal when I am able to have less trauma response, flashbacks, nightmares, re-experiencing of past.  I can stay more in the present\"      Objective #A (Patient Action)                          Status: Continued - Date:      Patient will identify specific strategies to more effectively address stressors  learn to utilize relaxation and cogntive coping skills to reduce and regulate emotions.     Intervention(s)  Therapist will teach emotional regulation skills. Utilizing CBT DBT ACT and mindfulness interventions.     Objective #B  Patient will address specific and targeted traumatic experiences to reduce negative trauma responses and overall impact of trauma.                 Status: Continued - Date:     Intervention(s)  Therapist will utilize CBT DBT ACT and mindfulness interventions.  Narrative therapy and Exposure therapy.    Patient has reviewed and agreed to the above plan.      [facility]bah Sandoval [facility]  June 27, 2022    Answers for HPI/ROS submitted by the patient on 9/7/2022  If you checked off any problems, how difficult have these problems made it for you to do your work, take care of things at home, or get along with other people?: Somewhat difficult  PHQ9 TOTAL SCORE: 6      "

## 2022-09-16 NOTE — PROGRESS NOTES
[facility]                                     Progress Note    Patient Name: Sommer Gomez  Date: 9/7/22         Service Type: Individual      Session Start Time: 9:00 AM  Session End Time: 9:50 AM     Session Length: 38-52 min    Session #: 41    Attendees: Client    Service Modality:  Video Visit:      Provider verified identity through the following two step process.  Patient provided:  Patient is known previously to provider    Telemedicine Visit: The patient's condition can be safely assessed and treated via synchronous audio and visual telemedicine encounter.      Reason for Telemedicine Visit: Services only offered telehealth    Originating Site (Patient Location): Patient's home    Distant Site (Provider Location): Provider Remote Setting- Home Office    Consent:  The patient/guardian has verbally consented to: the potential risks and benefits of telemedicine (video visit) versus in person care; bill my insurance or make self-payment for services provided; and responsibility for payment of non-covered services.     Patient would like the video invitation sent by:  Text to cell phone: [phone]    Mode of Communication:  Video Conference via Amwell    As the provider I attest to compliance with applicable laws and regulations related to telemedicine.    DATA  Interactive Complexity: No  Crisis: No        Progress Since Last Session (Related to Symptoms / Goals / Homework):   Symptoms: No change Patient and expressed no change in symptoms and behaviors    Homework: Completed in session      Episode of Care Goals: Minimal progress - ACTION (Actively working towards change); Intervened by reinforcing change plan / affirming steps taken     Current / Ongoing Stressors and Concerns:   Individual/Family/societal/Relational  Patient was on time and present for the session.  Patient expressed doing well.  Main area of stress was between ex-husand and his soon to be ex-wife.  We processed  and explore that relational dynamic                Treatment Objective(s) Addressed in This Session:   Family support and dynamics       Intervention:   Motivational Interviewing: Expressed Empathy/Understanding, Supported Autonomy, Collaboration, Evocation, Permission to raise concern or advise, Open-ended questions, Reflections: simple and complex, Change talk (evoked) and Reframe     Assessments completed prior to visit:  PHQ9:   PHQ-9 SCORE 5/11/2022 6/2/2022 6/15/2022 7/22/2022 7/28/2022 8/5/2022 9/7/2022   PHQ-9 Total Score - - - - - - -   PHQ-9 Total Score MyChart 3 (Minimal depression) 5 (Mild depression) 6 (Mild depression) 7 (Mild depression) 5 (Mild depression) 6 (Mild depression) 6 (Mild depression)   PHQ-9 Total Score 3 5 6 7 5 6 6     GAD7:   ROSA-7 SCORE 1/20/2021 2/26/2021 6/18/2021 12/13/2021 6/2/2022 7/22/2022 7/22/2022   Total Score - - - - - - -   Total Score - - - 6 (mild anxiety) 6 (mild anxiety) - 11 (moderate anxiety)   Total Score 12 7 12 6 6 11 11     PROMIS 10-Global Health (only subscores and total score):   PROMIS-10 Scores Only 12/13/2021 3/18/2022 3/30/2022 7/14/2022 7/28/2022   Global Mental Health Score 11 15 13 13 11   Global Physical Health Score 15 14 14 15 16   PROMIS TOTAL - SUBSCORES 26 29 27 28 27         ASSESSMENT: Current Emotional / Mental Status (status of significant symptoms):   Risk status (Self / Other harm or suicidal ideation)   Patient denies current fears or concerns for personal safety.   Patient denies current or recent suicidal ideation or behaviors.   Patient denies current or recent homicidal ideation or behaviors.   Patient denies current or recent self injurious behavior or ideation.   Patient denies other safety concerns.   Patient reports there has been no change in risk factors since their last session.     Patient reports there has been no change in protective factors since their last session.     Recommended that patient call 911 or go to the local ED  should there be a change in any of these risk factors.     Appearance:   Appropriate    Eye Contact:   Good    Psychomotor Behavior: Normal    Attitude:   Cooperative    Orientation:   All   Speech    Rate / Production: Normal     Volume:  Normal    Mood:    Normal   Affect:    Appropriate    Thought Content:  Clear    Thought Form:  Coherent  Logical    Insight:    Good      Medication Review:   No changes to current psychiatric medication(s)     Medication Compliance:   Yes     Changes in Health Issues:   Yes: gastroentrolgy, Associated Psychological Distress     Chemical Use Review:   Substance Use: Chemical use reviewed, no active concerns identified      Tobacco Use: No current tobacco use.      Diagnosis:  Major Depressive Disorder Recurrent Mild  Generalized Anxiety Disorder  Post traumatic Stress Disorder    Collateral Reports Completed:   Not Applicable    PLAN: (Patient Tasks / Therapist Tasks / Other)  Continue to meet for individual therapy and watch recommended video on trauma    Misbah Sandoval [facility]                                                         ______________________________________________________________________    Individual Treatment Plan    Patient's Name: Sommer Gomez  YOB: 1980    Date of Creation: 6/27/22  Date Treatment Plan Last Reviewed/Revised:     DSM5 Diagnoses: 296.31 (F33.0) Major Depressive Disorder, Recurrent Episode, Mild _, 300.02 (F41.1) Generalized Anxiety Disorder or 309.81 (F43.10) Posttraumatic Stress Disorder (includes Posttraumatic Stress Disorder for Children 6 Years and Younger)  Without dissociative symptoms  Psychosocial / Contextual Factors: Individual/Family/Societal/Relational  PROMIS (reviewed every 90 days):     Referral / Collaboration:  Referral to another professional/service is not indicated at this time..    Anticipated number of session for this episode of care: 90 days  Anticipation frequency of session: Biweekly  Anticipated  "Duration of each session: 38-52 minutes  Treatment plan will be reviewed in 90 days or when goals have been changed.        MeasurableTreatment Goal(s) related to diagnosis / functional impairment(s)  Goal 1: Patient will utilize interventions and skills to better regulate negative emotions in difficult circumstances or experiences.  Also, if they are triggered in situations.    I will know I've met my goal when I am able to find positives in difficult circumstances.  Increase in motivation and less irritable.\"      Objective #A (Patient Action)                          Patient will identify specific strategies to more effectively address stressors  identify three initial signs or symptoms of anxiety.  Status: Continued - Date:      Intervention(s)  Therapist will teach emotional recognition/identification. Utilizing CBT DBT ACT and mindfulness interventions.     Objective #B  Patient will identify at least 5 triggers for anxiety.  Status: Continued - Date:      Intervention(s)  Therapist will teach emotional recognition/identification. utilizing CBT DBT ACT and mindfulness interventions.     Objective #C  Patient will Feel less tired and more energy during the day .  Status: Continued - Date:      Intervention(s)  Therapist will teach emotional regulation skills. Utilizng CBT DBT ACT and mindfulness interventions.        Goal 2: Patient will reduce overall depression and anxiety    I will know I've met my goal when I am able to feel more motivation and have more energy.  I am able to be more mindful and in the present\"      Objective #A (Patient Action)                          Status: Continued - Date:     Patient will use cognitive strategies identified in therapy to challenge anxious thoughts  Identify negative self-talk and behaviors: challenge core beliefs, myths, and actions.     Intervention(s)  Therapist will utilize CBT DBT ACT and mindfulness interventions.     Goal 3: Patiient will address traumatic " "experiences to reduce trauma response.    I will know I've met my goal when I am able to have less trauma response, flashbacks, nightmares, re-experiencing of past.  I can stay more in the present\"      Objective #A (Patient Action)                          Status: Continued - Date:      Patient will identify specific strategies to more effectively address stressors  learn to utilize relaxation and cogntive coping skills to reduce and regulate emotions.     Intervention(s)  Therapist will teach emotional regulation skills. Utilizing CBT DBT ACT and mindfulness interventions.     Objective #B  Patient will address specific and targeted traumatic experiences to reduce negative trauma responses and overall impact of trauma.                 Status: Continued - Date:     Intervention(s)  Therapist will utilize CBT DBT ACT and mindfulness interventions.  Narrative therapy and Exposure therapy.    Patient has reviewed and agreed to the above plan.      [facility]oval [facility]  June 27, 2022    Answers for HPI/ROS submitted by the patient on 9/7/2022  If you checked off any problems, how difficult have these problems made it for you to do your work, take care of things at home, or get along with other people?: Somewhat difficult  PHQ9 TOTAL SCORE: 6      "

## 2022-09-19 ENCOUNTER — VIRTUAL VISIT (OUTPATIENT)
Dept: PSYCHOLOGY | Facility: CLINIC | Age: 42
End: 2022-09-19
Payer: COMMERCIAL

## 2022-09-19 DIAGNOSIS — F41.1 GENERALIZED ANXIETY DISORDER: Primary | ICD-10-CM

## 2022-09-19 DIAGNOSIS — F43.10 POST TRAUMATIC STRESS DISORDER (PTSD): ICD-10-CM

## 2022-09-19 DIAGNOSIS — F33.0 MAJOR DEPRESSIVE DISORDER, RECURRENT EPISODE, MILD WITH ANXIOUS DISTRESS (H): ICD-10-CM

## 2022-09-19 PROCEDURE — 90834 PSYTX W PT 45 MINUTES: CPT | Mod: 95 | Performed by: PSYCHOLOGIST

## 2022-09-23 NOTE — PROGRESS NOTES
[facility]                                     Progress Note    Patient Name: Sommer Gomez  Date: 9/19/22         Service Type: Individual      Session Start Time: 4:30 PM  Session End Time: 5:20 PM     Session Length: 38-52 min    Session #: 42    Attendees: Client    Service Modality:  Video Visit:      Provider verified identity through the following two step process.  Patient provided:  Patient is known previously to provider    Telemedicine Visit: The patient's condition can be safely assessed and treated via synchronous audio and visual telemedicine encounter.      Reason for Telemedicine Visit: Services only offered telehealth    Originating Site (Patient Location): Patient's home    Distant Site (Provider Location): Provider Remote Setting- Home Office    Consent:  The patient/guardian has verbally consented to: the potential risks and benefits of telemedicine (video visit) versus in person care; bill my insurance or make self-payment for services provided; and responsibility for payment of non-covered services.     Patient would like the video invitation sent by:  Text to cell phone: [phone]    Mode of Communication:  Video Conference via Amwell    As the provider I attest to compliance with applicable laws and regulations related to telemedicine.    DATA  Interactive Complexity: No  Crisis: No        Progress Since Last Session (Related to Symptoms / Goals / Homework):   Symptoms: No change Patient and expressed no change in symptoms and behaviors    Homework: Completed in session      Episode of Care Goals: Minimal progress - ACTION (Actively working towards change); Intervened by reinforcing change plan / affirming steps taken     Current / Ongoing Stressors and Concerns:   Individual/Family/societal/Relational  Patient was on time and present for the session.  Patient expressed doing well. Main stressors were related to services regarding their children.  They stated that  they had to be assertive due to lack of care and attentiveness to their kids related to transportation services to mental health and medical appointments.  Also, explored relational dynamics that create negative reactions.      Treatment Objective(s) Addressed in This Session:   Family support and dynamics       Intervention:   Motivational Interviewing: Expressed Empathy/Understanding, Supported Autonomy, Collaboration, Evocation, Permission to raise concern or advise, Open-ended questions, Reflections: simple and complex, Change talk (evoked) and Reframe     Assessments completed prior to visit:  PHQ9:   PHQ-9 SCORE 6/2/2022 6/15/2022 7/22/2022 7/28/2022 8/5/2022 9/7/2022 9/19/2022   PHQ-9 Total Score - - - - - - -   PHQ-9 Total Score MyChart 5 (Mild depression) 6 (Mild depression) 7 (Mild depression) 5 (Mild depression) 6 (Mild depression) 6 (Mild depression) 6 (Mild depression)   PHQ-9 Total Score 5 6 7 5 6 6 6     GAD7:   ROSA-7 SCORE 1/20/2021 2/26/2021 6/18/2021 12/13/2021 6/2/2022 7/22/2022 7/22/2022   Total Score - - - - - - -   Total Score - - - 6 (mild anxiety) 6 (mild anxiety) - 11 (moderate anxiety)   Total Score 12 7 12 6 6 11 11     PROMIS 10-Global Health (only subscores and total score):   PROMIS-10 Scores Only 12/13/2021 3/18/2022 3/30/2022 7/14/2022 7/28/2022   Global Mental Health Score 11 15 13 13 11   Global Physical Health Score 15 14 14 15 16   PROMIS TOTAL - SUBSCORES 26 29 27 28 27         ASSESSMENT: Current Emotional / Mental Status (status of significant symptoms):   Risk status (Self / Other harm or suicidal ideation)   Patient denies current fears or concerns for personal safety.   Patient denies current or recent suicidal ideation or behaviors.   Patient denies current or recent homicidal ideation or behaviors.   Patient denies current or recent self injurious behavior or ideation.   Patient denies other safety concerns.   Patient reports there has been no change in risk factors since  their last session.     Patient reports there has been no change in protective factors since their last session.     Recommended that patient call 911 or go to the local ED should there be a change in any of these risk factors.     Appearance:   Appropriate    Eye Contact:   Good    Psychomotor Behavior: Normal    Attitude:   Cooperative    Orientation:   All   Speech    Rate / Production: Normal     Volume:  Normal    Mood:    Normal   Affect:    Appropriate    Thought Content:  Clear    Thought Form:  Coherent  Logical    Insight:    Good      Medication Review:   No changes to current psychiatric medication(s)     Medication Compliance:   Yes     Changes in Health Issues:   Yes: gastroentrolgy, Associated Psychological Distress     Chemical Use Review:   Substance Use: Chemical use reviewed, no active concerns identified      Tobacco Use: No current tobacco use.      Diagnosis:  Major Depressive Disorder Recurrent Mild  Generalized Anxiety Disorder  Post traumatic Stress Disorder    Collateral Reports Completed:   Not Applicable    PLAN: (Patient Tasks / Therapist Tasks / Other)  Continue to meet for individual therapy and watch recommended video on trauma Dr. Servando Gunn/[facility]                                                         ______________________________________________________________________    Individual Treatment Plan    Patient's Name: Sommer Gomez  YOB: 1980    Date of Creation: 6/27/22  Date Treatment Plan Last Reviewed/Revised:     DSM5 Diagnoses: 296.31 (F33.0) Major Depressive Disorder, Recurrent Episode, Mild _, 300.02 (F41.1) Generalized Anxiety Disorder or 309.81 (F43.10) Posttraumatic Stress Disorder (includes Posttraumatic Stress Disorder for Children 6 Years and Younger)  Without dissociative symptoms  Psychosocial / Contextual Factors: Individual/Family/Societal/Relational  PROMIS (reviewed every 90 days):     Referral /  "Collaboration:  Referral to another professional/service is not indicated at this time..    Anticipated number of session for this episode of care: 90 days  Anticipation frequency of session: Biweekly  Anticipated Duration of each session: 38-52 minutes  Treatment plan will be reviewed in 90 days or when goals have been changed.        MeasurableTreatment Goal(s) related to diagnosis / functional impairment(s)  Goal 1: Patient will utilize interventions and skills to better regulate negative emotions in difficult circumstances or experiences.  Also, if they are triggered in situations.    I will know I've met my goal when I am able to find positives in difficult circumstances.  Increase in motivation and less irritable.\"      Objective #A (Patient Action)                          Patient will identify specific strategies to more effectively address stressors  identify three initial signs or symptoms of anxiety.  Status: Continued - Date:      Intervention(s)  Therapist will teach emotional recognition/identification. Utilizing CBT DBT ACT and mindfulness interventions.     Objective #B  Patient will identify at least 5 triggers for anxiety.  Status: Continued - Date:      Intervention(s)  Therapist will teach emotional recognition/identification. utilizing CBT DBT ACT and mindfulness interventions.     Objective #C  Patient will Feel less tired and more energy during the day .  Status: Continued - Date:      Intervention(s)  Therapist will teach emotional regulation skills. Utilizng CBT DBT ACT and mindfulness interventions.        Goal 2: Patient will reduce overall depression and anxiety    I will know I've met my goal when I am able to feel more motivation and have more energy.  I am able to be more mindful and in the present\"      Objective #A (Patient Action)                          Status: Continued - Date:     Patient will use cognitive strategies identified in therapy to challenge anxious thoughts  Identify " "negative self-talk and behaviors: challenge core beliefs, myths, and actions.     Intervention(s)  Therapist will utilize CBT DBT ACT and mindfulness interventions.     Goal 3: Patiient will address traumatic experiences to reduce trauma response.    I will know I've met my goal when I am able to have less trauma response, flashbacks, nightmares, re-experiencing of past.  I can stay more in the present\"      Objective #A (Patient Action)                          Status: Continued - Date:      Patient will identify specific strategies to more effectively address stressors  learn to utilize relaxation and cogntive coping skills to reduce and regulate emotions.     Intervention(s)  Therapist will teach emotional regulation skills. Utilizing CBT DBT ACT and mindfulness interventions.     Objective #B  Patient will address specific and targeted traumatic experiences to reduce negative trauma responses and overall impact of trauma.                 Status: Continued - Date:     Intervention(s)  Therapist will utilize CBT DBT ACT and mindfulness interventions.  Narrative therapy and Exposure therapy.    Patient has reviewed and agreed to the above plan.      Misbah Sandoval [facility]  June 27, 2022        Answers for HPI/ROS submitted by the patient on 9/19/2022  If you checked off any problems, how difficult have these problems made it for you to do your work, take care of things at home, or get along with other people?: Somewhat difficult  PHQ9 TOTAL SCORE: 6      "

## 2022-09-28 ENCOUNTER — VIRTUAL VISIT (OUTPATIENT)
Dept: PSYCHOLOGY | Facility: CLINIC | Age: 42
End: 2022-09-28
Payer: COMMERCIAL

## 2022-09-28 DIAGNOSIS — F33.0 MAJOR DEPRESSIVE DISORDER, RECURRENT EPISODE, MILD WITH ANXIOUS DISTRESS (H): ICD-10-CM

## 2022-09-28 DIAGNOSIS — F43.10 POST TRAUMATIC STRESS DISORDER (PTSD): ICD-10-CM

## 2022-09-28 DIAGNOSIS — F41.1 GENERALIZED ANXIETY DISORDER: Primary | ICD-10-CM

## 2022-09-28 PROCEDURE — 90834 PSYTX W PT 45 MINUTES: CPT | Mod: 95 | Performed by: PSYCHOLOGIST

## 2022-09-28 ASSESSMENT — PATIENT HEALTH QUESTIONNAIRE - PHQ9
SUM OF ALL RESPONSES TO PHQ QUESTIONS 1-9: 9
10. IF YOU CHECKED OFF ANY PROBLEMS, HOW DIFFICULT HAVE THESE PROBLEMS MADE IT FOR YOU TO DO YOUR WORK, TAKE CARE OF THINGS AT HOME, OR GET ALONG WITH OTHER PEOPLE: VERY DIFFICULT
SUM OF ALL RESPONSES TO PHQ QUESTIONS 1-9: 9

## 2022-10-03 ENCOUNTER — E-VISIT (OUTPATIENT)
Dept: FAMILY MEDICINE | Facility: CLINIC | Age: 42
End: 2022-10-03
Payer: COMMERCIAL

## 2022-10-03 DIAGNOSIS — N92.6 IRREGULAR MENSES: Primary | ICD-10-CM

## 2022-10-03 PROCEDURE — 99207 PR NON-BILLABLE SERV PER CHARTING: CPT | Performed by: PHYSICIAN ASSISTANT

## 2022-10-06 NOTE — PROGRESS NOTES
"[facility]                                     Progress Note    Patient Name: Sommer Gomez  Date: 9/28/22         Service Type: Individual      Session Start Time: 10:00 AM  Session End Time: 10:52 AM     Session Length: 38-52 min    Session #: 43    Attendees: Client    Service Modality:  Video Visit:      Provider verified identity through the following two step process.  Patient provided:  Patient is known previously to provider    Telemedicine Visit: The patient's condition can be safely assessed and treated via synchronous audio and visual telemedicine encounter.      Reason for Telemedicine Visit: Services only offered telehealth    Originating Site (Patient Location): Patient's home    Distant Site (Provider Location): Provider Remote Setting- Home Office    Consent:  The patient/guardian has verbally consented to: the potential risks and benefits of telemedicine (video visit) versus in person care; bill my insurance or make self-payment for services provided; and responsibility for payment of non-covered services.     Patient would like the video invitation sent by:  Text to cell phone: [phone]    Mode of Communication:  Video Conference via Amwell    As the provider I attest to compliance with applicable laws and regulations related to telemedicine.    DATA  Interactive Complexity: No  Crisis: No        Progress Since Last Session (Related to Symptoms / Goals / Homework):   Symptoms: No change Patient and expressed no change in symptoms and behaviors    Homework: Completed in session      Episode of Care Goals: Satisfactory progress - ACTION (Actively working towards change); Intervened by reinforcing change plan / affirming steps taken     Current / Ongoing Stressors and Concerns:   Individual/Family/societal/Relational  Patient was on time and present for the session.  Patient expressed that they have not been feeling well or feeling \"off\"  They expressed at times have lack " of interest or low mood.  They have not been sleeping well.  They expressed unable to identify specific reasons why.  They did state, that they are in pain with their shoulder and have limited movement.  Discussed that this might be contributing to their mood.       Treatment Objective(s) Addressed in This Session:   identify certain strategies for managing pain  Family support and dynamics       Intervention:   Motivational Interviewing: Expressed Empathy/Understanding, Supported Autonomy, Collaboration, Evocation, Permission to raise concern or advise, Open-ended questions, Reflections: simple and complex, Change talk (evoked) and Reframe     Assessments completed prior to visit:  PHQ9:   PHQ-9 SCORE 6/15/2022 7/22/2022 7/28/2022 8/5/2022 9/7/2022 9/19/2022 9/28/2022   PHQ-9 Total Score - - - - - - -   PHQ-9 Total Score MyChart 6 (Mild depression) 7 (Mild depression) 5 (Mild depression) 6 (Mild depression) 6 (Mild depression) 6 (Mild depression) 9 (Mild depression)   PHQ-9 Total Score 6 7 5 6 6 6 9     GAD7:   ROSA-7 SCORE 1/20/2021 2/26/2021 6/18/2021 12/13/2021 6/2/2022 7/22/2022 7/22/2022   Total Score - - - - - - -   Total Score - - - 6 (mild anxiety) 6 (mild anxiety) - 11 (moderate anxiety)   Total Score 12 7 12 6 6 11 11     PROMIS 10-Global Health (only subscores and total score):   PROMIS-10 Scores Only 12/13/2021 3/18/2022 3/30/2022 7/14/2022 7/28/2022   Global Mental Health Score 11 15 13 13 11   Global Physical Health Score 15 14 14 15 16   PROMIS TOTAL - SUBSCORES 26 29 27 28 27         ASSESSMENT: Current Emotional / Mental Status (status of significant symptoms):   Risk status (Self / Other harm or suicidal ideation)   Patient denies current fears or concerns for personal safety.   Patient denies current or recent suicidal ideation or behaviors.   Patient denies current or recent homicidal ideation or behaviors.   Patient denies current or recent self injurious behavior or ideation.   Patient denies  other safety concerns.   Patient reports there has been no change in risk factors since their last session.     Patient reports there has been no change in protective factors since their last session.     Recommended that patient call 911 or go to the local ED should there be a change in any of these risk factors.     Appearance:   Appropriate    Eye Contact:   Good    Psychomotor Behavior: Normal    Attitude:   Cooperative    Orientation:   All   Speech    Rate / Production: Normal     Volume:  Normal    Mood:    Sad    Affect:    Appropriate    Thought Content:  Clear    Thought Form:  Coherent  Logical    Insight:    Good      Medication Review:   No changes to current psychiatric medication(s)     Medication Compliance:   Yes     Changes in Health Issues:   Yes: Pain, Associated Psychological Distress     Chemical Use Review:   Substance Use: Chemical use reviewed, no active concerns identified      Tobacco Use: No current tobacco use.      Diagnosis:  Major Depressive Disorder Recurrent Mild  Generalized Anxiety Disorder  Post traumatic Stress Disorder    Collateral Reports Completed:   Not Applicable    PLAN: (Patient Tasks / Therapist Tasks / Other)  Continue to meet for individual therapy and watch recommended video on trauma Dr. Servando Gunn/[facility] [employer]/Follow up with Physical therapy or chiropractic for shoulder    Misbah Jaime, [facility]                                                         ______________________________________________________________________    Individual Treatment Plan    Patient's Name: Sommer Gomez  YOB: 1980    Date of Creation: 9/28/22  Date Treatment Plan Last Reviewed/Revised: 6/27/22    DSM5 Diagnoses: 296.31 (F33.0) Major Depressive Disorder, Recurrent Episode, Mild _, 300.02 (F41.1) Generalized Anxiety Disorder or 309.81 (F43.10) Posttraumatic Stress Disorder (includes Posttraumatic Stress Disorder for Children 6 Years and Younger)  Without dissociative  "symptoms  Psychosocial / Contextual Factors: Individual/Family/Societal/Relational  PROMIS (reviewed every 90 days):     Referral / Collaboration:  Referral to another professional/service is not indicated at this time..    Anticipated number of session for this episode of care: 90 days  Anticipation frequency of session: Biweekly  Anticipated Duration of each session: 38-52 minutes  Treatment plan will be reviewed in 90 days or when goals have been changed.        MeasurableTreatment Goal(s) related to diagnosis / functional impairment(s)  Goal 1: Patient will utilize interventions and skills to better regulate negative emotions in difficult circumstances or experiences.  Also, if they are triggered in situations.    I will know I've met my goal when I am able to find positives in difficult circumstances.  Increase in motivation and less irritable.\"      Objective #A (Patient Action)                          Patient will identify specific strategies to more effectively address stressors  identify three initial signs or symptoms of anxiety.  Status: Continued - Date:      Intervention(s)  Therapist will teach emotional recognition/identification. Utilizing CBT DBT ACT and mindfulness interventions.     Objective #B  Patient will identify at least 5 triggers for anxiety.  Status: Continued - Date:      Intervention(s)  Therapist will teach emotional recognition/identification. utilizing CBT DBT ACT and mindfulness interventions.     Objective #C  Patient will Feel less tired and more energy during the day .  Status: Continued - Date:      Intervention(s)  Therapist will teach emotional regulation skills. Utilizng CBT DBT ACT and mindfulness interventions.        Goal 2: Patient will reduce overall depression and anxiety    I will know I've met my goal when I am able to feel more motivation and have more energy.  I am able to be more mindful and in the present\"      Objective #A (Patient " "Action)                          Status: Continued - Date:     Patient will use cognitive strategies identified in therapy to challenge anxious thoughts  Identify negative self-talk and behaviors: challenge core beliefs, myths, and actions.     Intervention(s)  Therapist will utilize CBT DBT ACT and mindfulness interventions.     Goal 3: Patiient will address traumatic experiences to reduce trauma response.    I will know I've met my goal when I am able to have less trauma response, flashbacks, nightmares, re-experiencing of past.  I can stay more in the present\"      Objective #A (Patient Action)                          Status: Continued - Date:      Patient will identify specific strategies to more effectively address stressors  learn to utilize relaxation and cogntive coping skills to reduce and regulate emotions.     Intervention(s)  Therapist will teach emotional regulation skills. Utilizing CBT DBT ACT and mindfulness interventions.     Objective #B  Patient will address specific and targeted traumatic experiences to reduce negative trauma responses and overall impact of trauma.                 Status: Continued - Date:     Intervention(s)  Therapist will utilize CBT DBT ACT and mindfulness interventions.  Narrative therapy and Exposure therapy.    Patient has reviewed and agreed to the above plan.      Misbah Sandoval [facility]  September 28, 2022        Answers for HPI/ROS submitted by the patient on 9/28/2022  If you checked off any problems, how difficult have these problems made it for you to do your work, take care of things at home, or get along with other people?: Very difficult  PHQ9 TOTAL SCORE: 9      "

## 2022-10-11 ENCOUNTER — TRANSFERRED RECORDS (OUTPATIENT)
Dept: HEALTH INFORMATION MANAGEMENT | Facility: CLINIC | Age: 42
End: 2022-10-11

## 2022-10-19 ENCOUNTER — VIRTUAL VISIT (OUTPATIENT)
Dept: PSYCHOLOGY | Facility: CLINIC | Age: 42
End: 2022-10-19
Payer: COMMERCIAL

## 2022-10-19 DIAGNOSIS — F41.1 GENERALIZED ANXIETY DISORDER: Primary | ICD-10-CM

## 2022-10-19 DIAGNOSIS — F43.10 POST TRAUMATIC STRESS DISORDER (PTSD): ICD-10-CM

## 2022-10-19 DIAGNOSIS — F33.0 MAJOR DEPRESSIVE DISORDER, RECURRENT EPISODE, MILD WITH ANXIOUS DISTRESS (H): ICD-10-CM

## 2022-10-19 PROCEDURE — 90834 PSYTX W PT 45 MINUTES: CPT | Mod: 95 | Performed by: PSYCHOLOGIST

## 2022-10-26 ENCOUNTER — VIRTUAL VISIT (OUTPATIENT)
Dept: PSYCHOLOGY | Facility: CLINIC | Age: 42
End: 2022-10-26
Payer: COMMERCIAL

## 2022-10-26 ENCOUNTER — NURSE TRIAGE (OUTPATIENT)
Dept: NURSING | Facility: CLINIC | Age: 42
End: 2022-10-26

## 2022-10-26 DIAGNOSIS — F43.10 POST TRAUMATIC STRESS DISORDER (PTSD): ICD-10-CM

## 2022-10-26 DIAGNOSIS — F41.1 GENERALIZED ANXIETY DISORDER: Primary | ICD-10-CM

## 2022-10-26 DIAGNOSIS — F33.0 MAJOR DEPRESSIVE DISORDER, RECURRENT EPISODE, MILD WITH ANXIOUS DISTRESS (H): ICD-10-CM

## 2022-10-26 PROCEDURE — 90834 PSYTX W PT 45 MINUTES: CPT | Mod: 95 | Performed by: PSYCHOLOGIST

## 2022-10-26 NOTE — PROGRESS NOTES
"[facility]                                     Progress Note    Patient Name: Sommer Gomez  Date: 10/19/22         Service Type: Individual      Session Start Time: 7:00 AM  Session End Time: 7:52 AM     Session Length: 38-52 min    Session #: 44    Attendees: Client    Service Modality:  Video Visit:      Provider verified identity through the following two step process.  Patient provided:  Patient is known previously to provider    Telemedicine Visit: The patient's condition can be safely assessed and treated via synchronous audio and visual telemedicine encounter.      Reason for Telemedicine Visit: Services only offered telehealth    Originating Site (Patient Location): Patient's home    Distant Site (Provider Location): Provider Remote Setting- Home Office    Consent:  The patient/guardian has verbally consented to: the potential risks and benefits of telemedicine (video visit) versus in person care; bill my insurance or make self-payment for services provided; and responsibility for payment of non-covered services.     Patient would like the video invitation sent by:  Text to cell phone: [phone]    Mode of Communication:  Video Conference via Amwell    As the provider I attest to compliance with applicable laws and regulations related to telemedicine.    DATA  Interactive Complexity: No  Crisis: No        Progress Since Last Session (Related to Symptoms / Goals / Homework):   Symptoms: No change Patient and expressed no change in symptoms and behaviors    Homework: Completed in session      Episode of Care Goals: Satisfactory progress - ACTION (Actively working towards change); Intervened by reinforcing change plan / affirming steps taken     Current / Ongoing Stressors and Concerns:   Individual/Family/societal/Relational  Patient was on time and present for the session.  Patient expressed that they have not been feeling well or feeling \"off\"  They expressed at times have lack " of interest or low mood.  They have not been sleeping well. They did state, that they are in pain with their shoulder and have limited movement.  Discussed the primary emotions of Shame and how it manifest into their reactions.  Patient found this to be very insightful.       Treatment Objective(s) Addressed in This Session:   Identified emotions of shame and its impact on their sense of self       Intervention:   Motivational Interviewing: Expressed Empathy/Understanding, Supported Autonomy, Collaboration, Evocation, Permission to raise concern or advise, Open-ended questions, Reflections: simple and complex, Change talk (evoked) and Reframe     Assessments completed prior to visit:  PHQ9:   PHQ-9 SCORE 7/28/2022 8/5/2022 9/7/2022 9/19/2022 9/28/2022 10/12/2022 10/26/2022   PHQ-9 Total Score - - - - - - -   PHQ-9 Total Score MyChart 5 (Mild depression) 6 (Mild depression) 6 (Mild depression) 6 (Mild depression) 9 (Mild depression) 10 (Moderate depression) 4 (Minimal depression)   PHQ-9 Total Score 5 6 6 6 9 10 4     GAD7:   ROSA-7 SCORE 1/20/2021 2/26/2021 6/18/2021 12/13/2021 6/2/2022 7/22/2022 7/22/2022   Total Score - - - - - - -   Total Score - - - 6 (mild anxiety) 6 (mild anxiety) - 11 (moderate anxiety)   Total Score 12 7 12 6 6 11 11     PROMIS 10-Global Health (only subscores and total score):   PROMIS-10 Scores Only 12/13/2021 3/18/2022 3/30/2022 7/14/2022 7/28/2022   Global Mental Health Score 11 15 13 13 11   Global Physical Health Score 15 14 14 15 16   PROMIS TOTAL - SUBSCORES 26 29 27 28 27         ASSESSMENT: Current Emotional / Mental Status (status of significant symptoms):   Risk status (Self / Other harm or suicidal ideation)   Patient denies current fears or concerns for personal safety.   Patient denies current or recent suicidal ideation or behaviors.   Patient denies current or recent homicidal ideation or behaviors.   Patient denies current or recent self injurious behavior or  ideation.   Patient denies other safety concerns.   Patient reports there has been no change in risk factors since their last session.     Patient reports there has been no change in protective factors since their last session.     Recommended that patient call 911 or go to the local ED should there be a change in any of these risk factors.     Appearance:   Appropriate    Eye Contact:   Good    Psychomotor Behavior: Normal    Attitude:   Cooperative    Orientation:   All   Speech    Rate / Production: Normal     Volume:  Normal    Mood:    Sad    Affect:    Appropriate    Thought Content:  Clear    Thought Form:  Coherent  Logical    Insight:    Good      Medication Review:   No changes to current psychiatric medication(s)     Medication Compliance:   Yes     Changes in Health Issues:   Yes: Pain, Associated Psychological Distress     Chemical Use Review:   Substance Use: Chemical use reviewed, no active concerns identified      Tobacco Use: No current tobacco use.      Diagnosis:  Major Depressive Disorder Recurrent Mild  Generalized Anxiety Disorder  Post traumatic Stress Disorder    Collateral Reports Completed:   Not Applicable    PLAN: (Patient Tasks / Therapist Tasks / Other)  Continue to meet for individual therapy and watch recommended video on trauma Dr. Servando Gunn/[facility] [place], [facility]                                                         ______________________________________________________________________    Individual Treatment Plan    Patient's Name: Sommer Gomez  YOB: 1980    Date of Creation: 9/28/22  Date Treatment Plan Last Reviewed/Revised: 6/27/22    DSM5 Diagnoses: 296.31 (F33.0) Major Depressive Disorder, Recurrent Episode, Mild _, 300.02 (F41.1) Generalized Anxiety Disorder or 309.81 (F43.10) Posttraumatic Stress Disorder (includes Posttraumatic Stress Disorder for Children 6 Years and Younger)  Without dissociative  "symptoms  Psychosocial / Contextual Factors: Individual/Family/Societal/Relational  PROMIS (reviewed every 90 days):     Referral / Collaboration:  Referral to another professional/service is not indicated at this time..    Anticipated number of session for this episode of care: 90 days  Anticipation frequency of session: Biweekly  Anticipated Duration of each session: 38-52 minutes  Treatment plan will be reviewed in 90 days or when goals have been changed.        MeasurableTreatment Goal(s) related to diagnosis / functional impairment(s)  Goal 1: Patient will utilize interventions and skills to better regulate negative emotions in difficult circumstances or experiences.  Also, if they are triggered in situations.    I will know I've met my goal when I am able to find positives in difficult circumstances.  Increase in motivation and less irritable.\"      Objective #A (Patient Action)                          Patient will identify specific strategies to more effectively address stressors  identify three initial signs or symptoms of anxiety.  Status: Continued - Date:      Intervention(s)  Therapist will teach emotional recognition/identification. Utilizing CBT DBT ACT and mindfulness interventions.     Objective #B  Patient will identify at least 5 triggers for anxiety.  Status: Continued - Date:      Intervention(s)  Therapist will teach emotional recognition/identification. utilizing CBT DBT ACT and mindfulness interventions.     Objective #C  Patient will Feel less tired and more energy during the day .  Status: Continued - Date:      Intervention(s)  Therapist will teach emotional regulation skills. Utilizng CBT DBT ACT and mindfulness interventions.        Goal 2: Patient will reduce overall depression and anxiety    I will know I've met my goal when I am able to feel more motivation and have more energy.  I am able to be more mindful and in the present\"      Objective #A (Patient " "Action)                          Status: Continued - Date:     Patient will use cognitive strategies identified in therapy to challenge anxious thoughts  Identify negative self-talk and behaviors: challenge core beliefs, myths, and actions.     Intervention(s)  Therapist will utilize CBT DBT ACT and mindfulness interventions.     Goal 3: Patiient will address traumatic experiences to reduce trauma response.    I will know I've met my goal when I am able to have less trauma response, flashbacks, nightmares, re-experiencing of past.  I can stay more in the present\"      Objective #A (Patient Action)                          Status: Continued - Date:      Patient will identify specific strategies to more effectively address stressors  learn to utilize relaxation and cogntive coping skills to reduce and regulate emotions.     Intervention(s)  Therapist will teach emotional regulation skills. Utilizing CBT DBT ACT and mindfulness interventions.     Objective #B  Patient will address specific and targeted traumatic experiences to reduce negative trauma responses and overall impact of trauma.                 Status: Continued - Date:     Intervention(s)  Therapist will utilize CBT DBT ACT and mindfulness interventions.  Narrative therapy and Exposure therapy.    Patient has reviewed and agreed to the above plan.      Misbah Sandoval [facility]  September 28, 2022            "

## 2022-10-26 NOTE — TELEPHONE ENCOUNTER
Nurse Triage SBAR    Is this a 2nd Level Triage? NO    Situation:  Cold symptoms x 1 mos. 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  Now woke up with a sore throat and fever of 101 today via forehead thermometer, and those symptoms are new. Has not taken a COVID test yet.  History of asthma but has not taken her albuterol inhaler yet, as she stated that she doesn't need it.    No menses x 3 mos and now her menstrual cycle started last night and was heavy, soaking up to 3 pads in 8 hours. May be slowing down slightly now. Passed a few very small clots as well, smaller than a pea. History of Tubal Ligation for birth control.  She already has an upcoming appt to discuss this 11/09/2022.      Background: Patient,Sommerstanislaw. Consent: not needed.    Assessment:  No white patches on the back of her throat. It is is red in color.   Cough, nasal congestion, runny nose, sore throat and fever of 101. Fatigue.  She denies SOB, difficulty breathing, wheezing, chest pain, chest tightness or pressure.     Protocol Recommended Disposition: Home care/ Telephone Advise unless COVID positive, and then call back for virtual visit,  and be seen within 2 weeks for vaginal bleeding.     Recommendation: According to the protocol, Patient should be seen within 2 weeks. Advised Patient to be seen within 2 weeks. Take a home COVID test, and if that it positive, give us a call back to set up a virtual visit to discuss treatment options, since she is asthmatic. Care advice given. Patient verbalizes understanding and agrees with plan of care. Reviewed concerning symptoms and when to call back and patient verbalized understanding and agreed to follow care advice given.       Madonna Acosta RN  [facility] Nurse Advisor  10/26/2022 at 8:03 AM      COVID 19 Nurse Triage Plan/Patient Instructions    Please be aware  that novel coronavirus (COVID-19) may be circulating in the community. If you develop symptoms such as fever, cough, or SOB or if you have concerns about the presence of another infection including coronavirus (COVID-19), please contact your health care provider or visit [email or web address].     Disposition/Instructions    Home care recommended. Follow home care protocol based instructions.    Thank you for taking steps to prevent the spread of this virus.  o Limit your contact with others.  o Wear a simple mask to cover your cough.  o Wash your hands well and often.    Resources    [facility]: About COVID-19: [email or web address]    CDC: What to Do If You're Sick: www.cdc.gov/coronavirus/2019-ncov/about/steps-when-sick.html    CDC: Ending Home Isolation: www.cdc.gov/coronavirus/2019-ncov/hcp/disposition-in-home-patients.html     CDC: Caring for Someone: www.cdc.gov/coronavirus/2019-ncov/if-you-are-sick/care-for-someone.html     [facility]: Interim Guidance for Hospital Discharge to Home: www.health.On license of [facility].[place]/diseases/coronavirus/hcp/hospdischarge.pdf    [facility] clinical trials (COVID-19 research studies): [email or web address]-trials     Below are the COVID-19 hotlines at the [place] Department of Health ([facility]). Interpreters are available.   o For health questions: Call 623-808-3962 or 1-683.337.9406 (7 a.m. to 7 p.m.)  o For questions about schools and childcare: Call 917-120-0303 or 1-748.297.6976 (7 a.m. to 7 p.m.)   Reason for Disposition    [1] COVID-19 infection suspected by caller or triager AND [2] mild symptoms (cough, fever, or others) AND [3] has not gotten tested yet    Missed period has occurred 2 or more times in the last year and the cause is not known    Additional Information    Negative: SEVERE difficulty breathing (e.g., struggling for each breath, speaks in single words)    Negative: Difficult to awaken or acting confused (e.g., disoriented,  slurred speech)    Negative: Bluish (or gray) lips or face now    Negative: Shock suspected (e.g., cold/pale/clammy skin, too weak to stand, low BP, rapid pulse)    Negative: Sounds like a life-threatening emergency to the triager    Negative: [1] Diagnosed or suspected COVID-19 AND [2] symptoms lasting 3 or more weeks    Negative: [1] COVID-19 exposure AND [2] no symptoms    Negative: COVID-19 vaccine reaction suspected (e.g., fever, headache, muscle aches) occurring 1 to 3 days after getting vaccine    Negative: COVID-19 vaccine, questions about    Negative: [1] Lives with someone known to have influenza (flu test positive) AND [2] flu-like symptoms (e.g., cough, runny nose, sore throat, SOB; with or without fever)    Negative: [1] Adult with possible COVID-19 symptoms AND [2] triager concerned about severity of symptoms or other causes    Negative: COVID-19 and breastfeeding, questions about    Negative: SEVERE or constant chest pain or pressure  (Exception: Mild central chest pain, present only when coughing.)    Negative: MODERATE difficulty breathing (e.g., speaks in phrases, SOB even at rest, pulse 100-120)    Negative: Headache and stiff neck (can't touch chin to chest)    Negative: Oxygen level (e.g., pulse oximetry) 90 percent or lower    Negative: Chest pain or pressure  (Exception: MILD central chest pain, present only when coughing)    Negative: Patient sounds very sick or weak to the triager    Negative: MILD difficulty breathing (e.g., minimal/no SOB at rest, SOB with walking, pulse <100)    Negative: Fever > 103 F (39.4 C)    Negative: [1] Fever > 101 F (38.3 C) AND [2] over 60 years of age    Negative: [1] Fever > 100.0 F (37.8 C) AND [2] bedridden (e.g., nursing home patient, CVA, chronic illness, recovering from surgery)    Negative: [1] HIGH RISK for severe COVID complications (e.g., weak immune system, age > 64 years, obesity with BMI of 30 or higher, pregnant, chronic lung disease or other  chronic medical condition) AND [2] COVID symptoms (e.g., cough, fever)  (Exceptions: Already seen by PCP and no new or worsening symptoms.)    Negative: [1] HIGH RISK patient AND [2] influenza is widespread in the community AND [3] ONE OR MORE respiratory symptoms: cough, sore throat, runny or stuffy nose    Negative: [1] HIGH RISK patient AND [2] influenza exposure within the last 7 days AND [3] ONE OR MORE respiratory symptoms: cough, sore throat, runny or stuffy nose    Negative: Oxygen level (e.g., pulse oximetry) 91 to 94 percent    Negative: [1] COVID-19 infection suspected by caller or triager AND [2] mild symptoms (cough, fever, or others) AND [3] negative COVID-19 rapid test    Negative: Fever present > 3 days (72 hours)    Negative: [1] Fever returns after gone for over 24 hours AND [2] symptoms worse or not improved    Negative: [1] Continuous (nonstop) coughing interferes with work or school AND [2] no improvement using cough treatment per Care Advice    Negative: Cough present > 3 weeks    Negative: [1] COVID-19 diagnosed by positive lab test (e.g., PCR, rapid self-test kit) AND [2] NO symptoms (e.g., cough, fever, others)    Negative: [1] COVID-19 diagnosed by positive lab test (e.g., PCR, rapid self-test kit) AND [2] mild symptoms (e.g., cough, fever, others) AND [3] no complications or SOB    Negative: [1] COVID-19 diagnosed by doctor (or NP/PA) AND [2] mild symptoms (e.g., cough, fever, others) AND [3] no complications or SOB    Negative: [1] COVID-19 diagnosed AND [2] has mild nausea, vomiting or diarrhea    Negative: SEVERE vaginal bleeding (e.g., continuous red blood from vagina, or large blood clots) and very weak (can't stand)    Negative: Passed out (i.e., fainted, collapsed and was not responding)    Negative: Difficult to awaken or acting confused (e.g., disoriented, slurred speech)    Negative: Shock suspected (e.g., cold/pale/clammy skin, too weak to stand, low BP, rapid pulse)     Negative: Sounds like a life-threatening emergency to the triager    Negative: Pregnant 20 or more weeks (5 months or more)    Negative: Pregnant < 20 weeks (less than 5 months)    Negative: Postpartum (from 0 to 6 weeks after delivery)    Negative: Vaginal discharge is main symptom and bleeding is slight    Negative: SEVERE abdominal pain (e.g., excruciating)    Negative: SEVERE dizziness (e.g., unable to stand, requires support to walk, feels like passing out now)    Negative: SEVERE vaginal bleeding (e.g., soaking 2 pads or tampons per hour and present 2 or more hours; 1 menstrual cup every 2 hours)    Negative: MODERATE vaginal bleeding (i.e., soaking pad or tampon per hour and present > 6 hours; 1 menstrual cup every 6 hours)    Negative: Pale skin (pallor) of new-onset or worsening    Negative: Constant abdominal pain lasting > 2 hours    Negative: Patient sounds very sick or weak to the triager    Negative: Taking Coumadin (warfarin) or other strong blood thinner, or known bleeding disorder (e.g., thrombocytopenia)    Negative: Skin bruises or nosebleed and not caused by an injury    Negative: Bleeding/spotting after procedure (e.g., biopsy) or pelvic examination (e.g., pap smear) that persists > 3 days    Negative: Patient wants to be seen    Negative: Passed tissue (e.g., gray-white)    Negative: Periods with > 6 soaked pads or tampons per day    Negative: Periods last > 7 days    Negative: Uses menstrual cups and more than 80 ml blood per menstrual period    Protocols used: CORONAVIRUS (COVID-19) DIAGNOSED OR [id number]-R-IE, VAGINAL BLEEDING - [id number]-ZM

## 2022-10-28 ENCOUNTER — MYC REFILL (OUTPATIENT)
Dept: FAMILY MEDICINE | Facility: CLINIC | Age: 42
End: 2022-10-28

## 2022-10-28 DIAGNOSIS — R06.02 SHORTNESS OF BREATH: ICD-10-CM

## 2022-10-31 RX ORDER — ALBUTEROL SULFATE 90 UG/1
2 AEROSOL, METERED RESPIRATORY (INHALATION) EVERY 6 HOURS PRN
Qty: 18 G | Refills: 3 | Status: SHIPPED | OUTPATIENT
Start: 2022-10-31 | End: 2023-06-13

## 2022-11-04 NOTE — PROGRESS NOTES
[facility]                                     Progress Note    Patient Name: Sommer Gomez  Date: 10/26/22         Service Type: Individual      Session Start Time: 7:00 AM  Session End Time: 7:38 AM     Session Length: 38-52 min    Session #: 45    Attendees: Client    Service Modality:  Video Visit:      Provider verified identity through the following two step process.  Patient provided:  Patient is known previously to provider    Telemedicine Visit: The patient's condition can be safely assessed and treated via synchronous audio and visual telemedicine encounter.      Reason for Telemedicine Visit: Services only offered telehealth    Originating Site (Patient Location): Patient's home    Distant Site (Provider Location): Provider Remote Setting- Home Office    Consent:  The patient/guardian has verbally consented to: the potential risks and benefits of telemedicine (video visit) versus in person care; bill my insurance or make self-payment for services provided; and responsibility for payment of non-covered services.     Patient would like the video invitation sent by:  Text to cell phone: [phone]    Mode of Communication:  Video Conference via [facility]    As the provider I attest to compliance with applicable laws and regulations related to telemedicine.    DATA  Interactive Complexity: No  Crisis: No        Progress Since Last Session (Related to Symptoms / Goals / Homework):   Symptoms: No change Patient and expressed no change in symptoms and behaviors    Homework: Completed in session      Episode of Care Goals: Satisfactory progress - ACTION (Actively working towards change); Intervened by reinforcing change plan / affirming steps taken     Current / Ongoing Stressors and Concerns:   Individual/Family/societal/Relational  Patient was on time and present for the session. Patient expressed not feeling well due to being sick.  Discussed the primary emotions of Shame and how it  manifest into their reactions.  Patient found this to be very insightful.       Treatment Objective(s) Addressed in This Session:   Identified emotions of shame and its impact on their sense of self       Intervention:   Motivational Interviewing: Expressed Empathy/Understanding, Supported Autonomy, Collaboration, Evocation, Permission to raise concern or advise, Open-ended questions, Reflections: simple and complex, Change talk (evoked) and Reframe     Assessments completed prior to visit:  PHQ9:   PHQ-9 SCORE 7/28/2022 8/5/2022 9/7/2022 9/19/2022 9/28/2022 10/12/2022 10/26/2022   PHQ-9 Total Score - - - - - - -   PHQ-9 Total Score MyChart 5 (Mild depression) 6 (Mild depression) 6 (Mild depression) 6 (Mild depression) 9 (Mild depression) 10 (Moderate depression) 4 (Minimal depression)   PHQ-9 Total Score 5 6 6 6 9 10 4     GAD7:   ROSA-7 SCORE 1/20/2021 2/26/2021 6/18/2021 12/13/2021 6/2/2022 7/22/2022 7/22/2022   Total Score - - - - - - -   Total Score - - - 6 (mild anxiety) 6 (mild anxiety) - 11 (moderate anxiety)   Total Score 12 7 12 6 6 11 11     PROMIS 10-Global Health (only subscores and total score):   PROMIS-10 Scores Only 12/13/2021 3/18/2022 3/30/2022 7/14/2022 7/28/2022   Global Mental Health Score 11 15 13 13 11   Global Physical Health Score 15 14 14 15 16   PROMIS TOTAL - SUBSCORES 26 29 27 28 27         ASSESSMENT: Current Emotional / Mental Status (status of significant symptoms):   Risk status (Self / Other harm or suicidal ideation)   Patient denies current fears or concerns for personal safety.   Patient denies current or recent suicidal ideation or behaviors.   Patient denies current or recent homicidal ideation or behaviors.   Patient denies current or recent self injurious behavior or ideation.   Patient denies other safety concerns.   Patient reports there has been no change in risk factors since their last session.     Patient reports there has been no change in protective factors since  their last session.     Recommended that patient call 911 or go to the local ED should there be a change in any of these risk factors.     Appearance:   Appropriate    Eye Contact:   Good    Psychomotor Behavior: Normal    Attitude:   Cooperative    Orientation:   All   Speech    Rate / Production: Normal     Volume:  Normal    Mood:    Sad    Affect:    Appropriate    Thought Content:  Clear    Thought Form:  Coherent  Logical    Insight:    Good      Medication Review:   No changes to current psychiatric medication(s)     Medication Compliance:   Yes     Changes in Health Issues:   Yes: Pain, Associated Psychological Distress     Chemical Use Review:   Substance Use: Chemical use reviewed, no active concerns identified      Tobacco Use: No current tobacco use.      Diagnosis:  Major Depressive Disorder Recurrent Mild  Generalized Anxiety Disorder  Post traumatic Stress Disorder    Collateral Reports Completed:   Not Applicable    PLAN: (Patient Tasks / Therapist Tasks / Other)  Continue to meet for individual therapy and watch recommended video on trauma Dr. Servando Gunn/Myth holly [place]-Isidro Sandoval, [facility]                                                         ______________________________________________________________________    Individual Treatment Plan    Patient's Name: Sommer Gomez  YOB: 1980    Date of Creation: 9/28/22  Date Treatment Plan Last Reviewed/Revised: 6/27/22    DSM5 Diagnoses: 296.31 (F33.0) Major Depressive Disorder, Recurrent Episode, Mild _, 300.02 (F41.1) Generalized Anxiety Disorder or 309.81 (F43.10) Posttraumatic Stress Disorder (includes Posttraumatic Stress Disorder for Children 6 Years and Younger)  Without dissociative symptoms  Psychosocial / Contextual Factors: Individual/Family/Societal/Relational  PROMIS (reviewed every 90 days):     Referral / Collaboration:  Referral to another professional/service is not indicated at this  "time..    Anticipated number of session for this episode of care: 90 days  Anticipation frequency of session: Biweekly  Anticipated Duration of each session: 38-52 minutes  Treatment plan will be reviewed in 90 days or when goals have been changed.        MeasurableTreatment Goal(s) related to diagnosis / functional impairment(s)  Goal 1: Patient will utilize interventions and skills to better regulate negative emotions in difficult circumstances or experiences.  Also, if they are triggered in situations.    I will know I've met my goal when I am able to find positives in difficult circumstances.  Increase in motivation and less irritable.\"      Objective #A (Patient Action)                          Patient will identify specific strategies to more effectively address stressors  identify three initial signs or symptoms of anxiety.  Status: Continued - Date:      Intervention(s)  Therapist will teach emotional recognition/identification. Utilizing CBT DBT ACT and mindfulness interventions.     Objective #B  Patient will identify at least 5 triggers for anxiety.  Status: Continued - Date:      Intervention(s)  Therapist will teach emotional recognition/identification. utilizing CBT DBT ACT and mindfulness interventions.     Objective #C  Patient will Feel less tired and more energy during the day .  Status: Continued - Date:      Intervention(s)  Therapist will teach emotional regulation skills. Utilizng CBT DBT ACT and mindfulness interventions.        Goal 2: Patient will reduce overall depression and anxiety    I will know I've met my goal when I am able to feel more motivation and have more energy.  I am able to be more mindful and in the present\"      Objective #A (Patient Action)                          Status: Continued - Date:     Patient will use cognitive strategies identified in therapy to challenge anxious thoughts  Identify negative self-talk and behaviors: challenge core beliefs, myths, and " "actions.     Intervention(s)  Therapist will utilize CBT DBT ACT and mindfulness interventions.     Goal 3: Patiient will address traumatic experiences to reduce trauma response.    I will know I've met my goal when I am able to have less trauma response, flashbacks, nightmares, re-experiencing of past.  I can stay more in the present\"      Objective #A (Patient Action)                          Status: Continued - Date:      Patient will identify specific strategies to more effectively address stressors  learn to utilize relaxation and cogntive coping skills to reduce and regulate emotions.     Intervention(s)  Therapist will teach emotional regulation skills. Utilizing CBT DBT ACT and mindfulness interventions.     Objective #B  Patient will address specific and targeted traumatic experiences to reduce negative trauma responses and overall impact of trauma.                 Status: Continued - Date:     Intervention(s)  Therapist will utilize CBT DBT ACT and mindfulness interventions.  Narrative therapy and Exposure therapy.    Patient has reviewed and agreed to the above plan.      Misbah Sandoval [facility]  September 28, 2022          Answers for HPI/ROS submitted by the patient on 10/26/2022  If you checked off any problems, how difficult have these problems made it for you to do your work, take care of things at home, or get along with other people?: Somewhat difficult  PHQ9 TOTAL SCORE: 4      "

## 2022-11-09 ENCOUNTER — VIRTUAL VISIT (OUTPATIENT)
Dept: PSYCHOLOGY | Facility: CLINIC | Age: 42
End: 2022-11-09
Payer: COMMERCIAL

## 2022-11-09 DIAGNOSIS — F33.0 MAJOR DEPRESSIVE DISORDER, RECURRENT EPISODE, MILD WITH ANXIOUS DISTRESS (H): ICD-10-CM

## 2022-11-09 DIAGNOSIS — F43.10 POST TRAUMATIC STRESS DISORDER (PTSD): ICD-10-CM

## 2022-11-09 DIAGNOSIS — F41.1 GENERALIZED ANXIETY DISORDER: Primary | ICD-10-CM

## 2022-11-09 PROCEDURE — 90834 PSYTX W PT 45 MINUTES: CPT | Mod: 95 | Performed by: PSYCHOLOGIST

## 2022-11-09 ASSESSMENT — PATIENT HEALTH QUESTIONNAIRE - PHQ9
10. IF YOU CHECKED OFF ANY PROBLEMS, HOW DIFFICULT HAVE THESE PROBLEMS MADE IT FOR YOU TO DO YOUR WORK, TAKE CARE OF THINGS AT HOME, OR GET ALONG WITH OTHER PEOPLE: SOMEWHAT DIFFICULT
SUM OF ALL RESPONSES TO PHQ QUESTIONS 1-9: 7
SUM OF ALL RESPONSES TO PHQ QUESTIONS 1-9: 7

## 2022-11-16 ENCOUNTER — OFFICE VISIT (OUTPATIENT)
Dept: FAMILY MEDICINE | Facility: CLINIC | Age: 42
End: 2022-11-16
Payer: COMMERCIAL

## 2022-11-16 VITALS
SYSTOLIC BLOOD PRESSURE: 112 MMHG | WEIGHT: 191 LBS | RESPIRATION RATE: 16 BRPM | HEIGHT: 68 IN | TEMPERATURE: 97.8 F | BODY MASS INDEX: 28.95 KG/M2 | DIASTOLIC BLOOD PRESSURE: 81 MMHG | OXYGEN SATURATION: 99 % | HEART RATE: 100 BPM

## 2022-11-16 DIAGNOSIS — N95.1 PERIMENOPAUSAL: ICD-10-CM

## 2022-11-16 DIAGNOSIS — N92.6 IRREGULAR MENSES: Primary | ICD-10-CM

## 2022-11-16 DIAGNOSIS — E66.3 OVERWEIGHT: ICD-10-CM

## 2022-11-16 DIAGNOSIS — R63.5 WEIGHT GAIN: ICD-10-CM

## 2022-11-16 LAB
CLUE CELLS: ABNORMAL
ERYTHROCYTE [DISTWIDTH] IN BLOOD BY AUTOMATED COUNT: 13.1 % (ref 10–15)
HCT VFR BLD AUTO: 40.3 % (ref 35–47)
HGB BLD-MCNC: 12.9 G/DL (ref 11.7–15.7)
MCH RBC QN AUTO: 31.4 PG (ref 26.5–33)
MCHC RBC AUTO-ENTMCNC: 32 G/DL (ref 31.5–36.5)
MCV RBC AUTO: 98 FL (ref 78–100)
PLATELET # BLD AUTO: 342 10E3/UL (ref 150–450)
RBC # BLD AUTO: 4.11 10E6/UL (ref 3.8–5.2)
TRICHOMONAS, WET PREP: ABNORMAL
WBC # BLD AUTO: 7.2 10E3/UL (ref 4–11)
WBC'S/HIGH POWER FIELD, WET PREP: ABNORMAL
YEAST, WET PREP: ABNORMAL

## 2022-11-16 PROCEDURE — 85027 COMPLETE CBC AUTOMATED: CPT | Performed by: PHYSICIAN ASSISTANT

## 2022-11-16 PROCEDURE — 87210 SMEAR WET MOUNT SALINE/INK: CPT | Performed by: PHYSICIAN ASSISTANT

## 2022-11-16 PROCEDURE — 99214 OFFICE O/P EST MOD 30 MIN: CPT | Performed by: PHYSICIAN ASSISTANT

## 2022-11-16 PROCEDURE — 84443 ASSAY THYROID STIM HORMONE: CPT | Performed by: PHYSICIAN ASSISTANT

## 2022-11-16 PROCEDURE — 87491 CHLMYD TRACH DNA AMP PROBE: CPT | Performed by: PHYSICIAN ASSISTANT

## 2022-11-16 PROCEDURE — 87591 N.GONORRHOEAE DNA AMP PROB: CPT | Performed by: PHYSICIAN ASSISTANT

## 2022-11-16 PROCEDURE — 36415 COLL VENOUS BLD VENIPUNCTURE: CPT | Performed by: PHYSICIAN ASSISTANT

## 2022-11-16 ASSESSMENT — ASTHMA QUESTIONNAIRES
QUESTION_2 LAST FOUR WEEKS HOW OFTEN HAVE YOU HAD SHORTNESS OF BREATH: ONCE OR TWICE A WEEK
QUESTION_1 LAST FOUR WEEKS HOW MUCH OF THE TIME DID YOUR ASTHMA KEEP YOU FROM GETTING AS MUCH DONE AT WORK, SCHOOL OR AT HOME: A LITTLE OF THE TIME
QUESTION_4 LAST FOUR WEEKS HOW OFTEN HAVE YOU USED YOUR RESCUE INHALER OR NEBULIZER MEDICATION (SUCH AS ALBUTEROL): TWO OR THREE TIMES PER WEEK
QUESTION_5 LAST FOUR WEEKS HOW WOULD YOU RATE YOUR ASTHMA CONTROL: SOMEWHAT CONTROLLED
QUESTION_3 LAST FOUR WEEKS HOW OFTEN DID YOUR ASTHMA SYMPTOMS (WHEEZING, COUGHING, SHORTNESS OF BREATH, CHEST TIGHTNESS OR PAIN) WAKE YOU UP AT NIGHT OR EARLIER THAN USUAL IN THE MORNING: NOT AT ALL
ACT_TOTALSCORE: 19
ACT_TOTALSCORE: 19

## 2022-11-16 NOTE — PATIENT INSTRUCTIONS
Please call [facility] at [phone]  to set up the pelvic ultrasound.     For weight gain:   It is important to limit sugars/carbs in the diet as much as possible.  Try to increase activity/body weight core strengthening/YOGA.

## 2022-11-16 NOTE — PROGRESS NOTES
"  Assessment & Plan     Irregular menses  Discussed potential causes of irregular menses.  Will r/o infection and any structural causes.  If those negative, likely hormonal (perimenopausal).    - US Pelvic Complete with Transvaginal; Future  - Wet prep - lab collect  - CBC with platelets  - TSH with free T4 reflex  - NEISSERIA GONORRHOEA PCR  - CHLAMYDIA TRACHOMATIS PCR    Perimenopausal  Discussed the symptoms of perimenopause including hot flashes, changes in menses and moodiness.  Recommended an aerobic exercise program     Weight gain  It is important to limit sugars/carbs in the diet as much as possible.  Try to increase activity/body weight core strengthening/YOGA.        Overweight  It is important to limit sugars/carbs in the diet as much as possible.  Try to increase activity/body weight core strengthening/YOGA.            28 minutes spent on the date of the encounter doing chart review, history and exam, documentation and further activities per the note       BMI:   Estimated body mass index is 28.83 kg/m  as calculated from the following:    Height as of this encounter: 1.734 m (5' 8.25\").    Weight as of this encounter: 86.6 kg (191 lb).   Weight management plan: Discussed healthy diet and exercise guidelines        Return in about 3 months (around 2/16/2023) for a recheck if symptoms do not improve.    ELI Finn [facility] EBONI Miguel is a 41 year old, presenting for the following health issues:  Abnormal Bleeding Problem      History of Present Illness       Reason for visit:  Irregular period  Symptom onset:  More than a month  Symptoms include:  No consistency in when or how long period lasts  Symptom intensity:  Moderate  Symptom progression:  Worsening  Had these symptoms before:  No  What makes it worse:  No  What makes it better:  No    She eats 0-1 servings of fruits and vegetables daily.She consumes 0 sweetened beverage(s) daily.She exercises with " enough effort to increase her heart rate 10 to 19 minutes per day.  She exercises with enough effort to increase her heart rate 4 days per week.   She is taking medications regularly.       Menstrual Concern  Onset/Duration: Several months   Description:   Duration of bleeding episodes:   Frequency of periods: (1st day of one to 1st day of next):  Used to be a normal schedule but is no is consistent per pt; she went 3 months without a cycle for it to reappear for a few days. Before that it would sometimes appear more than once in 1 month.   Describe bleeding/flow:   Clots: YES  Number of pads/day: 3-4         Cramping: moderate and mild  Accompanying Signs & Symptoms:  Lightheadedness: YES- with last menstrual cycle   Temperature intolerance: No  Nosebleeds/Easy bruising: No  Vaginal Discharge: No  Acne: YES  Change in body hair: No  History:  No LMP recorded.  Previous normal periods: YES  Contraceptive use: NO  Sexually active: YES  Any bleeding after intercourse: No  Abnormal PAP Smears: YES- 1 abnormal result per pt  Precipitating or alleviating factors: None  Therapies tried and outcome: None    History of tubal ligation for birth control (this was 14 years ago).      1 year ago, menses was pretty normal for her, however over the last 6 months it's changed.   No vaginal discharge or itching.   Has some cramping when she has her period, otherwise not much.   No new sexual partners.     She has noticed weight gain over the past year.  She does have a new job that is much more sedentary compared to what she previously had . She was getting about 12,000 steps/day, now is at 3,000 steps per day. She has a hiatal hernia and told she needs to limit any strenuous activity/situps. She also has a shoulder injury, so she's not sure what she can do for activity.     She does like to eat carbohydrates.        Review of Systems   Constitutional, HEENT, cardiovascular, pulmonary, gi and gu systems are negative, except as  otherwise noted.      Objective    There were no vitals taken for this visit.  There is no height or weight on file to calculate BMI.  Physical Exam   GENERAL: healthy, alert and no distress  ABDOMEN: soft, nontender, no hepatosplenomegaly, no masses and bowel sounds normal    Results for orders placed or performed in visit on 11/16/22   CBC with platelets     Status: Normal   Result Value Ref Range    WBC Count 7.2 4.0 - 11.0 10e3/uL    RBC Count 4.11 3.80 - 5.20 10e6/uL    Hemoglobin 12.9 11.7 - 15.7 g/dL    Hematocrit 40.3 35.0 - 47.0 %    MCV 98 78 - 100 fL    MCH 31.4 26.5 - 33.0 pg    MCHC 32.0 31.5 - 36.5 g/dL    RDW 13.1 10.0 - 15.0 %    Platelet Count 342 150 - 450 10e3/uL   Wet prep - lab collect     Status: Abnormal    Specimen: Vagina; Swab   Result Value Ref Range    Trichomonas Absent Absent    Yeast Absent Absent    Clue Cells Absent Absent    WBCs/high power field 3+ (A) None

## 2022-11-17 ENCOUNTER — ANCILLARY PROCEDURE (OUTPATIENT)
Dept: ULTRASOUND IMAGING | Facility: CLINIC | Age: 42
End: 2022-11-17
Attending: PHYSICIAN ASSISTANT
Payer: COMMERCIAL

## 2022-11-17 DIAGNOSIS — N92.6 IRREGULAR MENSES: ICD-10-CM

## 2022-11-17 LAB
C TRACH DNA SPEC QL NAA+PROBE: NEGATIVE
N GONORRHOEA DNA SPEC QL NAA+PROBE: NEGATIVE
TSH SERPL DL<=0.005 MIU/L-ACNC: 0.94 MU/L (ref 0.4–4)

## 2022-11-17 PROCEDURE — 76830 TRANSVAGINAL US NON-OB: CPT | Mod: TC | Performed by: RADIOLOGY

## 2022-11-17 PROCEDURE — 76856 US EXAM PELVIC COMPLETE: CPT | Mod: TC | Performed by: RADIOLOGY

## 2022-11-18 ENCOUNTER — VIRTUAL VISIT (OUTPATIENT)
Dept: PSYCHOLOGY | Facility: CLINIC | Age: 42
End: 2022-11-18
Payer: COMMERCIAL

## 2022-11-18 DIAGNOSIS — F41.1 GENERALIZED ANXIETY DISORDER: Primary | ICD-10-CM

## 2022-11-18 DIAGNOSIS — F43.10 POST TRAUMATIC STRESS DISORDER (PTSD): ICD-10-CM

## 2022-11-18 DIAGNOSIS — F33.0 MAJOR DEPRESSIVE DISORDER, RECURRENT EPISODE, MILD WITH ANXIOUS DISTRESS (H): ICD-10-CM

## 2022-11-18 PROCEDURE — 90834 PSYTX W PT 45 MINUTES: CPT | Mod: 95 | Performed by: PSYCHOLOGIST

## 2022-11-18 ASSESSMENT — PATIENT HEALTH QUESTIONNAIRE - PHQ9
SUM OF ALL RESPONSES TO PHQ QUESTIONS 1-9: 5
10. IF YOU CHECKED OFF ANY PROBLEMS, HOW DIFFICULT HAVE THESE PROBLEMS MADE IT FOR YOU TO DO YOUR WORK, TAKE CARE OF THINGS AT HOME, OR GET ALONG WITH OTHER PEOPLE: NOT DIFFICULT AT ALL
SUM OF ALL RESPONSES TO PHQ QUESTIONS 1-9: 5

## 2022-11-21 NOTE — PROGRESS NOTES
[facility]                                     Progress Note    Patient Name: Sommer Gomez  Date: 11/9/22         Service Type: Individual      Session Start Time: 7:09 AM  Session End Time: 7:49 AM     Session Length: 38-52 min    Session #: 46    Attendees: Client    Service Modality:  Video Visit:      Provider verified identity through the following two step process.  Patient provided:  Patient is known previously to provider    Telemedicine Visit: The patient's condition can be safely assessed and treated via synchronous audio and visual telemedicine encounter.      Reason for Telemedicine Visit: Services only offered telehealth    Originating Site (Patient Location): Patient's home    Distant Site (Provider Location): Provider Remote Setting- Home Office    Consent:  The patient/guardian has verbally consented to: the potential risks and benefits of telemedicine (video visit) versus in person care; bill my insurance or make self-payment for services provided; and responsibility for payment of non-covered services.     Patient would like the video invitation sent by:  Text to cell phone: [phone]    Mode of Communication:  Video Conference via Amwell    As the provider I attest to compliance with applicable laws and regulations related to telemedicine.    DATA  Interactive Complexity: No  Crisis: No        Progress Since Last Session (Related to Symptoms / Goals / Homework):   Symptoms: Improving Patient expressed and exhibited some improvement in symptoms and behaviors    Homework: Completed in session      Episode of Care Goals: Satisfactory progress - ACTION (Actively working towards change); Intervened by reinforcing change plan / affirming steps taken     Current / Ongoing Stressors and Concerns:   Individual/Family/societal/Relational  Patient was on time and present for the session. Patient expressed that they are doing well.  They expressed minimal personal and family  stressors the past couple of weeks.  Explored and process relational and family dynamics.       Treatment Objective(s) Addressed in This Session:   relational dynamics       Intervention:   Motivational Interviewing: Expressed Empathy/Understanding, Supported Autonomy, Collaboration, Evocation, Permission to raise concern or advise, Open-ended questions, Reflections: simple and complex, Change talk (evoked) and Reframe     Assessments completed prior to visit:  PHQ9:   PHQ-9 SCORE 9/7/2022 9/19/2022 9/28/2022 10/12/2022 10/26/2022 11/9/2022 11/18/2022   PHQ-9 Total Score - - - - - - -   PHQ-9 Total Score MyChart 6 (Mild depression) 6 (Mild depression) 9 (Mild depression) 10 (Moderate depression) 4 (Minimal depression) 7 (Mild depression) 5 (Mild depression)   PHQ-9 Total Score 6 6 9 10 4 7 5     GAD7:   ROSA-7 SCORE 1/20/2021 2/26/2021 6/18/2021 12/13/2021 6/2/2022 7/22/2022 7/22/2022   Total Score - - - - - - -   Total Score - - - 6 (mild anxiety) 6 (mild anxiety) - 11 (moderate anxiety)   Total Score 12 7 12 6 6 11 11     PROMIS 10-Global Health (only subscores and total score):   PROMIS-10 Scores Only 12/13/2021 3/18/2022 3/30/2022 7/14/2022 7/28/2022 11/9/2022 11/18/2022   Global Mental Health Score 11 15 13 13 11 15 14   Global Physical Health Score 15 14 14 15 16 13 13   PROMIS TOTAL - SUBSCORES 26 29 27 28 27 28 27         ASSESSMENT: Current Emotional / Mental Status (status of significant symptoms):   Risk status (Self / Other harm or suicidal ideation)   Patient denies current fears or concerns for personal safety.   Patient denies current or recent suicidal ideation or behaviors.   Patient denies current or recent homicidal ideation or behaviors.   Patient denies current or recent self injurious behavior or ideation.   Patient denies other safety concerns.   Patient reports there has been no change in risk factors since their last session.     Patient reports there has been no change in protective factors  since their last session.     Recommended that patient call 911 or go to the local ED should there be a change in any of these risk factors.     Appearance:   Appropriate    Eye Contact:   Good    Psychomotor Behavior: Normal    Attitude:   Cooperative    Orientation:   All   Speech    Rate / Production: Normal     Volume:  Normal    Mood:    Sad    Affect:    Appropriate    Thought Content:  Clear    Thought Form:  Coherent  Logical    Insight:    Good      Medication Review:   No changes to current psychiatric medication(s)     Medication Compliance:   Yes     Changes in Health Issues:   Yes: Pain, Associated Psychological Distress     Chemical Use Review:   Substance Use: Chemical use reviewed, no active concerns identified      Tobacco Use: No current tobacco use.      Diagnosis:  Major Depressive Disorder Recurrent Mild  Generalized Anxiety Disorder  Post traumatic Stress Disorder    Collateral Reports Completed:   Not Applicable    PLAN: (Patient Tasks / Therapist Tasks / Other)  Continue to meet for individual therapy and watch recommended video on trauma Dr. Servando Gunn/[facility] [place] Sandoval, [facility]                                                         ______________________________________________________________________    Individual Treatment Plan    Patient's Name: Sommer Gomez  YOB: 1980    Date of Creation: 9/28/22  Date Treatment Plan Last Reviewed/Revised: 6/27/22    DSM5 Diagnoses: 296.31 (F33.0) Major Depressive Disorder, Recurrent Episode, Mild _, 300.02 (F41.1) Generalized Anxiety Disorder or 309.81 (F43.10) Posttraumatic Stress Disorder (includes Posttraumatic Stress Disorder for Children 6 Years and Younger)  Without dissociative symptoms  Psychosocial / Contextual Factors: Individual/Family/Societal/Relational  PROMIS (reviewed every 90 days):     Referral / Collaboration:  Referral to another professional/service is not indicated at this  "time..    Anticipated number of session for this episode of care: 90 days  Anticipation frequency of session: Biweekly  Anticipated Duration of each session: 38-52 minutes  Treatment plan will be reviewed in 90 days or when goals have been changed.        MeasurableTreatment Goal(s) related to diagnosis / functional impairment(s)  Goal 1: Patient will utilize interventions and skills to better regulate negative emotions in difficult circumstances or experiences.  Also, if they are triggered in situations.    I will know I've met my goal when I am able to find positives in difficult circumstances.  Increase in motivation and less irritable.\"      Objective #A (Patient Action)                          Patient will identify specific strategies to more effectively address stressors  identify three initial signs or symptoms of anxiety.  Status: Continued - Date:      Intervention(s)  Therapist will teach emotional recognition/identification. Utilizing CBT DBT ACT and mindfulness interventions.     Objective #B  Patient will identify at least 5 triggers for anxiety.  Status: Continued - Date:      Intervention(s)  Therapist will teach emotional recognition/identification. utilizing CBT DBT ACT and mindfulness interventions.     Objective #C  Patient will Feel less tired and more energy during the day .  Status: Continued - Date:      Intervention(s)  Therapist will teach emotional regulation skills. Utilizng CBT DBT ACT and mindfulness interventions.        Goal 2: Patient will reduce overall depression and anxiety    I will know I've met my goal when I am able to feel more motivation and have more energy.  I am able to be more mindful and in the present\"      Objective #A (Patient Action)                          Status: Continued - Date:     Patient will use cognitive strategies identified in therapy to challenge anxious thoughts  Identify negative self-talk and behaviors: challenge core beliefs, myths, and " "actions.     Intervention(s)  Therapist will utilize CBT DBT ACT and mindfulness interventions.     Goal 3: Patiient will address traumatic experiences to reduce trauma response.    I will know I've met my goal when I am able to have less trauma response, flashbacks, nightmares, re-experiencing of past.  I can stay more in the present\"      Objective #A (Patient Action)                          Status: Continued - Date:      Patient will identify specific strategies to more effectively address stressors  learn to utilize relaxation and cogntive coping skills to reduce and regulate emotions.     Intervention(s)  Therapist will teach emotional regulation skills. Utilizing CBT DBT ACT and mindfulness interventions.     Objective #B  Patient will address specific and targeted traumatic experiences to reduce negative trauma responses and overall impact of trauma.                 Status: Continued - Date:     Intervention(s)  Therapist will utilize CBT DBT ACT and mindfulness interventions.  Narrative therapy and Exposure therapy.    Patient has reviewed and agreed to the above plan.      Misbah Sandoval [facility]  September 28, 2022            Answers for HPI/ROS submitted by the patient on 11/9/2022  If you checked off any problems, how difficult have these problems made it for you to do your work, take care of things at home, or get along with other people?: Somewhat difficult  PHQ9 TOTAL SCORE: 7      "

## 2022-11-30 ENCOUNTER — VIRTUAL VISIT (OUTPATIENT)
Dept: PSYCHOLOGY | Facility: CLINIC | Age: 42
End: 2022-11-30
Payer: COMMERCIAL

## 2022-11-30 DIAGNOSIS — F43.10 POST TRAUMATIC STRESS DISORDER (PTSD): ICD-10-CM

## 2022-11-30 DIAGNOSIS — F41.1 GENERALIZED ANXIETY DISORDER: Primary | ICD-10-CM

## 2022-11-30 DIAGNOSIS — F33.0 MAJOR DEPRESSIVE DISORDER, RECURRENT EPISODE, MILD WITH ANXIOUS DISTRESS (H): ICD-10-CM

## 2022-11-30 PROCEDURE — 90834 PSYTX W PT 45 MINUTES: CPT | Mod: 95 | Performed by: PSYCHOLOGIST

## 2022-12-01 NOTE — PROGRESS NOTES
[facility]                                     Progress Note    Patient Name: Sommer Gomez  Date: 11/18/22         Service Type: Individual      Session Start Time: 7:09 AM  Session End Time: 7:49 AM     Session Length: 38-52 min    Session #: 47    Attendees: Client    Service Modality:  Video Visit:      Provider verified identity through the following two step process.  Patient provided:  Patient is known previously to provider    Telemedicine Visit: The patient's condition can be safely assessed and treated via synchronous audio and visual telemedicine encounter.      Reason for Telemedicine Visit: Services only offered telehealth    Originating Site (Patient Location): Patient's home    Distant Site (Provider Location): Provider Remote Setting- Home Office    Consent:  The patient/guardian has verbally consented to: the potential risks and benefits of telemedicine (video visit) versus in person care; bill my insurance or make self-payment for services provided; and responsibility for payment of non-covered services.     Patient would like the video invitation sent by:  Text to cell phone: [phone]    Mode of Communication:  Video Conference via Amwell    As the provider I attest to compliance with applicable laws and regulations related to telemedicine.    DATA  Interactive Complexity: No  Crisis: No        Progress Since Last Session (Related to Symptoms / Goals / Homework):   Symptoms: No change Patient expressed little to no changes in symptoms and behaviors since our last session    Homework: Completed in session      Episode of Care Goals: Satisfactory progress - ACTION (Actively working towards change); Intervened by reinforcing change plan / affirming steps taken     Current / Ongoing Stressors and Concerns:   Individual/Family/societal/Relational  Patient was on time and present for the session. Patient expressed that they are doing well.  They expressed minimal personal  and family stressors the past couple of weeks.  Explored and process relational and family dynamics.       Treatment Objective(s) Addressed in This Session:   relational dynamics       Intervention:   Motivational Interviewing: Expressed Empathy/Understanding, Supported Autonomy, Collaboration, Evocation, Permission to raise concern or advise, Open-ended questions, Reflections: simple and complex, Change talk (evoked) and Reframe     Assessments completed prior to visit:  PHQ9:   PHQ-9 SCORE 9/19/2022 9/28/2022 10/12/2022 10/26/2022 11/9/2022 11/18/2022 11/30/2022   PHQ-9 Total Score - - - - - - -   PHQ-9 Total Score MyChart 6 (Mild depression) 9 (Mild depression) 10 (Moderate depression) 4 (Minimal depression) 7 (Mild depression) 5 (Mild depression) 6 (Mild depression)   PHQ-9 Total Score 6 9 10 4 7 5 6     GAD7:   ROSA-7 SCORE 1/20/2021 2/26/2021 6/18/2021 12/13/2021 6/2/2022 7/22/2022 7/22/2022   Total Score - - - - - - -   Total Score - - - 6 (mild anxiety) 6 (mild anxiety) - 11 (moderate anxiety)   Total Score 12 7 12 6 6 11 11     PROMIS 10-Global Health (only subscores and total score):   PROMIS-10 Scores Only 3/18/2022 3/30/2022 7/14/2022 7/28/2022 11/9/2022 11/18/2022 11/30/2022   Global Mental Health Score 15 13 13 11 15 14 13   Global Physical Health Score 14 14 15 16 13 13 13   PROMIS TOTAL - SUBSCORES 29 27 28 27 28 27 26         ASSESSMENT: Current Emotional / Mental Status (status of significant symptoms):   Risk status (Self / Other harm or suicidal ideation)   Patient denies current fears or concerns for personal safety.   Patient denies current or recent suicidal ideation or behaviors.   Patient denies current or recent homicidal ideation or behaviors.   Patient denies current or recent self injurious behavior or ideation.   Patient denies other safety concerns.   Patient reports there has been no change in risk factors since their last session.     Patient reports there has been no change in  protective factors since their last session.     Recommended that patient call 911 or go to the local ED should there be a change in any of these risk factors.     Appearance:   Appropriate    Eye Contact:   Good    Psychomotor Behavior: Normal    Attitude:   Cooperative    Orientation:   All   Speech    Rate / Production: Normal     Volume:  Normal    Mood:    Sad    Affect:    Appropriate    Thought Content:  Clear    Thought Form:  Coherent  Logical    Insight:    Good      Medication Review:   No changes to current psychiatric medication(s)     Medication Compliance:   Yes     Changes in Health Issues:   Yes: Pain, Associated Psychological Distress     Chemical Use Review:   Substance Use: Chemical use reviewed, no active concerns identified      Tobacco Use: No current tobacco use.      Diagnosis:  Major Depressive Disorder Recurrent Mild  Generalized Anxiety Disorder  Post traumatic Stress Disorder    Collateral Reports Completed:   Not Applicable    PLAN: (Patient Tasks / Therapist Tasks / Other)  Continue to meet for individual therapy and reflect on past history of adverse experiences and how shame was impacting their perspective of self    Misbah Sandoval [facility]                                                         ______________________________________________________________________    Individual Treatment Plan    Patient's Name: Sommer Gomez  YOB: 1980    Date of Creation: 9/28/22  Date Treatment Plan Last Reviewed/Revised: 6/27/22    DSM5 Diagnoses: 296.31 (F33.0) Major Depressive Disorder, Recurrent Episode, Mild _, 300.02 (F41.1) Generalized Anxiety Disorder or 309.81 (F43.10) Posttraumatic Stress Disorder (includes Posttraumatic Stress Disorder for Children 6 Years and Younger)  Without dissociative symptoms  Psychosocial / Contextual Factors: Individual/Family/Societal/Relational  PROMIS (reviewed every 90 days):     Referral / Collaboration:  Referral to another  "professional/service is not indicated at this time..    Anticipated number of session for this episode of care: 90 days  Anticipation frequency of session: Biweekly  Anticipated Duration of each session: 38-52 minutes  Treatment plan will be reviewed in 90 days or when goals have been changed.        MeasurableTreatment Goal(s) related to diagnosis / functional impairment(s)  Goal 1: Patient will utilize interventions and skills to better regulate negative emotions in difficult circumstances or experiences.  Also, if they are triggered in situations.    I will know I've met my goal when I am able to find positives in difficult circumstances.  Increase in motivation and less irritable.\"      Objective #A (Patient Action)                          Patient will identify specific strategies to more effectively address stressors  identify three initial signs or symptoms of anxiety.  Status: Continued - Date:      Intervention(s)  Therapist will teach emotional recognition/identification. Utilizing CBT DBT ACT and mindfulness interventions.     Objective #B  Patient will identify at least 5 triggers for anxiety.  Status: Continued - Date:      Intervention(s)  Therapist will teach emotional recognition/identification. utilizing CBT DBT ACT and mindfulness interventions.     Objective #C  Patient will Feel less tired and more energy during the day .  Status: Continued - Date:      Intervention(s)  Therapist will teach emotional regulation skills. Utilizng CBT DBT ACT and mindfulness interventions.        Goal 2: Patient will reduce overall depression and anxiety    I will know I've met my goal when I am able to feel more motivation and have more energy.  I am able to be more mindful and in the present\"      Objective #A (Patient Action)                          Status: Continued - Date:     Patient will use cognitive strategies identified in therapy to challenge anxious thoughts  Identify negative self-talk and behaviors: " "challenge core beliefs, myths, and actions.     Intervention(s)  Therapist will utilize CBT DBT ACT and mindfulness interventions.     Goal 3: Patiient will address traumatic experiences to reduce trauma response.    I will know I've met my goal when I am able to have less trauma response, flashbacks, nightmares, re-experiencing of past.  I can stay more in the present\"      Objective #A (Patient Action)                          Status: Continued - Date:      Patient will identify specific strategies to more effectively address stressors  learn to utilize relaxation and cogntive coping skills to reduce and regulate emotions.     Intervention(s)  Therapist will teach emotional regulation skills. Utilizing CBT DBT ACT and mindfulness interventions.     Objective #B  Patient will address specific and targeted traumatic experiences to reduce negative trauma responses and overall impact of trauma.                 Status: Continued - Date:     Intervention(s)  Therapist will utilize CBT DBT ACT and mindfulness interventions.  Narrative therapy and Exposure therapy.    Patient has reviewed and agreed to the above plan.      [facility]  September 28, 2022            Answers for HPI/ROS submitted by the patient on 11/18/2022  If you checked off any problems, how difficult have these problems made it for you to do your work, take care of things at home, or get along with other people?: Not difficult at all  PHQ9 TOTAL SCORE: 5      "

## 2022-12-04 NOTE — PROGRESS NOTES
[facility]                                     Progress Note    Patient Name: Sommer Gomez  Date: 11/30/22         Service Type: Individual      Session Start Time: 7:09 AM  Session End Time: 7:49 AM     Session Length: 38-52 min    Session #: 48    Attendees: Client    Service Modality:  Video Visit:      Provider verified identity through the following two step process.  Patient provided:  Patient is known previously to provider    Telemedicine Visit: The patient's condition can be safely assessed and treated via synchronous audio and visual telemedicine encounter.      Reason for Telemedicine Visit: Services only offered telehealth    Originating Site (Patient Location): Patient's home    Distant Site (Provider Location): Provider Remote Setting- Home Office    Consent:  The patient/guardian has verbally consented to: the potential risks and benefits of telemedicine (video visit) versus in person care; bill my insurance or make self-payment for services provided; and responsibility for payment of non-covered services.     Patient would like the video invitation sent by:  Text to cell phone: [phone]    Mode of Communication:  Video Conference via Amwell    As the provider I attest to compliance with applicable laws and regulations related to telemedicine.    DATA  Interactive Complexity: No  Crisis: No        Progress Since Last Session (Related to Symptoms / Goals / Homework):   Symptoms: Improving Patient expressed and exhibited some improvement in symptoms and behaviors    Homework: Completed in session      Episode of Care Goals: Satisfactory progress - ACTION (Actively working towards change); Intervened by reinforcing change plan / affirming steps taken     Current / Ongoing Stressors and Concerns:   Individual/Family/societal/Relational  Patient was on time and present for the session. Patient expressed that they are doing well.  They expressed minimal personal and family  stressors the past couple of weeks.  Explored and process relational and family dynamics.  Discussed future termination of services.  Discussed and offered resources for other therapy services.      Treatment Objective(s) Addressed in This Session:   relational dynamics       Intervention:   Motivational Interviewing: Expressed Empathy/Understanding, Supported Autonomy, Collaboration, Evocation, Permission to raise concern or advise, Open-ended questions, Reflections: simple and complex, Change talk (evoked) and Reframe     Assessments completed prior to visit:  PHQ9:   PHQ-9 SCORE 9/19/2022 9/28/2022 10/12/2022 10/26/2022 11/9/2022 11/18/2022 11/30/2022   PHQ-9 Total Score - - - - - - -   PHQ-9 Total Score MyChart 6 (Mild depression) 9 (Mild depression) 10 (Moderate depression) 4 (Minimal depression) 7 (Mild depression) 5 (Mild depression) 6 (Mild depression)   PHQ-9 Total Score 6 9 10 4 7 5 6     GAD7:   ROSA-7 SCORE 1/20/2021 2/26/2021 6/18/2021 12/13/2021 6/2/2022 7/22/2022 7/22/2022   Total Score - - - - - - -   Total Score - - - 6 (mild anxiety) 6 (mild anxiety) - 11 (moderate anxiety)   Total Score 12 7 12 6 6 11 11     PROMIS 10-Global Health (only subscores and total score):   PROMIS-10 Scores Only 3/18/2022 3/30/2022 7/14/2022 7/28/2022 11/9/2022 11/18/2022 11/30/2022   Global Mental Health Score 15 13 13 11 15 14 13   Global Physical Health Score 14 14 15 16 13 13 13   PROMIS TOTAL - SUBSCORES 29 27 28 27 28 27 26         ASSESSMENT: Current Emotional / Mental Status (status of significant symptoms):   Risk status (Self / Other harm or suicidal ideation)   Patient denies current fears or concerns for personal safety.   Patient denies current or recent suicidal ideation or behaviors.   Patient denies current or recent homicidal ideation or behaviors.   Patient denies current or recent self injurious behavior or ideation.   Patient denies other safety concerns.   Patient reports there has been no change in  risk factors since their last session.     Patient reports there has been no change in protective factors since their last session.     Recommended that patient call 911 or go to the local ED should there be a change in any of these risk factors.     Appearance:   Appropriate    Eye Contact:   Good    Psychomotor Behavior: Normal    Attitude:   Cooperative    Orientation:   All   Speech    Rate / Production: Normal     Volume:  Normal    Mood:    Sad    Affect:    Appropriate    Thought Content:  Clear    Thought Form:  Coherent  Logical    Insight:    Good      Medication Review:   No changes to current psychiatric medication(s)     Medication Compliance:   Yes     Changes in Health Issues:   Yes: Pain, Associated Psychological Distress     Chemical Use Review:   Substance Use: Chemical use reviewed, no active concerns identified      Tobacco Use: No current tobacco use.      Diagnosis:  Major Depressive Disorder Recurrent Mild  Generalized Anxiety Disorder  Post traumatic Stress Disorder    Collateral Reports Completed:   Not Applicable    PLAN: (Patient Tasks / Therapist Tasks / Other)  Continue to meet for individual therapy and follow up with other resources for therapy    Misbah Sandoval LPC                                                         ______________________________________________________________________    Individual Treatment Plan    Patient's Name: Sommer Gomez  YOB: 1980    Date of Creation: 9/28/22  Date Treatment Plan Last Reviewed/Revised: 6/27/22    DSM5 Diagnoses: 296.31 (F33.0) Major Depressive Disorder, Recurrent Episode, Mild _, 300.02 (F41.1) Generalized Anxiety Disorder or 309.81 (F43.10) Posttraumatic Stress Disorder (includes Posttraumatic Stress Disorder for Children 6 Years and Younger)  Without dissociative symptoms  Psychosocial / Contextual Factors: Individual/Family/Societal/Relational  PROMIS (reviewed every 90 days):     Referral /  "Collaboration:  Referral to another professional/service is not indicated at this time..    Anticipated number of session for this episode of care: 90 days  Anticipation frequency of session: Biweekly  Anticipated Duration of each session: 38-52 minutes  Treatment plan will be reviewed in 90 days or when goals have been changed.        MeasurableTreatment Goal(s) related to diagnosis / functional impairment(s)  Goal 1: Patient will utilize interventions and skills to better regulate negative emotions in difficult circumstances or experiences.  Also, if they are triggered in situations.    I will know I've met my goal when I am able to find positives in difficult circumstances.  Increase in motivation and less irritable.\"      Objective #A (Patient Action)                          Patient will identify specific strategies to more effectively address stressors  identify three initial signs or symptoms of anxiety.  Status: Continued - Date:      Intervention(s)  Therapist will teach emotional recognition/identification. Utilizing CBT DBT ACT and mindfulness interventions.     Objective #B  Patient will identify at least 5 triggers for anxiety.  Status: Continued - Date:      Intervention(s)  Therapist will teach emotional recognition/identification. utilizing CBT DBT ACT and mindfulness interventions.     Objective #C  Patient will Feel less tired and more energy during the day .  Status: Continued - Date:      Intervention(s)  Therapist will teach emotional regulation skills. Utilizng CBT DBT ACT and mindfulness interventions.        Goal 2: Patient will reduce overall depression and anxiety    I will know I've met my goal when I am able to feel more motivation and have more energy.  I am able to be more mindful and in the present\"      Objective #A (Patient Action)                          Status: Continued - Date:     Patient will use cognitive strategies identified in therapy to challenge anxious thoughts  Identify " "negative self-talk and behaviors: challenge core beliefs, myths, and actions.     Intervention(s)  Therapist will utilize CBT DBT ACT and mindfulness interventions.     Goal 3: Patiient will address traumatic experiences to reduce trauma response.    I will know I've met my goal when I am able to have less trauma response, flashbacks, nightmares, re-experiencing of past.  I can stay more in the present\"      Objective #A (Patient Action)                          Status: Continued - Date:      Patient will identify specific strategies to more effectively address stressors  learn to utilize relaxation and cogntive coping skills to reduce and regulate emotions.     Intervention(s)  Therapist will teach emotional regulation skills. Utilizing CBT DBT ACT and mindfulness interventions.     Objective #B  Patient will address specific and targeted traumatic experiences to reduce negative trauma responses and overall impact of trauma.                 Status: Continued - Date:     Intervention(s)  Therapist will utilize CBT DBT ACT and mindfulness interventions.  Narrative therapy and Exposure therapy.    Patient has reviewed and agreed to the above plan.      Misbah Sandoval [facility]  September 28, 2022          Answers for HPI/ROS submitted by the patient on 11/30/2022  If you checked off any problems, how difficult have these problems made it for you to do your work, take care of things at home, or get along with other people?: Somewhat difficult  PHQ9 TOTAL SCORE: 6      "

## 2022-12-06 ENCOUNTER — MYC MEDICAL ADVICE (OUTPATIENT)
Dept: FAMILY MEDICINE | Facility: CLINIC | Age: 42
End: 2022-12-06

## 2022-12-14 ENCOUNTER — VIRTUAL VISIT (OUTPATIENT)
Dept: PSYCHOLOGY | Facility: CLINIC | Age: 42
End: 2022-12-14
Payer: COMMERCIAL

## 2022-12-14 DIAGNOSIS — F33.0 MAJOR DEPRESSIVE DISORDER, RECURRENT EPISODE, MILD WITH ANXIOUS DISTRESS (H): ICD-10-CM

## 2022-12-14 DIAGNOSIS — F43.10 POST TRAUMATIC STRESS DISORDER (PTSD): ICD-10-CM

## 2022-12-14 DIAGNOSIS — F41.1 GENERALIZED ANXIETY DISORDER: Primary | ICD-10-CM

## 2022-12-14 PROCEDURE — 90834 PSYTX W PT 45 MINUTES: CPT | Mod: 95 | Performed by: PSYCHOLOGIST

## 2022-12-16 ASSESSMENT — ASTHMA QUESTIONNAIRES
ACT_TOTALSCORE: 22
QUESTION_1 LAST FOUR WEEKS HOW MUCH OF THE TIME DID YOUR ASTHMA KEEP YOU FROM GETTING AS MUCH DONE AT WORK, SCHOOL OR AT HOME: A LITTLE OF THE TIME
ACT_TOTALSCORE: 22
QUESTION_5 LAST FOUR WEEKS HOW WOULD YOU RATE YOUR ASTHMA CONTROL: COMPLETELY CONTROLLED
QUESTION_2 LAST FOUR WEEKS HOW OFTEN HAVE YOU HAD SHORTNESS OF BREATH: ONCE OR TWICE A WEEK
QUESTION_4 LAST FOUR WEEKS HOW OFTEN HAVE YOU USED YOUR RESCUE INHALER OR NEBULIZER MEDICATION (SUCH AS ALBUTEROL): ONCE A WEEK OR LESS
QUESTION_3 LAST FOUR WEEKS HOW OFTEN DID YOUR ASTHMA SYMPTOMS (WHEEZING, COUGHING, SHORTNESS OF BREATH, CHEST TIGHTNESS OR PAIN) WAKE YOU UP AT NIGHT OR EARLIER THAN USUAL IN THE MORNING: NOT AT ALL

## 2022-12-16 NOTE — PROGRESS NOTES
[facility]                                     Progress Note    Patient Name: Sommer Gomez  Date: 12/14/22         Service Type: Individual      Session Start Time: 7:00 AM  Session End Time: 7:49 AM     Session Length: 38-52 min    Session #: 49    Attendees: Client    Service Modality:  Video Visit:      Provider verified identity through the following two step process.  Patient provided:  Patient is known previously to provider    Telemedicine Visit: The patient's condition can be safely assessed and treated via synchronous audio and visual telemedicine encounter.      Reason for Telemedicine Visit: Services only offered telehealth    Originating Site (Patient Location): Patient's home    Distant Site (Provider Location): Provider Remote Setting- Home Office    Consent:  The patient/guardian has verbally consented to: the potential risks and benefits of telemedicine (video visit) versus in person care; bill my insurance or make self-payment for services provided; and responsibility for payment of non-covered services.     Patient would like the video invitation sent by:  Text to cell phone: [phone]    Mode of Communication:  Video Conference via Amwell    As the provider I attest to compliance with applicable laws and regulations related to telemedicine.    DATA  Interactive Complexity: No  Crisis: No        Progress Since Last Session (Related to Symptoms / Goals / Homework):   Symptoms: Improving Patient expressed and exhibited some improvement in symptoms and behaviors    Homework: Completed in session      Episode of Care Goals: Satisfactory progress - ACTION (Actively working towards change); Intervened by reinforcing change plan / affirming steps taken     Current / Ongoing Stressors and Concerns:   Individual/Family/societal/Relational  Patient was on time and present for the session. Patient expressed that they are doing well.  They expressed minimal personal and family  stressors the past couple of weeks.  Explored and process relational and family dynamics.  Discussed future termination of services.  Discussed and offered resources for other therapy services.      Treatment Objective(s) Addressed in This Session:   relational dynamics       Intervention:   Motivational Interviewing: Expressed Empathy/Understanding, Supported Autonomy, Collaboration, Evocation, Permission to raise concern or advise, Open-ended questions, Reflections: simple and complex, Change talk (evoked) and Reframe     Assessments completed prior to visit:  PHQ9:   PHQ-9 SCORE 9/28/2022 10/12/2022 10/26/2022 11/9/2022 11/18/2022 11/30/2022 12/14/2022   PHQ-9 Total Score - - - - - - -   PHQ-9 Total Score MyChart 9 (Mild depression) 10 (Moderate depression) 4 (Minimal depression) 7 (Mild depression) 5 (Mild depression) 6 (Mild depression) 3 (Minimal depression)   PHQ-9 Total Score 9 10 4 7 5 6 3     GAD7:   ROSA-7 SCORE 1/20/2021 2/26/2021 6/18/2021 12/13/2021 6/2/2022 7/22/2022 7/22/2022   Total Score - - - - - - -   Total Score - - - 6 (mild anxiety) 6 (mild anxiety) - 11 (moderate anxiety)   Total Score 12 7 12 6 6 11 11     PROMIS 10-Global Health (only subscores and total score):   PROMIS-10 Scores Only 3/18/2022 3/30/2022 7/14/2022 7/28/2022 11/9/2022 11/18/2022 11/30/2022   Global Mental Health Score 15 13 13 11 15 14 13   Global Physical Health Score 14 14 15 16 13 13 13   PROMIS TOTAL - SUBSCORES 29 27 28 27 28 27 26         ASSESSMENT: Current Emotional / Mental Status (status of significant symptoms):   Risk status (Self / Other harm or suicidal ideation)   Patient denies current fears or concerns for personal safety.   Patient denies current or recent suicidal ideation or behaviors.   Patient denies current or recent homicidal ideation or behaviors.   Patient denies current or recent self injurious behavior or ideation.   Patient denies other safety concerns.   Patient reports there has been no change  in risk factors since their last session.     Patient reports there has been no change in protective factors since their last session.     Recommended that patient call 911 or go to the local ED should there be a change in any of these risk factors.     Appearance:   Appropriate    Eye Contact:   Good    Psychomotor Behavior: Normal    Attitude:   Cooperative    Orientation:   All   Speech    Rate / Production: Normal     Volume:  Normal    Mood:    Sad    Affect:    Appropriate    Thought Content:  Clear    Thought Form:  Coherent  Logical    Insight:    Good      Medication Review:   No changes to current psychiatric medication(s)     Medication Compliance:   Yes     Changes in Health Issues:   Yes: Pain, Associated Psychological Distress     Chemical Use Review:   Substance Use: Chemical use reviewed, no active concerns identified      Tobacco Use: No current tobacco use.      Diagnosis:  Major Depressive Disorder Recurrent Mild  Generalized Anxiety Disorder  Post traumatic Stress Disorder    Collateral Reports Completed:   Not Applicable    PLAN: (Patient Tasks / Therapist Tasks / Other)  Continue to meet for individual therapy and follow up with other resources for therapy    CHRISTIAN Mata                                                         ______________________________________________________________________    Individual Treatment Plan    Patient's Name: Sommer Gomez  YOB: 1980    Date of Creation: 9/28/22  Date Treatment Plan Last Reviewed/Revised: 6/27/22    DSM5 Diagnoses: 296.31 (F33.0) Major Depressive Disorder, Recurrent Episode, Mild _, 300.02 (F41.1) Generalized Anxiety Disorder or 309.81 (F43.10) Posttraumatic Stress Disorder (includes Posttraumatic Stress Disorder for Children 6 Years and Younger)  Without dissociative symptoms  Psychosocial / Contextual Factors: Individual/Family/Societal/Relational  PROMIS (reviewed every 90 days):     Referral /  "Collaboration:  Referral to another professional/service is not indicated at this time..    Anticipated number of session for this episode of care: 90 days  Anticipation frequency of session: Biweekly  Anticipated Duration of each session: 38-52 minutes  Treatment plan will be reviewed in 90 days or when goals have been changed.        MeasurableTreatment Goal(s) related to diagnosis / functional impairment(s)  Goal 1: Patient will utilize interventions and skills to better regulate negative emotions in difficult circumstances or experiences.  Also, if they are triggered in situations.    I will know I've met my goal when I am able to find positives in difficult circumstances.  Increase in motivation and less irritable.\"      Objective #A (Patient Action)                          Patient will identify specific strategies to more effectively address stressors  identify three initial signs or symptoms of anxiety.  Status: Continued - Date:      Intervention(s)  Therapist will teach emotional recognition/identification. Utilizing CBT DBT ACT and mindfulness interventions.     Objective #B  Patient will identify at least 5 triggers for anxiety.  Status: Continued - Date:      Intervention(s)  Therapist will teach emotional recognition/identification. utilizing CBT DBT ACT and mindfulness interventions.     Objective #C  Patient will Feel less tired and more energy during the day .  Status: Continued - Date:      Intervention(s)  Therapist will teach emotional regulation skills. Utilizng CBT DBT ACT and mindfulness interventions.        Goal 2: Patient will reduce overall depression and anxiety    I will know I've met my goal when I am able to feel more motivation and have more energy.  I am able to be more mindful and in the present\"      Objective #A (Patient Action)                          Status: Continued - Date:     Patient will use cognitive strategies identified in therapy to challenge anxious thoughts  Identify " "negative self-talk and behaviors: challenge core beliefs, myths, and actions.     Intervention(s)  Therapist will utilize CBT DBT ACT and mindfulness interventions.     Goal 3: Patiient will address traumatic experiences to reduce trauma response.    I will know I've met my goal when I am able to have less trauma response, flashbacks, nightmares, re-experiencing of past.  I can stay more in the present\"      Objective #A (Patient Action)                          Status: Continued - Date:      Patient will identify specific strategies to more effectively address stressors  learn to utilize relaxation and cogntive coping skills to reduce and regulate emotions.     Intervention(s)  Therapist will teach emotional regulation skills. Utilizing CBT DBT ACT and mindfulness interventions.     Objective #B  Patient will address specific and targeted traumatic experiences to reduce negative trauma responses and overall impact of trauma.                 Status: Continued - Date:     Intervention(s)  Therapist will utilize CBT DBT ACT and mindfulness interventions.  Narrative therapy and Exposure therapy.    Patient has reviewed and agreed to the above plan.      Misbah Sandoval [facility]  September 28, 2022            Answers for HPI/ROS submitted by the patient on 12/14/2022  If you checked off any problems, how difficult have these problems made it for you to do your work, take care of things at home, or get along with other people?: Somewhat difficult  PHQ9 TOTAL SCORE: 3      "

## 2022-12-25 ENCOUNTER — HEALTH MAINTENANCE LETTER (OUTPATIENT)
Age: 42
End: 2022-12-25

## 2023-01-02 ENCOUNTER — VIRTUAL VISIT (OUTPATIENT)
Dept: PSYCHOLOGY | Facility: CLINIC | Age: 43
End: 2023-01-02
Payer: COMMERCIAL

## 2023-01-02 DIAGNOSIS — F43.10 POST TRAUMATIC STRESS DISORDER (PTSD): ICD-10-CM

## 2023-01-02 DIAGNOSIS — F33.0 MAJOR DEPRESSIVE DISORDER, RECURRENT EPISODE, MILD WITH ANXIOUS DISTRESS (H): ICD-10-CM

## 2023-01-02 DIAGNOSIS — F41.1 GENERALIZED ANXIETY DISORDER: Primary | ICD-10-CM

## 2023-01-02 PROCEDURE — 90791 PSYCH DIAGNOSTIC EVALUATION: CPT | Mod: 95 | Performed by: PSYCHOLOGIST

## 2023-01-02 ASSESSMENT — ANXIETY QUESTIONNAIRES
5. BEING SO RESTLESS THAT IT IS HARD TO SIT STILL: NOT AT ALL
2. NOT BEING ABLE TO STOP OR CONTROL WORRYING: SEVERAL DAYS
7. FEELING AFRAID AS IF SOMETHING AWFUL MIGHT HAPPEN: SEVERAL DAYS
6. BECOMING EASILY ANNOYED OR IRRITABLE: NOT AT ALL
3. WORRYING TOO MUCH ABOUT DIFFERENT THINGS: SEVERAL DAYS
GAD7 TOTAL SCORE: 4
GAD7 TOTAL SCORE: 4
1. FEELING NERVOUS, ANXIOUS, OR ON EDGE: SEVERAL DAYS

## 2023-01-02 ASSESSMENT — COLUMBIA-SUICIDE SEVERITY RATING SCALE - C-SSRS
5. HAVE YOU STARTED TO WORK OUT OR WORKED OUT THE DETAILS OF HOW TO KILL YOURSELF? DO YOU INTEND TO CARRY OUT THIS PLAN?: NO
4. HAVE YOU HAD THESE THOUGHTS AND HAD SOME INTENTION OF ACTING ON THEM?: NO
3. HAVE YOU BEEN THINKING ABOUT HOW YOU MIGHT KILL YOURSELF?: YES
REASONS FOR IDEATION PAST MONTH: DOES NOT APPLY
REASONS FOR IDEATION LIFETIME: MOSTLY TO END OR STOP THE PAIN (YOU COULDN'T GO ON LIVING WITH THE PAIN OR HOW YOU WERE FEELING)
ATTEMPT LIFETIME: NO
1. HAVE YOU WISHED YOU WERE DEAD OR WISHED YOU COULD GO TO SLEEP AND NOT WAKE UP?: YES
2. HAVE YOU ACTUALLY HAD ANY THOUGHTS OF KILLING YOURSELF?: NO
6. HAVE YOU EVER DONE ANYTHING, STARTED TO DO ANYTHING, OR PREPARED TO DO ANYTHING TO END YOUR LIFE?: NO
TOTAL  NUMBER OF INTERRUPTED ATTEMPTS LIFETIME: NO
2. HAVE YOU ACTUALLY HAD ANY THOUGHTS OF KILLING YOURSELF?: YES
TOTAL  NUMBER OF ABORTED OR SELF INTERRUPTED ATTEMPTS LIFETIME: NO
1. IN THE PAST MONTH, HAVE YOU WISHED YOU WERE DEAD OR WISHED YOU COULD GO TO SLEEP AND NOT WAKE UP?: NO
4. HAVE YOU HAD THESE THOUGHTS AND HAD SOME INTENTION OF ACTING ON THEM?: YES

## 2023-01-02 ASSESSMENT — PATIENT HEALTH QUESTIONNAIRE - PHQ9
SUM OF ALL RESPONSES TO PHQ QUESTIONS 1-9: 3
5. POOR APPETITE OR OVEREATING: NOT AT ALL
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
## 2023-01-10 ENCOUNTER — VIRTUAL VISIT (OUTPATIENT)
Dept: PSYCHOLOGY | Facility: CLINIC | Age: 43
End: 2023-01-10
Payer: COMMERCIAL

## 2023-01-10 DIAGNOSIS — F41.1 GENERALIZED ANXIETY DISORDER: Primary | ICD-10-CM

## 2023-01-10 DIAGNOSIS — F43.10 POST TRAUMATIC STRESS DISORDER (PTSD): ICD-10-CM

## 2023-01-10 DIAGNOSIS — F33.0 MAJOR DEPRESSIVE DISORDER, RECURRENT EPISODE, MILD WITH ANXIOUS DISTRESS (H): ICD-10-CM

## 2023-01-10 PROCEDURE — 90834 PSYTX W PT 45 MINUTES: CPT | Mod: 95 | Performed by: PSYCHOLOGIST

## 2023-01-10 NOTE — PROGRESS NOTES
[facility]                                     Progress Note    Patient Name: Sommer Gomez  Date: 1-10-23         Service Type: Individual      Session Start Time: 5:30 pm Session End Time: 6:15 pm     Session Length: 38-52 min    Session #: 2    Attendees: Client    Service Modality:  Video Visit:      Provider verified identity through the following two step process.  Patient provided:  Patient  and Patient address    Telemedicine Visit: The patient's condition can be safely assessed and treated via synchronous audio and visual telemedicine encounter.      Reason for Telemedicine Visit: Services only offered telehealth    Originating Site (Patient Location): Patient's home    Distant Site (Provider Location): Provider Remote Setting- Home Office    Consent:  The patient/guardian has verbally consented to: the potential risks and benefits of telemedicine (video visit) versus in person care; bill my insurance or make self-payment for services provided; and responsibility for payment of non-covered services.     Patient would like the video invitation sent by:  Text to cell phone: yes    Mode of Communication:  Video Conference via Amwell    Distant Location (Provider):  Off-site    As the provider I attest to compliance with applicable laws and regulations related to telemedicine.    DATA  Interactive Complexity: No  Crisis: No        Progress Since Last Session (Related to Symptoms / Goals / Homework):   Symptoms: stable    Homework: Completed in session      Episode of Care Goals: Achieved / completed to satisfaction - ACTION (Actively working towards change); Intervened by reinforcing change plan / affirming steps taken     Current / Ongoing Stressors and Concerns:   Son is in day tx, has been suicidal   Has taken him to the hospital twice.     Treatment Objective(s) Addressed in This Session:   Support- son in crisis   Patient will be able to articulatescore anxiety triggers and  fears.  Patient will use at least 4 coping skills for anxiety management in the next 90 days .     Intervention:   CBT and  Supportive therapy.      Son is in day tx, has been suicidal  Client as taken him to the hospital twice.  Explored next small steps for her to be ready to manage   what ever may come next. She continues to put him first.  Discussed self-care.    Client has missed work for this week and likely next;   thus has some anxiety about finances.    Assessments completed prior to visit:  The following assessments were completed by patient for this visit:      ASSESSMENT: Current Emotional / Mental Status (status of significant symptoms):   Risk status (Self / Other harm or suicidal ideation)   Patient denies current fears or concerns for personal safety.   Patient denies current or recent suicidal ideation or behaviors.   Patient denies current or recent homicidal ideation or behaviors.   Patient denies current or recent self injurious behavior or ideation.   Patient denies other safety concerns.   Patient reports there has been no change in risk factors since their last session.     Patient reports there has been no change in protective factors since their last session.     A safety and risk management plan has not been developed;  Client is advised that if any change occurs to call [phone], or reports to an Emergency Department.       Appearance:   Appropriate    Eye Contact:   Good    Psychomotor Behavior: Normal    Attitude:   Cooperative    Orientation:   All   Speech    Rate / Production: Normal/ Responsive Normal     Volume:  Normal    Mood:    Anxious  Normal   Affect:    Appropriate    Thought Content:  Clear    Thought Form:  Coherent  Logical    Insight:    Good      Medication Review:   No changes to current psychiatric medication(s)     Medication Compliance:   Yes     Changes in Health Issues:   None reported     Chemical Use Review:   Substance Use: Chemical use reviewed, no active concerns  identified      Tobacco Use: No current tobacco use.      Diagnosis:  1. Generalized anxiety disorder    2. Major depressive disorder, recurrent episode, mild with anxious distress (H)    3. Post traumatic stress disorder (PTSD)        Collateral Reports Completed:   Routed note to PCP as needed.    PLAN: (Patient Tasks / Therapist Tasks / Other)  Small next steps .  Plan is to access support system daily.  Employ several coping skills daily.      Mariely Negron LP                                                         __________________________________________________________________    Individual Treatment Plan    Patient's Name: Sommer Gomez  YOB: 1980    Date of Creation:pend  Date Treatment Plan Last Reviewed/Revised: pend    DSM5 Diagnoses: 300.02 (F41.1) Generalized Anxiety Disorder  Psychosocial / Contextual Factors: son with depressive symptoms and ASD dx  PROMIS (reviewed every 90 days):     Referral / Collaboration:  Referral to another professional/service is not indicated at this time..    Anticipated number of session for this episode of care: 9-12 sessions  Anticipation frequency of session: Every other week  Anticipated Duration of each session: 38-52 minutes  Treatment plan will be reviewed in 90 days or when goals have been changed.     pend  MeasurableTreatment Goal(s) related to diagnosis / functional impairment(s)  Goal 1: Patient will be able to articulate core anxiety triggers. Will use early intervention 50% of the time.    I will know I've met my goal when .      Objective #A (Patient Action)    Patient will use at least 4 coping skills for anxiety management in the next 90 days .  Status: New - Date: 1-2023     Intervention(s)  Therapist will teach coping skills.    .      Patient has not reviewed nor agreed to the above plan.      Mariely Negron LP  January 10, 2023

## 2023-01-19 ENCOUNTER — VIRTUAL VISIT (OUTPATIENT)
Dept: PSYCHOLOGY | Facility: CLINIC | Age: 43
End: 2023-01-19
Payer: COMMERCIAL

## 2023-01-19 DIAGNOSIS — F41.1 GENERALIZED ANXIETY DISORDER: Primary | ICD-10-CM

## 2023-01-19 DIAGNOSIS — F43.10 POST TRAUMATIC STRESS DISORDER (PTSD): ICD-10-CM

## 2023-01-19 DIAGNOSIS — F33.0 MAJOR DEPRESSIVE DISORDER, RECURRENT EPISODE, MILD WITH ANXIOUS DISTRESS (H): ICD-10-CM

## 2023-01-19 PROCEDURE — 90834 PSYTX W PT 45 MINUTES: CPT | Mod: 95 | Performed by: PSYCHOLOGIST

## 2023-01-19 ASSESSMENT — ANXIETY QUESTIONNAIRES
GAD7 TOTAL SCORE: 10
6. BECOMING EASILY ANNOYED OR IRRITABLE: SEVERAL DAYS
GAD7 TOTAL SCORE: 10
2. NOT BEING ABLE TO STOP OR CONTROL WORRYING: SEVERAL DAYS
3. WORRYING TOO MUCH ABOUT DIFFERENT THINGS: SEVERAL DAYS
GAD7 TOTAL SCORE: 10
7. FEELING AFRAID AS IF SOMETHING AWFUL MIGHT HAPPEN: NOT AT ALL
3. WORRYING TOO MUCH ABOUT DIFFERENT THINGS: SEVERAL DAYS
5. BEING SO RESTLESS THAT IT IS HARD TO SIT STILL: MORE THAN HALF THE DAYS
IF YOU CHECKED OFF ANY PROBLEMS ON THIS QUESTIONNAIRE, HOW DIFFICULT HAVE THESE PROBLEMS MADE IT FOR YOU TO DO YOUR WORK, TAKE CARE OF THINGS AT HOME, OR GET ALONG WITH OTHER PEOPLE: EXTREMELY DIFFICULT
GAD7 TOTAL SCORE: 10
IF YOU CHECKED OFF ANY PROBLEMS ON THIS QUESTIONNAIRE, HOW DIFFICULT HAVE THESE PROBLEMS MADE IT FOR YOU TO DO YOUR WORK, TAKE CARE OF THINGS AT HOME, OR GET ALONG WITH OTHER PEOPLE: EXTREMELY DIFFICULT
8. IF YOU CHECKED OFF ANY PROBLEMS, HOW DIFFICULT HAVE THESE MADE IT FOR YOU TO DO YOUR WORK, TAKE CARE OF THINGS AT HOME, OR GET ALONG WITH OTHER PEOPLE?: EXTREMELY DIFFICULT
7. FEELING AFRAID AS IF SOMETHING AWFUL MIGHT HAPPEN: NOT AT ALL
GAD7 TOTAL SCORE: 10
GAD7 TOTAL SCORE: 10
4. TROUBLE RELAXING: NEARLY EVERY DAY
1. FEELING NERVOUS, ANXIOUS, OR ON EDGE: MORE THAN HALF THE DAYS
5. BEING SO RESTLESS THAT IT IS HARD TO SIT STILL: MORE THAN HALF THE DAYS
1. FEELING NERVOUS, ANXIOUS, OR ON EDGE: MORE THAN HALF THE DAYS
4. TROUBLE RELAXING: NEARLY EVERY DAY
6. BECOMING EASILY ANNOYED OR IRRITABLE: SEVERAL DAYS
2. NOT BEING ABLE TO STOP OR CONTROL WORRYING: SEVERAL DAYS
7. FEELING AFRAID AS IF SOMETHING AWFUL MIGHT HAPPEN: NOT AT ALL
7. FEELING AFRAID AS IF SOMETHING AWFUL MIGHT HAPPEN: NOT AT ALL
8. IF YOU CHECKED OFF ANY PROBLEMS, HOW DIFFICULT HAVE THESE MADE IT FOR YOU TO DO YOUR WORK, TAKE CARE OF THINGS AT HOME, OR GET ALONG WITH OTHER PEOPLE?: EXTREMELY DIFFICULT

## 2023-01-19 NOTE — PROGRESS NOTES
[facility]                                     Progress Note    Patient Name: Sommer Gomez  Date: 23         Service Type: Individual      Session Start Time: 4:30 pm Session End Time: 5:15 pm     Session Length: 38-52 min    Session #: 3    Attendees: Client    Service Modality:  Video Visit:      Provider verified identity through the following two step process.  Patient provided:  Patient  and Patient address    Telemedicine Visit: The patient's condition can be safely assessed and treated via synchronous audio and visual telemedicine encounter.      Reason for Telemedicine Visit: Services only offered telehealth    Originating Site (Patient Location): Patient's home    Distant Site (Provider Location): Provider Remote Setting- Home Office    Consent:  The patient/guardian has verbally consented to: the potential risks and benefits of telemedicine (video visit) versus in person care; bill my insurance or make self-payment for services provided; and responsibility for payment of non-covered services.     Patient would like the video invitation sent by:  Text to cell phone: yes    Mode of Communication:  Video Conference via Amwell    Distant Location (Provider):  Off-site    As the provider I attest to compliance with applicable laws and regulations related to telemedicine.    DATA  Interactive Complexity: No  Crisis: No     Diagnosis:  F41.1,   F33.0,  F43.10    Treatment plan reviewed: today ( 90 day = )   PROMIS:  23        Progress Since Last Session (Related to Symptoms / Goals / Homework):   Symptoms: stable    Homework: Completed in session      Episode of Care Goals: Achieved / completed to satisfaction - ACTION (Actively working towards change); Intervened by reinforcing change plan / affirming steps taken     Current / Ongoing Stressors and Concerns:   Son is in day tx, recently has been suicidal   Has taken him to the hospital twice.     Treatment Objective(s)  Addressed in This Session:   Support- son in crisis   Patient will be able to articulatescore anxiety triggers and fears.  Patient will use at least 4 coping skills for anxiety management in the next 90 days .     Intervention:   CBT and  Supportive therapy.    Client reports that she has had    difficulty getting son up out of bed   for the outpatient day program.   Today around 10:30 am   she was able to get him up and to attend   the program. Is hoping to get a mental   health worker for her child.    Client also reports that she is now unemployed.  Explored next steps.  "[employer] career planning.    Is looking into 4 different schools for   her daughter. Is hopeful that she can   find a fit.    Identified and processed feelings.    Assessments completed prior to visit:  The following assessments were completed by patient for this visit:      ASSESSMENT: Current Emotional / Mental Status (status of significant symptoms):   Risk status (Self / Other harm or suicidal ideation)   Patient denies current fears or concerns for personal safety.   Patient denies current or recent suicidal ideation or behaviors.   Patient denies current or recent homicidal ideation or behaviors.   Patient denies current or recent self injurious behavior or ideation.   Patient denies other safety concerns.   Patient reports there has been no change in risk factors since their last session.     Patient reports there has been no change in protective factors since their last session.     A safety and risk management plan has not been developed;  Client is advised that if any change occurs to call [phone], or reports to an Emergency Department.       Appearance:   Appropriate    Eye Contact:   Good    Psychomotor Behavior: Normal    Attitude:   Cooperative    Orientation:   All   Speech    Rate / Production: Normal/ Responsive Normal     Volume:  Normal    Mood:    Anxious  Normal   Affect:    Appropriate    Thought Content:  Clear    Thought  "Form:  Coherent  Logical    Insight:    Good      Medication Review:   No changes to current psychiatric medication(s)     Medication Compliance:   Yes     Changes in Health Issues:   None reported     Chemical Use Review:   Substance Use: Chemical use reviewed, no active concerns identified      Tobacco Use: No current tobacco use.      Diagnosis:  F41.1,   F33.0,  F43.10     Collateral Reports Completed:   Routed note to PCP as needed.    PLAN: (Patient Tasks / Therapist Tasks / Other)    Plan is to take it day to day.\"it is changing by the day\".  Past hw  Small next steps .  Plan is to access support system daily.  Employ several coping skills daily.      Mariely Negron LP                                                         __________________________________________________________________    Individual Treatment Plan    Patient's Name: Sommer Gomez  YOB: 1980    Date of Creation:pend  Date Treatment Plan Last Reviewed/Revised: pend    DSM5 Diagnoses: 300.02 (F41.1) Generalized Anxiety Disorder  Psychosocial / Contextual Factors: son with depressive symptoms and ASD dx  PROMIS (reviewed every 90 days): 1-02-23    Referral / Collaboration:  Referral to another professional/service is not indicated at this time..    Anticipated number of session for this episode of care: 9-12 sessions  Anticipation frequency of session: Every other week  Anticipated Duration of each session: 38-52 minutes  Treatment plan will be reviewed in 90 days or when goals have been changed.     pend  MeasurableTreatment Goal(s) related to diagnosis / functional impairment(s)  Goal 1: Patient will be able to articulate core anxiety triggers. Will use early intervention 50% of the time.    I will know I've met my goal when .  feeling effective 50% of the time.    Objective #A (Patient Action)    Patient will use at least 4 coping skills for anxiety management in the next 90 days .  Status: New - Date: 1-2023 "     Intervention(s)  Therapist will teach coping skills.    .Patient has not reviewed nor agreed to the above plan.      Mariely Negron LP      January 10, 2023    Answers for HPI/ROS submitted by the patient on 1/19/2023  ROSA 7 TOTAL SCORE: 10

## 2023-01-24 ENCOUNTER — VIRTUAL VISIT (OUTPATIENT)
Dept: PSYCHOLOGY | Facility: CLINIC | Age: 43
End: 2023-01-24
Payer: COMMERCIAL

## 2023-01-24 DIAGNOSIS — F33.0 MILD RECURRENT MAJOR DEPRESSION (H): ICD-10-CM

## 2023-01-24 DIAGNOSIS — F41.1 GENERALIZED ANXIETY DISORDER: Primary | ICD-10-CM

## 2023-01-24 PROCEDURE — 90834 PSYTX W PT 45 MINUTES: CPT | Mod: 95 | Performed by: PSYCHOLOGIST

## 2023-01-24 NOTE — PROGRESS NOTES
[facility]                                     Progress Note    Patient Name: Sommer Gomez  Date: 23         Service Type: Individual      Session Start Time: 5:35 pm Session End Time: 6:20 pm     Session Length: 38-52 min    Session #: 4    Attendees: Client    Service Modality:  Video Visit:      Provider verified identity through the following two step process.  Patient provided:  Patient  and Patient address    Telemedicine Visit: The patient's condition can be safely assessed and treated via synchronous audio and visual telemedicine encounter.      Reason for Telemedicine Visit: Services only offered telehealth    Originating Site (Patient Location): Patient's home    Distant Site (Provider Location): Provider Remote Setting- Home Office    Consent:  The patient/guardian has verbally consented to: the potential risks and benefits of telemedicine (video visit) versus in person care; bill my insurance or make self-payment for services provided; and responsibility for payment of non-covered services.     Patient would like the video invitation sent by:  Text to cell phone: yes    Mode of Communication:  Video Conference via Amwell    Distant Location (Provider):  Off-site    As the provider I attest to compliance with applicable laws and regulations related to telemedicine.    DATA  Interactive Complexity: No  Crisis: No     Diagnosis:  F41.1,   F33.0,  F43.10    Treatment plan reviewed: today ( 90 day = )   PROMIS:  23      Progress Since Last Session (Related to Symptoms / Goals / Homework):   Symptoms: stable    Homework: Completed in session      Episode of Care Goals: Achieved / completed to satisfaction - ACTION (Actively working towards change); Intervened by reinforcing change plan / affirming steps taken     Current / Ongoing Stressors and Concerns:   Son is in day tx, recently has been suicidal ( January)    Has taken him to the hospital twice.     Treatment  Objective(s) Addressed in This Session:   Support- son recently ([place])  in crisis   Patient will be able to articulate core anxiety triggers and fears.  Patient will use at least 4 coping skills for anxiety management in the next 90 days .     Intervention:   CBT and  Supportive therapy.    Client reports trouble falling asleep.  Explored stressors .  Explored sleep hygiene.  Reports lowered appetite.    Son is stabilized is attending 4 of 5 days   to his mental health program. No SI present.     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     Action step- turned in school enrollment for her daughter.       Identified and processed feelings.    Assessments completed prior to visit:  The following assessments were completed by patient for this visit:      ASSESSMENT: Current Emotional / Mental Status (status of significant symptoms):   Risk status (Self / Other harm or suicidal ideation)   Patient denies current fears or concerns for personal safety.   Patient denies current or recent suicidal ideation or behaviors.   Patient denies current or recent homicidal ideation or behaviors.   Patient denies current or recent self injurious behavior or ideation.   Patient denies other safety concerns.   Patient reports there has been no change in risk factors since their last session.     Patient reports there has been no change in protective factors since their last session.     A safety and risk management plan has not been developed;  Client is advised that if any change occurs to call [phone], or reports to an Emergency Department.       Appearance:   Appropriate    Eye Contact:   Good    Psychomotor Behavior: Normal    Attitude:   Cooperative    Orientation:   All   Speech    Rate / Production: Normal/ Responsive Normal     Volume:  Normal    Mood:    Anxious  Normal   Affect:    Appropriate    Thought Content:  Clear    Thought Form:  Coherent   "Logical    Insight:    Good      Medication Review:   No changes to current psychiatric medication(s)     Medication Compliance:   Yes     Changes in Health Issues:   None reported     Chemical Use Review:   Substance Use: Chemical use reviewed, no active concerns identified      Tobacco Use: No current tobacco use.      Diagnosis:  F41.1,   F33.0,  F43.10     Collateral Reports Completed:   Routed note to PCP as needed.    PLAN: (Patient Tasks / Therapist Tasks / Other)  Return to some level of daily self care.  Is weighing pros and cons about attending a new school.  Past hw  Plan is to take it day to day.\"it is changing by the day\".  Past hw  Small next steps .  Plan is to access support system daily.  Employ several coping skills daily.      Mariely Negron LP                                                         __________________________________________________________________    Individual Treatment Plan    Patient's Name: Sommer Gomez  YOB: 1980    Date of Creation:pend  Date Treatment Plan Last Reviewed/Revised: pend    DSM5 Diagnoses: 300.02 (F41.1) Generalized Anxiety Disorder  Psychosocial / Contextual Factors: son with depressive symptoms and ASD dx  PROMIS (reviewed every 90 days): 1-02-23    Referral / Collaboration:  Referral to another professional/service is not indicated at this time..    Anticipated number of session for this episode of care: 9-12 sessions  Anticipation frequency of session: Every other week  Anticipated Duration of each session: 38-52 minutes  Treatment plan will be reviewed in 90 days or when goals have been changed.       MeasurableTreatment Goal(s) related to diagnosis / functional impairment(s)  Goal 1: Patient will be able to articulate core anxiety triggers. Will use early intervention 50% of the time.    I will know I've met my goal when .  feeling effective 50% of the time.    Objective #A (Patient Action)    Patient will use at least 4 coping skills " for anxiety management in the next 90 days .  Status: New - Date: 1-2023     Intervention(s)  Therapist will teach coping skills. Provide a safe place to process stress/ emotions.    .Patient has not reviewed nor agreed to the above plan.      Mariely Negron LP      January 10, 2023    Answers for HPI/ROS submitted by the patient on 1/19/2023  ROSA 7 TOTAL SCORE: 10    Answers for HPI/ROS submitted by the patient on 1/19/2023  ROSA 7 TOTAL SCORE: 10

## 2023-01-28 DIAGNOSIS — K21.9 GASTROESOPHAGEAL REFLUX DISEASE WITHOUT ESOPHAGITIS: ICD-10-CM

## 2023-01-30 RX ORDER — FAMOTIDINE 40 MG/1
40 TABLET, FILM COATED ORAL 2 TIMES DAILY
Qty: 180 TABLET | Refills: 0 | Status: SHIPPED | OUTPATIENT
Start: 2023-01-30 | End: 2023-05-08

## 2023-02-07 ENCOUNTER — VIRTUAL VISIT (OUTPATIENT)
Dept: PSYCHOLOGY | Facility: CLINIC | Age: 43
End: 2023-02-07
Payer: COMMERCIAL

## 2023-02-07 DIAGNOSIS — F33.0 MAJOR DEPRESSIVE DISORDER, RECURRENT EPISODE, MILD WITH ANXIOUS DISTRESS (H): ICD-10-CM

## 2023-02-07 DIAGNOSIS — F41.1 GENERALIZED ANXIETY DISORDER: Primary | ICD-10-CM

## 2023-02-07 DIAGNOSIS — F43.10 POST TRAUMATIC STRESS DISORDER (PTSD): ICD-10-CM

## 2023-02-07 PROCEDURE — 90834 PSYTX W PT 45 MINUTES: CPT | Mod: 95 | Performed by: PSYCHOLOGIST

## 2023-02-07 NOTE — PROGRESS NOTES
[facility]                                     Progress Note    Patient Name: Sommer Gomez  Date: 23         Service Type: Individual      Session Start Time: 5:35 pm Session End Time: 6:20 pm     Session Length: 38-52 min    Session #: 5    Attendees: Client    Service Modality:  Video Visit:      Provider verified identity through the following two step process.  Patient provided:  Patient  and Patient address    Telemedicine Visit: The patient's condition can be safely assessed and treated via synchronous audio and visual telemedicine encounter.      Reason for Telemedicine Visit: Services only offered telehealth    Originating Site (Patient Location): Patient's home    Distant Site (Provider Location): Provider Remote Setting- Home Office    Consent:  The patient/guardian has verbally consented to: the potential risks and benefits of telemedicine (video visit) versus in person care; bill my insurance or make self-payment for services provided; and responsibility for payment of non-covered services.     Patient would like the video invitation sent by:  Text to cell phone: yes    Mode of Communication:  Video Conference via Amwell    Distant Location (Provider):  Off-site    As the provider I attest to compliance with applicable laws and regulations related to telemedicine.    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     Treatment Objective(s) Addressed in This Session:   Support- son recently (Jan)  in crisis   Patient will be able to articulate core anxiety triggers and fears.  Patient will use at least 4 coping skills for anxiety management in the next 90 days .     Intervention:   CBT and  Supportive therapy.      Client reports:    Son remains stabilized. Openly talking about   his symptoms with client including recent   ranking of SI present.   Reviewed her experience during the crisis and     now as son is stabalized.  Discussed her needs going forward.    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      Identified and processed feelings.    Assessments completed prior to visit:  The following assessments were completed by patient for this visit:      ASSESSMENT: Current Emotional / Mental Status (status of significant symptoms):   Risk status (Self / Other harm or suicidal ideation)   Patient denies current fears or concerns for personal safety.   Patient denies current or recent suicidal ideation or behaviors.   Patient denies current or recent homicidal ideation or behaviors.   Patient denies current or recent self injurious behavior or ideation.   Patient denies other safety concerns.   Patient reports there has been no change in risk factors since their last session.     Patient reports there has been no change in protective factors since their last session.     A safety and risk management plan has not been developed;  Client is advised that if any change occurs to call [phone], or reports to an Emergency Department.       Appearance:   Appropriate    Eye Contact:   Good    Psychomotor Behavior: Normal    Attitude:   Cooperative    Orientation:   All   Speech    Rate / Production: Normal/ Responsive Normal     Volume:  Normal    Mood:    Anxious  Normal   Affect:    Appropriate    Thought Content:  Clear    Thought Form:  Coherent  Logical  "   Insight:    Good      Medication Review:   No changes to current psychiatric medication(s)     Medication Compliance:   Yes     Changes in Health Issues:   None reported     Chemical Use Review:   Substance Use: Chemical use reviewed, no active concerns identified      Tobacco Use: No current tobacco use.      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    PLAN: (Patient Tasks / Therapist Tasks / Other)  Attend to work / life balance  Past hw  Return to some level of daily self care.  Is weighing pros and cons about attending a new school.  Past hw  Plan is to take it day to day.\"it is changing by the day\".  Past hw  Small next steps .  Plan is to access support system daily.  Employ several coping skills daily.      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    Anticipated number of session for this episode of care: 9-12 sessions  Anticipation frequency of session: Every other week  Anticipated Duration of each session: 38-52 minutes  Treatment plan will be reviewed in 90 days or when goals have been changed.       MeasurableTreatment Goal(s) related to diagnosis / functional impairment(s)  Goal 1: Patient will be able to articulate core anxiety triggers. Will use " early intervention 50% of the time.    I will know I've met my goal when .  feeling effective 50% of the time.    Objective #A (Patient Action)    Patient will use at least 4 coping skills for anxiety management in the next 90 days .  Status: New - Date: 1-2023     Intervention(s)  Therapist will teach coping skills. Provide a safe place to process stress/ emotions.    .Patient has not reviewed nor agreed to the above plan.      Mariely Negron LP      January 10, 2023    Answers for HPI/ROS submitted by the patient on 1/19/2023  ROSA 7 TOTAL SCORE: 10

## 2023-02-20 ENCOUNTER — VIRTUAL VISIT (OUTPATIENT)
Dept: PSYCHOLOGY | Facility: CLINIC | Age: 43
End: 2023-02-20
Payer: COMMERCIAL

## 2023-02-20 DIAGNOSIS — F41.1 GENERALIZED ANXIETY DISORDER: ICD-10-CM

## 2023-02-20 DIAGNOSIS — F88 DEVELOPMENTAL MENTAL DISORDER: Primary | ICD-10-CM

## 2023-02-20 DIAGNOSIS — F33.41 MAJOR DEPRESSIVE DISORDER, RECURRENT EPISODE, IN PARTIAL REMISSION (H): ICD-10-CM

## 2023-02-20 PROCEDURE — 90834 PSYTX W PT 45 MINUTES: CPT | Mod: VID | Performed by: PSYCHOLOGIST

## 2023-02-20 ASSESSMENT — PATIENT HEALTH QUESTIONNAIRE - PHQ9
10. IF YOU CHECKED OFF ANY PROBLEMS, HOW DIFFICULT HAVE THESE PROBLEMS MADE IT FOR YOU TO DO YOUR WORK, TAKE CARE OF THINGS AT HOME, OR GET ALONG WITH OTHER PEOPLE: SOMEWHAT DIFFICULT
SUM OF ALL RESPONSES TO PHQ QUESTIONS 1-9: 8
10. IF YOU CHECKED OFF ANY PROBLEMS, HOW DIFFICULT HAVE THESE PROBLEMS MADE IT FOR YOU TO DO YOUR WORK, TAKE CARE OF THINGS AT HOME, OR GET ALONG WITH OTHER PEOPLE: SOMEWHAT DIFFICULT
SUM OF ALL RESPONSES TO PHQ QUESTIONS 1-9: 8

## 2023-02-20 NOTE — PROGRESS NOTES
[facility]   Mental Health & Addiction Services     Progress Note - Initial Visit    Client Name:  Sommer Gomez Date: 2023         Service Type: Individual     Visit Start Time: 11:00am  Visit End Time: 11:40am    Visit #: 1    Attendees: Client attended alone    Service Modality:  Video Visit:      Provider verified identity through the following two step process.  Patient provided:  Patient  and Patient address    Telemedicine Visit: The patient's condition can be safely assessed and treated via synchronous audio and visual telemedicine encounter.      Reason for Telemedicine Visit: Services only offered telehealth    Originating Site (Patient Location): Patient's home    Distant Site (Provider Location): Provider Remote Setting- Home Office    Consent:  The patient/guardian has verbally consented to: the potential risks and benefits of telemedicine (video visit) versus in person care; bill my insurance or make self-payment for services provided; and responsibility for payment of non-covered services.     Patient would like the video invitation sent by:  Send to e-mail at: [email or web address]    Mode of Communication:  Video Conference via [facility]    Distant Location (Provider):  Off-site    As the provider I attest to compliance with applicable laws and regulations related to telemedicine.       DATA:  Extended Session (53+ minutes): No  Interactive Complexity: No   Crisis: No     Presenting Concerns/Current Stressors:   Patient presented to session to initiate the ADHD evaluation process.       PHQ 2023   PHQ-9 Total Score 3 8 8   Q9: Thoughts of better off dead/self-harm past 2 weeks Not at all Not at all Not at all        ROSA-7 SCORE 2023   Total Score - - -   Total Score - - 10 (moderate anxiety)   Total Score 4 10 10       ASSESSMENT:  Mental Status Assessment:  Appearance:   Appropriate   Eye  Contact:   Good   Psychomotor Behavior: Normal   Attitude:   Cooperative   Orientation:   All  Speech   Rate / Production: Normal/ Responsive   Volume:  Normal   Mood:    Normal  Affect:    Appropriate   Thought Content:  Clear   Thought Form:  Logical   Insight:    Good       Safety Issues and Plan for Safety and Risk Management:     [place] Suicide Severity Rating Scale (Lifetime/Recent)  [place] Suicide Severity Rating (Lifetime/Recent) 11/14/2019 1/20/2021 1/2/2023   Wish to be Dead (Lifetime) Yes Yes -   Non-Specific Active Suicidal Thoughts (Lifetime) Yes - -   Most Severe Ideation Rating (Lifetime) 5 5 -   Frequency (Lifetime) 5 4 -   Duration (Lifetime) 4 3 -   Controllability (Lifetime) 4 4 -   Protective Factors  (Lifetime) 1 2 -   Reasons for Ideation (Lifetime) 5 4 -   RETIRED: 1. Wish to be Dead (Recent) No No -   RETIRED: 2. Non-Specific Active Suicidal Thoughts (Recent) No - -   3. Active Suicidal Ideation with any Methods (Not Plan) Without Intent to Act (Lifetime) Yes - -   RETIRED: 3. Active Suicidal Ideation with any Methods (Not Plan) Without Intent to Act (Recent) - Yes -   RETIRE: 4. Active Suicidal Ideation with Some Intent to Act, Without Specific Plan (Lifetime) Yes - -   4. Active Suicidal Ideation with Some Intent to Act, Without Specific Plan (Recent) - No -   RETIRE: 5. Active Suicidal Ideation with Specific Plan and Intent (Lifetime) Yes - -   RETIRED: 5. Active Suicidal Ideation with Specific Plan and Intent (Recent) - No -   Most Severe Ideation Rating (Past Month) - NA -   Frequency (Past Month) - NA -   Duration (Past Month) - NA -   Controllability (Past Month) - NA -   Protective Factors (Past Month) - NA -   Reasons for Ideation (Past Month) - NA -   Actual Attempt (Lifetime) Yes Yes -   Total Number of Actual Attempts (Lifetime) 2 - -   Actual Attempt (Past 3 Months) - No -   Has subject engaged in non-suicidal self-injurious behavior? (Lifetime) No Yes -   Has subject engaged  in non-suicidal self-injurious behavior? (Past 3 Months) - No -   Interrupted Attempts (Lifetime) Yes No -   Total Number of Interrupted Attempts (Lifetime) 1 - -   Interrupted Attempts (Past 3 Months) - No -   Aborted or Self-Interrupted Attempt (Lifetime) No No -   Aborted or Self-Interrupted Attempt (Past 3 Months) - No -   Preparatory Acts or Behavior (Lifetime) Yes Yes -   Preparatory Acts or Behavior (Past 3 Months) - No -   Most Recent Attempt Date 57733 - -   Most Recent Attempt Actual Lethality Code - NA -   Most Lethal Attempt Actual Lethality Code - NA -   Initial/First Attempt Actual Lethality Code - NA -   1. Wish to be Dead (Lifetime) - - 1   Wish to be Dead Description (Lifetime) - - last thought 15 years ago. related to PTSD from her parents and her marriage. Was pregnant. Thought about takiing Trazedone. Stopped - wanted to be there for her kids.   1. Wish to be Dead (Past 1 Month) - - 0   2. Non-Specific Active Suicidal Thoughts (Lifetime) - - 1   Non-Specific Active Suicidal Thought Description (Lifetime) - - last thought 15 years ago. related to PTSD from her parents and her marriage. Was pregnant. Thought about takiing Trazedone. Stopped - wanted to be there for her kids.   2. Non-Specific Active Suicidal Thoughts (Past 1 Month) - - 0   3. Active Suicidal Ideation with any Methods (Not Plan) Without Intent to Act (Lifetime) - - 1   Active Suicidal Ideation with any Methods (Not Plan) Description (Lifetime) - - last thought 15 years ago. related to PTSD from her parents and her marriage. Was pregnant. Thought about takiing Trazedone. Stopped - wanted to be there for her kids.   3. Active Suicidal Ideation with any Methods (Not Plan) Without Intent to Act (Past 1 Month) - - 0   4. Active Suicidal Ideation with Some Intent to Act, Without Specific Plan (Lifetime) - - 1   Active Suicidal Ideation with Some Intent to Act, Without Specific Plan Description (Lifetime) - - last thought 15 years ago.  related to PTSD from her parents and her marriage. Was pregnant. Thought about takiing Trazedone. Stopped - wanted to be there for her kids.   4. Active Suicidal Ideation with Some Intent to Act, Without Specific Plan (Past 1 Month) - - 0   5. Active Suicidal Ideation with Specific Plan and Intent (Lifetime) - - 0   Most Severe Ideation Rating (Lifetime) - - 3   Description of Most Severe Ideation (Lifetime) - - last thought 15 years ago. related to PTSD from her parents and her marriage. Was pregnant. Thought about takiing Trazedone. Stopped - wanted to be there for her kids.   Frequency (Lifetime) - - 3   Duration (Lifetime) - - 2   Controllability (Lifetime) - - 1   Deterrents (Lifetime) - - 1   Deterrents (Past 1 Month) - - 0   Reasons for Ideation (Lifetime) - - 4   Reasons for Ideation (Past 1 Month) - - 0   Actual Attempt (Lifetime) - - 0   Has subject engaged in non-suicidal self-injurious behavior? (Lifetime) - - 0   Interrupted Attempts (Lifetime) - - 0   Aborted or Self-Interrupted Attempt (Lifetime) - - 0   Preparatory Acts or Behavior (Lifetime) - - 0   Calculated C-SSRS Risk Score (Lifetime/Recent) - - Moderate Risk     Patient denies current fears or concerns for personal safety.  Patient denies current or recent suicidal ideation or behaviors.  Patient denies current or recent homicidal ideation or behaviors.  Patient denies current or recent self injurious behavior or ideation.  Patient denies other safety concerns.  Recommended that patient call 911 or go to the local ED should there be a change in any of these risk factors.   Patient reports there are no firearms in the house.    Diagnostic Criteria:  F88:  A. A persistent pattern of inattention and/or hyperactivity-impulsivity that interferes with functioning or development, as characterized by (1) and/or (2):   1. Six or more inattention symptoms that have persisted for at least 6 months to a degree that is inconsistent with developmental level  and that negatively impacts directly on social and academic/occupational activities.   2. Six or more hyperactivity and impulsivity symptoms that have persisted for at least 6 months to a degree that is inconsistent with developmental level and that negatively impacts directly on social and academic/occupational activities.  B. Several symptoms (inattentive or hyperactive/impulsive) were present before the age of 12 years.  C. Several symptoms (inattentive or hyperactive/impulsive) present in ?2 settings (eg, at home, school, or work; with friends or relatives; in other activities).  D. There is clear evidence that the symptoms interfere with or reduce the quality of social, academic, or occupational functioning.  E. Symptoms do not occur exclusively during the course of schizophrenia or another psychotic disorder, and are not better explained by another mental disorder (eg, mood disorder, anxiety disorder, dissociative disorder, personality disorder, substance intoxication, or withdrawal).    F41.1:  A. Excessive anxiety and worry, occurring more days than not for at least 6 months about a number of events or activities.   B. The individual finds it difficult to control the worry.  C. The anxiety and worry are associated with 3 or more of 6 symptoms.  D. The anxiety, worry, or physical symptoms cause clinically significant distress or impairment in social, occupational, or other important areas of functioning.  E. The disturbance is not attributable to the physiological effects of a substance (e.g., a drug of abuse, a medication) or another medical condition (e.g., hyperthyroidism).  F. The disturbance is not better explained by another mental disorder (e.g., anxiety or worry about having panic attacks in panic disorder, negative evaluation in social anxiety disorder [social phobia], contamination or other obsessions in obsessive-compulsive disorder, separation from attachment figures in separation anxiety  disorder, reminders of traumatic events in posttraumatic stress disorder, gaining weight in anorexia nervosa, physical complaints in somatic symptom disorder, perceived appearance flaws in body dysmorphic disorder, having a serious illness in illness anxiety disorder, or the content of delusional beliefs in schizophrenia or delusional disorder).    F33.41:  A. Five (or more) symptoms have been present during the same 2-week period and represent a change from previous functioning; at least one of the symptoms is either (1) depressed mood or (2) loss of interest or pleasure.   1. Depressed mood.   2. Diminished interest or pleasure in all, or almost all, activities.   3. Significant appetite change.  4. Significant sleep change.   5. Fatigue or loss of energy.   6. Diminished ability to think or concentrate, or indecisiveness.    B. The symptoms cause clinically significant distress or impairment in social, occupational, or other important areas of functioning.  C. The episode is not attributable to the physiological effects of a substance or to another medical condition.  D. The occurence of major depressive episode is not better explained by other thought / psychotic disorders.  E. There has never been a manic episode or hypomanic episode.     DSM5 Diagnoses: (Sustained by DSM5 Criteria Listed Above)  Diagnoses:   1. Developmental mental disorder    2. Generalized anxiety disorder    3. Major depressive disorder, recurrent episode, in partial remission (H)      Psychosocial & Contextual Factors: social support, employed, trauma history  WHODAS 2.0 (12 item):   WHODAS 2.0 Total Score 1/20/2021 2/20/2023   Total Score 32 31   Total Score MyChart - 31       PROMIS-10 Scores  Global Mental Health Score: 14  Global Physical Health Score: 14   PROMIS TOTAL - SUBSCORES: 28      Intervention:              Reviewed symptoms and history of presenting concern. Patient endorsed symptoms consistent with depression , anxiety ,  trauma and ADHD. No longer meets criteria for PTSD. Patient denied symptoms associated with antonio, panic, OCD and perceptual difficulties. Updated DA completed on 1/2/2023 with Mariely Negron MA, LP.  CBT: socratic questioning, positive reinforcement  EFT: empathetic attunement, emotion checking, emotion naming  MI: open ended questions, affirmations, reflections        Attendance Agreement:  Client has not signed the attendance agreement. Discussed expectations at beginning of this first session and patient agreed.       PLAN:  Patient will complete Shorty questionnaires  and CNS Vital Signs prior to next session (2/27/2023).    Patient meets the following risk assessment and triage: Patient denied any current/recent/lifetime history of suicidal ideation and/or behaviors.  No safety plan indicated at this time.     Medical necessity criteria is warranted in order to: Measure a psychological disorder and its severity and functional impairment to determine psychiatric diagnosis when a mental illness is suspected, or to achieve a differential diagnosis from a range of medical/psychological disorders that present with similar constellations of symptoms (e.g., determination and measurement of anxiety severity and impact in the presence of ongoing asthma or heart disease), Perform symptom measurement to objectively measure treatment effectiveness and/or determine the need to refer for pharmacological treatment or other medical evaluation (e.g., based on severity and chronicity of symptoms) and Evaluate primary symptoms of impaired attention and concentration that can occur in many neurological and psychiatric conditions.    Medical necessity for psychological assessment is warranted as a result of the following: (1) A specific clinical question is posed that relates to the condition/symptoms being addressed (2) The question cannot be adequately addressed by clinical interview and/or behavioral observation (3) Results of  psychological testing are reasonably expected to provide an answer to the query (4) It is reasonably expected that the testing will provide information leading to a clearer diagnosis and/or guide treatment planning with an expectation of improved clinical outcome.    I acknowledge that, based upon current clinical information, the patient and I have reviewed and discussed issues pertaining to the purpose of therapy/testing, potential therapeutic goals, procedures, risks and benefits, and estimated duration of therapy/testing. Issues pertaining to fees/insurance and confidentiality were also addressed with the patient, who indicated understanding and elected to continue with appointments. I will not be providing any experimental procedures and, if we agree that a change in clinical procedure would be more beneficial, I will obtain specific consent for that procedure or refer you to another provider who has expertise in that area.       Tiffany Grullon PsyD,   Clinical Psychologist

## 2023-02-21 ENCOUNTER — VIRTUAL VISIT (OUTPATIENT)
Dept: PSYCHOLOGY | Facility: CLINIC | Age: 43
End: 2023-02-21
Payer: COMMERCIAL

## 2023-02-21 DIAGNOSIS — F41.1 GENERALIZED ANXIETY DISORDER: Primary | ICD-10-CM

## 2023-02-21 DIAGNOSIS — F33.41 MAJOR DEPRESSIVE DISORDER, RECURRENT EPISODE, IN PARTIAL REMISSION (H): ICD-10-CM

## 2023-02-21 PROCEDURE — 90834 PSYTX W PT 45 MINUTES: CPT | Mod: VID | Performed by: PSYCHOLOGIST

## 2023-02-21 NOTE — PROGRESS NOTES
[facility]                                     Progress Note    Patient Name: Sommer Gomez  Date: 23         Service Type: Individual      Session Start Time: 5:35 pm Session End Time: 6:20 pm     Session Length: 38-52 min    Session #: 6    Attendees: Client    Service Modality:  Video Visit:      Provider verified identity through the following two step process.  Patient provided:  Patient  and Patient address    Telemedicine Visit: The patient's condition can be safely assessed and treated via synchronous audio and visual telemedicine encounter.      Reason for Telemedicine Visit: Services only offered telehealth    Originating Site (Patient Location): Patient's home    Distant Site (Provider Location): Provider Remote Setting- Home Office    Consent:  The patient/guardian has verbally consented to: the potential risks and benefits of telemedicine (video visit) versus in person care; bill my insurance or make self-payment for services provided; and responsibility for payment of non-covered services.     Patient would like the video invitation sent by:  Text to cell phone: yes    Mode of Communication:  Video Conference via [facility]    Distant Location (Provider):  Off-site    As the provider I attest to compliance with applicable laws and regulations related to telemedicine.    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     Treatment Objective(s) Addressed in This Session:   Support- son recently (Jan)  in crisis   Patient will be able to articulate core anxiety triggers and fears.  Patient will use at least 4 coping skills for anxiety management in the next 90 days .     Intervention:   CBT and  Supportive therapy.      Client reports:  hat she had an identity theft- bank account recently;  Her entire bank account got emptied. Describes a   panic attack.     Has been working and  recently had to have a   talk with one of the family- who was asking for    more and more hours. Client want to save    enough time for her children.    Discussed parenting demands/ needs:  Son expressing interest in wanting to have   hormone therapy.      Identified and processed feelings.    Assessments completed prior to visit:  The following assessments were completed by patient for this visit:      ASSESSMENT: Current Emotional / Mental Status (status of significant symptoms):   Risk status (Self / Other harm or suicidal ideation)   Patient denies current fears or concerns for personal safety.   Patient denies current or recent suicidal ideation or behaviors.   Patient denies current or recent homicidal ideation or behaviors.   Patient denies current or recent self injurious behavior or ideation.   Patient denies other safety concerns.   Patient reports there has been no change in risk factors since their last session.     Patient reports there has been no change in protective factors since their last session.     A safety and risk management plan has not been developed;  Client is advised that if any change occurs to call [phone], or reports to an Emergency Department.       Appearance:   Appropriate    Eye Contact:   Good    Psychomotor Behavior: Normal    Attitude:   Cooperative    Orientation:   All   Speech    Rate / Production: Normal/ Responsive Normal     Volume:  Normal  "   Mood:    Anxious  Normal   Affect:    Appropriate    Thought Content:  Clear    Thought Form:  Coherent  Logical    Insight:    Good      Medication Review:   No changes to current psychiatric medication(s)     Medication Compliance:   Yes     Changes in Health Issues:   None reported     Chemical Use Review:   Substance Use: Chemical use reviewed, no active concerns identified      Tobacco Use: No current tobacco use.      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    PLAN: (Patient Tasks / Therapist Tasks / Other)  Client wants to continue to be available to her kids\.  Past hw  Attend to work / life balance  Past hw  Return to some level of daily self care.  Is weighing pros and cons about attending a new school.  Past hw  Plan is to take it day to day.\"it is changing by the day\".  Past hw  Small next steps .  Plan is to access support system daily.  Employ several coping skills daily.      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    Anticipated number of session for this episode of care: 9-12 sessions  Anticipation frequency of session: Every other week  Anticipated Duration of each session: 38-52 minutes  Treatment plan will be reviewed in 90 days or when " goals have been changed.       MeasurableTreatment Goal(s) related to diagnosis / functional impairment(s)  Goal 1: Patient will be able to articulate core anxiety triggers. Will use early intervention 50% of the time.    I will know I've met my goal when .  feeling effective 50% of the time.    Objective #A (Patient Action)    Patient will use at least 4 coping skills for anxiety management in the next 90 days .  Status: New - Date: 1-2023     Intervention(s)  Therapist will teach coping skills. Provide a safe place to process stress/ emotions.    .Patient has not reviewed nor agreed to the above plan.      Mariely Negron LP      January 10, 2023    Answers for HPI/ROS submitted by the patient on 1/19/2023  ROSA 7 TOTAL SCORE: 10    Answers for HPI/ROS submitted by the patient on 2/20/2023  If you checked off any problems, how difficult have these problems made it for you to do your work, take care of things at home, or get along with other people?: Somewhat difficult  PHQ9 TOTAL SCORE: 8

## 2023-02-27 ENCOUNTER — VIRTUAL VISIT (OUTPATIENT)
Dept: PSYCHOLOGY | Facility: CLINIC | Age: 43
End: 2023-02-27
Payer: COMMERCIAL

## 2023-02-27 DIAGNOSIS — F88 DEVELOPMENTAL MENTAL DISORDER: Primary | ICD-10-CM

## 2023-02-27 DIAGNOSIS — F41.1 GENERALIZED ANXIETY DISORDER: ICD-10-CM

## 2023-02-27 DIAGNOSIS — F33.41 MAJOR DEPRESSIVE DISORDER, RECURRENT EPISODE, IN PARTIAL REMISSION (H): ICD-10-CM

## 2023-02-27 PROCEDURE — 90832 PSYTX W PT 30 MINUTES: CPT | Mod: VID | Performed by: PSYCHOLOGIST

## 2023-02-27 NOTE — PROGRESS NOTES
[facility]   Mental Health & Addiction Services    Progress Note    Patient Name: Sommer Gomez  Date: 2023       Service Type:  Second ADHD testing appointment      Session Start Time: 5:00pm  Session End Time:  5:31pm     Session Length: 31 minutes    Session #: 2    Attendees: Client attended alone    Service Modality:  Video Visit:      Provider verified identity through the following two step process.  Patient provided:  Patient  and Patient address    Telemedicine Visit: The patient's condition can be safely assessed and treated via synchronous audio and visual telemedicine encounter.      Reason for Telemedicine Visit: Services only offered telehealth    Originating Site (Patient Location): Patient's home    Distant Site (Provider Location): Provider Remote Setting- Home Office    Consent:  The patient/guardian has verbally consented to: the potential risks and benefits of telemedicine (video visit) versus in person care; bill my insurance or make self-payment for services provided; and responsibility for payment of non-covered services.     Patient would like the video invitation sent by:  Send to e-mail at: [email or web address]    Mode of Communication:  [facility]    Distant Location (Provider):  Off-site    As the provider I attest to compliance with applicable laws and regulations related to telemedicine.      DATA  Interactive Complexity: No  Crisis: No       Progress Since Last Session (Related to Symptoms / Goals / Homework):   Symptoms: Stable, no change.    Homework: Not completed.    Episode of Care Goals: Satisfactory progress - ACTION (Actively working towards change); Intervened by reinforcing change plan / affirming steps taken     Current / Ongoing Stressors and Concerns:   Discussed manifestations of attention and concentration problems in various areas of her life.                  Treatment  Objective(s) Addressed in This Session:          Continued with testing process - patient was emailed the MMPI-2 and will complete before feedback.                 Intervention:              Continued information gathering specific to ADHD and mood symptoms. Ended session by discussing MMPI-2 and feedback process.   CBT: socratic questioning, positive reinforcement  EFT: empathetic attunement, emotion checking  MI: open ended questions, affirmations, reflections    Assessments completed prior to visit:  The following assessments were completed by patient for this visit:  PHQ9:   PHQ-9 SCORE 11/9/2022 11/18/2022 11/30/2022 12/14/2022 1/2/2023 2/20/2023 2/20/2023   PHQ-9 Total Score - - - - - - -   PHQ-9 Total Score MyChart 7 (Mild depression) 5 (Mild depression) 6 (Mild depression) 3 (Minimal depression) - - 8 (Mild depression)   PHQ-9 Total Score 7 5 6 3 3 8 8     GAD7:   ROSA-7 SCORE 12/13/2021 6/2/2022 7/22/2022 7/22/2022 1/2/2023 1/19/2023 1/19/2023   Total Score - - - - - - -   Total Score 6 (mild anxiety) 6 (mild anxiety) - 11 (moderate anxiety) - - 10 (moderate anxiety)   Total Score 6 6 11 11 4 10 10     CAGE-AID:   CAGE-AID Total Score 1/2/2023 2/20/2023   Total Score 0 0   Total Score MyChart - 0 (A total score of 2 or greater is considered clinically significant)     PROMIS 10-Global Health (only subscores and total score):   PROMIS-10 Scores Only 7/14/2022 7/28/2022 11/9/2022 11/18/2022 11/30/2022 1/2/2023 2/20/2023   Global Mental Health Score 13 11 15 14 13 11 14   Global Physical Health Score 15 16 13 13 13 13 14   PROMIS TOTAL - SUBSCORES 28 27 28 27 26 24 28     Amelia Suicide Severity Rating Scale (Lifetime/Recent)  Amelia Suicide Severity Rating (Lifetime/Recent) 11/14/2019 1/20/2021 1/2/2023   Wish to be Dead (Lifetime) Yes Yes -   Non-Specific Active Suicidal Thoughts (Lifetime) Yes - -   Most Severe Ideation Rating (Lifetime) 5 5 -   Frequency (Lifetime) 5 4 -   Duration (Lifetime) 4 3 -    Controllability (Lifetime) 4 4 -   Protective Factors  (Lifetime) 1 2 -   Reasons for Ideation (Lifetime) 5 4 -   RETIRED: 1. Wish to be Dead (Recent) No No -   RETIRED: 2. Non-Specific Active Suicidal Thoughts (Recent) No - -   3. Active Suicidal Ideation with any Methods (Not Plan) Without Intent to Act (Lifetime) Yes - -   RETIRED: 3. Active Suicidal Ideation with any Methods (Not Plan) Without Intent to Act (Recent) - Yes -   RETIRE: 4. Active Suicidal Ideation with Some Intent to Act, Without Specific Plan (Lifetime) Yes - -   4. Active Suicidal Ideation with Some Intent to Act, Without Specific Plan (Recent) - No -   RETIRE: 5. Active Suicidal Ideation with Specific Plan and Intent (Lifetime) Yes - -   RETIRED: 5. Active Suicidal Ideation with Specific Plan and Intent (Recent) - No -   Most Severe Ideation Rating (Past Month) - NA -   Frequency (Past Month) - NA -   Duration (Past Month) - NA -   Controllability (Past Month) - NA -   Protective Factors (Past Month) - NA -   Reasons for Ideation (Past Month) - NA -   Actual Attempt (Lifetime) Yes Yes -   Total Number of Actual Attempts (Lifetime) 2 - -   Actual Attempt (Past 3 Months) - No -   Has subject engaged in non-suicidal self-injurious behavior? (Lifetime) No Yes -   Has subject engaged in non-suicidal self-injurious behavior? (Past 3 Months) - No -   Interrupted Attempts (Lifetime) Yes No -   Total Number of Interrupted Attempts (Lifetime) 1 - -   Interrupted Attempts (Past 3 Months) - No -   Aborted or Self-Interrupted Attempt (Lifetime) No No -   Aborted or Self-Interrupted Attempt (Past 3 Months) - No -   Preparatory Acts or Behavior (Lifetime) Yes Yes -   Preparatory Acts or Behavior (Past 3 Months) - No -   Most Recent Attempt Date 02538 - -   Most Recent Attempt Actual Lethality Code - NA -   Most Lethal Attempt Actual Lethality Code - NA -   Initial/First Attempt Actual Lethality Code - NA -   1. Wish to be Dead (Lifetime) - - 1   Wish to be  Dead Description (Lifetime) - - last thought 15 years ago. related to PTSD from her parents and her marriage. Was pregnant. Thought about takiing Trazedone. Stopped - wanted to be there for her kids.   1. Wish to be Dead (Past 1 Month) - - 0   2. Non-Specific Active Suicidal Thoughts (Lifetime) - - 1   Non-Specific Active Suicidal Thought Description (Lifetime) - - last thought 15 years ago. related to PTSD from her parents and her marriage. Was pregnant. Thought about takiing Trazedone. Stopped - wanted to be there for her kids.   2. Non-Specific Active Suicidal Thoughts (Past 1 Month) - - 0   3. Active Suicidal Ideation with any Methods (Not Plan) Without Intent to Act (Lifetime) - - 1   Active Suicidal Ideation with any Methods (Not Plan) Description (Lifetime) - - last thought 15 years ago. related to PTSD from her parents and her marriage. Was pregnant. Thought about takiing Trazedone. Stopped - wanted to be there for her kids.   3. Active Suicidal Ideation with any Methods (Not Plan) Without Intent to Act (Past 1 Month) - - 0   4. Active Suicidal Ideation with Some Intent to Act, Without Specific Plan (Lifetime) - - 1   Active Suicidal Ideation with Some Intent to Act, Without Specific Plan Description (Lifetime) - - last thought 15 years ago. related to PTSD from her parents and her marriage. Was pregnant. Thought about takiing Trazedone. Stopped - wanted to be there for her kids.   4. Active Suicidal Ideation with Some Intent to Act, Without Specific Plan (Past 1 Month) - - 0   5. Active Suicidal Ideation with Specific Plan and Intent (Lifetime) - - 0   Most Severe Ideation Rating (Lifetime) - - 3   Description of Most Severe Ideation (Lifetime) - - last thought 15 years ago. related to PTSD from her parents and her marriage. Was pregnant. Thought about takiing Trazedone. Stopped - wanted to be there for her kids.   Frequency (Lifetime) - - 3   Duration (Lifetime) - - 2   Controllability (Lifetime) - - 1    Deterrents (Lifetime) - - 1   Deterrents (Past 1 Month) - - 0   Reasons for Ideation (Lifetime) - - 4   Reasons for Ideation (Past 1 Month) - - 0   Actual Attempt (Lifetime) - - 0   Has subject engaged in non-suicidal self-injurious behavior? (Lifetime) - - 0   Interrupted Attempts (Lifetime) - - 0   Aborted or Self-Interrupted Attempt (Lifetime) - - 0   Preparatory Acts or Behavior (Lifetime) - - 0   Calculated C-SSRS Risk Score (Lifetime/Recent) - - Moderate Risk        ASSESSMENT: Current Emotional / Mental Status (status of significant symptoms):   Risk status (Self / Other harm or suicidal ideation)   Patient denies current fears or concerns for personal safety.   Patient denies current or recent suicidal ideation or behaviors.   Patient denies current or recent homicidal ideation or behaviors.   Patient denies current or recent self injurious behavior or ideation.   Patient denies other safety concerns.   Patient reports there has been no change in risk factors since their last session.     Patient reports there has been no change in protective factors since their last session.     Recommended that patient call 911 or go to the local ED should there be a change in any of these risk factors.     Appearance:   Appropriate    Eye Contact:   Good    Psychomotor Behavior: Normal    Attitude:   Cooperative    Orientation:   All   Speech    Rate / Production: Normal     Volume:  Normal    Mood:    Normal   Affect:    Appropriate    Thought Content:  Clear    Thought Form:  Coherent  Logical    Insight:    Good      Medication Review:   No changes to current psychiatric medication(s)     Medication Compliance:   Yes     Changes in Health Issues:   None reported     Chemical Use Review:   Substance Use: Chemical use reviewed, no active concerns identified      Nicotine Use: No current tobacco use.      Diagnosis:  1. Developmental mental disorder    2. Generalized anxiety disorder    3. Major depressive disorder,  recurrent episode, in partial remission (H)        PLAN:   Patient will complete Shorty questionnaires, CNS Vital Signs, and MMPI-2 before feedback session is scheduled. Patient was made aware that the MMPI-2 needs to be completed as soon as possible.  If it is not completed within one and two weeks, email reminders will be sent directly.  The patient was also made aware that the link expires after 30 days and if the test is not completed within that timeframe, it will be her responsibility to reinitiate contact to resume the testing process.  My contact information was provided.  Patient was in agreement to this plan.      Tiffany Grullon PsyD, LP  Clinical Psychologist

## 2023-03-02 ENCOUNTER — VIRTUAL VISIT (OUTPATIENT)
Dept: PSYCHOLOGY | Facility: CLINIC | Age: 43
End: 2023-03-02
Payer: COMMERCIAL

## 2023-03-02 DIAGNOSIS — F33.41 MAJOR DEPRESSIVE DISORDER, RECURRENT EPISODE, IN PARTIAL REMISSION (H): ICD-10-CM

## 2023-03-02 DIAGNOSIS — F43.10 POST TRAUMATIC STRESS DISORDER (PTSD): ICD-10-CM

## 2023-03-02 DIAGNOSIS — F41.1 GENERALIZED ANXIETY DISORDER: Primary | ICD-10-CM

## 2023-03-02 PROCEDURE — 90834 PSYTX W PT 45 MINUTES: CPT | Mod: 95 | Performed by: PSYCHOLOGIST

## 2023-03-03 NOTE — PROGRESS NOTES
[facility]                                     Progress Note    Patient Name: Sommer Gomez  Date: 3-2-23         Service Type: Individual      Session Start Time: 1:00 pm Session End Time: 1:50 pm    Answers for HPI/ROS submitted by the patient on 3/2/2023  If you checked off any problems, how difficult have these problems made it for you to do your work, take care of things at home, or get along with other people?: Somewhat difficult  PHQ9 TOTAL SCORE: 7       Session Length: 38-52 min    Session #: 7    Attendees: Client    Service Modality:  Video Visit:      Provider verified identity through the following two step process.  Patient provided:  Patient  and Patient address    Telemedicine Visit: The patient's condition can be safely assessed and treated via synchronous audio and visual telemedicine encounter.      Reason for Telemedicine Visit: Services only offered telehealth    Originating Site (Patient Location): Patient's home    Distant Site (Provider Location): Provider Remote Setting- Home Office    Consent:  The patient/guardian has verbally consented to: the potential risks and benefits of telemedicine (video visit) versus in person care; bill my insurance or make self-payment for services provided; and responsibility for payment of non-covered services.     Patient would like the video invitation sent by:  Text to cell phone: yes    Mode of Communication:  Video Conference via [facility]    Distant Location (Provider):  Off-site    As the provider I attest to compliance with applicable laws and regulations related to telemedicine.    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     Treatment Objective(s) Addressed in This Session:   Support- son recently (Jan)  in crisis   Patient will be able to articulate core anxiety triggers and fears.  Patient will use at least 4 coping skills for anxiety management in the next 90 days .     Intervention:   CBT and  Supportive therapy.      Client reports:  Has hard a hard week.    Is still managing the stressors of   financial disruption and now has   an additional disruption.    Client shared a financial stressor related to    her ex-partner.  Explored stressors from last    marriage overall.    Identified and processed feelings.    Assessments completed prior to visit:  The following assessments were completed by patient for this visit:      ASSESSMENT: Current Emotional / Mental Status (status of significant symptoms):   Risk status (Self / Other harm or suicidal ideation)   Patient denies current fears or concerns for personal safety.   Patient denies current or recent suicidal ideation or behaviors.   Patient denies current or recent homicidal ideation or behaviors.   Patient denies current or recent self injurious behavior or ideation.   Patient denies other safety concerns.   Patient reports there has been no change in risk factors since their last session.     Patient reports there has been no change in protective factors since their last session.     A safety and risk management plan has not been developed;  Client is advised that if any change occurs to call [phone], or reports to an Emergency Department.       Appearance:   Appropriate    Eye Contact:   Good    Psychomotor Behavior: Normal  "   Attitude:   Cooperative    Orientation:   All   Speech    Rate / Production: Normal/ Responsive Normal     Volume:  Normal    Mood:    Anxious  Normal   Affect:    Appropriate    Thought Content:  Clear    Thought Form:  Coherent  Logical    Insight:    Good      Medication Review:   No changes to current psychiatric medication(s)     Medication Compliance:   Yes     Changes in Health Issues:   None reported     Chemical Use Review:   Substance Use: Chemical use reviewed, no active concerns identified      Tobacco Use: No current tobacco use.      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    PLAN: (Patient Tasks / Therapist Tasks / Other)  advocate for self  Past hw  Client wants to continue to be available to her kids\.  Past hw  Attend to work / life balance  Past hw  Return to some level of daily self care.  Is weighing pros and cons about attending a new school.  Past hw  Plan is to take it day to day.\"it is changing by the day\".  Past hw  Small next steps .  Plan is to access support system daily.  Employ several coping skills daily.      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    Anticipated number of session for this episode of care: 9-12 " sessions  Anticipation frequency of session: Every other week  Anticipated Duration of each session: 38-52 minutes  Treatment plan will be reviewed in 90 days or when goals have been changed.       MeasurableTreatment Goal(s) related to diagnosis / functional impairment(s)  Goal 1: Patient will be able to articulate core anxiety triggers. Will use early intervention 50% of the time.    I will know I've met my goal when .  feeling effective 50% of the time.    Objective #A (Patient Action)    Patient will use at least 4 coping skills for anxiety management in the next 90 days .  Status: New - Date: 1-2023     Intervention(s)  Therapist will teach coping skills. Provide a safe place to process stress/ emotions.    .Patient has not reviewed nor agreed to the above plan.      Mariely Negron LP      January 10, 2023    Answers for HPI/ROS submitted by the patient on 1/19/2023  ROSA 7 TOTAL SCORE: 10    Answers for HPI/ROS submitted by the patient on 2/20/2023  If you checked off any problems, how difficult have these problems made it for you to do your work, take care of things at home, or get along with other people?: Somewhat difficult  PHQ9 TOTAL SCORE: 8

## 2023-03-14 ENCOUNTER — VIRTUAL VISIT (OUTPATIENT)
Dept: PSYCHOLOGY | Facility: CLINIC | Age: 43
End: 2023-03-14
Payer: COMMERCIAL

## 2023-03-14 DIAGNOSIS — F41.1 GENERALIZED ANXIETY DISORDER: Primary | ICD-10-CM

## 2023-03-14 DIAGNOSIS — F33.41 MAJOR DEPRESSIVE DISORDER, RECURRENT EPISODE, IN PARTIAL REMISSION (H): ICD-10-CM

## 2023-03-14 DIAGNOSIS — F43.10 POST TRAUMATIC STRESS DISORDER (PTSD): ICD-10-CM

## 2023-03-14 PROCEDURE — 90834 PSYTX W PT 45 MINUTES: CPT | Mod: VID | Performed by: PSYCHOLOGIST

## 2023-03-14 NOTE — PROGRESS NOTES
[facility]                                     Progress Note    Patient Name: Sommer Gomez  Date: 3-14-23         Service Type: Individual      Session Start Time: 1:31 pm Session End Time: 2:16 pm    Answers for HPI/ROS submitted by the patient on 3/2/2023  If you checked off any problems, how difficult have these problems made it for you to do your work, take care of things at home, or get along with other people?: Somewhat difficult  PHQ9 TOTAL SCORE: 7       Session Length: 38-52 min    Session #: 8    Attendees: Client    Service Modality:  Video Visit:      Provider verified identity through the following two step process.  Patient provided:  Patient  and Patient address    Telemedicine Visit: The patient's condition can be safely assessed and treated via synchronous audio and visual telemedicine encounter.      Reason for Telemedicine Visit: Services only offered telehealth    Originating Site (Patient Location): Patient's home    Distant Site (Provider Location): Provider Remote Setting- Home Office    Consent:  The patient/guardian has verbally consented to: the potential risks and benefits of telemedicine (video visit) versus in person care; bill my insurance or make self-payment for services provided; and responsibility for payment of non-covered services.     Patient would like the video invitation sent by:  Text to cell phone: yes    Mode of Communication:  Video Conference via [employer]well    Distant Location (Provider):  Off-site    As the provider I attest to compliance with applicable laws and regulations related to telemedicine.    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     Treatment Objective(s) Addressed in This Session:   Support- son recently (Jan)  in crisis   Patient will be able to articulate core anxiety triggers and fears.  Patient will use at least 4 coping skills for anxiety management in the next 90 days .     Intervention:   CBT and  Supportive therapy.      Client reports:  Partner was injured at work related accident.      Meeting with [facility] worker for one   of her children. Plan includes  a- family membership  to the [facility].   Javier likes swimming (he doesn't like to go outside)         Client reports that financial stress is reducing.    Explored stressors from the week.    Identified and processed feelings.    Assessments completed prior to visit:  The following assessments were completed by patient for this visit:      ASSESSMENT: Current Emotional / Mental Status (status of significant symptoms):   Risk status (Self / Other harm or suicidal ideation)   Patient denies current fears or concerns for personal safety.   Patient denies current or recent suicidal ideation or behaviors.   Patient denies current or recent homicidal ideation or behaviors.   Patient denies current or recent self injurious behavior or ideation.   Patient denies other safety concerns.   Patient reports there has been no change in risk factors since their last session.     Patient reports there has been no change in protective factors since their last session.     A safety and risk management plan has not been developed;  Client is advised that if any change occurs to call [phone], or reports to an Emergency  "Department.       Appearance:   Appropriate    Eye Contact:   Good    Psychomotor Behavior: Normal    Attitude:   Cooperative    Orientation:   All   Speech    Rate / Production: Normal/ Responsive Normal     Volume:  Normal    Mood:    Anxious  Normal   Affect:    Appropriate    Thought Content:  Clear    Thought Form:  Coherent  Logical    Insight:    Good      Medication Review:   No changes to current psychiatric medication(s)     Medication Compliance:   Yes     Changes in Health Issues:   None reported     Chemical Use Review:   Substance Use: Chemical use reviewed, no active concerns identified      Tobacco Use: No current tobacco use.      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    PLAN: (Patient Tasks / Therapist Tasks / Other)  Want to balance family and work needs  advocate for self  Past hw  Client wants to continue to be available to her kids\.  Past hw  Attend to work / life balance  Past hw  Return to some level of daily self care.  Is weighing pros and cons about attending a new school.  Past hw  Plan is to take it day to day.\"it is changing by the day\".  Past hw  Small next steps .  Plan is to access support system daily.  Employ several coping skills daily.      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    Anticipated number of session for this episode of care: 9-12 sessions  Anticipation frequency of session: Every other week  Anticipated Duration of each session: 38-52 minutes  Treatment plan will be reviewed in 90 days or when goals have been changed.       MeasurableTreatment Goal(s) related to diagnosis / functional impairment(s)  Goal 1: Patient will be able to articulate core anxiety triggers. Will use early intervention 50% of the time.    I will know I've met my goal when .  feeling effective 50% of the time.    Objective #A (Patient Action)    Patient will use at least 4 coping skills for anxiety management in the next 90 days .  Status: New - Date: 1-2023     Intervention(s)  Therapist will teach coping skills. Provide a safe place to process stress/ emotions.    .Patient has not reviewed nor agreed to the above plan.      Mariely Negron LP          Answers for HPI/ROS submitted by the patient on 1/19/2023  ROSA 7 TOTAL SCORE: 10    Answers for HPI/ROS submitted by the patient on 2/20/2023  If you checked off any problems, how difficult have these problems made it for you to do your work, take care of things at home, or get along with other people?: Somewhat difficult  PHQ9 TOTAL SCORE: 8

## 2023-03-21 ENCOUNTER — VIRTUAL VISIT (OUTPATIENT)
Dept: PSYCHOLOGY | Facility: CLINIC | Age: 43
End: 2023-03-21
Payer: COMMERCIAL

## 2023-03-21 DIAGNOSIS — F33.0 MAJOR DEPRESSIVE DISORDER, RECURRENT, MILD (H): ICD-10-CM

## 2023-03-21 DIAGNOSIS — F41.1 GENERALIZED ANXIETY DISORDER: Primary | ICD-10-CM

## 2023-03-21 DIAGNOSIS — F43.10 POST TRAUMATIC STRESS DISORDER (PTSD): ICD-10-CM

## 2023-03-21 PROCEDURE — 90834 PSYTX W PT 45 MINUTES: CPT | Mod: VID | Performed by: PSYCHOLOGIST

## 2023-03-21 NOTE — PROGRESS NOTES
[facility]                                     Progress Note    Patient Name: Sommer Gomez  Date: 3-21-23         Service Type: Individual      Session Start Time: 2:28 pm Session End Time:  3:10 pm    Answers for HPI/ROS submitted by the patient on 3/2/2023  If you checked off any problems, how difficult have these problems made it for you to do your work, take care of things at home, or get along with other people?: Somewhat difficult  PHQ9 TOTAL SCORE: 7       Session Length: 38-52 min    Session #: 9    Attendees: Client    Service Modality:  Video Visit:      Provider verified identity through the following two step process.  Patient provided:  Patient  and Patient address    Telemedicine Visit: The patient's condition can be safely assessed and treated via synchronous audio and visual telemedicine encounter.      Reason for Telemedicine Visit: Services only offered telehealth    Originating Site (Patient Location): Patient's home    Distant Site (Provider Location): Provider Remote Setting- Home Office    Consent:  The patient/guardian has verbally consented to: the potential risks and benefits of telemedicine (video visit) versus in person care; bill my insurance or make self-payment for services provided; and responsibility for payment of non-covered services.     Patient would like the video invitation sent by:  Text to cell phone: yes    Mode of Communication:  Video Conference via Amwell    Distant Location (Provider):  Off-site    As the provider I attest to compliance with applicable laws and regulations related to telemedicine.    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     Treatment Objective(s) Addressed in This Session:     Support- son recently (Jan)  in crisis   Patient will be able to articulate core anxiety triggers and fears.  Patient will use at least 4 coping skills for anxiety management in the next 90 days .     Intervention:   CBT and  Supportive therapy.      Client reports:  Daughter Nano started school likes it.  Son Matti how from college- spent time   what to do first in the basement to make space   for son  Mathew's (21) bedroom. Shares that she   has  loads of stuff/ boxes from moves    Explored history around transitions like   moving, divorce, jobs.     Client will be getting an [facility] worker.    Identified and processed feelings.    Assessments completed prior to visit:  The following assessments were completed by patient for this visit:      ASSESSMENT: Current Emotional / Mental Status (status of significant symptoms):   Risk status (Self / Other harm or suicidal ideation)   Patient denies current fears or concerns for personal safety.   Patient denies current or recent suicidal ideation or behaviors.   Patient denies current or recent homicidal ideation or behaviors.   Patient denies current or recent self injurious behavior or ideation.   Patient denies other safety concerns.   Patient reports there has been no change in risk factors since their last session.     Patient reports there has been no change in protective factors since their last session.     A safety and risk management plan has not been developed;  Client is advised that if any change occurs to call [phone], or  "reports to an Emergency Department.       Appearance:   Appropriate    Eye Contact:   Good    Psychomotor Behavior: Normal    Attitude:   Cooperative    Orientation:   All   Speech    Rate / Production: Normal/ Responsive Normal     Volume:  Normal    Mood:    Anxious  Normal   Affect:    Appropriate    Thought Content:  Clear    Thought Form:  Coherent  Logical    Insight:    Good      Medication Review:   No changes to current psychiatric medication(s)     Medication Compliance:   Yes     Changes in Health Issues:   None reported     Chemical Use Review:   Substance Use: Chemical use reviewed, no active concerns identified      Tobacco Use: No current tobacco use.      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    PLAN: (Patient Tasks / Therapist Tasks / Other)  Daily prioritizing of kids needs.  Past hw  Want to balance family and work needs  advocate for self  Past hw  Client wants to continue to be available to her kids\.  Past hw  Attend to work / life balance  Past hw  Return to some level of daily self care.  Is weighing pros and cons about attending a new school.  Past hw  Plan is to take it day to day.\"it is changing by the day\".  Past hw  Small next steps .  Plan is to access support system daily.  Employ several coping skills daily.      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    Anticipated number of session for this episode of care: 9-12 sessions  Anticipation frequency of session: Every other week  Anticipated Duration of each session: 38-52 minutes  Treatment plan will be reviewed in 90 days or when goals have been changed.     The plan for the next 90 day is continue/ maintain  with these actions and cognitive behavioral tasks to develop more consistency / skill proficiency in effort to reduce symptom frequency, intensity and duration.    Due 4-23       Want to balance family and work needs  advocate for self.  Client wants to continue to be available to her kids\.  Attend to work / life balance  Return to some level of daily self care.  Is weighing pros and cons about attending a new school.  Plan is to take it day to day.\"it is changing by the day\".  Small next steps .  Plan is to access support system daily.  Employ several coping skills daily.      Mariely Negron LP                                                             MeasurableTreatment Goal(s) related to diagnosis / functional impairment(s)  Goal 1: Patient will be able to articulate core anxiety triggers. Will use early intervention 50% of the time.    I will know I've met my goal when .  feeling effective 50% of the time.    Objective #A (Patient Action)    Patient will use at least 4 coping skills for anxiety management in the next 90 days .  Status: New - Date: 1-2023     Intervention(s)  Therapist will teach coping skills. Provide a safe place to process stress/ emotions.    .Patient has not reviewed nor agreed to the above plan.      Mariely Negron LP          Answers for HPI/ROS submitted by the patient on 1/19/2023  ROSA 7 TOTAL SCORE: 10    Answers for HPI/ROS submitted by the patient on 2/20/2023  If you checked off any problems, how difficult have these problems made it for " you to do your work, take care of things at home, or get along with other people?: Somewhat difficult  PHQ9 TOTAL SCORE: 8

## 2023-03-24 DIAGNOSIS — F33.0 MILD RECURRENT MAJOR DEPRESSION (H): ICD-10-CM

## 2023-03-28 RX ORDER — FLUOXETINE 40 MG/1
40 CAPSULE ORAL DAILY
Qty: 90 CAPSULE | Refills: 0 | Status: SHIPPED | OUTPATIENT
Start: 2023-03-28 | End: 2023-06-13

## 2023-03-28 NOTE — TELEPHONE ENCOUNTER
Rx for 90 days.  Patient has not been seen over 1 year.  Advise to schedule a preventative visit for future refills.

## 2023-03-28 NOTE — TELEPHONE ENCOUNTER
Notified Myriam that provider prescribed a 90 day supply but will need a preventative visit prior to any additional refills. Offered to help patient schedule appointment, she stated that she will schedule her preventative visit after she gets home from work. No further questions at this time.     Renetta Ward MA

## 2023-04-04 ENCOUNTER — VIRTUAL VISIT (OUTPATIENT)
Dept: PSYCHOLOGY | Facility: CLINIC | Age: 43
End: 2023-04-04
Payer: COMMERCIAL

## 2023-04-04 DIAGNOSIS — F33.0 MAJOR DEPRESSIVE DISORDER, RECURRENT, MILD (H): ICD-10-CM

## 2023-04-04 DIAGNOSIS — F41.1 GENERALIZED ANXIETY DISORDER: Primary | ICD-10-CM

## 2023-04-04 DIAGNOSIS — F43.10 POST TRAUMATIC STRESS DISORDER (PTSD): ICD-10-CM

## 2023-04-04 PROCEDURE — 90834 PSYTX W PT 45 MINUTES: CPT | Mod: VID | Performed by: PSYCHOLOGIST

## 2023-04-04 ASSESSMENT — ANXIETY QUESTIONNAIRES
IF YOU CHECKED OFF ANY PROBLEMS ON THIS QUESTIONNAIRE, HOW DIFFICULT HAVE THESE PROBLEMS MADE IT FOR YOU TO DO YOUR WORK, TAKE CARE OF THINGS AT HOME, OR GET ALONG WITH OTHER PEOPLE: SOMEWHAT DIFFICULT
8. IF YOU CHECKED OFF ANY PROBLEMS, HOW DIFFICULT HAVE THESE MADE IT FOR YOU TO DO YOUR WORK, TAKE CARE OF THINGS AT HOME, OR GET ALONG WITH OTHER PEOPLE?: SOMEWHAT DIFFICULT
7. FEELING AFRAID AS IF SOMETHING AWFUL MIGHT HAPPEN: NOT AT ALL
GAD7 TOTAL SCORE: 10
2. NOT BEING ABLE TO STOP OR CONTROL WORRYING: MORE THAN HALF THE DAYS
GAD7 TOTAL SCORE: 10
6. BECOMING EASILY ANNOYED OR IRRITABLE: SEVERAL DAYS
7. FEELING AFRAID AS IF SOMETHING AWFUL MIGHT HAPPEN: NOT AT ALL
4. TROUBLE RELAXING: NEARLY EVERY DAY
1. FEELING NERVOUS, ANXIOUS, OR ON EDGE: MORE THAN HALF THE DAYS
3. WORRYING TOO MUCH ABOUT DIFFERENT THINGS: SEVERAL DAYS
GAD7 TOTAL SCORE: 10
5. BEING SO RESTLESS THAT IT IS HARD TO SIT STILL: SEVERAL DAYS

## 2023-04-04 ASSESSMENT — PATIENT HEALTH QUESTIONNAIRE - PHQ9
SUM OF ALL RESPONSES TO PHQ QUESTIONS 1-9: 10
SUM OF ALL RESPONSES TO PHQ QUESTIONS 1-9: 10
10. IF YOU CHECKED OFF ANY PROBLEMS, HOW DIFFICULT HAVE THESE PROBLEMS MADE IT FOR YOU TO DO YOUR WORK, TAKE CARE OF THINGS AT HOME, OR GET ALONG WITH OTHER PEOPLE: SOMEWHAT DIFFICULT

## 2023-04-04 NOTE — PROGRESS NOTES
[facility]                                     Progress Note    Patient Name: Sommer Gomez  Date: 23         Service Type: Individual      Session Start Time: 9:30 am Session End Time: 10:20 am    Answers for HPI/ROS submitted by the patient on 3/2/2023  If you checked off any problems, how difficult have these problems made it for you to do your work, take care of things at home, or get along with other people?: Somewhat difficult  PHQ9 TOTAL SCORE: 7       Session Length: 38-52 min    Session #: 10    Attendees: Client    Service Modality:  Video Visit:      Provider verified identity through the following two step process.  Patient provided:  Patient  and Patient address    Telemedicine Visit: The patient's condition can be safely assessed and treated via synchronous audio and visual telemedicine encounter.      Reason for Telemedicine Visit: Services only offered telehealth    Originating Site (Patient Location): Patient's home    Distant Site (Provider Location): Provider Remote Setting- Home Office    Consent:  The patient/guardian has verbally consented to: the potential risks and benefits of telemedicine (video visit) versus in person care; bill my insurance or make self-payment for services provided; and responsibility for payment of non-covered services.     Patient would like the video invitation sent by:  Text to cell phone: yes    Mode of Communication:  Video Conference via [email or web address]    Distant Location (Provider):  Off-site    As the provider I attest to compliance with applicable laws and regulations related to telemedicine.    DATA  Interactive Complexity: No  Crisis: No     Diagnosis:  F41.1 generalized anxiety d/o,    F33.0, Major Depressive Disorder, recurrent , mild  F43.10 Post Traumatic Stress     Treatment plan reviewed: today ( 90 day = )   PROMIS:  23, due       Progress Since Last Session (Related to Symptoms / Goals / Homework):   Symptoms:  stable    Homework: Completed in session      Episode of Care Goals: Achieved / completed to satisfaction - ACTION (Actively working towards change); Intervened by reinforcing change plan / affirming steps taken     Current / Ongoing Stressors and Concerns:   Career disruption   Financial stress   Son is in treatment, has been suicidal ( January)    Has taken him to the hospital twice.     Treatment Objective(s) Addressed in This Session:     Support- son recently (Jan)  in crisis   Patient will be able to articulate core anxiety triggers and fears.  Patient will use at least 4 coping skills for anxiety management in the next 90 days .     Intervention:   CBT and  Supportive therapy.      Client reports:has been feeling behind.  Discussed expectations of self.   Has been working more hours outside of the house.   Daughter Nano is maintaining gains since    starting at her new school. Client sees her smiling and    even helping around the house     Explored work life balance.    Identified and processed feelings.    Assessments completed prior to visit:  The following assessments were completed by patient for this visit:      ASSESSMENT: Current Emotional / Mental Status (status of significant symptoms):   Risk status (Self / Other harm or suicidal ideation)   Patient denies current fears or concerns for personal safety.   Patient denies current or recent suicidal ideation or behaviors.   Patient denies current or recent homicidal ideation or behaviors.   Patient denies current or recent self injurious behavior or ideation.   Patient denies other safety concerns.   Patient reports there has been no change in risk factors since their last session.     Patient reports there has been no change in protective factors since their last session.     A safety and risk management plan has not been developed;  Client is advised that if any change occurs to call [phone], or reports to an Emergency  "Department.       Appearance:   Appropriate    Eye Contact:   Good    Psychomotor Behavior: Normal    Attitude:   Cooperative    Orientation:   All   Speech    Rate / Production: Normal/ Responsive Normal     Volume:  Normal    Mood:    Anxious  Normal   Affect:    Appropriate    Thought Content:  Clear    Thought Form:  Coherent  Logical    Insight:    Good      Medication Review:   No changes to current psychiatric medication(s)     Medication Compliance:   Yes     Changes in Health Issues:   None reported     Chemical Use Review:   Substance Use: Chemical use reviewed, no active concerns identified      Tobacco Use: No current tobacco use.      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    PLAN: (Patient Tasks / Therapist Tasks / Other)  Consider assessing day to day expections of self    as she moves through pre and post crisis management    for her children.  Past hw  Daily prioritizing of kids needs.  Past hw  Want to balance family and work needs  advocate for self  Past hw  Client wants to continue to be available to her kids\.  Past hw  Attend to work / life balance  Past hw  Return to some level of daily self care.  Is weighing pros and cons about attending a new school.  Past hw  Plan is to take it day to day.\"it is changing by the day\".  Past hw  Small next steps .  Plan is to access support system daily.  Employ several coping skills daily.      Mariely Negron LP                                                         __________________________________________________________________    Individual Treatment Plan    Patient's Name: Sommer Gomez  YOB: 1980    Date of Creation: 1-23  Date Treatment Plan Last Reviewed/Revised: 1-23    DSM5 Diagnoses:  F41.1 generalized anxiety d/o,    F33.0, Major Depressive Disorder, recurrent , mild  F43.10 " "Post Traumatic DO    Psychosocial / Contextual Factors: son with depressive symptoms and ASD dx  PROMIS (reviewed every 90 days): 1-02-23  Due in April    Referral / Collaboration:  Referral to another professional/service is not indicated at this time..    Anticipated number of session for this episode of care: 9-12 sessions  Anticipation frequency of session: Every other week  Anticipated Duration of each session: 38-52 minutes  Treatment plan will be reviewed in 90 days or when goals have been changed.     The plan for the next 90 day is continue/ maintain  with these actions and cognitive behavioral tasks to develop more consistency / skill proficiency in effort to reduce symptom frequency, intensity and duration.    Due 4-23       Want to balance family and work needs  advocate for self.  Client wants to continue to be available to her kids\.  Attend to work / life balance  Return to some level of daily self care.  Is weighing pros and cons about attending a new school.  Plan is to take it day to day.\"it is changing by the day\".  Small next steps .  Plan is to access support system daily.  Employ several coping skills daily.      Mariely Negron LP                                                             MeasurableTreatment Goal(s) related to diagnosis / functional impairment(s)  Goal 1: Patient will be able to articulate core anxiety triggers. Will use early intervention 50% of the time.    I will know I've met my goal when .  feeling effective 50% of the time.    Objective #A (Patient Action)    Patient will use at least 4 coping skills for anxiety management in the next 90 days .  Status: New - Date: 1-2023     Intervention(s)  Therapist will teach coping skills. Provide a safe place to process stress/ emotions.    .Patient has not reviewed nor agreed to the above plan.      Mariely Negron LP          Answers for HPI/ROS submitted by the patient on 1/19/2023  ROSA 7 TOTAL SCORE: 10Answers for HPI/ROS submitted " by the patient on 4/4/2023  If you checked off any problems, how difficult have these problems made it for you to do your work, take care of things at home, or get along with other people?: Somewhat difficult  PHQ9 TOTAL SCORE: 10  ROSA 7 TOTAL SCORE: 10        Answers for HPI/ROS submitted by the patient on 2/20/2023  If you checked off any problems, how difficult have these problems made it for you to do your work, take care of things at home, or get along with other people?: Somewhat difficult  PHQ9 TOTAL SCORE: 8

## 2023-04-17 ENCOUNTER — VIRTUAL VISIT (OUTPATIENT)
Dept: PSYCHOLOGY | Facility: CLINIC | Age: 43
End: 2023-04-17
Payer: COMMERCIAL

## 2023-04-17 DIAGNOSIS — F41.1 GENERALIZED ANXIETY DISORDER: Primary | ICD-10-CM

## 2023-04-17 DIAGNOSIS — F43.10 POST TRAUMATIC STRESS DISORDER (PTSD): ICD-10-CM

## 2023-04-17 PROCEDURE — 90834 PSYTX W PT 45 MINUTES: CPT | Mod: 95 | Performed by: PSYCHOLOGIST

## 2023-04-17 ASSESSMENT — PATIENT HEALTH QUESTIONNAIRE - PHQ9
SUM OF ALL RESPONSES TO PHQ QUESTIONS 1-9: 8
SUM OF ALL RESPONSES TO PHQ QUESTIONS 1-9: 8
10. IF YOU CHECKED OFF ANY PROBLEMS, HOW DIFFICULT HAVE THESE PROBLEMS MADE IT FOR YOU TO DO YOUR WORK, TAKE CARE OF THINGS AT HOME, OR GET ALONG WITH OTHER PEOPLE: SOMEWHAT DIFFICULT

## 2023-04-17 NOTE — PROGRESS NOTES
"    [facility]                                     Progress Note    Patient Name: Sommer Gomez  Date: 23         Service Type: Individual      Session Start Time: 10:31 am Session End Time: 11:16 am    Answers for HPI/ROS submitted by the patient on 3/2/2023  If you checked off any problems, how difficult have these problems made it for you to do your work, take care of things at home, or get along with other people?: Somewhat difficult  PHQ9 TOTAL SCORE: 7       Session Length: 38-52 min    Session #: 11    Attendees: Client    Service Modality:  Video Visit:      Provider verified identity through the following two step process.  Patient provided:  Patient  and Patient address    Telemedicine Visit: The patient's condition can be safely assessed and treated via synchronous audio and visual telemedicine encounter.      Reason for Telemedicine Visit: Services only offered telehealth    Originating Site (Patient Location): Patient's home    Distant Site (Provider Location): Provider Remote Setting- Home Office    Consent:  The patient/guardian has verbally consented to: the potential risks and benefits of telemedicine (video visit) versus in person care; bill my insurance or make self-payment for services provided; and responsibility for payment of non-covered services.     Patient would like the video invitation sent by:  Text to cell phone: yes    Mode of Communication:  phone    Distant Location (Provider):  Off-site    As the provider I attest to compliance with applicable laws and regulations related to telemedicine.  The patient has been notified of the following:      \"We have found that certain health care needs can be provided without the need for a face to face visit.  This service lets us provide the care you need with a phone conversation.       I will have full access to your [facility] medical record during this entire phone call.   I will be taking notes for your " "medical record.      Since this is like an office visit, we will bill your insurance company for this service.       There are potential benefits and risks of telephone visits (e.g. limits to patient confidentiality) that differ from in-person visits.?Confidentiality still applies for telephone services, and nobody will record the visit.  It is important to be in a quiet, private space that is free of distractions (including cell phone or other devices) during the visit.??      If during the course of the call I believe a telephone visit is not appropriate, you will not be charged for this service\"     Consent has been obtained for this service by care team member: Yes          DATA  Interactive Complexity: No  Crisis: No     Diagnosis:  F41.1 generalized anxiety d/o,    F33.0, Major Depressive Disorder, recurrent , mild  F43.10 Post Traumatic Stress     Treatment plan reviewed: today ( 90 day 7-23 )   PROMIS:  1-02-23, due 4--23      Progress Since Last Session (Related to Symptoms / Goals / Homework):   Symptoms: stable    Homework: Completed in session      Episode of Care Goals: Achieved / completed to satisfaction - ACTION (Actively working towards change); Intervened by reinforcing change plan / affirming steps taken     Current / Ongoing Stressors and Concerns:   Career disruption   Financial stress   Oldest is trans   Son is in treatment, has been suicidal ( January)    Has taken him to the hospital twice.     Treatment Objective(s) Addressed in This Session:     Support- son recently (Jan)  in crisis   Patient will be able to articulate core anxiety triggers and fears.  Patient will use at least 4 coping skills for anxiety management in the next 90 days .     Intervention:   CBT and  Supportive therapy.      Client reports:  Reviewed expectations of self.       Daughter Nano is loving her new school. May have some   medication changes.   Client shares her oldest ( non-binary) took initiative to get a " date.  College son will be home in May.     Work life balance is going ok.  Has some work lined up for summer.  Explored self care/ self preservation.    Identified and processed feelings.    Assessments completed prior to visit:  The following assessments were completed by patient for this visit:      ASSESSMENT: Current Emotional / Mental Status (status of significant symptoms):   Risk status (Self / Other harm or suicidal ideation)   Patient denies current fears or concerns for personal safety.   Patient denies current or recent suicidal ideation or behaviors.   Patient denies current or recent homicidal ideation or behaviors.   Patient denies current or recent self injurious behavior or ideation.   Patient denies other safety concerns.   Patient reports there has been no change in risk factors since their last session.     Patient reports there has been no change in protective factors since their last session.     A safety and risk management plan has not been developed;  Client is advised that if any change occurs to call [phone], or reports to an Emergency Department.       Appearance:   Appropriate    Eye Contact:   Good    Psychomotor Behavior: Normal    Attitude:   Cooperative    Orientation:   All   Speech    Rate / Production: Normal/ Responsive Normal     Volume:  Normal    Mood:    Anxious  Normal   Affect:    Appropriate    Thought Content:  Clear    Thought Form:  Coherent  Logical    Insight:    Good      Medication Review:   No changes to current psychiatric medication(s)     Medication Compliance:   Yes     Changes in Health Issues:   None reported     Chemical Use Review:   Substance Use: Chemical use reviewed, no active concerns identified      Tobacco Use: No current tobacco use.      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    PLAN: (Patient Tasks /  "Therapist Tasks / Other)  Attend to self care/ self preservation.  Past hw  Consider assessing day to day expections of self    as she moves through pre and post crisis management    for her children.  Past hw  Daily prioritizing of kids needs.  Past hw  Want to balance family and work needs  advocate for self  Past hw  Client wants to continue to be available to her kids\.  Past hw  Attend to work / life balance  Past hw  Return to some level of daily self care.  Is weighing pros and cons about attending a new school.  Past hw  Plan is to take it day to day.\"it is changing by the day\".  Past hw  Small next steps .  Plan is to access support system daily.  Employ several coping skills daily.      Mariely Negron LP                                                         __________________________________________________________________    Individual Treatment Plan    Patient's Name: Sommer Gomez  YOB: 1980    Date of Creation: 1-23  Date Treatment Plan Last Reviewed/Revised: 1-23    DSM5 Diagnoses:  F41.1 generalized anxiety d/o,    F33.0, Major Depressive Disorder, recurrent , mild  F43.10 Post Traumatic DO    Psychosocial / Contextual Factors: son with depressive symptoms and ASD dx  PROMIS (reviewed every 90 days): 1-02-23,   Due 4--23  Referral / Collaboration:  Referral to another professional/service is not indicated at this time..    Anticipated number of session for this episode of care: 9-12 sessions  Anticipation frequency of session: Every other week  Anticipated Duration of each session: 38-52 minutes  Treatment plan will be reviewed in 90 days or when goals have been changed.     The plan for the next 90 day is continue/ maintain  with these actions and cognitive behavioral tasks to develop more consistency / skill proficiency in effort to reduce symptom frequency, intensity and duration.    Due 4-23       Want to balance family and work needs  advocate for self.  Client wants to " "continue to be available to her kids\.  Attend to work / life balance  Return to some level of daily self care.  Is weighing pros and cons about attending a new school.  Plan is to take it day to day.\"it is changing by the day\".  Small next steps .  Plan is to access support system daily.  Employ several coping skills daily.      Mariely Negron LP                                                             MeasurableTreatment Goal(s) related to diagnosis / functional impairment(s)  Goal 1: Patient will be able to articulate core anxiety triggers. Will use early intervention 50% of the time.    I will know I've met my goal when .  feeling effective 50% of the time.    Objective #A (Patient Action)    Patient will use at least 4 coping skills for anxiety management in the next 90 days .  Status: New - Date: 1-2023     Intervention(s)  Therapist will teach coping skills. Provide a safe place to process stress/ emotions.    .Patient has not reviewed nor agreed to the above plan.      Mariely Negron LP          Answers for HPI/ROS submitted by the patient on 1/19/2023  ROSA 7 TOTAL SCORE: 10Answers for HPI/ROS submitted by the patient on 4/4/2023  If you checked off any problems, how difficult have these problems made it for you to do your work, take care of things at home, or get along with other people?: Somewhat difficult  PHQ9 TOTAL SCORE: 10  ROSA 7 TOTAL SCORE: 10        Answers for HPI/ROS submitted by the patient on 2/20/2023  If you checked off any problems, how difficult have these problems made it for you to do your work, take care of things at home, or get along with other people?: Somewhat difficult  PHQ9 TOTAL SCORE: 8Answers for HPI/ROS submitted by the patient on 4/17/2023  If you checked off any problems, how difficult have these problems made it for you to do your work, take care of things at home, or get along with other people?: Somewhat difficult  PHQ9 TOTAL SCORE: 8      "

## 2023-05-01 ENCOUNTER — VIRTUAL VISIT (OUTPATIENT)
Dept: PSYCHOLOGY | Facility: CLINIC | Age: 43
End: 2023-05-01
Payer: COMMERCIAL

## 2023-05-01 DIAGNOSIS — F33.0 MAJOR DEPRESSIVE DISORDER, RECURRENT, MILD (H): ICD-10-CM

## 2023-05-01 DIAGNOSIS — F41.1 GENERALIZED ANXIETY DISORDER: Primary | ICD-10-CM

## 2023-05-01 DIAGNOSIS — F43.10 POST TRAUMATIC STRESS DISORDER (PTSD): ICD-10-CM

## 2023-05-01 PROCEDURE — 90834 PSYTX W PT 45 MINUTES: CPT | Mod: 93 | Performed by: PSYCHOLOGIST

## 2023-05-01 NOTE — PROGRESS NOTES
"    [facility]                                     Progress Note    Patient Name: Sommer Gomez  Date: 23         Service Type: Individual      Session Start Time: 10:30 am Session End Time: 11:16 am    Answers for HPI/ROS submitted by the patient on 3/2/2023  If you checked off any problems, how difficult have these problems made it for you to do your work, take care of things at home, or get along with other people?: Somewhat difficult  PHQ9 TOTAL SCORE: 7       Session Length: 38-52 min    Session #: 12    Attendees: Client    Service Modality:  phone Visit:      Provider verified identity through the following two step process.  Patient provided:  Patient  and Patient address    Telemedicine Visit: The patient's condition can be safely assessed and treated via synchronous audio and visual telemedicine encounter.      Reason for Telemedicine Visit: Services only offered telehealth    Originating Site (Patient Location): Patient's home    Distant Site (Provider Location): Provider Remote Setting- Home Office    Consent:  The patient/guardian has verbally consented to: the potential risks and benefits of telemedicine (video visit) versus in person care; bill my insurance or make self-payment for services provided; and responsibility for payment of non-covered services.     Patient would like the video invitation sent by:  Text to cell phone: yes    Mode of Communication:  phone    Distant Location (Provider):  Off-site    As the provider I attest to compliance with applicable laws and regulations related to telemedicine.  The patient has been notified of the following:      \"We have found that certain health care needs can be provided without the need for a face to face visit.  This service lets us provide the care you need with a phone conversation.       I will have full access to your [facility] medical record during this entire phone call.   I will be taking notes for your " "medical record.      Since this is like an office visit, we will bill your insurance company for this service.       There are potential benefits and risks of telephone visits (e.g. limits to patient confidentiality) that differ from in-person visits.?Confidentiality still applies for telephone services, and nobody will record the visit.  It is important to be in a quiet, private space that is free of distractions (including cell phone or other devices) during the visit.??      If during the course of the call I believe a telephone visit is not appropriate, you will not be charged for this service\"     Consent has been obtained for this service by care team member: Yes          DATA  Interactive Complexity: No  Crisis: No     Diagnosis:  F41.1 generalized anxiety d/o,    F33.0, Major Depressive Disorder, recurrent , mild  F43.10 Post Traumatic Stress     Treatment plan reviewed: today ( 90 day 7-23 )   PROMIS:  1-02-23, 5-01-23      Progress Since Last Session (Related to Symptoms / Goals / Homework):   Symptoms: stable    Homework: Completed in session      Episode of Care Goals: Achieved / completed to satisfaction - ACTION (Actively working towards change); Intervened by reinforcing change plan / affirming steps taken     Current / Ongoing Stressors and Concerns:   Career disruption   Financial stress   Son - past suicidal  episode ( January )    Has taken him to the hospital twice.     Treatment Objective(s) Addressed in This Session:     Support- parenting needs   Patient will be able to articulate core anxiety triggers and fears.  Patient will use at least 4 coping skills for anxiety management in the next 90 days .     Intervention:   CBT and  Supportive therapy.      Updated PROMIS ,   Updated 4-23 mood scales reviewed    Reviewed  How she has been using her energy.  Caring for her family. Working as a nanny.      Daughter Nano is doing well. Has had a med change to [employer].   Client shares her oldest ( " non-binary) took initiative to get a date.  College son Matti coming home this month.   Son Javier - school is a trigger; his providers are saying that   he can't do school right now.      Explored self care/ self preservation since last visit.    Identified and processed feelings.    Assessments completed prior to visit:  The following assessments were completed by patient for this visit:      ASSESSMENT: Current Emotional / Mental Status (status of significant symptoms):   Risk status (Self / Other harm or suicidal ideation)   Patient denies current fears or concerns for personal safety.   Patient denies current or recent suicidal ideation or behaviors.   Patient denies current or recent homicidal ideation or behaviors.   Patient denies current or recent self injurious behavior or ideation.   Patient denies other safety concerns.   Patient reports there has been no change in risk factors since their last session.     Patient reports there has been no change in protective factors since their last session.     A safety and risk management plan has not been developed;  Client is advised that if any change occurs to call [phone], or reports to an Emergency Department.       Appearance:   Appropriate    Eye Contact:   Good    Psychomotor Behavior: Normal    Attitude:   Cooperative    Orientation:   All   Speech    Rate / Production: Normal/ Responsive Normal     Volume:  Normal    Mood:    Anxious  Normal   Affect:    Appropriate    Thought Content:  Clear    Thought Form:  Coherent  Logical    Insight:    Good      Medication Review:   No changes to current psychiatric medication(s)     Medication Compliance:   Yes     Changes in Health Issues:   None reported     Chemical Use Review:   Substance Use: Chemical use reviewed, no active concerns identified      Tobacco Use: No current tobacco use.      Diagnosis:  F41.1 generalized anxiety d/o,    F33.0, Major Depressive Disorder, recurrent , mild  F43.10 Post Traumatic DO    "  Collateral Reports Completed:   Routed note to PCP as needed.    PLAN: (Patient Tasks / Therapist Tasks / Other)  Attend to  work/ life balance.  Past hw  Attend to self care/ self preservation.  Past hw  Consider assessing day to day expections of self    as she moves through pre and post crisis management    for her children.  Past hw  Daily prioritizing of kids needs.    Mariely Negron, LP                                                         __________________________________________________________________    Individual Treatment Plan    Patient's Name: Sommer Gomez  YOB: 1980    Date of Creation: 1-23  Date Treatment Plan Last Reviewed/Revised: 1-23    DSM5 Diagnoses:  F41.1 generalized anxiety d/o,    F33.0, Major Depressive Disorder, recurrent , mild  F43.10 Post Traumatic DO    Psychosocial / Contextual Factors: son with depressive symptoms and ASD dx  PROMIS (reviewed every 90 days): 1-02-23,    5-01-23  Referral / Collaboration:  Referral to another professional/service is not indicated at this time..    Anticipated number of session for this episode of care: 9-12 sessions  Anticipation frequency of session: Every other week  Anticipated Duration of each session: 38-52 minutes  Treatment plan will be reviewed in 90 days or when goals have been changed.     The plan for the next 90 day is continue/ maintain  with these actions and cognitive behavioral tasks to develop more consistency / skill proficiency in effort to reduce symptom frequency, intensity and duration.    4-23     Want to balance family and work needs  advocate for self.  Client wants to continue to be available to her kids\.  Attend to work / life balance  Return to some level of daily self care.  Is weighing pros and cons about attending a new school.  Plan is to take it day to day.\"it is changing by the day\".  Small next steps .  Plan is to access support system daily.  Employ several coping skills daily.      Mariely" LARISSA Negron                                                             MeasurableTreatment Goal(s) related to diagnosis / functional impairment(s)  Goal 1: Patient will be able to articulate core anxiety triggers. Will use early intervention 50% of the time.    I will know I've met my goal when .  feeling effective 50% of the time.    Objective #A (Patient Action)    Patient will use at least 4 coping skills for anxiety management in the next 90 days .  Status: New - Date: 1-2023     Intervention(s)  Therapist will teach coping skills. Provide a safe place to process stress/ emotions.    .Patient has not reviewed nor agreed to the above plan.      Mariely Negron LP          Answers for HPI/ROS submitted by the patient on 1/19/2023  ROSA 7 TOTAL SCORE: 10Answers for HPI/ROS submitted by the patient on 4/4/2023  If you checked off any problems, how difficult have these problems made it for you to do your work, take care of things at home, or get along with other people?: Somewhat difficult  PHQ9 TOTAL SCORE: 10  ROSA 7 TOTAL SCORE: 10        Answers for HPI/ROS submitted by the patient on 2/20/2023  If you checked off any problems, how difficult have these problems made it for you to do your work, take care of things at home, or get along with other people?: Somewhat difficult  PHQ9 TOTAL SCORE: 8Answers for HPI/ROS submitted by the patient on 4/17/2023  If you checked off any problems, how difficult have these problems made it for you to do your work, take care of things at home, or get along with other people?: Somewhat difficult  PHQ9 TOTAL SCORE: 8

## 2023-05-06 DIAGNOSIS — K21.9 GASTROESOPHAGEAL REFLUX DISEASE WITHOUT ESOPHAGITIS: ICD-10-CM

## 2023-05-08 RX ORDER — FAMOTIDINE 40 MG/1
40 TABLET, FILM COATED ORAL 2 TIMES DAILY
Qty: 180 TABLET | Refills: 0 | Status: SHIPPED | OUTPATIENT
Start: 2023-05-08 | End: 2023-06-13

## 2023-05-15 ENCOUNTER — VIRTUAL VISIT (OUTPATIENT)
Dept: PSYCHOLOGY | Facility: CLINIC | Age: 43
End: 2023-05-15
Payer: COMMERCIAL

## 2023-05-15 DIAGNOSIS — F41.1 GENERALIZED ANXIETY DISORDER: Primary | ICD-10-CM

## 2023-05-15 DIAGNOSIS — F33.0 MAJOR DEPRESSIVE DISORDER, RECURRENT, MILD (H): ICD-10-CM

## 2023-05-15 DIAGNOSIS — F43.10 POST TRAUMATIC STRESS DISORDER (PTSD): ICD-10-CM

## 2023-05-15 PROCEDURE — 90834 PSYTX W PT 45 MINUTES: CPT | Mod: 93 | Performed by: PSYCHOLOGIST

## 2023-05-15 NOTE — PROGRESS NOTES
"    [facility]                                     Progress Note    Patient Name: Sommer Gomez  Date: 5-15-23         Service Type: Individual      Session Start Time: 10:30 am Session End Time: 11:16 am    Answers for HPI/ROS submitted by the patient on 3/2/2023  If you checked off any problems, how difficult have these problems made it for you to do your work, take care of things at home, or get along with other people?: Somewhat difficult  PHQ9 TOTAL SCORE: 7       Session Length: 38-52 min    Session #: 13    Attendees: Client    Service Modality:  phone Visit:      Provider verified identity through the following two step process.  Patient provided:  Patient  and Patient address    Telemedicine Visit: The patient's condition can be safely assessed and treated via synchronous audio and visual telemedicine encounter.      Reason for Telemedicine Visit: Services only offered telehealth    Originating Site (Patient Location): Patient's home    Distant Site (Provider Location): Provider Remote Setting- Home Office    Consent:  The patient/guardian has verbally consented to: the potential risks and benefits of telemedicine (video visit) versus in person care; bill my insurance or make self-payment for services provided; and responsibility for payment of non-covered services.     Patient would like the video invitation sent by:  Text to cell phone: yes    Mode of Communication:  phone    Distant Location (Provider):  Off-site    As the provider I attest to compliance with applicable laws and regulations related to telemedicine.  The patient has been notified of the following:      \"We have found that certain health care needs can be provided without the need for a face to face visit.  This service lets us provide the care you need with a phone conversation.       I will have full access to your [facility] medical record during this entire phone call.   I will be taking notes for your " "medical record.      Since this is like an office visit, we will bill your insurance company for this service.       There are potential benefits and risks of telephone visits (e.g. limits to patient confidentiality) that differ from in-person visits.?Confidentiality still applies for telephone services, and nobody will record the visit.  It is important to be in a quiet, private space that is free of distractions (including cell phone or other devices) during the visit.??      If during the course of the call I believe a telephone visit is not appropriate, you will not be charged for this service\"     Consent has been obtained for this service by care team member: Yes          DATA  Interactive Complexity: No  Crisis: No     Diagnosis:  F41.1 generalized anxiety d/o,    F33.0, Major Depressive Disorder, recurrent , mild  F43.10 Post Traumatic Stress     Treatment plan reviewed: today ( 90 day 7-23 )   PROMIS:  1-02-23, 5-01-23      Progress Since Last Session (Related to Symptoms / Goals / Homework):   Symptoms: stable    Homework: Completed in session      Episode of Care Goals: Achieved / completed to satisfaction - ACTION (Actively working towards change); Intervened by reinforcing change plan / affirming steps taken     Current / Ongoing Stressors and Concerns:   Career disruption   Financial stress   Son - past suicidal  episode ( January )    Has taken him to the hospital twice.     Treatment Objective(s) Addressed in This Session:     Support- parenting needs   Patient will be able to articulate core anxiety triggers and fears.  Patient will use at least 4 coping skills for anxiety management in the next 90 days .     Intervention:   CBT and  Supportive therapy.          Reviewed experiences of low energy.  Narrative work around how she has    been trying to address this medically.    Looked at ways she balances work/ family.  Discussed current stressors.      Explored self care/ self preservation since last " visit.    Identified and processed feelings.    Assessments completed prior to visit:  The following assessments were completed by patient for this visit:      ASSESSMENT: Current Emotional / Mental Status (status of significant symptoms):   Risk status (Self / Other harm or suicidal ideation)   Patient denies current fears or concerns for personal safety.   Patient denies current or recent suicidal ideation or behaviors.   Patient denies current or recent homicidal ideation or behaviors.   Patient denies current or recent self injurious behavior or ideation.   Patient denies other safety concerns.   Patient reports there has been no change in risk factors since their last session.     Patient reports there has been no change in protective factors since their last session.     A safety and risk management plan has not been developed;  Client is advised that if any change occurs to call [phone], or reports to an Emergency Department.       Appearance:   Appropriate    Eye Contact:   Good    Psychomotor Behavior: Normal    Attitude:   Cooperative    Orientation:   All   Speech    Rate / Production: Normal/ Responsive Normal     Volume:  Normal    Mood:    Anxious  Normal   Affect:    Appropriate    Thought Content:  Clear    Thought Form:  Coherent  Logical    Insight:    Good      Medication Review:   No changes to current psychiatric medication(s)     Medication Compliance:   Yes     Changes in Health Issues:   None reported     Chemical Use Review:   Substance Use: Chemical use reviewed, no active concerns identified      Tobacco Use: No current tobacco use.      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    PLAN: (Patient Tasks / Therapist Tasks / Other)  Consider several resources on managing energy  Past hw  Attend to  work/ life balance.  Past hw  Attend to self care/  "self preservation.  Past hw  Consider assessing day to day expections of self    as she moves through pre and post crisis management    for her children.  Past hw  Daily prioritizing of kids needs.    Mariely Negron LP                                                         __________________________________________________________________    Individual Treatment Plan    Patient's Name: Sommer Gomez  YOB: 1980    Date of Creation: 1-23  Date Treatment Plan Last Reviewed/Revised: 1-23    DSM5 Diagnoses:  F41.1 generalized anxiety d/o,    F33.0, Major Depressive Disorder, recurrent , mild  F43.10 Post Traumatic DO    Psychosocial / Contextual Factors: son with depressive symptoms and ASD dx  PROMIS (reviewed every 90 days): 1-02-23,    5-01-23  Referral / Collaboration:  Referral to another professional/service is not indicated at this time..    Anticipated number of session for this episode of care: 9-12 sessions  Anticipation frequency of session: Every other week  Anticipated Duration of each session: 38-52 minutes  Treatment plan will be reviewed in 90 days or when goals have been changed.     The plan for the next 90 day is continue/ maintain  with these actions and cognitive behavioral tasks to develop more consistency / skill proficiency in effort to reduce symptom frequency, intensity and duration.    4-23     Want to balance family and work needs  advocate for self.  Client wants to continue to be available to her kids\.  Attend to work / life balance  Return to some level of daily self care.  Is weighing pros and cons about attending a new school.  Plan is to take it day to day.\"it is changing by the day\".  Small next steps .  Plan is to access support system daily.  Employ several coping skills daily.      Mariely Negron LP                                                             MeasurableTreatment Goal(s) related to diagnosis / functional impairment(s)  Goal 1: Patient will be able to " articulate core anxiety triggers. Will use early intervention 50% of the time.    I will know I've met my goal when .  feeling effective 50% of the time.    Objective #A (Patient Action)    Patient will use at least 4 coping skills for anxiety management in the next 90 days .  Status: New - Date: 1-2023     Intervention(s)  Therapist will teach coping skills. Provide a safe place to process stress/ emotions.    .Patient has not reviewed nor agreed to the above plan.      Mariely Negron LP          Answers for HPI/ROS submitted by the patient on 1/19/2023  ROSA 7 TOTAL SCORE: 10Answers for HPI/ROS submitted by the patient on 4/4/2023  If you checked off any problems, how difficult have these problems made it for you to do your work, take care of things at home, or get along with other people?: Somewhat difficult  PHQ9 TOTAL SCORE: 10  ROSA 7 TOTAL SCORE: 10        Answers for HPI/ROS submitted by the patient on 2/20/2023  If you checked off any problems, how difficult have these problems made it for you to do your work, take care of things at home, or get along with other people?: Somewhat difficult  PHQ9 TOTAL SCORE: 8Answers for HPI/ROS submitted by the patient on 4/17/2023  If you checked off any problems, how difficult have these problems made it for you to do your work, take care of things at home, or get along with other people?: Somewhat difficult  PHQ9 TOTAL SCORE: 8

## 2023-06-05 ENCOUNTER — VIRTUAL VISIT (OUTPATIENT)
Dept: FAMILY MEDICINE | Facility: CLINIC | Age: 43
End: 2023-06-05
Payer: COMMERCIAL

## 2023-06-05 DIAGNOSIS — R07.0 THROAT PAIN: ICD-10-CM

## 2023-06-05 DIAGNOSIS — J01.10 ACUTE NON-RECURRENT FRONTAL SINUSITIS: Primary | ICD-10-CM

## 2023-06-05 PROCEDURE — 99213 OFFICE O/P EST LOW 20 MIN: CPT | Mod: 95 | Performed by: INTERNAL MEDICINE

## 2023-06-05 ASSESSMENT — PATIENT HEALTH QUESTIONNAIRE - PHQ9
SUM OF ALL RESPONSES TO PHQ QUESTIONS 1-9: 7
10. IF YOU CHECKED OFF ANY PROBLEMS, HOW DIFFICULT HAVE THESE PROBLEMS MADE IT FOR YOU TO DO YOUR WORK, TAKE CARE OF THINGS AT HOME, OR GET ALONG WITH OTHER PEOPLE: SOMEWHAT DIFFICULT
SUM OF ALL RESPONSES TO PHQ QUESTIONS 1-9: 7

## 2023-06-05 NOTE — PROGRESS NOTES
Myriam is a 42 year old who is being evaluated via a billable video visit.      How would you like to obtain your AVS? MyChart  If the video visit is dropped, the invitation should be resent by: Text to cell phone: [phone]  Will anyone else be joining your video visit? No    Assessment & Plan     Acute non-recurrent frontal sinusitis  Throat pain  Symptoms most consistent with sinus infection for which I will prescribe augmentin BID x 7 days. However she also mentions sore throat to left side with swelling. No red flag or alarm signs. Discussed virtual visit limitations and unable to perform physical exam. She is non-toxic and able to speak in full sentences. No drooling or fevers. Will empirically cover with augmentin as per above, however discussed cannot r/o tonsillar abscess and if symptoms are at all worsening or not improving, should be evaluated in person. ER precautions/urgent care discussed. She is agreeable to plan  - amoxicillin-clavulanate (AUGMENTIN) 875-125 MG tablet  Dispense: 14 tablet; Refill: 0      Return if symptoms worsen or fail to improve.      Terrie Anderson DO  [facility]    Alcira Miguel is a 42 year old, presenting for the following health issues:      History of Present Illness       Reason for visit:  Allergies, possible infection from allergies  Symptom onset:  1-2 weeks ago  Symptoms include:  Stuffed nose, sinus pressure, sinus pain, drainage from eye, sore throat, headache,  Symptom intensity:  Mild  Symptom progression:  Staying the same  Had these symptoms before:  No  What makes it worse:  Spending a lot of time outside  What makes it better:  Allergy med, claritin D 12 hour, rest, increased fluids    She eats 0-1 servings of fruits and vegetables daily.She consumes 0 sweetened beverage(s) daily.She exercises with enough effort to increase her heart rate 10 to 19 minutes per day.  She exercises with enough effort to increase her heart rate 5 days per  week. She is missing 1 dose(s) of medications per week.  She is not taking prescribed medications regularly due to remembering to take.    Today's PHQ-9         PHQ-9 Total Score: 7    PHQ-9 Q9 Thoughts of better off dead/self-harm past 2 weeks :   Not at all    How difficult have these problems made it for you to do your work, take care of things at home, or get along with other people: Somewhat difficult      New patient to me presents virtually via video  Allergy symptoms started two weeks ago. States these have improved but now has persistent R sided maxillary sinus pressure and nasal congestion. No f/c. Headache. Sore throat, mainly to left side. Feels like is swollen but not sure if she can see any abscess or growth. States no trouble breathing or talking. Mostly discomfort with swallowing.    Review of Systems   Constitutional, HEENT, cardiovascular, pulmonary, gi and gu systems are negative, except as otherwise noted.      Objective           Vitals:  No vitals were obtained today due to virtual visit.    Physical Exam   GEN: No acute distress  RESP: No audible increased work of breathing. Patient speaking in full sentences without distress.  PSYCH: pleasant  Exam otherwise limited due to virtual platform          Video-Visit Details    Type of service:  Video Visit   Video Start Time: 10:16 AM  Video End Time:10:26 AM    Originating Location (pt. Location): Home  Distant Location (provider location):  On-site  Platform used for Video Visit: [employer]

## 2023-06-06 ASSESSMENT — ENCOUNTER SYMPTOMS
DIARRHEA: 0
WEAKNESS: 0
EYE PAIN: 0
NAUSEA: 0
COUGH: 1
MYALGIAS: 1
FREQUENCY: 0
PARESTHESIAS: 0
SHORTNESS OF BREATH: 0
CHILLS: 0
HEMATURIA: 0
PALPITATIONS: 0
ARTHRALGIAS: 0
ABDOMINAL PAIN: 0
CONSTIPATION: 0
DIZZINESS: 0
SORE THROAT: 1
NERVOUS/ANXIOUS: 0
HEARTBURN: 1
HEMATOCHEZIA: 0
JOINT SWELLING: 0
HEADACHES: 1
DYSURIA: 0
BREAST MASS: 0
FEVER: 0

## 2023-06-06 ASSESSMENT — ASTHMA QUESTIONNAIRES
ACT_TOTALSCORE: 20
ACT_TOTALSCORE: 20
QUESTION_2 LAST FOUR WEEKS HOW OFTEN HAVE YOU HAD SHORTNESS OF BREATH: ONCE OR TWICE A WEEK
QUESTION_3 LAST FOUR WEEKS HOW OFTEN DID YOUR ASTHMA SYMPTOMS (WHEEZING, COUGHING, SHORTNESS OF BREATH, CHEST TIGHTNESS OR PAIN) WAKE YOU UP AT NIGHT OR EARLIER THAN USUAL IN THE MORNING: ONCE OR TWICE
QUESTION_4 LAST FOUR WEEKS HOW OFTEN HAVE YOU USED YOUR RESCUE INHALER OR NEBULIZER MEDICATION (SUCH AS ALBUTEROL): ONCE A WEEK OR LESS
QUESTION_5 LAST FOUR WEEKS HOW WOULD YOU RATE YOUR ASTHMA CONTROL: WELL CONTROLLED
QUESTION_1 LAST FOUR WEEKS HOW MUCH OF THE TIME DID YOUR ASTHMA KEEP YOU FROM GETTING AS MUCH DONE AT WORK, SCHOOL OR AT HOME: A LITTLE OF THE TIME

## 2023-06-08 ENCOUNTER — VIRTUAL VISIT (OUTPATIENT)
Dept: PSYCHOLOGY | Facility: CLINIC | Age: 43
End: 2023-06-08
Payer: COMMERCIAL

## 2023-06-08 DIAGNOSIS — F41.1 GENERALIZED ANXIETY DISORDER: Primary | ICD-10-CM

## 2023-06-08 DIAGNOSIS — F33.0 MAJOR DEPRESSIVE DISORDER, RECURRENT, MILD (H): ICD-10-CM

## 2023-06-08 DIAGNOSIS — F43.10 POST TRAUMATIC STRESS DISORDER (PTSD): ICD-10-CM

## 2023-06-08 PROCEDURE — 90834 PSYTX W PT 45 MINUTES: CPT | Mod: 93 | Performed by: PSYCHOLOGIST

## 2023-06-08 ASSESSMENT — PATIENT HEALTH QUESTIONNAIRE - PHQ9
SUM OF ALL RESPONSES TO PHQ QUESTIONS 1-9: 6
SUM OF ALL RESPONSES TO PHQ QUESTIONS 1-9: 6
10. IF YOU CHECKED OFF ANY PROBLEMS, HOW DIFFICULT HAVE THESE PROBLEMS MADE IT FOR YOU TO DO YOUR WORK, TAKE CARE OF THINGS AT HOME, OR GET ALONG WITH OTHER PEOPLE: SOMEWHAT DIFFICULT

## 2023-06-08 NOTE — PROGRESS NOTES
"    [facility]                                     Progress Note    Patient Name: Sommer Gomez  Date: 23         Service Type: Individual      Session Start Time: Noon  Session End Time: 12:50 pm    Answers for HPI/ROS submitted by the patient on 3/2/2023  If you checked off any problems, how difficult have these problems made it for you to do your work, take care of things at home, or get along with other people?: Somewhat difficult  PHQ9 TOTAL SCORE: 7       Session Length: 38-52 min    Session #: 14    Attendees: Client    Service Modality:  phone Visit:      Provider verified identity through the following two step process.  Patient provided:  Patient  and Patient address    Telemedicine Visit: The patient's condition can be safely assessed and treated via synchronous audio and visual telemedicine encounter.      Reason for Telemedicine Visit: Services only offered telehealth    Originating Site (Patient Location): Patient's home    Distant Site (Provider Location): Provider Remote Setting- Home Office    Consent:  The patient/guardian has verbally consented to: the potential risks and benefits of telemedicine (video visit) versus in person care; bill my insurance or make self-payment for services provided; and responsibility for payment of non-covered services.     Patient would like the video invitation sent by:  Text to cell phone: yes    Mode of Communication:  phone    Distant Location (Provider):  Off-site    As the provider I attest to compliance with applicable laws and regulations related to telemedicine.  The patient has been notified of the following:      \"We have found that certain health care needs can be provided without the need for a face to face visit.  This service lets us provide the care you need with a phone conversation.       I will have full access to your [facility] medical record during this entire phone call.   I will be taking notes for your " "medical record.      Since this is like an office visit, we will bill your insurance company for this service.       There are potential benefits and risks of telephone visits (e.g. limits to patient confidentiality) that differ from in-person visits.?Confidentiality still applies for telephone services, and nobody will record the visit.  It is important to be in a quiet, private space that is free of distractions (including cell phone or other devices) during the visit.??      If during the course of the call I believe a telephone visit is not appropriate, you will not be charged for this service\"     Consent has been obtained for this service by care team member: Yes          DATA  Interactive Complexity: No  Crisis: No     Diagnosis:  F41.1 generalized anxiety d/o,    F33.0, Major Depressive Disorder, recurrent , mild  F43.10 Post Traumatic Stress     Treatment plan reviewed: today ( 90 day 7-23 )   PROMIS:  1-02-23, 5-01-23      Progress Since Last Session (Related to Symptoms / Goals / Homework):   Symptoms: stable    Homework: Completed in session      Episode of Care Goals: Achieved / completed to satisfaction - ACTION (Actively working towards change); Intervened by reinforcing change plan / affirming steps taken     Current / Ongoing Stressors and Concerns:   Hurt her leg last weak   Career disruption   Financial stress   Son - past suicidal  episode ( January )    Has taken him to the hospital twice.     Treatment Objective(s) Addressed in This Session:     Support- parenting needs   Patient will be able to articulate core anxiety triggers and fears.  Patient will use at least 4 coping skills for anxiety management in the next 90 days .     Intervention:   CBT and  Supportive therapy.          Reviewed symptoms.    Hurt her leg last week- is staying off the stair.  On abx for allergies / sinuses.     Took her daughter to urgent care for ankle.  Her youngest son started a day tx program.    Work hours have " stabalized.  S/M/ T/F Feels manageable.      Narrative work and support offered around motherhood   and parenting members who are neuro diverse.     Identified and processed feelings.    Assessments completed prior to visit:  The following assessments were completed by patient for this visit:      ASSESSMENT: Current Emotional / Mental Status (status of significant symptoms):   Risk status (Self / Other harm or suicidal ideation)   Patient denies current fears or concerns for personal safety.   Patient denies current or recent suicidal ideation or behaviors.   Patient denies current or recent homicidal ideation or behaviors.   Patient denies current or recent self injurious behavior or ideation.   Patient denies other safety concerns.   Patient reports there has been no change in risk factors since their last session.     Patient reports there has been no change in protective factors since their last session.     A safety and risk management plan has not been developed;  Client is advised that if any change occurs to call [phone], or reports to an Emergency Department.       Appearance:   Appropriate    Eye Contact:   Good    Psychomotor Behavior: Normal    Attitude:   Cooperative    Orientation:   All   Speech    Rate / Production: Normal/ Responsive Normal     Volume:  Normal    Mood:    Anxious  Normal   Affect:    Appropriate    Thought Content:  Clear    Thought Form:  Coherent  Logical    Insight:    Good      Medication Review:   No changes to current psychiatric medication(s)     Medication Compliance:   Yes     Changes in Health Issues:   None reported     Chemical Use Review:   Substance Use: Chemical use reviewed, no active concerns identified      Tobacco Use: No current tobacco use.      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    PLAN: (Patient Tasks /  "Therapist Tasks / Other)  client wants to allow time to heal leg- prioritize tasks  Past hw  Consider several resources on managing energy  Past hw  Attend to  work/ life balance.  Past hw  Attend to self care/ self preservation.  Past hw  Consider assessing day to day expections of self    as she moves through pre and post crisis management    for her children.  Past hw  Daily prioritizing of kids needs.    Mariely Negron, LP                                                         __________________________________________________________________    Individual Treatment Plan    Patient's Name: Sommer Gomez  YOB: 1980    Date of Creation: 1-23  Date Treatment Plan Last Reviewed/Revised: 1-23    DSM5 Diagnoses:  F41.1 generalized anxiety d/o,    F33.0, Major Depressive Disorder, recurrent , mild  F43.10 Post Traumatic DO    Psychosocial / Contextual Factors: son with depressive symptoms and ASD dx  PROMIS (reviewed every 90 days): 1-02-23,    5-01-23  Referral / Collaboration:  Referral to another professional/service is not indicated at this time..    Anticipated number of session for this episode of care: 9-12 sessions  Anticipation frequency of session: Every other week  Anticipated Duration of each session: 38-52 minutes  Treatment plan will be reviewed in 90 days or when goals have been changed.     The plan for the next 90 day is continue/ maintain  with these actions and cognitive behavioral tasks to develop more consistency / skill proficiency in effort to reduce symptom frequency, intensity and duration.    4-23     Want to balance family and work needs  advocate for self.  Client wants to continue to be available to her kids\.  Attend to work / life balance  Return to some level of daily self care.  Is weighing pros and cons about attending a new school.  Plan is to take it day to day.\"it is changing by the day\".  Small next steps .  Plan is to access support system daily.  Employ several " coping skills daily.      Mariely Negron LP                                                             MeasurableTreatment Goal(s) related to diagnosis / functional impairment(s)  Goal 1: Patient will be able to articulate core anxiety triggers. Will use early intervention 50% of the time.    I will know I've met my goal when .  feeling effective 50% of the time.    Objective #A (Patient Action)    Patient will use at least 4 coping skills for anxiety management in the next 90 days .  Status: New - Date: 1-2023     Intervention(s)  Therapist will teach coping skills. Provide a safe place to process stress/ emotions.    .Patient has not reviewed nor agreed to the above plan.      Mariely Negron LP          Answers for HPI/ROS submitted by the patient on 1/19/2023  ROSA 7 TOTAL SCORE: 10Answers for HPI/ROS submitted by the patient on 4/4/2023  If you checked off any problems, how difficult have these problems made it for you to do your work, take care of things at home, or get along with other people?: Somewhat difficult  PHQ9 TOTAL SCORE: 10  ROSA 7 TOTAL SCORE: 10        Answers for HPI/ROS submitted by the patient on 2/20/2023  If you checked off any problems, how difficult have these problems made it for you to do your work, take care of things at home, or get along with other people?: Somewhat difficult  PHQ9 TOTAL SCORE: 8Answers for HPI/ROS submitted by the patient on 4/17/2023  If you checked off any problems, how difficult have these problems made it for you to do your work, take care of things at home, or get along with other people?: Somewhat difficult  PHQ9 TOTAL SCORE: 8  Answers for HPI/ROS submitted by the patient on 6/8/2023  If you checked off any problems, how difficult have these problems made it for you to do your work, take care of things at home, or get along with other people?: Somewhat difficult  PHQ9 TOTAL SCORE: 6

## 2023-06-13 ENCOUNTER — OFFICE VISIT (OUTPATIENT)
Dept: FAMILY MEDICINE | Facility: CLINIC | Age: 43
End: 2023-06-13
Payer: COMMERCIAL

## 2023-06-13 VITALS
SYSTOLIC BLOOD PRESSURE: 108 MMHG | HEART RATE: 114 BPM | BODY MASS INDEX: 28.28 KG/M2 | OXYGEN SATURATION: 95 % | DIASTOLIC BLOOD PRESSURE: 70 MMHG | RESPIRATION RATE: 16 BRPM | WEIGHT: 186.6 LBS | HEIGHT: 68 IN | TEMPERATURE: 97.5 F

## 2023-06-13 DIAGNOSIS — J30.2 SEASONAL ALLERGIC RHINITIS, UNSPECIFIED TRIGGER: ICD-10-CM

## 2023-06-13 DIAGNOSIS — Z00.00 ROUTINE HISTORY AND PHYSICAL EXAMINATION OF ADULT: Primary | ICD-10-CM

## 2023-06-13 DIAGNOSIS — R06.02 SHORTNESS OF BREATH: ICD-10-CM

## 2023-06-13 DIAGNOSIS — K21.9 GASTROESOPHAGEAL REFLUX DISEASE WITHOUT ESOPHAGITIS: ICD-10-CM

## 2023-06-13 DIAGNOSIS — F33.0 MILD RECURRENT MAJOR DEPRESSION (H): ICD-10-CM

## 2023-06-13 DIAGNOSIS — L91.8 SKIN TAG: ICD-10-CM

## 2023-06-13 PROCEDURE — 80061 LIPID PANEL: CPT | Performed by: FAMILY MEDICINE

## 2023-06-13 PROCEDURE — 84443 ASSAY THYROID STIM HORMONE: CPT | Performed by: FAMILY MEDICINE

## 2023-06-13 PROCEDURE — 99396 PREV VISIT EST AGE 40-64: CPT | Mod: 25 | Performed by: FAMILY MEDICINE

## 2023-06-13 PROCEDURE — 36415 COLL VENOUS BLD VENIPUNCTURE: CPT | Performed by: FAMILY MEDICINE

## 2023-06-13 PROCEDURE — 80048 BASIC METABOLIC PNL TOTAL CA: CPT | Performed by: FAMILY MEDICINE

## 2023-06-13 PROCEDURE — 11200 RMVL SKIN TAGS UP TO&INC 15: CPT | Performed by: FAMILY MEDICINE

## 2023-06-13 PROCEDURE — 99213 OFFICE O/P EST LOW 20 MIN: CPT | Mod: 25 | Performed by: FAMILY MEDICINE

## 2023-06-13 RX ORDER — FLUOXETINE 40 MG/1
40 CAPSULE ORAL DAILY
Qty: 90 CAPSULE | Refills: 0 | Status: SHIPPED | OUTPATIENT
Start: 2023-06-13 | End: 2023-09-21

## 2023-06-13 RX ORDER — ALBUTEROL SULFATE 90 UG/1
2 AEROSOL, METERED RESPIRATORY (INHALATION) EVERY 6 HOURS PRN
Qty: 18 G | Refills: 3 | Status: SHIPPED | OUTPATIENT
Start: 2023-06-13

## 2023-06-13 RX ORDER — FAMOTIDINE 40 MG/1
40 TABLET, FILM COATED ORAL 2 TIMES DAILY
Qty: 180 TABLET | Refills: 3 | Status: SHIPPED | OUTPATIENT
Start: 2023-06-13 | End: 2024-08-06

## 2023-06-13 RX ORDER — CETIRIZINE HYDROCHLORIDE 10 MG/1
10 TABLET ORAL DAILY
Qty: 90 TABLET | Refills: 3 | Status: SHIPPED | OUTPATIENT
Start: 2023-06-13 | End: 2024-08-06

## 2023-06-13 ASSESSMENT — ENCOUNTER SYMPTOMS
DIZZINESS: 0
FEVER: 0
COUGH: 1
WEAKNESS: 0
PARESTHESIAS: 0
MYALGIAS: 1
SHORTNESS OF BREATH: 0
SORE THROAT: 1
HEMATOCHEZIA: 0
NAUSEA: 0
FREQUENCY: 0
ABDOMINAL PAIN: 0
ARTHRALGIAS: 0
PALPITATIONS: 0
HEMATURIA: 0
BREAST MASS: 0
CONSTIPATION: 0
DIARRHEA: 0
DYSURIA: 0
HEARTBURN: 1
EYE PAIN: 0
HEADACHES: 1
NERVOUS/ANXIOUS: 0
JOINT SWELLING: 0
CHILLS: 0

## 2023-06-13 ASSESSMENT — PAIN SCALES - GENERAL: PAINLEVEL: NO PAIN (0)

## 2023-06-13 NOTE — PATIENT INSTRUCTIONS
Gera Miguel,    Thank you for allowing [facility] to manage your care.    I ordered some blood work, please go to the laboratory to get your laboratory studies.    I sent your prescriptions to your pharmacy.    For your convenience, test results are released as soon as they are available  Please allow 1-2 business days for me to send you a comment about your results.  If not done so, I encourage you to login into [employer] [email or web address] to review your results in real time.     If you have any questions or concerns, please feel free to call us at [phone].    Sincerely,    Dr. Shultz    Did you know?      You can schedule a video visit for follow-up appointments as well as future appointments for certain conditions.  Please see the below link.     [email or web address]    If you have not already done so,  I encourage you to sign up for [employer] ([email or web address]).  This will allow you to review your results, securely communicate with a provider, and schedule virtual visits as well.

## 2023-06-13 NOTE — LETTER
June 27, 2023      Myriam ANTUNEZ Jason  [address]  [facility] [id number]        Dear ,    Your recent results show the following:  -Cholesterol levels (LDL,HDL, Triglycerides) are otherwise normal.  ADVISE: rechecking in 1 year.    Resulted Orders   Basic metabolic panel  (Ca, Cl, CO2, Creat, Gluc, K, Na, BUN)   Result Value Ref Range    Sodium 138 136 - 145 mmol/L    Potassium 4.7 3.4 - 5.3 mmol/L    Chloride 103 98 - 107 mmol/L    Carbon Dioxide (CO2) 22 22 - 29 mmol/L    Anion Gap 13 7 - 15 mmol/L    Urea Nitrogen 9.9 6.0 - 20.0 mg/dL    Creatinine 0.77 0.51 - 0.95 mg/dL    Calcium 9.3 8.6 - 10.0 mg/dL    Glucose 92 70 - 99 mg/dL    GFR Estimate >90 >60 mL/min/1.73m2      Comment:      eGFR calculated using 2021 CKD-EPI equation.   Lipid panel reflex to direct LDL Fasting   Result Value Ref Range    Cholesterol 177 <200 mg/dL    Triglycerides 88 <150 mg/dL    Direct Measure HDL 57 >=50 mg/dL    LDL Cholesterol Calculated 102 (H) <=100 mg/dL    Non HDL Cholesterol 120 <130 mg/dL    Narrative    Cholesterol  Desirable:  <200 mg/dL    Triglycerides  Normal:  Less than 150 mg/dL  Borderline High:  150-199 mg/dL  High:  200-499 mg/dL  Very High:  Greater than or equal to 500 mg/dL    Direct Measure HDL  Female:  Greater than or equal to 50 mg/dL   Male:  Greater than or equal to 40 mg/dL    LDL Cholesterol  Desirable:  <100mg/dL  Above Desirable:  100-129 mg/dL   Borderline High:  130-159 mg/dL   High:  160-189 mg/dL   Very High:  >= 190 mg/dL    Non HDL Cholesterol  Desirable:  130 mg/dL  Above Desirable:  130-159 mg/dL  Borderline High:  160-189 mg/dL  High:  190-219 mg/dL  Very High:  Greater than or equal to 220 mg/dL       If you have any questions or concerns, please call the clinic at the number listed above.       Sincerely,      Dharmesh Shultz DO

## 2023-06-13 NOTE — PROGRESS NOTES
Answers for HPI/ROS submitted by the patient on 2023  If you checked off any problems, how difficult have these problems made it for you to do your work, take care of things at home, or get along with other people?: Somewhat difficult  PHQ9 TOTAL SCORE: 8       SUBJECTIVE:   CC: Myriam is an 42 year old who presents for preventive health visit.       2023     9:14 AM   Additional Questions   Roomed by Dora   Accompanied by N/A         2023     9:14 AM   Patient Reported Additional Medications   Patient reports taking the following new medications N/A     Healthy Habits:     Getting at least 3 servings of Calcium per day:  Yes    Bi-annual eye exam:  Yes    Dental care twice a year:  Yes    Sleep apnea or symptoms of sleep apnea:  Daytime drowsiness    Diet:  Other    Frequency of exercise:  1 day/week    Duration of exercise:  30-45 minutes    Taking medications regularly:  Yes    Medication side effects:  None    PHQ-2 Total Score: 2    Additional concerns today:  No          Patient would like to discuss the following;  1. Pain from sinus infection           Social History     Tobacco Use     Smoking status: Never     Smokeless tobacco: Never   Vaping Use     Vaping status: Never Used   Substance Use Topics     Alcohol use: No             2023     1:18 PM   Alcohol Use   Prescreen: >3 drinks/day or >7 drinks/week? Not Applicable     Reviewed orders with patient.  Reviewed health maintenance and updated orders accordingly - Yes      Breast Cancer Screenin/6/2023     1:20 PM   Breast CA Risk Assessment (FHS-7)   Do you have a family history of breast, colon, or ovarian cancer? No / Unknown       click delete button to remove this line now  Mammogram Screening: Recommended annual mammography  Pertinent mammograms are reviewed under the imaging tab.    History of abnormal Pap smear: NO - age 30-65 PAP every 5 years with negative HPV co-testing recommended      Latest Ref Rng & Units  10/11/2021     8:08 AM 9/8/2014    12:00 AM 2/18/2011    11:21 AM   PAP / HPV   PAP  Negative for Intraepithelial Lesion or Malignancy (NILM)       PAP (Historical)   NIL   NIL     HPV 16 DNA Negative Negative       HPV 18 DNA Negative Negative       Other HR HPV Negative Negative         Reviewed and updated as needed this visit by clinical staff   Tobacco  Allergies  Meds              Reviewed and updated as needed this visit by Provider                 Past Medical History:   Diagnosis Date     Anxiety disorder      Chondromalacia of patella      Depression      Uncomplicated asthma       Past Surgical History:   Procedure Laterality Date     COLONOSCOPY  3/1/2011    flex sig     COLONOSCOPY      colonoscopy     COLONOSCOPY       COMBINED ESOPHAGOSCOPY, GASTROSCOPY, DUODENOSCOPY (EGD) WITH CO2 INSUFFLATION N/A 2/22/2022    Procedure: ESOPHAGOGASTRODUODENOSCOPY, WITH CO2 INSUFFLATION;  Surgeon: Russell Patel DO;  Location: [facility] OR     ESSURE TUBAL LIGATION  2008     TUBAL LIGATION  12/08    essure occlusion     1. Physical exam    2. Sinus infection: Was recently treated with augmentin on 6/5/23.  Right sided is improved but left side discomfort.  Fever resolved.  States that congestion has improved.  Dealing with allergies.     3. Mole on back: Left lower.  Ongoing for the past month.  Intermittent.  Unsure if it is larger in size.  Grandmother with history of skin cancer.       Review of Systems   Constitutional: Negative for chills and fever.   HENT: Positive for congestion and sore throat. Negative for ear pain and hearing loss.    Eyes: Negative for pain and visual disturbance.   Respiratory: Positive for cough. Negative for shortness of breath.    Cardiovascular: Negative for chest pain, palpitations and peripheral edema.   Gastrointestinal: Positive for heartburn. Negative for abdominal pain, constipation, diarrhea, hematochezia and nausea.   Breasts:  Negative for tenderness, breast mass and  "discharge.   Genitourinary: Negative for dysuria, frequency, genital sores, hematuria, pelvic pain, urgency, vaginal bleeding and vaginal discharge.   Musculoskeletal: Positive for myalgias. Negative for arthralgias and joint swelling.   Skin: Negative for rash.   Neurological: Positive for headaches. Negative for dizziness, weakness and paresthesias.   Psychiatric/Behavioral: Negative for mood changes. The patient is not nervous/anxious.           OBJECTIVE:   /70   Pulse 114   Temp 97.5  F (36.4  C) (Tympanic)   Resp 16   Ht 1.729 m (5' 8.07\")   Wt 84.6 kg (186 lb 9.6 oz)   LMP 04/01/2023 (Approximate)   SpO2 95%   BMI 28.31 kg/m    Physical Exam  Constitutional:       General: She is not in acute distress.     Appearance: She is not diaphoretic.   HENT:      Head: Normocephalic and atraumatic.      Right Ear: External ear normal.      Left Ear: External ear normal.      Mouth/Throat:      Pharynx: No oropharyngeal exudate.   Eyes:      General:         Right eye: No discharge.         Left eye: No discharge.      Conjunctiva/sclera: Conjunctivae normal.      Pupils: Pupils are equal, round, and reactive to light.   Neck:      Thyroid: No thyromegaly.      Trachea: No tracheal deviation.   Cardiovascular:      Rate and Rhythm: Normal rate and regular rhythm.      Heart sounds: Normal heart sounds. No murmur heard.  Pulmonary:      Effort: Pulmonary effort is normal. No respiratory distress.      Breath sounds: Normal breath sounds. No wheezing or rales.   Abdominal:      General: There is no distension.      Palpations: Abdomen is soft. There is no mass.      Tenderness: There is no abdominal tenderness. There is no guarding or rebound.   Musculoskeletal:         General: No deformity. Normal range of motion.      Cervical back: Normal range of motion and neck supple.   Lymphadenopathy:      Cervical: No cervical adenopathy.   Skin:     General: Skin is warm.      Findings: No erythema or rash.      " Comments: Approximately 3 mm fleshy skin tag involving left lower back   Neurological:      Mental Status: She is alert and oriented to person, place, and time.      Cranial Nerves: No cranial nerve deficit.      Coordination: Coordination normal.   Psychiatric:         Judgment: Judgment normal.           Component      Latest Ref Rng 2/18/2011  11:31 AM 9/14/2011  12:23 PM 10/24/2012  12:33 PM   Sodium      133 - 144 mmol/L   138    Potassium      3.4 - 5.3 mmol/L   4.0    Chloride      94 - 109 mmol/L   105    Carbon Dioxide      20 - 32 mmol/L   24    Anion Gap      6 - 17 mmol/L   9    Glucose      60 - 99 mg/dL   92    Urea Nitrogen      5 - 24 mg/dL   8    Creatinine      0.52 - 1.04 mg/dL   0.71    GFR Estimate      >60 mL/min/1.7m2   >90    GFR Estimate If Black      >60 mL/min/1.7m2   >90    Calcium      8.5 - 10.4 mg/dL   9.5    Cholesterol      <200 mg/dL 131      Triglycerides      <150 mg/dL 90      HDL Cholesterol      >=50 mg/dL 54      LDL Cholesterol Calculated      <=100 mg/dL 59      VLDL-Cholesterol      0 - 30 mg/dL 18      Cholesterol/HDL Ratio      0.0 - 5.0  2.0      Non HDL Cholesterol      <130 mg/dL      Patient Fasting?      Hemoglobin A1C      4.3 - 6.0 %  4.9       Component      Latest Ref Rng 10/11/2021  8:36 AM   Sodium      133 - 144 mmol/L    Potassium      3.4 - 5.3 mmol/L    Chloride      94 - 109 mmol/L    Carbon Dioxide      20 - 32 mmol/L    Anion Gap      6 - 17 mmol/L    Glucose      60 - 99 mg/dL    Urea Nitrogen      5 - 24 mg/dL    Creatinine      0.52 - 1.04 mg/dL    GFR Estimate      >60 mL/min/1.7m2    GFR Estimate If Black      >60 mL/min/1.7m2    Calcium      8.5 - 10.4 mg/dL    Cholesterol      <200 mg/dL 155    Triglycerides      <150 mg/dL 84    HDL Cholesterol      >=50 mg/dL 63    LDL Cholesterol Calculated      <=100 mg/dL 75    VLDL-Cholesterol      0 - 30 mg/dL    Cholesterol/HDL Ratio      0.0 - 5.0     Non HDL Cholesterol      <130 mg/dL 92    Patient  "Fasting? Yes    Hemoglobin A1C      4.3 - 6.0 %      Procedure: Skin biopsy  After a discussion of risks, benefits and side effects of procedure, verbal consent was obtained.  Patient declines local anethesia.  The site was prepped with beta iodine. Using curved iris scissor, lesion was excised.  Patient tolerated the procedure without complication, minimal bleeding, site was dressed with proper wound dressing       ASSESSMENT/PLAN:   1. Routine history and physical examination of adult  - Lipid panel reflex to direct LDL Fasting; Future  - Basic metabolic panel  (Ca, Cl, CO2, Creat, Gluc, K, Na, BUN); Future  - TSH with free T4 reflex; Future  - TSH with free T4 reflex  - Basic metabolic panel  (Ca, Cl, CO2, Creat, Gluc, K, Na, BUN)  - Lipid panel reflex to direct LDL Fasting    2. Gastroesophageal reflux disease without esophagitis  Stable  - famotidine (PEPCID) 40 MG tablet; Take 1 tablet (40 mg) by mouth 2 times daily  Dispense: 180 tablet; Refill: 3    3. Mild recurrent major depression (H)  Stable  - FLUoxetine (PROZAC) 40 MG capsule; Take 1 capsule (40 mg) by mouth daily  Dispense: 90 capsule; Refill: 0    4. Shortness of breath  - albuterol (PROAIR HFA/PROVENTIL HFA/VENTOLIN HFA) 108 (90 Base) MCG/ACT inhaler; Inhale 2 puffs into the lungs every 6 hours as needed for shortness of breath or wheezing  Dispense: 18 g; Refill: 3    5. Seasonal allergic rhinitis, unspecified trigger  - cetirizine (ZYRTEC) 10 MG tablet; Take 1 tablet (10 mg) by mouth daily  Dispense: 90 tablet; Refill: 3    6. Skin tag  Please see procedure note  - REMOVAL OF SKIN TAGS, FIRST 15      Patient has been advised of split billing requirements and indicates understanding: Yes      COUNSELING:  Reviewed preventive health counseling, as reflected in patient instructions      BMI:   Estimated body mass index is 28.31 kg/m  as calculated from the following:    Height as of this encounter: 1.729 m (5' 8.07\").    Weight as of this encounter: " 84.6 kg (186 lb 9.6 oz).   Weight management plan: Discussed healthy diet and exercise guidelines      She reports that she has never smoked. She has never used smokeless tobacco.          DO PRETTY [facility]roso [facility]

## 2023-06-14 LAB
ANION GAP SERPL CALCULATED.3IONS-SCNC: 13 MMOL/L (ref 7–15)
BUN SERPL-MCNC: 9.9 MG/DL (ref 6–20)
CALCIUM SERPL-MCNC: 9.3 MG/DL (ref 8.6–10)
CHLORIDE SERPL-SCNC: 103 MMOL/L (ref 98–107)
CHOLEST SERPL-MCNC: 177 MG/DL
CREAT SERPL-MCNC: 0.77 MG/DL (ref 0.51–0.95)
DEPRECATED HCO3 PLAS-SCNC: 22 MMOL/L (ref 22–29)
GFR SERPL CREATININE-BSD FRML MDRD: >90 ML/MIN/1.73M2
GLUCOSE SERPL-MCNC: 92 MG/DL (ref 70–99)
HDLC SERPL-MCNC: 57 MG/DL
LDLC SERPL CALC-MCNC: 102 MG/DL
NONHDLC SERPL-MCNC: 120 MG/DL
POTASSIUM SERPL-SCNC: 4.7 MMOL/L (ref 3.4–5.3)
SODIUM SERPL-SCNC: 138 MMOL/L (ref 136–145)
TRIGL SERPL-MCNC: 88 MG/DL
TSH SERPL DL<=0.005 MIU/L-ACNC: 0.65 UIU/ML (ref 0.3–4.2)

## 2023-06-22 ENCOUNTER — VIRTUAL VISIT (OUTPATIENT)
Dept: PSYCHOLOGY | Facility: CLINIC | Age: 43
End: 2023-06-22
Payer: COMMERCIAL

## 2023-06-22 DIAGNOSIS — F41.1 GENERALIZED ANXIETY DISORDER: Primary | ICD-10-CM

## 2023-06-22 DIAGNOSIS — F43.10 POST TRAUMATIC STRESS DISORDER (PTSD): ICD-10-CM

## 2023-06-22 DIAGNOSIS — F33.0 MAJOR DEPRESSIVE DISORDER, RECURRENT, MILD (H): ICD-10-CM

## 2023-06-22 PROCEDURE — 90834 PSYTX W PT 45 MINUTES: CPT | Mod: 93 | Performed by: PSYCHOLOGIST

## 2023-06-22 ASSESSMENT — PATIENT HEALTH QUESTIONNAIRE - PHQ9
SUM OF ALL RESPONSES TO PHQ QUESTIONS 1-9: 9
SUM OF ALL RESPONSES TO PHQ QUESTIONS 1-9: 9
10. IF YOU CHECKED OFF ANY PROBLEMS, HOW DIFFICULT HAVE THESE PROBLEMS MADE IT FOR YOU TO DO YOUR WORK, TAKE CARE OF THINGS AT HOME, OR GET ALONG WITH OTHER PEOPLE: SOMEWHAT DIFFICULT

## 2023-06-22 NOTE — PROGRESS NOTES
"    [facility]                                     Progress Note    Patient Name: Sommer Gomez  Date: 23         Service Type: Individual      Session Start Time: 10  Session End Time: 10:45 am    Answers for HPI/ROS submitted by the patient on 3/2/2023  If you checked off any problems, how difficult have these problems made it for you to do your work, take care of things at home, or get along with other people?: Somewhat difficult  PHQ9 TOTAL SCORE: 7       Session Length: 38-52 min    Session #: 15    Attendees: Client    Service Modality:  phone Visit:      Provider verified identity through the following two step process.  Patient provided:  Patient  and Patient address    Telemedicine Visit: The patient's condition can be safely assessed and treated via synchronous audio and visual telemedicine encounter.      Reason for Telemedicine Visit: Services only offered telehealth    Originating Site (Patient Location): Patient's home    Distant Site (Provider Location): Provider Remote Setting- Home Office    Consent:  The patient/guardian has verbally consented to: the potential risks and benefits of telemedicine (video visit) versus in person care; bill my insurance or make self-payment for services provided; and responsibility for payment of non-covered services.     Patient would like the video invitation sent by:  Text to cell phone: yes    Mode of Communication:  phone    Distant Location (Provider):  Off-site    As the provider I attest to compliance with applicable laws and regulations related to telemedicine.  The patient has been notified of the following:      \"We have found that certain health care needs can be provided without the need for a face to face visit.  This service lets us provide the care you need with a phone conversation.       I will have full access to your [facility] medical record during this entire phone call.   I will be taking notes for your " "medical record.      Since this is like an office visit, we will bill your insurance company for this service.       There are potential benefits and risks of telephone visits (e.g. limits to patient confidentiality) that differ from in-person visits.?Confidentiality still applies for telephone services, and nobody will record the visit.  It is important to be in a quiet, private space that is free of distractions (including cell phone or other devices) during the visit.??      If during the course of the call I believe a telephone visit is not appropriate, you will not be charged for this service\"     Consent has been obtained for this service by care team member: Yes          DATA  Interactive Complexity: No  Crisis: No     Diagnosis:  F41.1 generalized anxiety d/o,    F33.0, Major Depressive Disorder, recurrent , mild  F43.10 Post Traumatic Stress     Treatment plan reviewed: today ( 90 day 7-23 )   PROMIS:  1-02-23, 5-01-23      Progress Since Last Session (Related to Symptoms / Goals / Homework):   Symptoms: stable    Homework: Completed in session      Episode of Care Goals: Achieved / completed to satisfaction - ACTION (Actively working towards change); Intervened by reinforcing change plan / affirming steps taken     Current / Ongoing Stressors and Concerns:   Hurt her leg last week   Career disruption   Financial stress   Son - past suicidal  episode ( January )    Has taken him to the hospital twice.     Treatment Objective(s) Addressed in This Session:     Support- parenting needs   Patient will be able to articulate core anxiety triggers and fears.  Patient will use at least 4 coping skills for anxiety management in the next 90 days .     Intervention:   CBT and  Supportive therapy.          Reviewed stressors, symptoms and skills used.    Is on the mend from the leg injury from this month.   An abx worked for her sinuses.       Is looking into finding health insurance since the   emergency order has " ". \"I get overwhelmed\",   at times and is aware that it takes time and energy   to navigate/ understand the process.  Explored energy and mind sets to move forward.    Her youngest son in a day tx program.  Son started group therapy 2 times a week.  MIRIAN Is due for ARM services.      Identified and processed feelings.    Assessments completed prior to visit:  The following assessments were completed by patient for this visit:      ASSESSMENT: Current Emotional / Mental Status (status of significant symptoms):   Risk status (Self / Other harm or suicidal ideation)   Patient denies current fears or concerns for personal safety.   Patient denies current or recent suicidal ideation or behaviors.   Patient denies current or recent homicidal ideation or behaviors.   Patient denies current or recent self injurious behavior or ideation.   Patient denies other safety concerns.   Patient reports there has been no change in risk factors since their last session.     Patient reports there has been no change in protective factors since their last session.     A safety and risk management plan has not been developed;  Client is advised that if any change occurs to call [phone], or reports to an Emergency Department.       Appearance:   Appropriate    Eye Contact:   Good    Psychomotor Behavior: Normal    Attitude:   Cooperative    Orientation:   All   Speech    Rate / Production: Normal/ Responsive Normal     Volume:  Normal    Mood:    Anxious  Normal   Affect:    Appropriate    Thought Content:  Clear    Thought Form:  Coherent  Logical    Insight:    Good      Medication Review:   No changes to current psychiatric medication(s)     Medication Compliance:   Yes     Changes in Health Issues:   None reported     Chemical Use Review:   Substance Use: Chemical use reviewed, no active concerns identified      Tobacco Use: No current tobacco use.      Diagnosis:  F41.1 generalized anxiety d/o,    F33.0, Major Depressive Disorder, " recurrent , mild  F43.10 Post Traumatic DO     Collateral Reports Completed:   Routed note to PCP as needed.    PLAN: (Patient Tasks / Therapist Tasks / Other)  Plan is to take/ make time to research health care insurance    while she is at her moms.  Past hw  client wants to allow time to heal leg- prioritize tasks  Past hw  Consider several resources on managing energy  Past hw  Attend to  work/ life balance.  Past hw  Attend to self care/ self preservation.  Past hw  Consider assessing day to day expections of self    as she moves through pre and post crisis management    for her children.  Past hw  Daily prioritizing of kids needs.    Mariely Negron, LP                                                         __________________________________________________________________    Individual Treatment Plan    Patient's Name: Sommer Gomez  YOB: 1980    Date of Creation: 1-23  Date Treatment Plan Last Reviewed/Revised: 1-23    DSM5 Diagnoses:  F41.1 generalized anxiety d/o,    F33.0, Major Depressive Disorder, recurrent , mild  F43.10 Post Traumatic DO    Psychosocial / Contextual Factors: son with depressive symptoms and ASD dx  PROMIS (reviewed every 90 days): 1-02-23,    5-01-23  Referral / Collaboration:  Referral to another professional/service is not indicated at this time..    Anticipated number of session for this episode of care: 9-12 sessions  Anticipation frequency of session: Every other week  Anticipated Duration of each session: 38-52 minutes  Treatment plan will be reviewed in 90 days or when goals have been changed.     The plan for the next 90 day is continue/ maintain  with these actions and cognitive behavioral tasks to develop more consistency / skill proficiency in effort to reduce symptom frequency, intensity and duration.    4-23     Want to balance family and work needs  advocate for self.  Client wants to continue to be available to her kids\.  Attend to work / life  "balance  Return to some level of daily self care.  Is weighing pros and cons about attending a new school.  Plan is to take it day to day.\"it is changing by the day\".  Small next steps .  Plan is to access support system daily.  Employ several coping skills daily.      Mariely Negron LP                                                             MeasurableTreatment Goal(s) related to diagnosis / functional impairment(s)  Goal 1: Patient will be able to articulate core anxiety triggers. Will use early intervention 50% of the time.    I will know I've met my goal when .  feeling effective 50% of the time.    Objective #A (Patient Action)    Patient will use at least 4 coping skills for anxiety management in the next 90 days .  Status: New - Date: 1-2023     Intervention(s)  Therapist will teach coping skills. Provide a safe place to process stress/ emotions.    .Patient has not reviewed nor agreed to the above plan.      Mairely Negron LP          Answers for HPI/ROS submitted by the patient on 1/19/2023  ROSA 7 TOTAL SCORE: 10Answers for HPI/ROS submitted by the patient on 4/4/2023  If you checked off any problems, how difficult have these problems made it for you to do your work, take care of things at home, or get along with other people?: Somewhat difficult  PHQ9 TOTAL SCORE: 10  ROSA 7 TOTAL SCORE: 10        Answers for HPI/ROS submitted by the patient on 2/20/2023  If you checked off any problems, how difficult have these problems made it for you to do your work, take care of things at home, or get along with other people?: Somewhat difficult  PHQ9 TOTAL SCORE: 8Answers for HPI/ROS submitted by the patient on 4/17/2023  If you checked off any problems, how difficult have these problems made it for you to do your work, take care of things at home, or get along with other people?: Somewhat difficult  PHQ9 TOTAL SCORE: 8  Answers for HPI/ROS submitted by the patient on 6/8/2023  If you checked off any problems, how " difficult have these problems made it for you to do your work, take care of things at home, or get along with other people?: Somewhat difficult  PHQ9 TOTAL SCORE: 6    Answers for HPI/ROS submitted by the patient on 6/22/2023  If you checked off any problems, how difficult have these problems made it for you to do your work, take care of things at home, or get along with other people?: Somewhat difficult  PHQ9 TOTAL SCORE: 9

## 2023-06-28 ENCOUNTER — E-VISIT (OUTPATIENT)
Dept: URGENT CARE | Facility: CLINIC | Age: 43
End: 2023-06-28
Payer: COMMERCIAL

## 2023-06-28 DIAGNOSIS — R06.2 WHEEZING: Primary | ICD-10-CM

## 2023-06-28 PROCEDURE — 99207 PR NON-BILLABLE SERV PER CHARTING: CPT | Performed by: PHYSICIAN ASSISTANT

## 2023-06-28 NOTE — PATIENT INSTRUCTIONS
Dear Sommer Gomez,    We are sorry you are not feeling well. Based on the responses you provided, it is recommended that you be seen in-person in urgent care so we can better evaluate your symptoms. Please click here to find the nearest urgent care location to you.   You will not be charged for this Visit. Thank you for trusting us with your care.    Casper Frankel PA-C

## 2023-07-06 ENCOUNTER — VIRTUAL VISIT (OUTPATIENT)
Dept: PSYCHOLOGY | Facility: CLINIC | Age: 43
End: 2023-07-06
Payer: COMMERCIAL

## 2023-07-06 DIAGNOSIS — F41.1 GENERALIZED ANXIETY DISORDER: ICD-10-CM

## 2023-07-06 DIAGNOSIS — F43.10 POST TRAUMATIC STRESS DISORDER (PTSD): ICD-10-CM

## 2023-07-06 DIAGNOSIS — F33.0 MAJOR DEPRESSIVE DISORDER, RECURRENT, MILD (H): Primary | ICD-10-CM

## 2023-07-06 PROCEDURE — 90834 PSYTX W PT 45 MINUTES: CPT | Mod: 95 | Performed by: PSYCHOLOGIST

## 2023-07-06 ASSESSMENT — ANXIETY QUESTIONNAIRES
GAD7 TOTAL SCORE: 7
IF YOU CHECKED OFF ANY PROBLEMS ON THIS QUESTIONNAIRE, HOW DIFFICULT HAVE THESE PROBLEMS MADE IT FOR YOU TO DO YOUR WORK, TAKE CARE OF THINGS AT HOME, OR GET ALONG WITH OTHER PEOPLE: SOMEWHAT DIFFICULT
6. BECOMING EASILY ANNOYED OR IRRITABLE: SEVERAL DAYS
1. FEELING NERVOUS, ANXIOUS, OR ON EDGE: MORE THAN HALF THE DAYS
3. WORRYING TOO MUCH ABOUT DIFFERENT THINGS: SEVERAL DAYS
GAD7 TOTAL SCORE: 7
7. FEELING AFRAID AS IF SOMETHING AWFUL MIGHT HAPPEN: NOT AT ALL
4. TROUBLE RELAXING: SEVERAL DAYS
5. BEING SO RESTLESS THAT IT IS HARD TO SIT STILL: NOT AT ALL
2. NOT BEING ABLE TO STOP OR CONTROL WORRYING: MORE THAN HALF THE DAYS

## 2023-07-06 NOTE — PROGRESS NOTES
"    [facility]                                     Progress Note    Patient Name: Sommer Gomez  Date: 23         Service Type: Individual      Session Start Time: 3  Session End Time: 3:45 am    Answers for HPI/ROS submitted by the patient on 3/2/2023  If you checked off any problems, how difficult have these problems made it for you to do your work, take care of things at home, or get along with other people?: Somewhat difficult  PHQ9 TOTAL SCORE: 7       Session Length: 38-52 min    Session #: 16    Attendees: Client    Service Modality:  phone Visit:      Provider verified identity through the following two step process.  Patient provided:  Patient  and Patient address    Telemedicine Visit: The patient's condition can be safely assessed and treated via synchronous audio and visual telemedicine encounter.      Reason for Telemedicine Visit: Services only offered telehealth    Originating Site (Patient Location): Patient's home    Distant Site (Provider Location): Provider Remote Setting- Home Office    Consent:  The patient/guardian has verbally consented to: the potential risks and benefits of telemedicine (video visit) versus in person care; bill my insurance or make self-payment for services provided; and responsibility for payment of non-covered services.     Patient would like the video invitation sent by:  Text to cell phone: yes    Mode of Communication:  phone    Distant Location (Provider):  Off-site    As the provider I attest to compliance with applicable laws and regulations related to telemedicine.  The patient has been notified of the following:      \"We have found that certain health care needs can be provided without the need for a face to face visit.  This service lets us provide the care you need with a phone conversation.       I will have full access to your [facility] medical record during this entire phone call.   I will be taking notes for your medical " "record.      Since this is like an office visit, we will bill your insurance company for this service.       There are potential benefits and risks of telephone visits (e.g. limits to patient confidentiality) that differ from in-person visits.?Confidentiality still applies for telephone services, and nobody will record the visit.  It is important to be in a quiet, private space that is free of distractions (including cell phone or other devices) during the visit.??      If during the course of the call I believe a telephone visit is not appropriate, you will not be charged for this service\"     Consent has been obtained for this service by care team member: Yes          DATA  Interactive Complexity: No  Crisis: No     Diagnosis:  F41.1 generalized anxiety d/o,    F33.0, Major Depressive Disorder, recurrent , mild  F43.10 Post Traumatic Stress     Treatment plan reviewed: today ( 90 day 7-23 )   PROMIS:  1-02-23, 5-01-23      Progress Since Last Session (Related to Symptoms / Goals / Homework):   Symptoms: stable    Homework: Completed in session      Episode of Care Goals: Achieved / completed to satisfaction - ACTION (Actively working towards change); Intervened by reinforcing change plan / affirming steps taken     Current / Ongoing Stressors and Concerns:   Caring for children with nuero diversity; navigating services    Career disruption   Financial stress   Son - past suicidal  episode ( January 23 )    Has taken him to the hospital twice.     Treatment Objective(s) Addressed in This Session:     Support- parenting needs   Patient will be able to articulate core anxiety triggers and fears.  Patient will use at least 4 coping skills for anxiety management in the next 90 days .     Intervention:   CBT and  Supportive therapy.          Reviewed stressors, symptoms and skills used.    6 hours of sleep.  Lowered appetite      Client reports that there has been a recent vist   with son's mental health case " "worker.     Is trying to mange the stress of  looking into finding health insurance.  Did look into [employer].     Javier in a day tx program;  \"it's going pretty well\".    Explored skills used.  Distraction  Accessing support - text  Weighted blanket  Cleaning   Self-care:  walking with friend  got to the dentist        Identified and processed feelings.    Assessments completed prior to visit:  The following assessments were completed by patient for this visit:      ASSESSMENT: Current Emotional / Mental Status (status of significant symptoms):   Risk status (Self / Other harm or suicidal ideation)   Patient denies current fears or concerns for personal safety.   Patient denies current or recent suicidal ideation or behaviors.   Patient denies current or recent homicidal ideation or behaviors.   Patient denies current or recent self injurious behavior or ideation.   Patient denies other safety concerns.   Patient reports there has been no change in risk factors since their last session.     Patient reports there has been no change in protective factors since their last session.     A safety and risk management plan has not been developed;  Client is advised that if any change occurs to call [phone], or reports to an Emergency Department.       Appearance:   Appropriate    Eye Contact:   Good    Psychomotor Behavior: Normal    Attitude:   Cooperative    Orientation:   All   Speech    Rate / Production: Normal/ Responsive Normal     Volume:  Normal    Mood:    Anxious  Normal   Affect:    Appropriate    Thought Content:  Clear    Thought Form:  Coherent  Logical    Insight:    Good      Medication Review:   No changes to current psychiatric medication(s)     Medication Compliance:   Yes     Changes in Health Issues:   None reported     Chemical Use Review:   Substance Use: Chemical use reviewed, no active concerns identified      Tobacco Use: No current tobacco use.      Diagnosis:  F41.1 generalized anxiety d/o,    F33.0, " Major Depressive Disorder, recurrent , mild  F43.10 Post Traumatic DO     Collateral Reports Completed:   Routed note to PCP as needed.    PLAN: (Patient Tasks / Therapist Tasks / Other)  Daily use of coping skills  Past hw  Plan is to take/ make time to research health care insurance    while she is at her moms.  Past hw  client wants to allow time to heal leg- prioritize tasks  Past hw  Consider several resources on managing energy  Past hw  Attend to  work/ life balance.  Past hw  Attend to self care/ self preservation.  Past hw  Consider assessing day to day expections of self    as she moves through pre and post crisis management    for her children.  Past hw  Daily prioritizing of kids needs.    Mariely Negron, LP                                                         __________________________________________________________________    Individual Treatment Plan    Patient's Name: Sommer Gomez  YOB: 1980    Date of Creation: 1-23  Date Treatment Plan Last Reviewed/Revised: 1-23    DSM5 Diagnoses:  F41.1 generalized anxiety d/o,    F33.0, Major Depressive Disorder, recurrent , mild  F43.10 Post Traumatic DO    Psychosocial / Contextual Factors: son with depressive symptoms and ASD dx  PROMIS (reviewed every 90 days): 1-02-23,    5-01-23  Referral / Collaboration:  Referral to another professional/service is not indicated at this time..    Anticipated number of session for this episode of care: 9-12 sessions  Anticipation frequency of session: Every other week  Anticipated Duration of each session: 38-52 minutes  Treatment plan will be reviewed in 90 days or when goals have been changed.     7-06-23  The plan for the next 90 day is continue/ maintain  with these actions and cognitive behavioral tasks to develop more consistency / skill proficiency in effort to reduce symptom frequency, intensity and duration.    Daily use of coping skills  Past hw  Plan is to take/ make time to research health  "care insurance    while she is at her moms.  Past hw  client wants to allow time to heal leg- prioritize tasks  Past hw  Consider several resources on managing energy  Past hw  Attend to  work/ life balance.  Past hw  Attend to self care/ self preservation.  Past hw  Consider assessing day to day expections of self    as she moves through pre and post crisis management    for her children.  Past hw  Daily prioritizing of kids needs      4-23 The plan for the next 90 day is continue/ maintain  with these actions and cognitive behavioral tasks to develop more consistency / skill proficiency in effort to reduce symptom frequency, intensity and duration.    Want to balance family and work needs  advocate for self.  Client wants to continue to be available to her kids\.  Attend to work / life balance  Return to some level of daily self care.  Is weighing pros and cons about attending a new school.  Plan is to take it day to day.\"it is changing by the day\".  Small next steps .  Plan is to access support system daily.  Employ several coping skills daily.      Mariely Negron LP                                                             MeasurableTreatment Goal(s) related to diagnosis / functional impairment(s)  Goal 1: Patient will be able to articulate core anxiety triggers. Will use early intervention 50% of the time.    I will know I've met my goal when .  feeling effective 50% of the time.    Objective #A (Patient Action)    Patient will use at least 4 coping skills for anxiety management in the next 90 days .  Status: New - Date: 1-2023     Intervention(s)  Therapist will teach coping skills. Provide a safe place to process stress/ emotions.    .Patient has not reviewed nor agreed to the above plan.      Mariely Negron LP          Answers for HPI/ROS submitted by the patient on 1/19/2023  ROSA 7 TOTAL SCORE: 10Answers for HPI/ROS submitted by the patient on 4/4/2023  If you checked off any problems, how difficult have " these problems made it for you to do your work, take care of things at home, or get along with other people?: Somewhat difficult  PHQ9 TOTAL SCORE: 10  ROSA 7 TOTAL SCORE: 10        Answers for HPI/ROS submitted by the patient on 2/20/2023  If you checked off any problems, how difficult have these problems made it for you to do your work, take care of things at home, or get along with other people?: Somewhat difficult  PHQ9 TOTAL SCORE: 8Answers for HPI/ROS submitted by the patient on 4/17/2023  If you checked off any problems, how difficult have these problems made it for you to do your work, take care of things at home, or get along with other people?: Somewhat difficult  PHQ9 TOTAL SCORE: 8  Answers for HPI/ROS submitted by the patient on 6/8/2023  If you checked off any problems, how difficult have these problems made it for you to do your work, take care of things at home, or get along with other people?: Somewhat difficult  PHQ9 TOTAL SCORE: 6    Answers for HPI/ROS submitted by the patient on 6/22/2023  If you checked off any problems, how difficult have these problems made it for you to do your work, take care of things at home, or get along with other people?: Somewhat difficult  PHQ9 TOTAL SCORE: 9  Answers for HPI/ROS submitted by the patient on 7/6/2023  If you checked off any problems, how difficult have these problems made it for you to do your work, take care of things at home, or get along with other people?: Somewhat difficult  PHQ9 TOTAL SCORE: 6  ROSA 7 TOTAL SCORE: 7

## 2023-07-20 ENCOUNTER — VIRTUAL VISIT (OUTPATIENT)
Dept: PSYCHOLOGY | Facility: CLINIC | Age: 43
End: 2023-07-20
Payer: COMMERCIAL

## 2023-07-20 DIAGNOSIS — F43.10 POST TRAUMATIC STRESS DISORDER (PTSD): ICD-10-CM

## 2023-07-20 DIAGNOSIS — F33.0 MAJOR DEPRESSIVE DISORDER, RECURRENT, MILD (H): Primary | ICD-10-CM

## 2023-07-20 DIAGNOSIS — F41.1 GENERALIZED ANXIETY DISORDER: ICD-10-CM

## 2023-07-20 PROCEDURE — 90834 PSYTX W PT 45 MINUTES: CPT | Mod: 93 | Performed by: PSYCHOLOGIST

## 2023-07-20 NOTE — PROGRESS NOTES
"    [facility]                                     Progress Note    Patient Name: Sommer Gomez  Date: 23         Service Type: Individual      Session Start Time: 3  Session End Time: 3:45 pm    Answers for HPI/ROS submitted by the patient on 3/2/2023  If you checked off any problems, how difficult have these problems made it for you to do your work, take care of things at home, or get along with other people?: Somewhat difficult  PHQ9 TOTAL SCORE: 7       Session Length: 38-52 min    Session #: 16    Attendees: Client    Service Modality:  phone Visit:      Provider verified identity through the following two step process.  Patient provided:  Patient  and Patient address    Telemedicine Visit: The patient's condition can be safely assessed and treated via synchronous audio and visual telemedicine encounter.      Reason for Telemedicine Visit: Services only offered telehealth    Originating Site (Patient Location): Patient's home    Distant Site (Provider Location): Provider Remote Setting- Home Office    Consent:  The patient/guardian has verbally consented to: the potential risks and benefits of telemedicine (video visit) versus in person care; bill my insurance or make self-payment for services provided; and responsibility for payment of non-covered services.     Patient would like the video invitation sent by:  Text to cell phone: yes    Mode of Communication:  phone    Distant Location (Provider):  Off-site    As the provider I attest to compliance with applicable laws and regulations related to telemedicine.  The patient has been notified of the following:      \"We have found that certain health care needs can be provided without the need for a face to face visit.  This service lets us provide the care you need with a phone conversation.       I will have full access to your [facility] medical record during this entire phone call.   I will be taking notes for your medical " "record.      Since this is like an office visit, we will bill your insurance company for this service.       There are potential benefits and risks of telephone visits (e.g. limits to patient confidentiality) that differ from in-person visits.?Confidentiality still applies for telephone services, and nobody will record the visit.  It is important to be in a quiet, private space that is free of distractions (including cell phone or other devices) during the visit.??      If during the course of the call I believe a telephone visit is not appropriate, you will not be charged for this service\"     Consent has been obtained for this service by care team member: Yes          DATA  Interactive Complexity: No  Crisis: No     Diagnosis:  F41.1 generalized anxiety d/o,    F33.0, Major Depressive Disorder, recurrent , mild  F43.10 Post Traumatic Stress     Treatment plan reviewed: today ( 90 day 7-23 )   PROMIS:  1-02-23, 5-01-23      Progress Since Last Session (Related to Symptoms / Goals / Homework):   Symptoms: stable    Homework: Completed in session      Episode of Care Goals: Achieved / completed to satisfaction - ACTION (Actively working towards change); Intervened by reinforcing change plan / affirming steps taken  Explored skills used. Distraction. Accessing support - text  Weighted blanket, Cleaning.  Self-care: walking with friend, got to the dentist.     Current / Ongoing Stressors and Concerns:   Caring for children with nuero diversity; navigating services    Career disruption   Financial stress   Son - past suicidal  episode ( January 23 )    Has taken him to the hospital twice.     Treatment Objective(s) Addressed in This Session:     Support- parenting needs   Patient will be able to articulate core anxiety triggers and fears.  Patient will use at least 4 coping skills for anxiety management in the next 90 days .     Intervention:   CBT and  Supportive therapy.          Reviewed stressors, symptoms and " skills used.    Client reports additional financial   stressors- water [employer]kam[employer] s[employer]astava.  Discussed resiliency.  Shares that her worker has been very helpful   as on site support, especially in reinforcing     life skills for the children and young adults.     Discussed roles and role stress.    Identified and processed feelings.    Assessments completed prior to visit:  The following assessments were completed by patient for this visit:      ASSESSMENT: Current Emotional / Mental Status (status of significant symptoms):   Risk status (Self / Other harm or suicidal ideation)   Patient denies current fears or concerns for personal safety.   Patient denies current or recent suicidal ideation or behaviors.   Patient denies current or recent homicidal ideation or behaviors.   Patient denies current or recent self injurious behavior or ideation.   Patient denies other safety concerns.   Patient reports there has been no change in risk factors since their last session.     Patient reports there has been no change in protective factors since their last session.     A safety and risk management plan has not been developed;  Client is advised that if any change occurs to call [phone], or reports to an Emergency Department.       Appearance:   Appropriate    Eye Contact:   Good    Psychomotor Behavior: Normal    Attitude:   Cooperative    Orientation:   All   Speech    Rate / Production: Normal/ Responsive Normal     Volume:  Normal    Mood:    Anxious  Normal   Affect:    Appropriate    Thought Content:  Clear    Thought Form:  Coherent  Logical    Insight:    Good      Medication Review:   No changes to current psychiatric medication(s)     Medication Compliance:   Yes     Changes in Health Issues:   None reported     Chemical Use Review:   Substance Use: Chemical use reviewed, no active concerns identified      Tobacco Use: No current tobacco use.      Diagnosis:  F41.1 generalized anxiety d/o,    F33.0, Major Depressive Disorder,  recurrent , mild  F43.10 Post Traumatic DO     Collateral Reports Completed:   Routed note to PCP as needed.    PLAN: (Patient Tasks / Therapist Tasks / Other)  Daily use of coping skills  Past hw  Plan is to take/ make time to research health care insurance    while she is at her moms.  Past hw  client wants to allow time to heal leg- prioritize tasks  Past hw  Consider several resources on managing energy  Past hw  Attend to  work/ life balance.  Past hw  Attend to self care/ self preservation.  Past hw  Consider assessing day to day expections of self    as she moves through pre and post crisis management    for her children.  Past hw  Daily prioritizing of kids needs.    Mariely Negron, LP                                                         __________________________________________________________________    Individual Treatment Plan    Patient's Name: Sommer Gomez  YOB: 1980    Date of Creation: 1-23  Date Treatment Plan Last Reviewed/Revised: 1-23    DSM5 Diagnoses:  F41.1 generalized anxiety d/o,    F33.0, Major Depressive Disorder, recurrent , mild  F43.10 Post Traumatic DO    Psychosocial / Contextual Factors: son with depressive symptoms and ASD dx  PROMIS (reviewed every 90 days): 1-02-23,    5-01-23  Referral / Collaboration:  Referral to another professional/service is not indicated at this time..    Anticipated number of session for this episode of care: 9-12 sessions  Anticipation frequency of session: Every other week  Anticipated Duration of each session: 38-52 minutes  Treatment plan will be reviewed in 90 days or when goals have been changed.     7-06-23  The plan for the next 90 day is continue/ maintain  with these actions and cognitive behavioral tasks to develop more consistency / skill proficiency in effort to reduce symptom frequency, intensity and duration.    Daily use of coping skills  Plan is to take/ make time to research health care insurance    while she is at  "her moms.  client wants to allow time to heal leg- prioritize tasks  Consider several resources on managing energy  Attend to  work/ life balance.  Attend to self care/ self preservation.  Consider assessing day to day expections of self    as she moves through pre and post crisis management    for her children.  Daily prioritizing of kids needs      4-23 The plan for the next 90 day is continue/ maintain  with these actions and cognitive behavioral tasks to develop more consistency / skill proficiency in effort to reduce symptom frequency, intensity and duration.    Want to balance family and work needs  advocate for self.  Client wants to continue to be available to her kids\.  Attend to work / life balance  Return to some level of daily self care.  Is weighing pros and cons about attending a new school.  Plan is to take it day to day.\"it is changing by the day\".  Small next steps .  Plan is to access support system daily.  Employ several coping skills daily.      Mariely Negron LP                                                             MeasurableTreatment Goal(s) related to diagnosis / functional impairment(s)  Goal 1: Patient will be able to articulate core anxiety triggers. Will use early intervention 50% of the time.    I will know I've met my goal when .  feeling effective 50% of the time.    Objective #A (Patient Action)    Patient will use at least 4 coping skills for anxiety management in the next 90 days .  Status: New - Date: 1-2023     Intervention(s)  Therapist will teach coping skills. Provide a safe place to process stress/ emotions.    .Patient has not reviewed nor agreed to the above plan.      Mariely Negron LP          Answers for HPI/ROS submitted by the patient on 1/19/2023  ROSA 7 TOTAL SCORE: 10Answers for HPI/ROS submitted by the patient on 4/4/2023  If you checked off any problems, how difficult have these problems made it for you to do your work, take care of things at home, or get along " with other people?: Somewhat difficult  PHQ9 TOTAL SCORE: 10  ORSA 7 TOTAL SCORE: 10        Answers for HPI/ROS submitted by the patient on 2/20/2023  If you checked off any problems, how difficult have these problems made it for you to do your work, take care of things at home, or get along with other people?: Somewhat difficult  PHQ9 TOTAL SCORE: 8Answers for HPI/ROS submitted by the patient on 4/17/2023  If you checked off any problems, how difficult have these problems made it for you to do your work, take care of things at home, or get along with other people?: Somewhat difficult  PHQ9 TOTAL SCORE: 8  Answers for HPI/ROS submitted by the patient on 6/8/2023  If you checked off any problems, how difficult have these problems made it for you to do your work, take care of things at home, or get along with other people?: Somewhat difficult  PHQ9 TOTAL SCORE: 6    Answers for HPI/ROS submitted by the patient on 6/22/2023  If you checked off any problems, how difficult have these problems made it for you to do your work, take care of things at home, or get along with other people?: Somewhat difficult  PHQ9 TOTAL SCORE: 9  Answers for HPI/ROS submitted by the patient on 7/6/2023  If you checked off any problems, how difficult have these problems made it for you to do your work, take care of things at home, or get along with other people?: Somewhat difficult  PHQ9 TOTAL SCORE: 6  ROSA 7 TOTAL SCORE: 7

## 2023-08-03 ENCOUNTER — VIRTUAL VISIT (OUTPATIENT)
Dept: PSYCHOLOGY | Facility: CLINIC | Age: 43
End: 2023-08-03
Payer: COMMERCIAL

## 2023-08-03 DIAGNOSIS — F33.0 MAJOR DEPRESSIVE DISORDER, RECURRENT, MILD (H): Primary | ICD-10-CM

## 2023-08-03 DIAGNOSIS — F43.10 POST TRAUMATIC STRESS DISORDER (PTSD): ICD-10-CM

## 2023-08-03 DIAGNOSIS — F41.1 GENERALIZED ANXIETY DISORDER: ICD-10-CM

## 2023-08-03 PROCEDURE — 90834 PSYTX W PT 45 MINUTES: CPT | Mod: 95 | Performed by: PSYCHOLOGIST

## 2023-08-03 NOTE — PROGRESS NOTES
"    [facility]                                     Progress Note    Patient Name: Sommer Gomez (Jenny)  Date: 23         Service Type: Individual      Session Start Time: 3  Session End Time: 3:45 pm      Session Length: 38-52 min    Session #: 16    Attendees: Client    Service Modality:  phone Visit:      Provider verified identity through the following two step process.  Patient provided:  Patient  and Patient address    Telemedicine Visit: The patient's condition can be safely assessed and treated via synchronous audio and visual telemedicine encounter.      Reason for Telemedicine Visit: Services only offered telehealth    Originating Site (Patient Location): Patient's home    Distant Site (Provider Location): Provider Remote Setting- Home Office    Consent:  The patient/guardian has verbally consented to: the potential risks and benefits of telemedicine (video visit) versus in person care; bill my insurance or make self-payment for services provided; and responsibility for payment of non-covered services.     Patient would like the video invitation sent by:  Text to cell phone: yes    Mode of Communication:  phone    Distant Location (Provider):  Off-site    As the provider I attest to compliance with applicable laws and regulations related to telemedicine.  The patient has been notified of the following:      \"We have found that certain health care needs can be provided without the need for a face to face visit.  This service lets us provide the care you need with a phone conversation.       I will have full access to your [facility] medical record during this entire phone call.   I will be taking notes for your medical record.      Since this is like an office visit, we will bill your insurance company for this service.       There are potential benefits and risks of telephone visits (e.g. limits to patient confidentiality) that differ from in-person " "visits.?Confidentiality still applies for telephone services, and nobody will record the visit.  It is important to be in a quiet, private space that is free of distractions (including cell phone or other devices) during the visit.??      If during the course of the call I believe a telephone visit is not appropriate, you will not be charged for this service\"     Consent has been obtained for this service by care team member: Yes          DATA  Interactive Complexity: No  Crisis: No     Diagnosis:  F41.1 generalized anxiety d/o,    F33.0, Major Depressive Disorder, recurrent , mild  F43.10 Post Traumatic Stress     Treatment plan reviewed: today ( 90 day 10-23 )   PROMIS:  1-02-23, 5-01-23      Progress Since Last Session (Related to Symptoms / Goals / Homework):   Symptoms:  stable    Homework: Completed in session      Episode of Care Goals: Achieved / completed to satisfaction - ACTION (Actively working towards change); Intervened by reinforcing change plan / affirming steps taken  Explored skills used. Distraction. Accessing support - text  Weighted blanket, Cleaning.  Self-care: walking with friend, got to the dentist.     Current / Ongoing Stressors and Concerns:   Caring for children with nuero diversity; navigating services    Career disruption   Financial stress   Son - past suicidal  episode ( January 23 )    Has taken him to the hospital twice.     Treatment Objective(s) Addressed in This Session:     Support- parenting needs   Patient will be able to articulate core anxiety triggers and fears.  Patient will use at least 4 coping skills for anxiety management in the next 90 days .     Intervention:   CBT and  Supportive therapy.           Reviewed stressors, symptoms and skills used.    Client reports that she has been working with her    [facility] worker to sort possessions in the garage.  Is careful not to lift- ( guarding a back injury).    Exhausting physically but not getting triggered.    Explored " growing up- other's throwing away her things   without asking her.    Identified and processed feelings.    Assessments completed prior to visit:  The following assessments were completed by patient for this visit:      ASSESSMENT: Current Emotional / Mental Status (status of significant symptoms):   Risk status (Self / Other harm or suicidal ideation)   Patient denies current fears or concerns for personal safety.   Patient denies current or recent suicidal ideation or behaviors.   Patient denies current or recent homicidal ideation or behaviors.   Patient denies current or recent self injurious behavior or ideation.   Patient denies other safety concerns.   Patient reports there has been no change in risk factors since their last session.     Patient reports there has been no change in protective factors since their last session.     A safety and risk management plan has not been developed;  Client is advised that if any change occurs to call [phone], or reports to an Emergency Department.       Appearance:   Appropriate    Eye Contact:   Good    Psychomotor Behavior: Normal    Attitude:   Cooperative    Orientation:   All   Speech    Rate / Production: Normal/ Responsive Normal     Volume:  Normal    Mood:    Anxious  Normal   Affect:    Appropriate    Thought Content:  Clear    Thought Form:  Coherent  Logical    Insight:    Good      Medication Review:   No changes to current psychiatric medication(s)     Medication Compliance:   Yes     Changes in Health Issues:   None reported     Chemical Use Review:   Substance Use: Chemical use reviewed, no active concerns identified      Tobacco Use: No current tobacco use.      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    PLAN: (Patient Tasks / Therapist Tasks / Other)  Daily use of coping skills  Past hw  Plan is to take/ make  time to research health care insurance    while she is at her moms.  Past hw  client wants to allow time to heal leg- prioritize tasks  Past hw  Consider several resources on managing energy  Past hw  Attend to  work/ life balance.  Past hw  Attend to self care/ self preservation.  Past hw  Consider assessing day to day expections of self    as she moves through pre and post crisis management    for her children.  Past hw  Daily prioritizing of kids needs.    Mariely Negron, LP                                                         __________________________________________________________________    Individual Treatment Plan    Patient's Name: Sommer Gomez  YOB: 1980    Date of Creation: 1-23  Date Treatment Plan Last Reviewed/Revised: 1-23    DSM5 Diagnoses:  F41.1 generalized anxiety d/o,    F33.0, Major Depressive Disorder, recurrent , mild  F43.10 Post Traumatic DO    Psychosocial / Contextual Factors: son with depressive symptoms and ASD dx  PROMIS (reviewed every 90 days): 1-02-23,    5-01-23  Referral / Collaboration:  Referral to another professional/service is not indicated at this time..    Anticipated number of session for this episode of care: 9-12 sessions  Anticipation frequency of session: Every other week  Anticipated Duration of each session: 38-52 minutes  Treatment plan will be reviewed in 90 days or when goals have been changed.     7-06-23  The plan for the next 90 day is continue/ maintain  with these actions and cognitive behavioral tasks to develop more consistency / skill proficiency in effort to reduce symptom frequency, intensity and duration.    Daily use of coping skills  Plan is to take/ make time to research health care insurance    while she is at her moms.  client wants to allow time to heal leg- prioritize tasks  Consider several resources on managing energy  Attend to  work/ life balance.  Attend to self care/ self preservation.  Consider assessing day to day  "expections of self    as she moves through pre and post crisis management    for her children.  Daily prioritizing of kids needs      4-23 The plan for the next 90 day is continue/ maintain  with these actions and cognitive behavioral tasks to develop more consistency / skill proficiency in effort to reduce symptom frequency, intensity and duration.    Want to balance family and work needs  advocate for self.  Client wants to continue to be available to her kids\.  Attend to work / life balance  Return to some level of daily self care.  Is weighing pros and cons about attending a new school.  Plan is to take it day to day.\"it is changing by the day\".  Small next steps .  Plan is to access support system daily.  Employ several coping skills daily.      Mariely Negron LP                                                             MeasurableTreatment Goal(s) related to diagnosis / functional impairment(s)  Goal 1: Patient will be able to articulate core anxiety triggers. Will use early intervention 50% of the time.    I will know I've met my goal when .  feeling effective 50% of the time.    Objective #A (Patient Action)    Patient will use at least 4 coping skills for anxiety management in the next 90 days .  Status: New - Date: 1-2023      Intervention(s)  Therapist will teach  coping skills . Provide a safe place to process stress/ emotions.    .Patient has not reviewed nor agreed to the above plan.      Mariely Negron LP          PHQ9 TOTAL SCORE: 9  Answers for HPI/ROS submitted by the patient on 7/6/2023  If you checked off any problems, how difficult have these problems made it for you to do your work, take care of things at home, or get along with other people?: Somewhat difficult  PHQ9 TOTAL SCORE: 6  ROSA 7 TOTAL SCORE: 7  "

## 2023-08-17 ENCOUNTER — VIRTUAL VISIT (OUTPATIENT)
Dept: PSYCHOLOGY | Facility: CLINIC | Age: 43
End: 2023-08-17
Payer: COMMERCIAL

## 2023-08-17 DIAGNOSIS — F43.10 POST TRAUMATIC STRESS DISORDER (PTSD): ICD-10-CM

## 2023-08-17 DIAGNOSIS — F41.1 GENERALIZED ANXIETY DISORDER: ICD-10-CM

## 2023-08-17 DIAGNOSIS — F33.0 MAJOR DEPRESSIVE DISORDER, RECURRENT, MILD (H): Primary | ICD-10-CM

## 2023-08-17 PROCEDURE — 90834 PSYTX W PT 45 MINUTES: CPT | Mod: 93 | Performed by: PSYCHOLOGIST

## 2023-08-17 NOTE — PROGRESS NOTES
"    [facility]                                     Progress Note    Patient Name: Sommer Gomez (Jenny)  Date: 23         Service Type: Individual      Session Start Time: 10  Session End Time: 10:45 pm      Session Length: 38-52 min    Session #: 17    Attendees: Client    Service Modality:  phone Visit:      Provider verified identity through the following two step process.  Patient provided:  Patient  and Patient address    Telemedicine Visit: The patient's condition can be safely assessed and treated via synchronous audio and visual telemedicine encounter.      Reason for Telemedicine Visit: Services only offered telehealth    Originating Site (Patient Location): Patient's home    Distant Site (Provider Location): Provider Remote Setting- Home Office    Consent:  The patient/guardian has verbally consented to: the potential risks and benefits of telemedicine (video visit) versus in person care; bill my insurance or make self-payment for services provided; and responsibility for payment of non-covered services.     Patient would like the video invitation sent by:  Text to cell phone: yes    Mode of Communication:  phone    Distant Location (Provider):  Off-site    As the provider I attest to compliance with applicable laws and regulations related to telemedicine.  The patient has been notified of the following:      \"We have found that certain health care needs can be provided without the need for a face to face visit.  This service lets us provide the care you need with a phone conversation.       I will have full access to your [facility] medical record during this entire phone call.   I will be taking notes for your medical record.      Since this is like an office visit, we will bill your insurance company for this service.       There are potential benefits and risks of telephone visits (e.g. limits to patient confidentiality) that differ from in-person " "visits.?Confidentiality still applies for telephone services, and nobody will record the visit.  It is important to be in a quiet, private space that is free of distractions (including cell phone or other devices) during the visit.??      If during the course of the call I believe a telephone visit is not appropriate, you will not be charged for this service\"     Consent has been obtained for this service by care team member: Yes          DATA  Interactive Complexity: No  Crisis: No     Diagnosis:  F41.1 generalized anxiety d/o,    F33.0, Major Depressive Disorder, recurrent , mild  F43.10 Post Traumatic Stress     Treatment plan reviewed: today ( 90 day 10-23 )   PROMIS:  1-02-23, 5-01-23      Progress Since Last Session (Related to Symptoms / Goals / Homework):   Symptoms:  stable    Homework: Completed in session      Episode of Care Goals: Achieved / completed to satisfaction - ACTION (Actively working towards change); Intervened by reinforcing change plan / affirming steps taken  Explored skills used. Distraction. Accessing support - text  Weighted blanket, Cleaning.  Self-care: walking with friend, got to the dentist.     Current / Ongoing Stressors and Concerns:   Caring for children with nuero diversity; navigating services    Career disruption   Financial stress   Son - past suicidal  episode ( January 23 )    Has taken him to the hospital twice.     Treatment Objective(s) Addressed in This Session:     Support- parenting needs   Patient will be able to articulate core anxiety triggers and fears.  Patient will use at least 4 coping skills for anxiety management in the next 90 days .     Intervention:   CBT and  Supportive therapy.           Reviewed stressors, symptoms and skills used.    Client reports that she continues to    Work with her [facility] worker to sort possessions in the garage.  Is careful not to lift- ( guarding a back injury).    Exhausting physically but not getting triggered.    Explored work " life balance.    Identified and processed feelings.    Assessments completed prior to visit:  The following assessments were completed by patient for this visit:      ASSESSMENT: Current Emotional / Mental Status (status of significant symptoms):   Risk status (Self / Other harm or suicidal ideation)   Patient denies current fears or concerns for personal safety.   Patient denies current or recent suicidal ideation or behaviors.   Patient denies current or recent homicidal ideation or behaviors.   Patient denies current or recent self injurious behavior or ideation.   Patient denies other safety concerns.   Patient reports there has been no change in risk factors since their last session.     Patient reports there has been no change in protective factors since their last session.     A safety and risk management plan has not been developed;  Client is advised that if any change occurs to call [phone], or reports to an Emergency Department.       Appearance:   Appropriate    Eye Contact:   Good    Psychomotor Behavior: Normal    Attitude:   Cooperative    Orientation:   All   Speech    Rate / Production: Normal/ Responsive Normal     Volume:  Normal    Mood:    Anxious  Normal   Affect:    Appropriate    Thought Content:  Clear    Thought Form:  Coherent  Logical    Insight:    Good      Medication Review:   No changes to current psychiatric medication(s)     Medication Compliance:   Yes     Changes in Health Issues:   None reported     Chemical Use Review:   Substance Use: Chemical use reviewed, no active concerns identified      Tobacco Use: No current tobacco use.      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    PLAN: (Patient Tasks / Therapist Tasks / Other)      Daily use of coping skills  Past hw  Plan is to take/ make time to research health care insurance    while she  is at her moms.  Past hw  client wants to allow time to heal leg- prioritize tasks  Past hw  Consider several resources on managing energy  Past hw  Attend to  work/ life balance.  Past hw  Attend to self care/ self preservation.  Past hw  Consider assessing day to day expections of self    as she moves through pre and post crisis management    for her children.  Past hw  Daily prioritizing of kids needs.    Mariely Negron, LP                                                         __________________________________________________________________    Individual Treatment Plan    Patient's Name: Sommer Gomez  YOB: 1980    Date of Creation: 1-23  Date Treatment Plan Last Reviewed/Revised: 1-23    DSM5 Diagnoses:  F41.1 generalized anxiety d/o,    F33.0, Major Depressive Disorder, recurrent , mild  F43.10 Post Traumatic DO    Psychosocial / Contextual Factors: son with depressive symptoms and ASD dx  PROMIS (reviewed every 90 days): 1-02-23,    5-01-23  Referral / Collaboration:  Referral to another professional/service is not indicated at this time..    Anticipated number of session for this episode of care: 9-12 sessions  Anticipation frequency of session: Every other week  Anticipated Duration of each session: 38-52 minutes  Treatment plan will be reviewed in 90 days or when goals have been changed.     7-06-23  The plan for the next 90 day is continue/ maintain  with these actions and cognitive behavioral tasks to develop more consistency / skill proficiency in effort to reduce symptom frequency, intensity and duration.    Daily use of coping skills  Plan is to take/ make time to research health care insurance    while she is at her moms.  client wants to allow time to heal leg- prioritize tasks  Consider several resources on managing energy  Attend to  work/ life balance.  Attend to self care/ self preservation.  Consider assessing day to day expections of self    as she moves through pre and  "post crisis management    for her children.  Daily prioritizing of kids needs      4-23 The plan for the next 90 day is continue/ maintain  with these actions and cognitive behavioral tasks to develop more consistency / skill proficiency in effort to reduce symptom frequency, intensity and duration.    Want to balance family and work needs  advocate for self.  Client wants to continue to be available to her kids\.  Attend to work / life balance  Return to some level of daily self care.  Is weighing pros and cons about attending a new school.  Plan is to take it day to day.\"it is changing by the day\".  Small next steps .  Plan is to access support system daily.  Employ several coping skills daily.      Mariely Negron LP                                                             MeasurableTreatment Goal(s) related to diagnosis / functional impairment(s)  Goal 1: Patient will be able to articulate core anxiety triggers. Will use early intervention 50% of the time.    I will know I've met my goal when .  feeling effective 50% of the time.    Objective #A (Patient Action)    Patient will use at least 4 coping skills for anxiety management in the next 90 days .  Status: New - Date: 1-2023      Intervention(s)  Therapist will teach  coping skills . Provide a safe place to process stress/ emotions.    .Patient has not reviewed nor agreed to the above plan.      Mariely Nergon LP          PHQ9 TOTAL SCORE: 9  Answers for HPI/ROS submitted by the patient on 7/6/2023  If you checked off any problems, how difficult have these problems made it for you to do your work, take care of things at home, or get along with other people?: Somewhat difficult  PHQ9 TOTAL SCORE: 6  ROSA 7 TOTAL SCORE: 7  "

## 2023-08-29 ENCOUNTER — VIRTUAL VISIT (OUTPATIENT)
Dept: PSYCHOLOGY | Facility: CLINIC | Age: 43
End: 2023-08-29
Payer: COMMERCIAL

## 2023-08-29 DIAGNOSIS — F33.0 MAJOR DEPRESSIVE DISORDER, RECURRENT, MILD (H): Primary | ICD-10-CM

## 2023-08-29 DIAGNOSIS — F43.10 POST TRAUMATIC STRESS DISORDER (PTSD): ICD-10-CM

## 2023-08-29 DIAGNOSIS — F41.1 GENERALIZED ANXIETY DISORDER: ICD-10-CM

## 2023-08-29 PROCEDURE — 90834 PSYTX W PT 45 MINUTES: CPT | Mod: 93 | Performed by: PSYCHOLOGIST

## 2023-08-29 NOTE — PROGRESS NOTES
"    [facility]                                     Progress Note    Patient Name: Sommer Gomez (Jenny)  Date: 23         Service Type: Individual      Session Start Time: 11:32 am  Session End Time: 12:15 am      Session Length: 38-52 min    Session #: 17    Attendees: Client    Service Modality:  phone Visit:      Provider verified identity through the following two step process.  Patient provided:  Patient  and Patient address    Telemedicine Visit: The patient's condition can be safely assessed and treated via synchronous audio and visual telemedicine encounter.      Reason for Telemedicine Visit: Services only offered telehealth    Originating Site (Patient Location): Patient's home    Distant Site (Provider Location): Provider Remote Setting- Home Office    Consent:  The patient/guardian has verbally consented to: the potential risks and benefits of telemedicine (video visit) versus in person care; bill my insurance or make self-payment for services provided; and responsibility for payment of non-covered services.     Patient would like the video invitation sent by:  Text to cell phone: yes    Mode of Communication:  phone    Distant Location (Provider):  Off-site    As the provider I attest to compliance with applicable laws and regulations related to telemedicine.  The patient has been notified of the following:      \"We have found that certain health care needs can be provided without the need for a face to face visit.  This service lets us provide the care you need with a phone conversation.       I will have full access to your [facility] medical record during this entire phone call.   I will be taking notes for your medical record.      Since this is like an office visit, we will bill your insurance company for this service.       There are potential benefits and risks of telephone visits (e.g. limits to patient confidentiality) that differ from in-person " "visits.?Confidentiality still applies for telephone services, and nobody will record the visit.  It is important to be in a quiet, private space that is free of distractions (including cell phone or other devices) during the visit.??      If during the course of the call I believe a telephone visit is not appropriate, you will not be charged for this service\"     Consent has been obtained for this service by care team member: Yes          DATA  Interactive Complexity: No  Crisis: No     Diagnosis:  F41.1 generalized anxiety d/o,    F33.0, Major Depressive Disorder, recurrent , mild  F43.10 Post Traumatic Stress     Treatment plan reviewed: today ( 90 day 10-23 )   PROMIS:  1-02-23, 5-01-23, 8-29-23      Progress Since Last Session (Related to Symptoms / Goals / Homework):   Symptoms:  stable    Homework: Completed in session      Episode of Care Goals: Achieved / completed to satisfaction - ACTION (Actively working towards change); Intervened by reinforcing change plan / affirming steps taken  Explored skills used. Distraction. Accessing support - text  Weighted blanket, Cleaning.  Self-care: walking with friend, got to the dentist.     Current / Ongoing Stressors and Concerns:   Caring for children with nuero diversity; navigating services    Career disruption   Financial stress   Son - past suicidal  episode ( January 23 )    Has taken him to the hospital twice.     Treatment Objective(s) Addressed in This Session:   Review treatment planning.  Reviewed stressors, symptoms and skills used.  Support- parenting needs        Intervention:   CBT and  Supportive therapy.       PROMIS and mood scales updated    It's been about 9 months since her child Javier's mental health crisis:  Meds are helping, sleep got better with trazedone.  Not in school for now  \"I had 6 mental professional tell me \" ..  Javier need to have time to recover. Client may home school   them this year.     Reviewed client's roles and needs.   Client " able to get a lot done in last 8 months  ( Having downsized work hours).  Feels that she is in a good place and can step    down from individual therapy. Plan is to schedule   as needs arise. Medications management is in place.      Identified and processed feelings.    Assessments completed prior to visit:  The following assessments were completed by patient for this visit:      ASSESSMENT: Current Emotional / Mental Status (status of significant symptoms):   Risk status (Self / Other harm or suicidal ideation)   Patient denies current fears or concerns for personal safety.   Patient denies current or recent suicidal ideation or behaviors.   Patient denies current or recent homicidal ideation or behaviors.   Patient denies current or recent self injurious behavior or ideation.   Patient denies other safety concerns.   Patient reports there has been no change in risk factors since their last session.     Patient reports there has been no change in protective factors since their last session.     A safety and risk management plan has not been developed;  Client is advised that if any change occurs to call [phone], or reports to an Emergency Department.       Appearance:   Appropriate    Eye Contact:   Good    Psychomotor Behavior: Normal    Attitude:   Cooperative    Orientation:   All   Speech    Rate / Production: Normal/ Responsive Normal     Volume:  Normal    Mood:    Anxious  Normal   Affect:    Appropriate    Thought Content:  Clear    Thought Form:  Coherent  Logical    Insight:    Good      Medication Review:   No changes to current psychiatric medication(s)     Medication Compliance:   Yes     Changes in Health Issues:   None reported     Chemical Use Review:   Substance Use: Chemical use reviewed, no active concerns identified      Tobacco Use: No current tobacco use.      Diagnosis:  F41.1 generalized anxiety d/o,    F33.0, Major Depressive Disorder, recurrent , mild  F43.10 Post Traumatic DO     Collateral  Reports Completed:   Routed note to PCP as needed.    PLAN: (Patient Tasks / Therapist Tasks / Other)  Return as needed.  Past hw  Daily use of coping skills  Past hw  Plan is to take/ make time to research health care insurance    while she is at her moms.  Past hw  client wants to allow time to heal leg- prioritize tasks  Past hw  Consider several resources on managing energy  Past hw  Attend to  work/ life balance.  Past hw  Attend to self care/ self preservation.  Past hw  Consider assessing day to day expections of self    as she moves through pre and post crisis management    for her children.  Past hw  Daily prioritizing of kids needs.    Mariely Negron, LP                                                         __________________________________________________________________    Individual Treatment Plan    Patient's Name: Sommer Gomez  YOB: 1980    Date of Creation: 1-23  Date Treatment Plan Last Reviewed/Revised: 8-23    DSM5 Diagnoses:  F41.1 generalized anxiety d/o,    F33.0, Major Depressive Disorder, recurrent , mild  F43.10 Post Traumatic DO    Psychosocial / Contextual Factors: son with depressive symptoms and ASD dx  PROMIS (reviewed every 90 days): 1-02-23,    5-01-23 8-29-2-3  Referral / Collaboration:  Referral to another professional/service is not indicated at this time..    Anticipated number of session for this episode of care: 9-12 sessions  Anticipation frequency of session: Every other week  Anticipated Duration of each session: 38-52 minutes  Treatment plan will be reviewed in 90 days or when goals have been changed.     7-06-23  The plan for the next 90 day is continue/ maintain  with these actions and cognitive behavioral tasks to develop more consistency / skill proficiency in effort to reduce symptom frequency, intensity and duration.    Daily use of coping skills  Plan is to take/ make time to research health care insurance    while she is at her moms.  client  wants to allow time to heal leg- prioritize tasks  Consider several resources on managing energy  Attend to  work/ life balance.  Attend to self care/ self preservation.  Consider assessing day to day expections of self    as she moves through pre and post crisis management    for her children.  Daily prioritizing of kids needs        Mariely Negron LP                                                             MeasurableTreatment Goal(s) related to diagnosis / functional impairment(s)  Goal 1: Patient will be able to articulate core anxiety / stress  triggers. Will use early intervention 60% of the time.    I will know I've met my goal when .  feeling effective over 60% of the time.    Objective #A (Patient Action)    Patient will use at least 4 coping skills for anxiety management in the next 90 days .  Status: New - Date: 1-2023      Intervention(s)  Therapist will teach or reinforce   coping skills . Provide a safe place to process stress/ emotions.    .Patient has not reviewed nor agreed to the above plan.      Mariely Negron LP          PHQ9 TOTAL SCORE: 9  Answers for HPI/ROS submitted by the patient on 7/6/2023  If you checked off any problems, how difficult have these problems made it for you to do your work, take care of things at home, or get along with other people?: Somewhat difficult  PHQ9 TOTAL SCORE: 6  ROSA 7 TOTAL SCORE: 7

## 2023-12-26 ENCOUNTER — FCC EXTENDED DOCUMENTATION (OUTPATIENT)
Dept: PSYCHOLOGY | Facility: CLINIC | Age: 43
End: 2023-12-26
Payer: COMMERCIAL

## 2023-12-26 NOTE — PROGRESS NOTES
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  WHODAS 2.0 (12 item) Score: na          Presenting Concern:  Stress and mood disruption      Reason for Discharge:  Goals completed  .    Disposition at Time of Last Encounter:   Comments:   Feels that she is in a good place and can step    down from individual therapy. Plan is to schedule   as needs arise. Medications management is in place.     Risk Management:   Client denies a history of suicidal ideation, suicide attempts, self-injurious behavior, homicidal ideation, homicidal behavior, and and other safety concerns         Referred To:  none        Mariely Negron LP   12/26/2023

## 2024-02-03 ENCOUNTER — HEALTH MAINTENANCE LETTER (OUTPATIENT)
Age: 44
End: 2024-02-03

## 2024-07-08 ENCOUNTER — ANCILLARY PROCEDURE (OUTPATIENT)
Dept: GENERAL RADIOLOGY | Facility: CLINIC | Age: 44
End: 2024-07-08
Attending: PEDIATRICS
Payer: COMMERCIAL

## 2024-07-08 ENCOUNTER — OFFICE VISIT (OUTPATIENT)
Dept: ORTHOPEDICS | Facility: CLINIC | Age: 44
End: 2024-07-08
Payer: COMMERCIAL

## 2024-07-08 DIAGNOSIS — S93.491A SPRAIN OF OTHER LIGAMENT OF RIGHT ANKLE, INITIAL ENCOUNTER: Primary | ICD-10-CM

## 2024-07-08 PROCEDURE — 99203 OFFICE O/P NEW LOW 30 MIN: CPT | Performed by: PEDIATRICS

## 2024-07-08 PROCEDURE — 73610 X-RAY EXAM OF ANKLE: CPT | Mod: TC | Performed by: RADIOLOGY

## 2024-07-08 NOTE — PATIENT INSTRUCTIONS
Discussed the likely injuries associated with a lateral ankle sprain and typical treatment including rest from irritating activities and pain free weight bearing - ankle brace.  Patient will need a gradual rehabilitation program focusing on range of motion, strengthening and proprioception - refer to PT.  Discussed a gradual return to sports and activities once pain free, no swelling, full range of motion and full strength.  Follow up at this time is only as needed - if symptoms do not improve.  Recommended ankle brace use with activities for at least 6 months post injury.     Plan:  - Today's Plan of Care:  Ankle bracing  Continue with relative rest and activity modification, Ice, Compression, and Elevation.  Can apply ice 10-15 minutes 3-4 times per day as needed. OTC medications as needed.    Home Exercise Program   Referral to physical therapy    -We also discussed other future treatment options:  MRI if not improving    Follow Up: 6 - 8 weeks and as needed    If you have any further questions for your physician or physician s care team you can call [phone].

## 2024-07-08 NOTE — LETTER
7/8/2024      Sommre Gomez  [address]  [facility] [address]      Dear Colleague,    Thank you for referring your patient, Sommer Gomez, to the [facility]. Please see a copy of my visit note below.    ASSESSMENT & PLAN    Myriam was seen today for pain.    Diagnoses and all orders for this visit:    Sprain of other ligament of right ankle, initial encounter  -     XR Ankle Right G/E 3 Views  -     Physical Therapy  Referral; Future      This issue is acute and Unchanged.      ICD-10-CM    1. Sprain of other ligament of right ankle, initial encounter  S93.491A XR Ankle Right G/E 3 Views     Physical Therapy  Referral          Discussed the likely injuries associated with a lateral ankle sprain and typical treatment including rest from irritating activities and pain free weight bearing - ankle brace.  Patient will need a gradual rehabilitation program focusing on range of motion, strengthening and proprioception - refer to PT.  Discussed a gradual return to sports and activities once pain free, no swelling, full range of motion and full strength.  Follow up at this time is only as needed - if symptoms do not improve.  Recommended ankle brace use with activities for at least 6 months post injury.     Plan:  - Today's Plan of Care:  Ankle bracing  Continue with relative rest and activity modification, Ice, Compression, and Elevation.  Can apply ice 10-15 minutes 3-4 times per day as needed. OTC medications as needed.    Home Exercise Program   Referral to physical therapy    -We also discussed other future treatment options:  MRI if not improving    Follow Up: 6 - 8 weeks and as needed    Concerning signs and symptoms were reviewed and all questions were answered at this time.    Sarah Reynoso MD [facility]  Sports Medicine Physician  [facility]    -----  Chief Complaint   Patient presents with     Right Ankle - Pain        SUBJECTIVE  Sommer Gomez is a/an 43 year old female who is seen as a self referral for evaluation of right ankle.     The patient is seen by themselves.    Onset: 3 week(s) ago. Patient describes injury as while walking and turned her ankle on a cobblestone while in [place]  Location of Pain: right ankle, lateral malleolus, lateral ankle  Worsened by: walking, driving, wrong shoes, doing too much   Better with: nothing   Treatments tried: elevation, ice, Tylenol, and wrapping   Associated symptoms: swelling, numbness, weakness of right ankle, and feeling of instability    Orthopedic/Surgical history: yes, ankle sprain, long time ago   Social History/Occupation: working as a [employer]      REVIEW OF SYSTEMS:  Review of Systems    OBJECTIVE:  There were no vitals taken for this visit.   General: healthy, alert and in no distress  Skin: no suspicious lesions or rash.  CV: distal perfusion intact   Resp: normal respiratory effort without conversational dyspnea   Psych: normal mood and affect  Gait: NORMAL  Neuro: Normal light sensory exam of lower extremity    Right Foot and Ankle Exam:    Inspection:       edema noted mild right lateral ankle    Foot inspection:       no deformity noted    Tender:       Lateral ankle ligaments, mild anterior and medial ankle    Non-Tender:       remainder of ankle and foot right    ROM:        limited ankle ROM in dorsiflexion, plantarflexion, inversion and eversion right    Strength:       ankle dorsiflexion 5/5 right       plantarflexion 5/5 right       inversion 5/5 right       eversion 5/5 right    Special Tests:       positive (+) anterior drawer right       neg (-) talar tilt right       neg (-) external rotation testing right       neg (-) squeeze test right    Gait:       normal    Lower extremity alignment:       Normal    Neurovascular:       2+ peripheral pulses bilaterally and brisk capillary refill       sensation grossly intact      RADIOLOGY:  Final results and  radiologist's interpretation, available in the [facility] health record.  Images were reviewed with the patient in the office today.  My personal interpretation of the performed imaging:    3 XR views of right ankle reviewed: no acute bony abnormality, no significant degenerative change  - will follow official read      Review of the result(s) of each unique test - XR             Again, thank you for allowing me to participate in the care of your patient.        Sincerely,        Sarah Reynoso MD

## 2024-07-08 NOTE — PROGRESS NOTES
ASSESSMENT & PLAN    Myriam was seen today for pain.    Diagnoses and all orders for this visit:    Sprain of other ligament of right ankle, initial encounter  -     XR Ankle Right G/E 3 Views  -     Physical Therapy  Referral; Future      This issue is acute and Unchanged.      ICD-10-CM    1. Sprain of other ligament of right ankle, initial encounter  S93.491A XR Ankle Right G/E 3 Views     Physical Therapy  Referral          Discussed the likely injuries associated with a lateral ankle sprain and typical treatment including rest from irritating activities and pain free weight bearing - ankle brace.  Patient will need a gradual rehabilitation program focusing on range of motion, strengthening and proprioception - refer to PT.  Discussed a gradual return to sports and activities once pain free, no swelling, full range of motion and full strength.  Follow up at this time is only as needed - if symptoms do not improve.  Recommended ankle brace use with activities for at least 6 months post injury.     Plan:  - Today's Plan of Care:  Ankle bracing  Continue with relative rest and activity modification, Ice, Compression, and Elevation.  Can apply ice 10-15 minutes 3-4 times per day as needed. OTC medications as needed.    Home Exercise Program   Referral to physical therapy    -We also discussed other future treatment options:  MRI if not improving    Follow Up: 6 - 8 weeks and as needed    Concerning signs and symptoms were reviewed and all questions were answered at this time.    Sarah Reynoso MD [facility]  Sports Medicine Physician  [facility]    -----  Chief Complaint   Patient presents with    Right Ankle - Pain       SUBJECTIVE  Sommer Gomez is a/an 43 year old female who is seen as a self referral for evaluation of right ankle.     The patient is seen by themselves.    Onset: 3 week(s) ago. Patient describes injury as while walking and turned her ankle on a cobblestone while  in [place]  Location of Pain: right ankle, lateral malleolus, lateral ankle  Worsened by: walking, driving, wrong shoes, doing too much   Better with: nothing   Treatments tried: elevation, ice, Tylenol, and wrapping   Associated symptoms: swelling, numbness, weakness of right ankle, and feeling of instability    Orthopedic/Surgical history: yes, ankle sprain, long time ago   Social History/Occupation: working as a nanny      REVIEW OF SYSTEMS:  Review of Systems    OBJECTIVE:  There were no vitals taken for this visit.   General: healthy, alert and in no distress  Skin: no suspicious lesions or rash.  CV: distal perfusion intact   Resp: normal respiratory effort without conversational dyspnea   Psych: normal mood and affect  Gait: NORMAL  Neuro: Normal light sensory exam of lower extremity    Right Foot and Ankle Exam:    Inspection:       edema noted mild right lateral ankle    Foot inspection:       no deformity noted    Tender:       Lateral ankle ligaments, mild anterior and medial ankle    Non-Tender:       remainder of ankle and foot right    ROM:        limited ankle ROM in dorsiflexion, plantarflexion, inversion and eversion right    Strength:       ankle dorsiflexion 5/5 right       plantarflexion 5/5 right       inversion 5/5 right       eversion 5/5 right    Special Tests:       positive (+) anterior drawer right       neg (-) talar tilt right       neg (-) external rotation testing right       neg (-) squeeze test right    Gait:       normal    Lower extremity alignment:       Normal    Neurovascular:       2+ peripheral pulses bilaterally and brisk capillary refill       sensation grossly intact      RADIOLOGY:  Final results and radiologist's interpretation, available in the [facility] health record.  Images were reviewed with the patient in the office today.  My personal interpretation of the performed imaging:    3 XR views of right ankle reviewed: no acute bony abnormality, no significant degenerative  change  - will follow official read      Review of the result(s) of each unique test - XR

## 2024-07-10 ENCOUNTER — THERAPY VISIT (OUTPATIENT)
Dept: PHYSICAL THERAPY | Facility: CLINIC | Age: 44
End: 2024-07-10
Attending: PEDIATRICS
Payer: COMMERCIAL

## 2024-07-10 DIAGNOSIS — S93.491A SPRAIN OF OTHER LIGAMENT OF RIGHT ANKLE, INITIAL ENCOUNTER: ICD-10-CM

## 2024-07-10 PROCEDURE — 97161 PT EVAL LOW COMPLEX 20 MIN: CPT | Mod: GP | Performed by: PHYSICAL THERAPIST

## 2024-07-10 PROCEDURE — 97110 THERAPEUTIC EXERCISES: CPT | Mod: GP | Performed by: PHYSICAL THERAPIST

## 2024-07-10 PROCEDURE — 97010 HOT OR COLD PACKS THERAPY: CPT | Mod: GP | Performed by: PHYSICAL THERAPIST

## 2024-07-10 ASSESSMENT — ACTIVITIES OF DAILY LIVING (ADL)
SQUATTING: MODERATE DIFFICULTY
YOUR_USUAL_HOBBIES,_RECREATIONAL_OR_SPORTING_ACTIVITIES: QUITE A BIT OF DIFFICULTY
PLEASE_INDICATE_YOR_PRIMARY_REASON_FOR_REFERRAL_TO_THERAPY:: FOOT AND/OR ANKLE
PERFORMING_LIGHT_ACTIVITIES_AROUND_YOUR_HOME: A LITTLE BIT OF DIFFICULTY
GETTING_INTO_AND_OUT_OF_A_BATH: MODERATE DIFFICULTY
PERFORMING_HEAVY_ACTIVITIES_AROUND_YOUR_HOME: QUITE A BIT OF DIFFICULTY
WALKING_A_MILE: A LITTLE BIT OF DIFFICULTY
MAKING_SHARP_TURNS_WHILE_RUNNING_FAST: EXTREME DIFFICULTY OR UNABLE TO PERFORM ACTIVITY
ANY_OF_YOUR_USUAL_WORK,_HOUSEWORK_OR_SCHOOL_ACTIVITIES: MODERATE DIFFICULTY
RUNNING_ON_EVEN_GROUND: EXTREME DIFFICULTY OR UNABLE TO PERFORM ACTIVITY
GOING_UP_OR_DOWN_10_STAIRS: MODERATE DIFFICULTY
ROLLING_OVER_IN_BED: A LITTLE BIT OF DIFFICULTY
GETTING_INTO_OR_OUT_OF_A_CAR: MODERATE DIFFICULTY
STANDING_FOR_1_HOUR: A LITTLE BIT OF DIFFICULTY
LEFS_RAW_SCORE: 0
SHOPPING: EXTREME DIFFICULTY OR UNABLE TO PERFORM ACTIVITY
WALKING_BETWEEN_ROOMS: A LITTLE BIT OF DIFFICULTY
SITTING_FOR_1_HOUR: A LITTLE BIT OF DIFFICULTY
WALKING_2_BLOCKS: A LITTLE BIT OF DIFFICULTY
PUTTING_ON_YOUR_SHOES_OR_SOCKS: A LITTLE BIT OF DIFFICULTY
LEFS_SCORE(%): 0
RUNNING_ON_UNEVEN_GROUND: EXTREME DIFFICULTY OR UNABLE TO PERFORM ACTIVITY
LIFTING_AN_OBJECT,_LIKE_A_BAG_OF_GROCERIES_FROM_THE_FLOOR: NO DIFFICULTY

## 2024-07-10 NOTE — PROGRESS NOTES
PHYSICAL THERAPY EVALUATION  Type of Visit: Evaluation      Pt presents to PT with 3 wk history of R ankle sprain when she stepped wrong while walking in [place], she later fell coming down stairs onto same side. She iced and elevated which allowed her to finish her trip and saw MD here once arrived back and was referred to PT.       Fall Risk Screen:  Fall screen completed by: PT  Have you fallen 2 or more times in the past year?: No  Have you fallen and had an injury in the past year?: Yes  Is patient a fall risk?: No    Subjective       Presenting condition or subjective complaint: right ankle sprain  Date of onset: 06/19/24    Relevant medical history: Asthma; Depression; Overweight   Dates & types of surgery: n/a    Prior diagnostic imaging/testing results: X-ray     Prior therapy history for the same diagnosis, illness or injury: No      Prior Level of Function  Transfers:   Ambulation:   ADL:   IADL:     Living Environment  Social support: With family members   Type of home: House; 2-story; Basement   Stairs to enter the home: Yes 4 Is there a railing: No     Ramp: No   Stairs inside the home: Yes 20 Is there a railing: Yes     Help at home: None  Equipment owned:       Employment: Yes   Hobbies/Interests: [employer]    Patient goals for therapy: regular activities without pain    Pain assessment: pain in R anterolateral ankle with walking, stairs     Objective   FOOT/ANKLE EVALUATION  PAIN:   INTEGUMENTARY (edema, incisions):   POSTURE:   GAIT:   Weightbearing Status: WBAT  Assistive Device(s): None  Gait Deviations: Antalgic, wide based, decreased stance time on R, reciprocal pattern on stairs ascent and descent without handrail and some pain with descent  BALANCE/PROPRIOCEPTION:   WEIGHT BEARING ALIGNMENT:   NON-WEIGHTBEARING ALIGNMENT:    ROM: AROM ankle L DF: 10degs PF: 70degs INV:42degs EV: 12degs. R DF: 4degs PF: 58degs INV: 28degs EV:8degs   STRENGTH: LLE normal 5/5 all planes, RLE normal  5/5 except ankle eversion 4/5 and painful  FLEXIBILITY:   SPECIAL TESTS: + varus test on R  FUNCTIONAL TESTS: pain with some difficulty descending stairs  PALPATION: TTP R ATFL  JOINT MOBILITY: LLE normal TC, MT, ST joints, limted R TC, ST, MT    Assessment & Plan   CLINICAL IMPRESSIONS  Medical Diagnosis: R ankle sprain    Treatment Diagnosis: R ankle sprain   Impression/Assessment: Patient is a 43 year old female with R ankle pain complaints.  The following significant findings have been identified: Pain, Decreased ROM/flexibility, Decreased joint mobility, Decreased strength, Impaired balance, and Impaired gait. These impairments interfere with their ability to perform self care tasks, work tasks, recreational activities, household chores, driving , household mobility, and community mobility as compared to previous level of function.     Clinical Decision Making (Complexity):  Clinical Presentation: Stable/Uncomplicated  Clinical Presentation Rationale: based on medical and personal factors listed in PT evaluation  Clinical Decision Making (Complexity): Low complexity    PLAN OF CARE  Treatment Interventions:  Modalities: Cryotherapy  Interventions: Gait Training, Manual Therapy, Neuromuscular Re-education, Therapeutic Activity, Therapeutic Exercise    Long Term Goals     PT Goal 1  Goal Identifier: walking  Goal Description: pt will be able to walk 30mins painfree including stairs ascent and descent of stairs with reciprocal pattern painfree.  Rationale: to maximize safety and independence within the community;to maximize safety and independence within the home  Target Date: 10/02/24      Frequency of Treatment: 1x/wk  Duration of Treatment: 12wks    Recommended Referrals to Other Professionals:   Education Assessment:   Learner/Method: Patient;Demonstration;Pictures/Video  Education Comments: ankle anatomy and mechanics, activity modification, rehab progression and expectations    Risks and benefits of  evaluation/treatment have been explained.   Patient/Family/caregiver agrees with Plan of Care.     Evaluation Time:     PT Eval, Low Complexity Minutes (57342): 15       Signing Clinician: FRANCIS Vines [facility]                                                                                   OUTPATIENT PHYSICAL THERAPY      PLAN OF TREATMENT FOR OUTPATIENT REHABILITATION   Patient's Last Name, First Name, Sommer Archuleta YOB: 1980   Provider's Name   [facility]   Medical Record No.  [id number]     Onset Date: 06/19/24  Start of Care Date: 07/10/24     Medical Diagnosis:  R ankle sprain      PT Treatment Diagnosis:  R ankle sprain Plan of Treatment  Frequency/Duration: 1x/wk/ 12wks    Certification date from 07/10/24 to 10/02/24         See note for plan of treatment details and functional goals     Doc Rainey PT                         I CERTIFY THE NEED FOR THESE SERVICES FURNISHED UNDER        THIS PLAN OF TREATMENT AND WHILE UNDER MY CARE     (Physician attestation of this document indicates review and certification of the therapy plan).              Referring Provider:  Sarah Reynoso    Initial Assessment  See Epic Evaluation- Start of Care Date: 07/10/24

## 2024-07-30 ENCOUNTER — THERAPY VISIT (OUTPATIENT)
Dept: PHYSICAL THERAPY | Facility: CLINIC | Age: 44
End: 2024-07-30
Attending: PEDIATRICS
Payer: COMMERCIAL

## 2024-07-30 DIAGNOSIS — S93.491A SPRAIN OF OTHER LIGAMENT OF RIGHT ANKLE, INITIAL ENCOUNTER: Primary | ICD-10-CM

## 2024-07-30 PROCEDURE — 97110 THERAPEUTIC EXERCISES: CPT | Mod: GP | Performed by: PHYSICAL THERAPIST

## 2024-08-06 ENCOUNTER — OFFICE VISIT (OUTPATIENT)
Dept: FAMILY MEDICINE | Facility: CLINIC | Age: 44
End: 2024-08-06
Attending: FAMILY MEDICINE
Payer: COMMERCIAL

## 2024-08-06 VITALS
DIASTOLIC BLOOD PRESSURE: 68 MMHG | HEIGHT: 68 IN | SYSTOLIC BLOOD PRESSURE: 100 MMHG | HEART RATE: 92 BPM | WEIGHT: 186.6 LBS | TEMPERATURE: 97.3 F | BODY MASS INDEX: 28.28 KG/M2 | OXYGEN SATURATION: 100 % | RESPIRATION RATE: 16 BRPM

## 2024-08-06 DIAGNOSIS — Z12.31 VISIT FOR SCREENING MAMMOGRAM: ICD-10-CM

## 2024-08-06 DIAGNOSIS — J30.2 SEASONAL ALLERGIC RHINITIS, UNSPECIFIED TRIGGER: ICD-10-CM

## 2024-08-06 DIAGNOSIS — R20.0 NUMBNESS AND TINGLING IN LEFT HAND: ICD-10-CM

## 2024-08-06 DIAGNOSIS — Z13.1 SCREENING FOR DIABETES MELLITUS: ICD-10-CM

## 2024-08-06 DIAGNOSIS — F33.0 MILD RECURRENT MAJOR DEPRESSION (H): ICD-10-CM

## 2024-08-06 DIAGNOSIS — Z13.220 LIPID SCREENING: ICD-10-CM

## 2024-08-06 DIAGNOSIS — K21.9 GASTROESOPHAGEAL REFLUX DISEASE WITHOUT ESOPHAGITIS: ICD-10-CM

## 2024-08-06 DIAGNOSIS — R20.2 NUMBNESS AND TINGLING IN LEFT HAND: ICD-10-CM

## 2024-08-06 DIAGNOSIS — Z00.00 ROUTINE GENERAL MEDICAL EXAMINATION AT A HEALTH CARE FACILITY: Primary | ICD-10-CM

## 2024-08-06 DIAGNOSIS — H91.93 DECREASED HEARING OF BOTH EARS: ICD-10-CM

## 2024-08-06 PROCEDURE — 90471 IMMUNIZATION ADMIN: CPT | Performed by: FAMILY MEDICINE

## 2024-08-06 PROCEDURE — 80061 LIPID PANEL: CPT | Performed by: FAMILY MEDICINE

## 2024-08-06 PROCEDURE — 82607 VITAMIN B-12: CPT | Performed by: FAMILY MEDICINE

## 2024-08-06 PROCEDURE — 36415 COLL VENOUS BLD VENIPUNCTURE: CPT | Performed by: FAMILY MEDICINE

## 2024-08-06 PROCEDURE — 80048 BASIC METABOLIC PNL TOTAL CA: CPT | Performed by: FAMILY MEDICINE

## 2024-08-06 PROCEDURE — 84443 ASSAY THYROID STIM HORMONE: CPT | Performed by: FAMILY MEDICINE

## 2024-08-06 PROCEDURE — 99214 OFFICE O/P EST MOD 30 MIN: CPT | Mod: 25 | Performed by: FAMILY MEDICINE

## 2024-08-06 PROCEDURE — 99396 PREV VISIT EST AGE 40-64: CPT | Mod: 25 | Performed by: FAMILY MEDICINE

## 2024-08-06 PROCEDURE — 90677 PCV20 VACCINE IM: CPT | Performed by: FAMILY MEDICINE

## 2024-08-06 RX ORDER — CETIRIZINE HYDROCHLORIDE 10 MG/1
10 TABLET ORAL DAILY
Qty: 90 TABLET | Refills: 3 | Status: SHIPPED | OUTPATIENT
Start: 2024-08-06

## 2024-08-06 RX ORDER — FLUOXETINE 40 MG/1
40 CAPSULE ORAL DAILY
Qty: 90 CAPSULE | Refills: 3 | Status: SHIPPED | OUTPATIENT
Start: 2024-08-06

## 2024-08-06 RX ORDER — FAMOTIDINE 40 MG/1
40 TABLET, FILM COATED ORAL 2 TIMES DAILY
Qty: 180 TABLET | Refills: 3 | Status: SHIPPED | OUTPATIENT
Start: 2024-08-06

## 2024-08-06 SDOH — HEALTH STABILITY: PHYSICAL HEALTH: ON AVERAGE, HOW MANY MINUTES DO YOU ENGAGE IN EXERCISE AT THIS LEVEL?: 30 MIN

## 2024-08-06 SDOH — HEALTH STABILITY: PHYSICAL HEALTH: ON AVERAGE, HOW MANY DAYS PER WEEK DO YOU ENGAGE IN MODERATE TO STRENUOUS EXERCISE (LIKE A BRISK WALK)?: 3 DAYS

## 2024-08-06 ASSESSMENT — SOCIAL DETERMINANTS OF HEALTH (SDOH): HOW OFTEN DO YOU GET TOGETHER WITH FRIENDS OR RELATIVES?: NEVER

## 2024-08-06 ASSESSMENT — ASTHMA QUESTIONNAIRES
QUESTION_4 LAST FOUR WEEKS HOW OFTEN HAVE YOU USED YOUR RESCUE INHALER OR NEBULIZER MEDICATION (SUCH AS ALBUTEROL): ONCE A WEEK OR LESS
ACT_TOTALSCORE: 22
QUESTION_2 LAST FOUR WEEKS HOW OFTEN HAVE YOU HAD SHORTNESS OF BREATH: ONCE OR TWICE A WEEK
QUESTION_1 LAST FOUR WEEKS HOW MUCH OF THE TIME DID YOUR ASTHMA KEEP YOU FROM GETTING AS MUCH DONE AT WORK, SCHOOL OR AT HOME: NONE OF THE TIME
QUESTION_5 LAST FOUR WEEKS HOW WOULD YOU RATE YOUR ASTHMA CONTROL: WELL CONTROLLED
ACT_TOTALSCORE: 22
QUESTION_3 LAST FOUR WEEKS HOW OFTEN DID YOUR ASTHMA SYMPTOMS (WHEEZING, COUGHING, SHORTNESS OF BREATH, CHEST TIGHTNESS OR PAIN) WAKE YOU UP AT NIGHT OR EARLIER THAN USUAL IN THE MORNING: NOT AT ALL

## 2024-08-06 NOTE — LETTER
August 12, 2024      Myriam L Jason  [address]  [facility] [address]        Dear ,    We are writing to inform you of your test results.    Your recent results show the following:  -Cholesterol levels (LDL,HDL, Triglycerides) are borderline elevated.  Encourage diet and exercise, would like to avoid prescription medications if motivated to make lifestyle changes.  ADVISE: rechecking in 1 year.  -Kidney function is normal (Cr, GFR), Sodium is normal, Potassium is normal, Calcium is normal, Glucose is normal.  -TSH (thyroid stimulating hormone) level is normal which indicates normal thyroid function.  -Vitamin B12 result is normal  For additional lab test information, labtestsonline.org is an excellent reference.    Resulted Orders   Vitamin B12   Result Value Ref Range    Vitamin B12 538 232 - 1,245 pg/mL   TSH with free T4 reflex   Result Value Ref Range    TSH 1.19 0.30 - 4.20 uIU/mL   Lipid panel reflex to direct LDL Fasting   Result Value Ref Range    Cholesterol 190 <200 mg/dL    Triglycerides 155 (H) <150 mg/dL    Direct Measure HDL 54 >=50 mg/dL    LDL Cholesterol Calculated 105 (H) <=100 mg/dL    Non HDL Cholesterol 136 (H) <130 mg/dL    Patient Fasting > 8hrs? Yes     Narrative    Cholesterol  Desirable:  <200 mg/dL    Triglycerides  Normal:  Less than 150 mg/dL  Borderline High:  150-199 mg/dL  High:  200-499 mg/dL  Very High:  Greater than or equal to 500 mg/dL    Direct Measure HDL  Female:  Greater than or equal to 50 mg/dL   Male:  Greater than or equal to 40 mg/dL    LDL Cholesterol  Desirable:  <100mg/dL  Above Desirable:  100-129 mg/dL   Borderline High:  130-159 mg/dL   High:  160-189 mg/dL   Very High:  >= 190 mg/dL    Non HDL Cholesterol  Desirable:  130 mg/dL  Above Desirable:  130-159 mg/dL  Borderline High:  160-189 mg/dL  High:  190-219 mg/dL  Very High:  Greater than or equal to 220 mg/dL   Basic metabolic panel  (Ca, Cl, CO2, Creat, Gluc, K, Na, BUN)   Result Value Ref  Range    Sodium 138 135 - 145 mmol/L    Potassium 4.4 3.4 - 5.3 mmol/L    Chloride 103 98 - 107 mmol/L    Carbon Dioxide (CO2) 22 22 - 29 mmol/L    Anion Gap 13 7 - 15 mmol/L    Urea Nitrogen 9.0 6.0 - 20.0 mg/dL    Creatinine 0.68 0.51 - 0.95 mg/dL    GFR Estimate >90 >60 mL/min/1.73m2      Comment:      eGFR calculated using 2021 CKD-EPI equation.    Calcium 9.0 8.8 - 10.4 mg/dL      Comment:      Reference intervals for this test were updated on 7/16/2024 to reflect our healthy population more accurately. There may be differences in the flagging of prior results with similar values performed with this method. Those prior results can be interpreted in the context of the updated reference intervals.    Glucose 75 70 - 99 mg/dL    Patient Fasting > 8hrs? Yes        If you have any questions or concerns, please call the clinic at the number listed above.       Sincerely,      Dharmesh Shultz, DO

## 2024-08-06 NOTE — PATIENT INSTRUCTIONS
Gera Miguel,    Thank you for allowing [facility] to manage your care.    I ordered some blood work, please go to the laboratory to get your laboratory studies.    I sent your prescriptions to your pharmacy.    I ordered a mammogram, please call diagnostic imaging [phone] to schedule your test.    I made a audiology referral, they will be calling in approximately 1 week to set up your appointment.  If you do not hear from them, please call the specialty number on your after visit.     For your convenience, test results are released as soon as they are available  Please allow 1-2 business days for me to send you a comment about your results.  If not done so, I encourage you to login into [employer] ([email or web address] to review your results in real time.     If you have any questions or concerns, please feel free to call us at [phone].    Sincerely,    Dr. Shultz    Did you know?      You can schedule a video visit for follow-up appointments as well as future appointments for certain conditions.  Please see the below link.     [email or web address]    If you have not already done so,  I encourage you to sign up for [employer] ([email or web address]).  This will allow you to review your results, securely communicate with a provider, and schedule virtual visits as well.      Patient Education   Preventive Care Advice   This is general advice given by our system to help you stay healthy. However, your care team may have specific advice just for you. Please talk to your care team about your preventive care needs.  Nutrition  Eat 5 or more servings of fruits and vegetables each day.  Try wheat bread, brown rice and whole grain pasta (instead of white bread, rice, and pasta).  Get enough calcium and vitamin D. Check the label on foods and aim for 100% of the RDA (recommended daily allowance).  Lifestyle  Exercise at least 150 minutes each  week  (30 minutes a day, 5 days a week).  Do muscle strengthening activities 2 days a week. These help control your weight and prevent disease.  No smoking.  Wear sunscreen to prevent skin cancer.  Have a dental exam and cleaning every 6 months.  Yearly exams  See your health care team every year to talk about:  Any changes in your health.  Any medicines your care team has prescribed.  Preventive care, family planning, and ways to prevent chronic diseases.  Shots (vaccines)   HPV shots (up to age 26), if you've never had them before.  Hepatitis B shots (up to age 59), if you've never had them before.  COVID-19 shot: Get this shot when it's due.  Flu shot: Get a flu shot every year.  Tetanus shot: Get a tetanus shot every 10 years.  Pneumococcal, hepatitis A, and RSV shots: Ask your care team if you need these based on your risk.  Shingles shot (for age 50 and up)  General health tests  Diabetes screening:  Starting at age 35, Get screened for diabetes at least every 3 years.  If you are younger than age 35, ask your care team if you should be screened for diabetes.  Cholesterol test: At age 39, start having a cholesterol test every 5 years, or more often if advised.  Bone density scan (DEXA): At age 50, ask your care team if you should have this scan for osteoporosis (brittle bones).  Hepatitis C: Get tested at least once in your life.  STIs (sexually transmitted infections)  Before age 24: Ask your care team if you should be screened for STIs.  After age 24: Get screened for STIs if you're at risk. You are at risk for STIs (including HIV) if:  You are sexually active with more than one person.  You don't use condoms every time.  You or a partner was diagnosed with a sexually transmitted infection.  If you are at risk for HIV, ask about PrEP medicine to prevent HIV.  Get tested for HIV at least once in your life, whether you are at risk for HIV or not.  Cancer screening tests  Cervical cancer screening: If you have  a cervix, begin getting regular cervical cancer screening tests starting at age 21.  Breast cancer scan (mammogram): If you've ever had breasts, begin having regular mammograms starting at age 40. This is a scan to check for breast cancer.  Colon cancer screening: It is important to start screening for colon cancer at age 45.  Have a colonoscopy test every 10 years (or more often if you're at risk) Or, ask your provider about stool tests like a FIT test every year or Cologuard test every 3 years.  To learn more about your testing options, visit:   .  For help making a decision, visit:   https://bit.ly/qf00819.  Prostate cancer screening test: If you have a prostate, ask your care team if a prostate cancer screening test (PSA) at age 55 is right for you.  Lung cancer screening: If you are a current or former smoker ages 50 to 80, ask your care team if ongoing lung cancer screenings are right for you.  For informational purposes only. Not to replace the advice of your health care provider. Copyright   2023 [employer]. All rights reserved. Clinically reviewed by the [facility]. [employer] 718490 - REV 01/24.  Learning About Depression Screening  What is depression screening?  Depression screening is a way to see if you have depression symptoms. It may be done by a doctor or counselor. It's often part of a routine checkup. That's because your mental health is just as important as your physical health.  Depression is a mental health condition that affects how you feel, think, and act. You may:  Have less energy.  Lose interest in your daily activities.  Feel sad and grouchy for a long time.  Depression is very common. It affects people of all ages.  Many things can lead to depression. Some people become depressed after they have a stroke or find out they have a major illness like cancer or heart disease. The death of a loved one or a breakup may lead to depression. It can run in  "families. Most experts believe that a combination of inherited genes and stressful life events can cause it.  What happens during screening?  You may be asked to fill out a form about your depression symptoms. You and the doctor will discuss your answers. The doctor may ask you more questions to learn more about how you think, act, and feel.  What happens after screening?  If you have symptoms of depression, your doctor will talk to you about your options.  Doctors usually treat depression with medicines or counseling. Often, combining the two works best. Many people don't get help because they think that they'll get over the depression on their own. But people with depression may not get better unless they get treatment.  The cause of depression is not well understood. There may be many factors involved. But if you have depression, it's not your fault.  A serious symptom of depression is thinking about death or suicide. If you or someone you care about talks about this or about feeling hopeless, get help right away.  It's important to know that depression can be treated. Medicine, counseling, and self-care may help.  Where can you learn more?  Go to https://www.[employer].net/patiented  Enter T185 in the search box to learn more about \"Learning About Depression Screening.\"  Current as of: June 24, 2023  Content Version: 14.1 2006-2024 [employer].   Care instructions adapted under license by your healthcare professional. If you have questions about a medical condition or this instruction, always ask your healthcare professional. [employer] disclaims any warranty or liability for your use of this information.       "

## 2024-08-06 NOTE — PROGRESS NOTES
Preventive Care Visit  [facility] EBONI Shultz DO, Family Medicine  Aug 6, 2024      1. Routine general medical examination at a health care facility    2. Gastroesophageal reflux disease without esophagitis  Chronic and stable.   - famotidine (PEPCID) 40 MG tablet; Take 1 tablet (40 mg) by mouth 2 times daily  Dispense: 180 tablet; Refill: 3    3. Mild recurrent major depression (H24)  Chronic and stable.   - FLUoxetine (PROZAC) 40 MG capsule; Take 1 capsule (40 mg) by mouth daily  Dispense: 90 capsule; Refill: 3    4. Seasonal allergic rhinitis, unspecified trigger  Chronic and stable.   - cetirizine (ZYRTEC) 10 MG tablet; Take 1 tablet (10 mg) by mouth daily  Dispense: 90 tablet; Refill: 3    5. Visit for screening mammogram  - MA Screening Bilateral w/ Mustapha; Future    6. Numbness and tingling in left hand  - TSH with free T4 reflex; Future  - Vitamin B12; Future  - Vitamin B12  - TSH with free T4 reflex    7. Lipid screening  - Lipid panel reflex to direct LDL Fasting; Future  - Lipid panel reflex to direct LDL Fasting    8. Screening for diabetes mellitus  - Basic metabolic panel  (Ca, Cl, CO2, Creat, Gluc, K, Na, BUN); Future  - Basic metabolic panel  (Ca, Cl, CO2, Creat, Gluc, K, Na, BUN)    9. Decreased hearing of both ears  - Adult Audiology  Referral; Future      Subjective   Myriam is a 43 year old, presenting for the following:  Physical and Recheck Medication        8/6/2024     9:21 AM   Additional Questions   Roomed by Svetlana   Accompanied by self        Health Care Directive  Patient does not have a Health Care Directive or Living Will: Patient states has Advance Directive and will bring in a copy to clinic.    HPI  Patient has concerns of hearing with background noises, and left hand pains, and Alzheimer's concerns            8/6/2024   General Health   How would you rate your overall physical health? Good   Feel stress (tense, anxious, or unable to sleep) Only a  little      (!) STRESS CONCERN      8/6/2024   Nutrition   Three or more servings of calcium each day? Yes   Diet: Other   If other, please elaborate: no red meat low fat   How many servings of fruit and vegetables per day? (!) 2-3   How many sweetened beverages each day? 0-1            8/6/2024   Exercise   Days per week of moderate/strenous exercise 3 days   Average minutes spent exercising at this level 30 min            8/6/2024   Social Factors   Frequency of gathering with friends or relatives Never   Worry food won't last until get money to buy more No   Food not last or not have enough money for food? No   Do you have housing? (Housing is defined as stable permanent housing and does not include staying ouside in a car, in a tent, in an abandoned building, in an overnight shelter, or couch-surfing.) Yes   Are you worried about losing your housing? Yes   Lack of transportation? No   Unable to get utilities (heat,electricity)? No   Want help with housing or utility concern? No      (!) HOUSING CONCERN PRESENT(!) SOCIAL CONNECTIONS CONCERN      8/6/2024   Dental   Dentist two times every year? (!) NO            8/6/2024   TB Screening   Were you born outside of the [place]? No          Today's PHQ-9 Score:       8/6/2024     9:02 AM   PHQ-9 SCORE   PHQ-9 Total Score MyChart 7 (Mild depression)   PHQ-9 Total Score 7         8/6/2024   Substance Use   Alcohol more than 3/day or more than 7/wk No   Do you use any other substances recreationally? No        Social History     Tobacco Use    Smoking status: Never     Passive exposure: Never    Smokeless tobacco: Never   Vaping Use    Vaping status: Never Used   Substance Use Topics    Alcohol use: No    Drug use: No          Mammogram Screening - Mammogram every 1-2 years updated in Health Maintenance based on mutual decision making        8/6/2024   STI Screening   New sexual partner(s) since last STI/HIV test? No        History of abnormal Pap smear: No - age 30- 64 PAP  "with HPV every 5 years recommended        Latest Ref Rng & Units 10/11/2021     8:08 AM 9/8/2014    12:00 AM 2/18/2011    11:21 AM   PAP / HPV   PAP  Negative for Intraepithelial Lesion or Malignancy (NILM)      PAP (Historical)   NIL  NIL    HPV 16 DNA Negative Negative      HPV 18 DNA Negative Negative      Other HR HPV Negative Negative        ASCVD Risk   The 10-year ASCVD risk score (Cornel CARROLL, et al., 2019) is: 0.3%    Values used to calculate the score:      Age: 43 years      Sex: Female      Is Non- : No      Diabetic: No      Tobacco smoker: No      Systolic Blood Pressure: 100 mmHg      Is BP treated: No      HDL Cholesterol: 57 mg/dL      Total Cholesterol: 177 mg/dL       Reviewed and updated as needed this visit by Provider                    Past Medical History:   Diagnosis Date    Anxiety disorder     Chondromalacia of patella     Depression     Uncomplicated asthma      Past Surgical History:   Procedure Laterality Date    COLONOSCOPY  3/1/2011    flex sig    COLONOSCOPY      colonoscopy    COLONOSCOPY      COMBINED ESOPHAGOSCOPY, GASTROSCOPY, DUODENOSCOPY (EGD) WITH CO2 INSUFFLATION N/A 2/22/2022    Procedure: ESOPHAGOGASTRODUODENOSCOPY, WITH CO2 INSUFFLATION;  Surgeon: Russell Patel DO;  Location: [facility] OR    ESSURE TUBAL LIGATION  2008    TUBAL LIGATION  12/08    essure occlusion       Physical exam    2. Left hand pain: Started in January.  Unchanged.  Pain.  Gets worse with light touch (example blanket touching).  No trauma.  Right handed (ambidextrous).  No repetitive motions.     3. Bilateral hearing concerns.  Couple months.  Hard to decipher background noise.    4. Family History of Alzheimer's: Mother's side.  Patient would like it to be added to her family history.          Objective    Exam  /68   Pulse 92   Temp 97.3  F (36.3  C) (Temporal)   Resp 16   Ht 1.734 m (5' 8.27\")   Wt 84.6 kg (186 lb 9.6 oz)   LMP 07/15/2024 (Within Days)  " " SpO2 100%   BMI 28.15 kg/m     Estimated body mass index is 28.15 kg/m  as calculated from the following:    Height as of this encounter: 1.734 m (5' 8.27\").    Weight as of this encounter: 84.6 kg (186 lb 9.6 oz).    Physical Exam  Constitutional:       General: She is not in acute distress.     Appearance: She is not diaphoretic.   HENT:      Head: Normocephalic and atraumatic.      Right Ear: External ear normal.      Left Ear: External ear normal.      Mouth/Throat:      Pharynx: No oropharyngeal exudate.   Eyes:      General:         Right eye: No discharge.         Left eye: No discharge.      Conjunctiva/sclera: Conjunctivae normal.      Pupils: Pupils are equal, round, and reactive to light.   Neck:      Thyroid: No thyromegaly.      Trachea: No tracheal deviation.   Cardiovascular:      Rate and Rhythm: Normal rate and regular rhythm.      Heart sounds: Normal heart sounds. No murmur heard.  Pulmonary:      Effort: Pulmonary effort is normal. No respiratory distress.      Breath sounds: Normal breath sounds. No wheezing or rales.   Abdominal:      General: There is no distension.      Palpations: Abdomen is soft. There is no mass.      Tenderness: There is no abdominal tenderness. There is no guarding or rebound.   Musculoskeletal:         General: No deformity. Normal range of motion.      Cervical back: Normal range of motion and neck supple.      Comments: Left wrist: Negative Tinnels   Lymphadenopathy:      Cervical: No cervical adenopathy.   Skin:     General: Skin is warm.      Findings: No erythema or rash.   Neurological:      Mental Status: She is alert and oriented to person, place, and time.      Cranial Nerves: No cranial nerve deficit.      Coordination: Coordination normal.   Psychiatric:         Judgment: Judgment normal.         Signed Electronically by: Dharmesh Shultz DO    Answers submitted by the patient for this visit:  Patient Health Questionnaire (Submitted on 8/6/2024)  If you " checked off any problems, how difficult have these problems made it for you to do your work, take care of things at home, or get along with other people?: Somewhat difficult  PHQ9 TOTAL SCORE: 7

## 2024-08-06 NOTE — LETTER
My Asthma Action Plan    Name: Sommer Gomez   YOB: 1980  Date: 8/6/2024   My doctor: Dharmesh Shultz, DO   [facility] clinic: [facility]        My Rescue Medicine:   Albuterol inhaler (Proair/Ventolin/Proventil HFA)  2-4 puffs EVERY 4 HOURS as needed. Use a spacer if recommended by your provider.   My Asthma Severity:   Intermittent / Exercise Induced  Know your asthma triggers:  strong odors and fumes, exercise/activity            GREEN ZONE   Good Control  I feel good  No cough or wheeze  Can work, sleep and play without asthma symptoms       Take your asthma control medicine every day.     If exercise triggers your asthma, take your rescue medication  15 minutes before exercise or sports, and  During exercise if you have asthma symptoms  Spacer to use with inhaler: If you have a spacer, make sure to use it with your inhaler             YELLOW ZONE Getting Worse  I have ANY of these:  I do not feel good  Cough or wheeze  Chest feels tight  Wake up at night   Keep taking your Green Zone medications  Start taking your rescue medicine:  every 20 minutes for up to 1 hour. Then every 4 hours for 24-48 hours.  If you stay in the Yellow Zone for more than 12-24 hours, contact your doctor.  If you do not return to the Green Zone in 12-24 hours or you get worse, start taking your oral steroid medicine if prescribed by your provider.           RED ZONE Medical Alert - Get Help  I have ANY of these:  I feel awful  Medicine is not helping  Breathing getting harder  Trouble walking or talking  Nose opens wide to breathe       Take your rescue medicine NOW  If your provider has prescribed an oral steroid medicine, start taking it NOW  Call your doctor NOW  If you are still in the Red Zone after 20 minutes and you have not reached your doctor:  Take your rescue medicine again and  Call 911 or go to the emergency room right away    See your regular doctor within 2 weeks of an Emergency Room or  Urgent Care visit for follow-up treatment.          Annual Reminders:  Meet with Asthma Educator,  Flu Shot in the Fall, consider Pneumonia Vaccination for patients with asthma (aged 19 and older).    Pharmacy: [facility] [address] [place] [address] [id number] [place]    Electronically signed by Dharmesh Shultz DO   Date: 08/06/24                    Asthma Triggers  How To Control Things That Make Your Asthma Worse    Triggers are things that make your asthma worse.  Look at the list below to help you find your triggers and   what you can do about them. You can help prevent asthma flare-ups by staying away from your triggers.      Trigger                                                          What you can do   Cigarette Smoke  Tobacco smoke can make asthma worse. Do not allow smoking in your home, car or around you.  Be sure no one smokes at a child s day care or school.  If you smoke, ask your health care provider for ways to help you quit.  Ask family members to quit too.  Ask your health care provider for a referral to Quit Plan to help you quit smoking, or call 7-546-560-PLAN.     Colds, Flu, Bronchitis  These are common triggers of asthma. Wash your hands often.  Don t touch your eyes, nose or mouth.  Get a flu shot every year.     Dust Mites  These are tiny bugs that live in cloth or carpet. They are too small to see. Wash sheets and blankets in hot water every week.   Encase pillows and mattress in dust mite proof covers.  Avoid having carpet if you can. If you have carpet, vacuum weekly.   Use a dust mask and HEPA vacuum.   Pollen and Outdoor Mold  Some people are allergic to trees, grass, or weed pollen, or molds. Try to keep your windows closed.  Limit time out doors when pollen count is high.   Ask you health care provider about taking medicine during allergy season.     Animal Dander  Some people are allergic to skin flakes, urine or saliva from pets with fur or feathers. Keep pets with fur or  feathers out of your home.    If you can t keep the pet outdoors, then keep the pet out of your bedroom.  Keep the bedroom door closed.  Keep pets off cloth furniture and away from stuffed toys.     Mice, Rats, and Cockroaches  Some people are allergic to the waste from these pests.   Cover food and garbage.  Clean up spills and food crumbs.  Store grease in the refrigerator.   Keep food out of the bedroom.   Indoor Mold  This can be a trigger if your home has high moisture. Fix leaking faucets, pipes, or other sources of water.   Clean moldy surfaces.  Dehumidify basement if it is damp and smelly.   Smoke, Strong Odors, and Sprays  These can reduce air quality. Stay away from strong odors and sprays, such as perfume, powder, hair spray, paints, smoke incense, paint, cleaning products, candles and new carpet.   Exercise or Sports  Some people with asthma have this trigger. Be active!  Ask your doctor about taking medicine before sports or exercise to prevent symptoms.    Warm up for 5-10 minutes before and after sports or exercise.     Other Triggers of Asthma  Cold air:  Cover your nose and mouth with a scarf.  Sometimes laughing or crying can be a trigger.  Some medicines and food can trigger asthma.

## 2024-08-07 LAB
ANION GAP SERPL CALCULATED.3IONS-SCNC: 13 MMOL/L (ref 7–15)
BUN SERPL-MCNC: 9 MG/DL (ref 6–20)
CALCIUM SERPL-MCNC: 9 MG/DL (ref 8.8–10.4)
CHLORIDE SERPL-SCNC: 103 MMOL/L (ref 98–107)
CHOLEST SERPL-MCNC: 190 MG/DL
CREAT SERPL-MCNC: 0.68 MG/DL (ref 0.51–0.95)
EGFRCR SERPLBLD CKD-EPI 2021: >90 ML/MIN/1.73M2
FASTING STATUS PATIENT QL REPORTED: YES
FASTING STATUS PATIENT QL REPORTED: YES
GLUCOSE SERPL-MCNC: 75 MG/DL (ref 70–99)
HCO3 SERPL-SCNC: 22 MMOL/L (ref 22–29)
HDLC SERPL-MCNC: 54 MG/DL
LDLC SERPL CALC-MCNC: 105 MG/DL
NONHDLC SERPL-MCNC: 136 MG/DL
POTASSIUM SERPL-SCNC: 4.4 MMOL/L (ref 3.4–5.3)
SODIUM SERPL-SCNC: 138 MMOL/L (ref 135–145)
TRIGL SERPL-MCNC: 155 MG/DL
TSH SERPL DL<=0.005 MIU/L-ACNC: 1.19 UIU/ML (ref 0.3–4.2)
VIT B12 SERPL-MCNC: 538 PG/ML (ref 232–1245)

## 2024-08-08 ENCOUNTER — THERAPY VISIT (OUTPATIENT)
Dept: PHYSICAL THERAPY | Facility: CLINIC | Age: 44
End: 2024-08-08
Attending: PEDIATRICS
Payer: COMMERCIAL

## 2024-08-08 DIAGNOSIS — S93.491A SPRAIN OF OTHER LIGAMENT OF RIGHT ANKLE, INITIAL ENCOUNTER: Primary | ICD-10-CM

## 2024-08-08 PROCEDURE — 97110 THERAPEUTIC EXERCISES: CPT | Mod: GP | Performed by: PHYSICAL THERAPIST

## 2024-08-23 ENCOUNTER — THERAPY VISIT (OUTPATIENT)
Dept: PHYSICAL THERAPY | Facility: CLINIC | Age: 44
End: 2024-08-23
Payer: COMMERCIAL

## 2024-08-23 DIAGNOSIS — S93.491A SPRAIN OF OTHER LIGAMENT OF RIGHT ANKLE, INITIAL ENCOUNTER: Primary | ICD-10-CM

## 2024-08-23 PROCEDURE — 97110 THERAPEUTIC EXERCISES: CPT | Mod: GP | Performed by: PHYSICAL THERAPIST

## 2024-08-23 NOTE — PROGRESS NOTES
08/23/24 0500   Appointment Info   Signing clinician's name / credentials Doc Rainey DPT   Total/Authorized Visits 12   Visits Used 4   Medical Diagnosis R ankle sprain   PT Tx Diagnosis R ankle sprain   Quick Adds Certification   Progress Note/Certification   Start of Care Date 07/10/24   Onset of illness/injury or Date of Surgery 06/19/24   Therapy Frequency 1x/wk   Predicted Duration 12wks   Certification date from 07/10/24   Certification date to 10/02/24   Progress Note Due Date 10/04/24   Progress Note Completed Date 08/23/24   PT Goal 1   Goal Identifier walking   Goal Description pt will be able to walk 30mins painfree including stairs ascent and descent of stairs with reciprocal pattern painfree.   Rationale to maximize safety and independence within the community;to maximize safety and independence within the home   Target Date 10/02/24   Subjective Report   Subjective Report pt reports the ankle still hurts, feels it is getting better but feels like she is doing more to get it better, like exercises. she has had to walk hills and uneven surfaces. has been kicking a soccerball with 4yr old too which hurt. feels balance is improving. feels at rest her R foot wants to turn inward.   Objective Measures   Objective Measures Objective Measure 2;Objective Measure 3   Objective Measure 1   Objective Measure AROM R ankle   Details df: -2degs pf: 58degs inv: 34degs ev: 16degs   Objective Measure 2   Objective Measure gait   Details reciprocal pattern on ascent and descent without handrail with mild antalgia and B lateral pelvic drop on level and stairs   Objective Measure 3   Objective Measure special tests and TTP   Details TTP ATFL on R, anterior drawer painful over ATFL   Therapeutic Procedure/Exercise   Therapeutic Procedures: strength, endurance, ROM, flexibility minutes ([id number]) 30   Ther Proc 1 bike SH 7 x6mins   Ther Proc 2 ankle DF/PF RTB [id number] each   Ther Proc 3 ankle INV/EV RTB [id number] each    Ther Proc 4 SLS on half foam 30s each, blue foam 30s each, red disc 30s each, 1-2 touches on red disc   Ther Proc 5 reviewed gastroc and soleus stretches   Skilled Intervention cues   Patient Response/Progress painfree   Plan   Plan for next session next BAPS board, towel crunches, balance   Total Session Time   Timed Code Treatment Minutes 30   Total Treatment Time (sum of timed and untimed services) 30       LEFS: 53/80 (66%) improved from 50%    PLAN  Pt has been seen for 4 PT visits for R lateral ankle sprain with some fair to good improvement in activity tolerance and balance but DF/PF is about the same or slightly worse, currently we plan to continue PT for further proprioception/balance, ankle and global strength.    Beginning/End Dates of Progress Note Reporting Period:  7/10/24 to 08/23/2024    Referring Provider:  Sarah Reynoso

## 2024-08-29 ENCOUNTER — THERAPY VISIT (OUTPATIENT)
Dept: PHYSICAL THERAPY | Facility: CLINIC | Age: 44
End: 2024-08-29
Payer: COMMERCIAL

## 2024-08-29 DIAGNOSIS — S93.491A SPRAIN OF OTHER LIGAMENT OF RIGHT ANKLE, INITIAL ENCOUNTER: Primary | ICD-10-CM

## 2024-08-29 PROCEDURE — 97110 THERAPEUTIC EXERCISES: CPT | Mod: GP | Performed by: PHYSICAL THERAPIST

## 2024-09-05 ENCOUNTER — THERAPY VISIT (OUTPATIENT)
Dept: PHYSICAL THERAPY | Facility: CLINIC | Age: 44
End: 2024-09-05
Payer: COMMERCIAL

## 2024-09-05 DIAGNOSIS — S93.491A SPRAIN OF OTHER LIGAMENT OF RIGHT ANKLE, INITIAL ENCOUNTER: Primary | ICD-10-CM

## 2024-09-05 PROCEDURE — 97110 THERAPEUTIC EXERCISES: CPT | Mod: GP | Performed by: PHYSICAL THERAPIST

## 2024-09-10 ENCOUNTER — ANCILLARY PROCEDURE (OUTPATIENT)
Dept: MAMMOGRAPHY | Facility: CLINIC | Age: 44
End: 2024-09-10
Attending: FAMILY MEDICINE
Payer: COMMERCIAL

## 2024-09-10 DIAGNOSIS — Z12.31 VISIT FOR SCREENING MAMMOGRAM: ICD-10-CM

## 2024-09-10 PROCEDURE — 77063 BREAST TOMOSYNTHESIS BI: CPT | Mod: TC | Performed by: RADIOLOGY

## 2024-09-10 PROCEDURE — 77067 SCR MAMMO BI INCL CAD: CPT | Mod: TC | Performed by: RADIOLOGY

## 2024-09-12 ENCOUNTER — THERAPY VISIT (OUTPATIENT)
Dept: PHYSICAL THERAPY | Facility: CLINIC | Age: 44
End: 2024-09-12
Payer: COMMERCIAL

## 2024-09-12 DIAGNOSIS — S93.491A SPRAIN OF OTHER LIGAMENT OF RIGHT ANKLE, INITIAL ENCOUNTER: Primary | ICD-10-CM

## 2024-09-12 PROCEDURE — 97110 THERAPEUTIC EXERCISES: CPT | Mod: GP | Performed by: PHYSICAL THERAPIST

## 2024-09-12 PROCEDURE — 97112 NEUROMUSCULAR REEDUCATION: CPT | Mod: GP | Performed by: PHYSICAL THERAPIST

## 2024-09-16 ENCOUNTER — ANCILLARY PROCEDURE (OUTPATIENT)
Dept: MAMMOGRAPHY | Facility: CLINIC | Age: 44
End: 2024-09-16
Attending: FAMILY MEDICINE
Payer: COMMERCIAL

## 2024-09-16 ENCOUNTER — ANCILLARY PROCEDURE (OUTPATIENT)
Dept: ULTRASOUND IMAGING | Facility: CLINIC | Age: 44
End: 2024-09-16
Attending: FAMILY MEDICINE
Payer: COMMERCIAL

## 2024-09-16 DIAGNOSIS — R92.8 ABNORMAL MAMMOGRAM: ICD-10-CM

## 2024-09-16 PROCEDURE — 76642 ULTRASOUND BREAST LIMITED: CPT | Mod: RT

## 2024-09-16 PROCEDURE — G0279 TOMOSYNTHESIS, MAMMO: HCPCS

## 2024-09-16 PROCEDURE — 77065 DX MAMMO INCL CAD UNI: CPT | Mod: RT

## 2025-02-18 ENCOUNTER — VIRTUAL VISIT (OUTPATIENT)
Dept: FAMILY MEDICINE | Facility: CLINIC | Age: 45
End: 2025-02-18
Payer: COMMERCIAL

## 2025-02-18 DIAGNOSIS — Z78.9 NON-SMOKER: ICD-10-CM

## 2025-02-18 DIAGNOSIS — J20.9 ACUTE BRONCHITIS WITH SYMPTOMS > 10 DAYS: Primary | ICD-10-CM

## 2025-02-18 PROCEDURE — 98005 SYNCH AUDIO-VIDEO EST LOW 20: CPT | Performed by: STUDENT IN AN ORGANIZED HEALTH CARE EDUCATION/TRAINING PROGRAM

## 2025-02-18 RX ORDER — PREDNISONE 20 MG/1
20 TABLET ORAL DAILY
Qty: 5 TABLET | Refills: 0 | Status: SHIPPED | OUTPATIENT
Start: 2025-02-18

## 2025-02-18 RX ORDER — BENZONATATE 100 MG/1
100 CAPSULE ORAL 3 TIMES DAILY PRN
Qty: 42 CAPSULE | Refills: 0 | Status: SHIPPED | OUTPATIENT
Start: 2025-02-18

## 2025-02-18 RX ORDER — AZITHROMYCIN 250 MG/1
TABLET, FILM COATED ORAL
Qty: 6 TABLET | Refills: 0 | Status: SHIPPED | OUTPATIENT
Start: 2025-02-18 | End: 2025-02-23

## 2025-02-18 ASSESSMENT — PATIENT HEALTH QUESTIONNAIRE - PHQ9
SUM OF ALL RESPONSES TO PHQ QUESTIONS 1-9: 10
10. IF YOU CHECKED OFF ANY PROBLEMS, HOW DIFFICULT HAVE THESE PROBLEMS MADE IT FOR YOU TO DO YOUR WORK, TAKE CARE OF THINGS AT HOME, OR GET ALONG WITH OTHER PEOPLE: SOMEWHAT DIFFICULT
SUM OF ALL RESPONSES TO PHQ QUESTIONS 1-9: 10

## 2025-02-18 NOTE — PROGRESS NOTES
"Myriam is a 44 year old who is being evaluated via a billable video visit.    How would you like to obtain your AVS? MyChart  If the video visit is dropped, the invitation should be resent by: Text to cell phone: [phone]  Will anyone else be joining your video visit? No      Assessment & Plan     Acute bronchitis with symptoms > 10 days  uncontrolled  - XR Chest 2 Views; Future  - predniSONE (DELTASONE) 20 MG tablet; Take 1 tablet (20 mg) by mouth daily. With food  - azithromycin (ZITHROMAX) 250 MG tablet; Take 2 tablets (500 mg) by mouth daily for 1 day, THEN 1 tablet (250 mg) daily for 4 days.  - benzonatate (TESSALON) 100 MG capsule; Take 1 capsule (100 mg) by mouth 3 times daily as needed.  The risks, benefits and treatment options of prescribed medications or other treatments have been discussed with the patient. The patient verbalized their understanding and should call or follow up if no improvement or if they develop further problems    Non-smoker                    Subjective   Myriam is a 44 year old, presenting for the following health issues:  URI        2/18/2025     1:55 PM   Additional Questions   Roomed by Xochitl   Accompanied by Self check in           Patient arrived to discuss symptoms of Cough.     URI    History of Present Illness       Reason for visit:  Cough won't go away, harder to breathe than usual  Symptom onset:  1-2 weeks ago  Symptoms include:  Congestion in nose,  throat, and impacting breathing, coughing \"fits\" that make it hard to breath and others question if I'm OK  Symptom intensity:  Moderate  Symptom progression:  Staying the same  Had these symptoms before:  No  What makes it worse:  Laying on my back at an angle or flat, laughing, being physically active.  What makes it better:  Warm drinks help break up the congestion.   She is taking medications regularly.   Symptoms ongoing for a month    Review of Systems  Constitutional, HEENT, cardiovascular, pulmonary, GI, , " musculoskeletal, neuro, skin, endocrine and psych systems are negative, except as otherwise noted.      Objective           Vitals:  No vitals were obtained today due to virtual visit.    Physical Exam   GENERAL: alert and no distress  EYES: Eyes grossly normal to inspection.  No discharge or erythema, or obvious scleral/conjunctival abnormalities.  RESP: coughing  SKIN: Visible skin clear. No significant rash, abnormal pigmentation or lesions.  PSYCH: Appropriate affect, tone, and pace of words         Video-Visit Details   Video visit: 1min. 30sec.  Telephone visit: 10minutes 50sec  Type of service:  Video Visit     Originating Location (pt. Location): Home    Distant Location (provider location):  On-site  Platform used for Video Visit: Susan  Signed Electronically by: Katie Boo MD

## 2025-02-18 NOTE — PATIENT INSTRUCTIONS
Gera Miguel,    Thank you for allowing [facility] to manage your care.        I sent your prescriptions to your pharmacy.    Get the Xray done if there is no improvement  For your convenience, test results are released as soon as they are available  Please allow 1-2 business days for me to send you a comment about your results.  If not done so, I encourage you to login into [employer] ([email or web address] to review your results in real time.     If you have any questions or concerns, please feel free to call us at [phone].    Sincerely,    Dr. Boo    Did you know?      You can schedule a video visit for follow-up appointments as well as future appointments for certain conditions.  Please see the below link.     [email or web address]    If you have not already done so,  I encourage you to sign up for [employer] ([email or web address]).  This will allow you to review your results, securely communicate with a provider, and schedule virtual visits as well.

## 2025-08-07 ENCOUNTER — OFFICE VISIT (OUTPATIENT)
Dept: FAMILY MEDICINE | Facility: CLINIC | Age: 45
End: 2025-08-07
Attending: FAMILY MEDICINE
Payer: COMMERCIAL

## 2025-08-07 VITALS
HEIGHT: 68 IN | DIASTOLIC BLOOD PRESSURE: 62 MMHG | BODY MASS INDEX: 29.07 KG/M2 | OXYGEN SATURATION: 98 % | RESPIRATION RATE: 20 BRPM | HEART RATE: 83 BPM | WEIGHT: 191.8 LBS | TEMPERATURE: 97.2 F | SYSTOLIC BLOOD PRESSURE: 104 MMHG

## 2025-08-07 DIAGNOSIS — Z13.1 SCREENING FOR DIABETES MELLITUS: ICD-10-CM

## 2025-08-07 DIAGNOSIS — Z13.220 LIPID SCREENING: ICD-10-CM

## 2025-08-07 DIAGNOSIS — Z13.29 SCREENING FOR THYROID DISORDER: ICD-10-CM

## 2025-08-07 DIAGNOSIS — J30.2 SEASONAL ALLERGIC RHINITIS, UNSPECIFIED TRIGGER: ICD-10-CM

## 2025-08-07 DIAGNOSIS — Z00.00 ROUTINE GENERAL MEDICAL EXAMINATION AT A HEALTH CARE FACILITY: Primary | ICD-10-CM

## 2025-08-07 DIAGNOSIS — K21.9 GASTROESOPHAGEAL REFLUX DISEASE WITHOUT ESOPHAGITIS: ICD-10-CM

## 2025-08-07 DIAGNOSIS — G47.09 OTHER INSOMNIA: ICD-10-CM

## 2025-08-07 DIAGNOSIS — F33.0 MILD RECURRENT MAJOR DEPRESSION: ICD-10-CM

## 2025-08-07 LAB
ANION GAP SERPL CALCULATED.3IONS-SCNC: 12 MMOL/L (ref 7–15)
BUN SERPL-MCNC: 8.9 MG/DL (ref 6–20)
CALCIUM SERPL-MCNC: 8.8 MG/DL (ref 8.8–10.4)
CHLORIDE SERPL-SCNC: 107 MMOL/L (ref 98–107)
CHOLEST SERPL-MCNC: 193 MG/DL
CREAT SERPL-MCNC: 0.73 MG/DL (ref 0.51–0.95)
EGFRCR SERPLBLD CKD-EPI 2021: >90 ML/MIN/1.73M2
FASTING STATUS PATIENT QL REPORTED: YES
FASTING STATUS PATIENT QL REPORTED: YES
GLUCOSE SERPL-MCNC: 88 MG/DL (ref 70–99)
HCO3 SERPL-SCNC: 23 MMOL/L (ref 22–29)
HDLC SERPL-MCNC: 58 MG/DL
LDLC SERPL CALC-MCNC: 119 MG/DL
NONHDLC SERPL-MCNC: 135 MG/DL
POTASSIUM SERPL-SCNC: 4.4 MMOL/L (ref 3.4–5.3)
SODIUM SERPL-SCNC: 142 MMOL/L (ref 135–145)
TRIGL SERPL-MCNC: 78 MG/DL
TSH SERPL DL<=0.005 MIU/L-ACNC: 1.32 UIU/ML (ref 0.3–4.2)

## 2025-08-07 RX ORDER — CETIRIZINE HYDROCHLORIDE 10 MG/1
10 TABLET ORAL DAILY
Qty: 90 TABLET | Refills: 3 | Status: SHIPPED | OUTPATIENT
Start: 2025-08-07

## 2025-08-07 RX ORDER — FLUOXETINE HYDROCHLORIDE 40 MG/1
40 CAPSULE ORAL DAILY
Qty: 90 CAPSULE | Refills: 3 | Status: SHIPPED | OUTPATIENT
Start: 2025-08-07

## 2025-08-07 RX ORDER — FAMOTIDINE 40 MG/1
40 TABLET, FILM COATED ORAL 2 TIMES DAILY
Qty: 180 TABLET | Refills: 3 | Status: SHIPPED | OUTPATIENT
Start: 2025-08-07

## 2025-08-07 SDOH — HEALTH STABILITY: PHYSICAL HEALTH: ON AVERAGE, HOW MANY DAYS PER WEEK DO YOU ENGAGE IN MODERATE TO STRENUOUS EXERCISE (LIKE A BRISK WALK)?: 4 DAYS

## 2025-08-07 SDOH — HEALTH STABILITY: PHYSICAL HEALTH: ON AVERAGE, HOW MANY MINUTES DO YOU ENGAGE IN EXERCISE AT THIS LEVEL?: 20 MIN

## 2025-08-07 ASSESSMENT — ENCOUNTER SYMPTOMS
HEADACHES: 0
COLOR CHANGE: 0
AGITATION: 0
APPETITE CHANGE: 0
DIZZINESS: 0
ENDOCRINE NEGATIVE: 1
SHORTNESS OF BREATH: 0
GASTROINTESTINAL NEGATIVE: 1
NERVOUS/ANXIOUS: 0
PALPITATIONS: 0
WEAKNESS: 0
HEMATOLOGIC/LYMPHATIC NEGATIVE: 1
CHILLS: 0
ALLERGIC/IMMUNOLOGIC NEGATIVE: 1
ACTIVITY CHANGE: 0
FATIGUE: 0
SLEEP DISTURBANCE: 1
EYES NEGATIVE: 1
COUGH: 0
WHEEZING: 0

## 2025-08-07 ASSESSMENT — ANXIETY QUESTIONNAIRES
8. IF YOU CHECKED OFF ANY PROBLEMS, HOW DIFFICULT HAVE THESE MADE IT FOR YOU TO DO YOUR WORK, TAKE CARE OF THINGS AT HOME, OR GET ALONG WITH OTHER PEOPLE?: SOMEWHAT DIFFICULT
4. TROUBLE RELAXING: MORE THAN HALF THE DAYS
1. FEELING NERVOUS, ANXIOUS, OR ON EDGE: SEVERAL DAYS
GAD7 TOTAL SCORE: 6
GAD7 TOTAL SCORE: 6
5. BEING SO RESTLESS THAT IT IS HARD TO SIT STILL: NOT AT ALL
6. BECOMING EASILY ANNOYED OR IRRITABLE: SEVERAL DAYS
3. WORRYING TOO MUCH ABOUT DIFFERENT THINGS: SEVERAL DAYS
GAD7 TOTAL SCORE: 6
7. FEELING AFRAID AS IF SOMETHING AWFUL MIGHT HAPPEN: NOT AT ALL
2. NOT BEING ABLE TO STOP OR CONTROL WORRYING: SEVERAL DAYS
IF YOU CHECKED OFF ANY PROBLEMS ON THIS QUESTIONNAIRE, HOW DIFFICULT HAVE THESE PROBLEMS MADE IT FOR YOU TO DO YOUR WORK, TAKE CARE OF THINGS AT HOME, OR GET ALONG WITH OTHER PEOPLE: SOMEWHAT DIFFICULT
7. FEELING AFRAID AS IF SOMETHING AWFUL MIGHT HAPPEN: NOT AT ALL

## 2025-08-07 ASSESSMENT — PATIENT HEALTH QUESTIONNAIRE - PHQ9
SUM OF ALL RESPONSES TO PHQ QUESTIONS 1-9: 10
SUM OF ALL RESPONSES TO PHQ QUESTIONS 1-9: 10
10. IF YOU CHECKED OFF ANY PROBLEMS, HOW DIFFICULT HAVE THESE PROBLEMS MADE IT FOR YOU TO DO YOUR WORK, TAKE CARE OF THINGS AT HOME, OR GET ALONG WITH OTHER PEOPLE: VERY DIFFICULT

## 2025-08-07 ASSESSMENT — SOCIAL DETERMINANTS OF HEALTH (SDOH): HOW OFTEN DO YOU GET TOGETHER WITH FRIENDS OR RELATIVES?: ONCE A WEEK

## (undated) DEVICE — SOL WATER IRRIG 1000ML BOTTLE 07139-09

## (undated) DEVICE — PREP CHLORAPREP 26ML TINTED ORANGE  260815

## (undated) RX ORDER — FENTANYL CITRATE 50 UG/ML
INJECTION, SOLUTION INTRAMUSCULAR; INTRAVENOUS
Status: DISPENSED
Start: 2022-02-22